# Patient Record
Sex: FEMALE | Race: WHITE | Employment: OTHER | URBAN - METROPOLITAN AREA
[De-identification: names, ages, dates, MRNs, and addresses within clinical notes are randomized per-mention and may not be internally consistent; named-entity substitution may affect disease eponyms.]

---

## 2017-05-24 ENCOUNTER — HOSPITAL ENCOUNTER (OUTPATIENT)
Dept: MAMMOGRAPHY | Age: 72
Discharge: HOME | End: 2017-05-24
Attending: INTERNAL MEDICINE
Payer: MEDICARE

## 2017-05-24 DIAGNOSIS — Z12.31 VISIT FOR SCREENING MAMMOGRAM: ICD-10-CM

## 2017-05-24 PROCEDURE — 77067 SCR MAMMO BI INCL CAD: CPT

## 2017-06-01 ENCOUNTER — HOSPITAL ENCOUNTER (OUTPATIENT)
Dept: MAMMOGRAPHY | Age: 72
Discharge: HOME | End: 2017-06-01
Attending: INTERNAL MEDICINE
Payer: MEDICARE

## 2017-06-01 DIAGNOSIS — R92.8 ABNORMAL MAMMOGRAM OF BOTH BREASTS: ICD-10-CM

## 2017-06-01 PROCEDURE — 77066 DX MAMMO INCL CAD BI: CPT

## 2017-06-29 ENCOUNTER — HOSPITAL ENCOUNTER (OUTPATIENT)
Dept: LAB | Age: 72
Discharge: HOME | End: 2017-06-29

## 2017-06-29 PROCEDURE — 88342 IMHCHEM/IMCYTCHM 1ST ANTB: CPT | Performed by: DERMATOLOGY

## 2017-07-26 RX ORDER — ATENOLOL 50 MG/1
50 TABLET ORAL
Qty: 90 TAB | Refills: 3 | Status: SHIPPED | OUTPATIENT
Start: 2017-07-26 | End: 2018-07-09 | Stop reason: SDUPTHER

## 2017-07-31 ENCOUNTER — HOSPITAL ENCOUNTER (OUTPATIENT)
Dept: LAB | Age: 72
Discharge: HOME | End: 2017-07-31

## 2017-08-23 PROBLEM — G63 NEUROPATHY DUE TO PERIPHERAL VASCULAR DISEASE (HCC): Status: ACTIVE | Noted: 2017-08-23

## 2017-08-23 PROBLEM — I25.10 ARTERIOSCLEROTIC HEART DISEASE (ASHD): Status: ACTIVE | Noted: 2017-08-23

## 2017-08-23 PROBLEM — E03.8 ADULT ONSET HYPOTHYROIDISM: Status: ACTIVE | Noted: 2017-08-23

## 2017-08-23 PROBLEM — M47.812 CERVICAL SPONDYLOSIS: Status: ACTIVE | Noted: 2017-08-23

## 2017-08-23 PROBLEM — M85.80 OSTEOPENIA: Status: ACTIVE | Noted: 2017-08-23

## 2017-08-23 PROBLEM — M26.629 TEMPOROMANDIBULAR JOINT PAIN DYSFUNCTION SYNDROME: Status: ACTIVE | Noted: 2017-08-23

## 2017-08-23 PROBLEM — Z72.0 TOBACCO USER: Status: ACTIVE | Noted: 2017-08-23

## 2017-08-23 PROBLEM — D75.89 MACROCYTOSIS: Status: ACTIVE | Noted: 2017-08-23

## 2017-08-23 PROBLEM — D64.9 ANEMIA: Status: ACTIVE | Noted: 2017-08-23

## 2017-08-23 PROBLEM — E78.5 DYSLIPIDEMIA: Status: ACTIVE | Noted: 2017-08-23

## 2017-08-23 PROBLEM — I73.9 CLAUDICATION IN PERIPHERAL VASCULAR DISEASE (HCC): Status: ACTIVE | Noted: 2017-08-23

## 2017-08-23 PROBLEM — I34.1: Status: ACTIVE | Noted: 2017-08-23

## 2017-08-23 PROBLEM — F41.9 ANXIETY: Status: ACTIVE | Noted: 2017-08-23

## 2017-08-23 PROBLEM — M65.30 TRIGGER FINGER: Status: ACTIVE | Noted: 2017-08-23

## 2017-08-23 PROBLEM — I73.9 NEUROPATHY DUE TO PERIPHERAL VASCULAR DISEASE (HCC): Status: ACTIVE | Noted: 2017-08-23

## 2017-08-23 RX ORDER — PREDNISONE 5 MG/1
TABLET ORAL SEE ADMIN INSTRUCTIONS
COMMUNITY
End: 2017-10-12

## 2017-08-23 RX ORDER — CILOSTAZOL 100 MG/1
TABLET ORAL
COMMUNITY
End: 2017-09-05 | Stop reason: SDUPTHER

## 2017-08-23 RX ORDER — GABAPENTIN 300 MG/1
300 CAPSULE ORAL 3 TIMES DAILY
COMMUNITY
End: 2017-09-18 | Stop reason: SDUPTHER

## 2017-08-23 RX ORDER — DIAPER,BRIEF,ADULT, DISPOSABLE
500 EACH MISCELLANEOUS
COMMUNITY
End: 2018-03-15

## 2017-08-23 RX ORDER — VALACYCLOVIR HYDROCHLORIDE 1 G/1
1000 TABLET, FILM COATED ORAL
COMMUNITY
End: 2018-10-02 | Stop reason: SDUPTHER

## 2017-09-06 RX ORDER — AMLODIPINE BESYLATE 5 MG/1
TABLET ORAL
Qty: 90 TAB | Refills: 3 | Status: SHIPPED | OUTPATIENT
Start: 2017-09-06 | End: 2017-09-26

## 2017-09-06 RX ORDER — CILOSTAZOL 100 MG/1
TABLET ORAL
Qty: 180 TAB | Refills: 3 | Status: SHIPPED | OUTPATIENT
Start: 2017-09-06 | End: 2018-07-30 | Stop reason: SDUPTHER

## 2017-09-14 ENCOUNTER — HOSPITAL ENCOUNTER (OUTPATIENT)
Dept: LAB | Age: 72
Discharge: HOME | End: 2017-09-14

## 2017-09-18 RX ORDER — GABAPENTIN 300 MG/1
300 CAPSULE ORAL 4 TIMES DAILY
Qty: 120 CAP | Refills: 1 | Status: SHIPPED | OUTPATIENT
Start: 2017-09-18 | End: 2017-09-23

## 2017-09-18 RX ORDER — GABAPENTIN 300 MG/1
300 CAPSULE ORAL 4 TIMES DAILY
Qty: 270 CAP | Refills: 3 | Status: SHIPPED | OUTPATIENT
Start: 2017-09-18 | End: 2017-09-23

## 2017-09-18 NOTE — TELEPHONE ENCOUNTER
Requested Prescriptions     Pending Prescriptions Disp Refills    gabapentin (NEURONTIN) 300 mg capsule 270 Cap 3     Sig: Take 1 Cap by mouth three (3) times daily.

## 2017-09-18 NOTE — TELEPHONE ENCOUNTER
Requested Prescriptions     Pending Prescriptions Disp Refills    gabapentin (NEURONTIN) 300 mg capsule 120 Cap 1     Sig: Take 1 Cap by mouth four (4) times daily.

## 2017-09-23 PROBLEM — B00.1 FEVER BLISTER: Status: ACTIVE | Noted: 2017-09-23

## 2017-09-23 PROBLEM — Z00.00 PE (PHYSICAL EXAM), ANNUAL: Status: ACTIVE | Noted: 2017-09-23

## 2017-09-23 PROBLEM — M48.061 LUMBAR SPINAL STENOSIS: Status: ACTIVE | Noted: 2017-09-23

## 2017-09-23 PROBLEM — F32.A DEPRESSION: Status: ACTIVE | Noted: 2017-09-23

## 2017-09-23 PROBLEM — D64.9 ANEMIA: Status: RESOLVED | Noted: 2017-08-23 | Resolved: 2017-09-23

## 2017-09-23 RX ORDER — ATORVASTATIN CALCIUM 40 MG/1
40 TABLET, FILM COATED ORAL DAILY
COMMUNITY
End: 2018-01-30 | Stop reason: SDUPTHER

## 2017-09-23 RX ORDER — LISINOPRIL 40 MG/1
40 TABLET ORAL DAILY
COMMUNITY
End: 2017-10-02 | Stop reason: SDUPTHER

## 2017-09-23 RX ORDER — GABAPENTIN 300 MG/1
600 CAPSULE ORAL 2 TIMES DAILY
COMMUNITY
End: 2018-07-09 | Stop reason: SDUPTHER

## 2017-09-26 ENCOUNTER — OFFICE VISIT (OUTPATIENT)
Dept: INTERNAL MEDICINE CLINIC | Age: 72
End: 2017-09-26

## 2017-09-26 VITALS
HEIGHT: 63 IN | TEMPERATURE: 98.1 F | HEART RATE: 71 BPM | WEIGHT: 126.2 LBS | DIASTOLIC BLOOD PRESSURE: 86 MMHG | SYSTOLIC BLOOD PRESSURE: 151 MMHG | BODY MASS INDEX: 22.36 KG/M2

## 2017-09-26 DIAGNOSIS — E78.5 DYSLIPIDEMIA: ICD-10-CM

## 2017-09-26 DIAGNOSIS — C43.61 MALIGNANT MELANOMA OF RIGHT UPPER EXTREMITY INCLUDING SHOULDER (HCC): ICD-10-CM

## 2017-09-26 DIAGNOSIS — I10 ESSENTIAL HYPERTENSION: ICD-10-CM

## 2017-09-26 DIAGNOSIS — I73.9 PVD (PERIPHERAL VASCULAR DISEASE) (HCC): Primary | ICD-10-CM

## 2017-09-26 PROBLEM — C43.9 MELANOMA (HCC): Status: ACTIVE | Noted: 2017-09-26

## 2017-09-26 LAB
ALBUMIN SERPL-MCNC: 4.2 G/DL (ref 3.9–5.4)
ALKALINE PHOS POC: 83 U/L (ref 38–126)
ALT SERPL-CCNC: 28 U/L (ref 9–52)
AST SERPL-CCNC: 25 U/L (ref 14–36)
BUN BLD-MCNC: 20 MG/DL (ref 7–17)
CALCIUM BLD-MCNC: 9.6 MG/DL (ref 8.4–10.2)
CHLORIDE BLD-SCNC: 105 MMOL/L (ref 98–107)
CHOLEST SERPL-MCNC: 132 MG/DL (ref 0–200)
CO2 POC: 26 MMOL/L (ref 22–32)
CREAT BLD-MCNC: 0.8 MG/DL (ref 0.7–1.2)
EGFR (POC): 73.1
GLUCOSE POC: 88 MG/DL (ref 65–105)
HDLC SERPL-MCNC: 73 MG/DL (ref 35–130)
LDL CHOLESTEROL POC: 41.6 MG/DL (ref 0–130)
POTASSIUM SERPL-SCNC: 4.5 MMOL/L (ref 3.6–5)
PROT SERPL-MCNC: 7.3 G/DL (ref 6.3–8.2)
SODIUM SERPL-SCNC: 144 MMOL/L (ref 137–145)
TCHOL/HDL RATIO (POC): 1.8 (ref 0–4)
TOTAL BILIRUBIN POC: 0.5 MG/DL (ref 0.2–1.3)
TRIGL SERPL-MCNC: 87 MG/DL (ref 0–200)
VLDLC SERPL CALC-MCNC: 17.4 MG/DL

## 2017-09-26 NOTE — PROGRESS NOTES
Subjective:   Viki Gould is a 67 y.o. female      Chief Complaint   Patient presents with    Hypertension     3mth        History of present illness:  Ms. Sally Silverio returns for three month follow up. Since the last visit here there was a mixup in terms of her medications and she got Amlodipine rather than Gabapentin and actually had a bit of withdrawal from being off the Gabapentin for several days time. Interestingly enough, her blood pressure is mildly elevated today and we may have to return to the Amlodipine. She is a bit stressed today, so I'm not going to make that move today and we will check her again in a month's time. If her blood pressure remains elevated we can certainly return to the Amlodipine. She is due for her lipids, etc. today. Those were checked. She denies new cardiorespiratory, GI or  symptoms. Her claudication remains approximately the same. Back pain has been stable and since she has resumed the Gabapentin she has had less issue with the ischemic neuropathy. Overall she is really stable except for her blood pressure being elevated today and we will sort that out by next visit.         Patient Active Problem List    Diagnosis Date Noted    Arteriosclerotic heart disease (ASHD) 08/23/2017     Priority: 1 - One    Claudication in peripheral vascular disease ([facility] [address] 08/23/2017     Priority: 1 - One    PVD (peripheral vascular disease) ([facility] [address]      Priority: 1 - One    CAD (coronary artery disease)      Priority: 1 - One    Hypertension      Priority: 1 - One    Adult onset hypothyroidism 08/23/2017     Priority: 2 - Two    Dyslipidemia 08/23/2017     Priority: 2 - Two    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 ([facility] [address] 08/23/2017     Priority: 2 - Two    Depression 09/23/2017     Priority: 3 - Three    Lumbar spinal stenosis 09/23/2017     Priority: 3 - Three    Familial mitral valve prolapse 08/23/2017     Priority: 3 - Three    Stroke [facility])      Priority: 3 - Three    GERD (gastroesophageal reflux disease)      Priority: 3 - Three    CVD (cerebrovascular disease)      Priority: 3 - Three    Anxiety 08/23/2017     Priority: 4 - Four    Cervical spondylosis 08/23/2017     Priority: 4 - Four    Macrocytosis 08/23/2017     Priority: 4 - Four    Osteopenia 08/23/2017     Priority: 4 - Four    Temporomandibular joint pain dysfunction syndrome 08/23/2017     Priority: 4 - Four    Fever blister 09/23/2017     Priority: 5 - Five    Tobacco user 08/23/2017     Priority: 5 - Five    Trigger finger 08/23/2017     Priority: 5 - Five    Melanoma ([facility] [address] 09/26/2017    PE (physical exam), annual 09/23/2017      Past Surgical History:   Procedure Laterality Date    BYPASS GRAFT OTHR,FEM-POP       RIGHT FEMORAL-POPLITEAL BYPASS  WITH POLYTETRA-FL UOROETHYLENE GRAFT     CARDIAC SURG PROCEDURE UNLIST      stents x2    HX CAROTID ENDARTERECTOMY      right cea    VASCULAR SURGERY PROCEDURE UNLIST      femoral stent - left    VASCULAR SURGERY PROCEDURE UNLIST      X5 right femoral bypass      Allergies   Allergen Reactions    Neomycin Sulfate Unknown (comments)      Family History   Problem Relation Age of Onset   [de-identified] Migraines Mother     Heart Disease Father     Hypertension Father     Lung Disease Father     Cancer Father      H&N    Hypertension Sister     Diabetes Sister     Heart Disease Brother     Hypertension Brother     Hypertension Sister     Cancer Sister      breast    Breast Cancer Sister 54    Hypertension Sister       Social History     Social History    Marital status:      Spouse name: N/A    Number of children: N/A    Years of education: N/A     Occupational History    Not on file.      Social History Main Topics    Smoking status: Former Smoker     Packs/day: 1.00     Years: 35.00     Quit date: 1/21/2008    Smokeless tobacco: Never Used    Alcohol use 2.0 oz/week     4 Glasses of wine per week      Comment: occasionally    Drug use: No    Sexual activity: Not on file     Other Topics Concern    Not on file     Social History Narrative     Outpatient Prescriptions Marked as Taking for the 9/26/17 encounter (Office Visit) with Nuria Hay MD   Medication Sig Dispense Refill    gabapentin (NEURONTIN) 300 mg capsule Take 600 mg by mouth two (2) times a day.  atorvastatin (LIPITOR) 40 mg tablet Take 40 mg by mouth daily.  lisinopril (PRINIVIL, ZESTRIL) 40 mg tablet Take 40 mg by mouth daily.  cilostazol (PLETAL) 100 mg tablet TAKE 1 TABLET TWICE A  Tab 3    valACYclovir (VALTREX) 1 gram tablet Take 1,000 mg by mouth. 2 initially and 2 in 12 hr for fever blisters      DEXTRIN (EASY FIBER PO) Take 30 mL by mouth daily.  atenolol (TENORMIN) 50 mg tablet Take 1 Tab by mouth nightly. 90 Tab 3    levothyroxine (SYNTHROID) 100 mcg tablet Take 100 mcg by mouth Daily (before breakfast).  ascorbic acid (VITAMIN C) 500 mg tablet Take 500 mg by mouth two (2) times a day.  omeprazole (PRILOSEC) 20 mg capsule Take 20 mg by mouth daily.  aspirin (ASPIRIN) 325 mg tablet Take 325 mg by mouth daily.  multivitamin, stress formula (STRESS TAB) tablet Take 1 Tab by mouth daily.  buPROPion SR (WELLBUTRIN SR) 150 mg SR tablet Take 150 mg by mouth daily. Review of Systems              Constitutional:  She denies fever, weight loss, sweats or fatigue. HEENT:  No blurred or double vision, headache or dizziness. No difficulty with swallowing, taste, speech or smell. Respiratory:  No cough, wheezing or shortness of breath. No sputum production. Cardiac:  Denies chest pain, palpitations, unexplained indigestion, syncope, edema, PND or orthopnea. GI:  No changes in bowel movements, no abdominal pain, no bloating, anorexia, nausea, vomiting or heartburn. :  No frequency or dysuria. Denies incontinence. Extremities:  No joint pain, stiffness or swelling.   Skin:  No recent rashes or mole changes. Neurological:  No numbness, tingling, burning paresthesias or loss of motor strength. No syncope, dizziness, frequent headaches or memory loss. Objective:     Vitals:    09/26/17 1049   BP: 151/86   Pulse: 71   Temp: 98.1 °F (36.7 °C)   TempSrc: Oral   Weight: 126 lb 3.2 oz (57.2 kg)   Height: 5' 3\" (1.6 m)   PainSc:   0 - No pain       Body mass index is 22.36 kg/(m^2). Physical Examination:              General Appearance:  Well-developed, well-nourished, no acute  distress. HEENT:      Ears:  The TMs and ear canals were clear. Eyes:  The pupillary responses were normal.  Extraocular muscle function intact. Lids and conjunctiva not injected. Neck:  Supple without thyromegaly or adenopathy. No JVD noted. Lungs:  Clear to auscultation and percussion. Cardiac:  Regular rate and rhythm without murmur. GI: nontender w/o mass. Normal BS's. Extremities:  No clubbing, cyanosis or edema. Skin:  No rash or unusual mole changes noted. Neurological:  Grossly normal.            Assessment/Plan:   Impressions:      ICD-10-CM ICD-9-CM    1. PVD (peripheral vascular disease) ([facility]) I73.9 443.9 AMB POC COMPREHENSIVE METABOLIC PANEL   2. Essential hypertension I10 401.9 AMB POC COMPREHENSIVE METABOLIC PANEL   3. Dyslipidemia E78.5 272.4 AMB POC COMPREHENSIVE METABOLIC PANEL      AMB POC LIPID PROFILE   4. Malignant melanoma of right upper extremity including shoulder ([facility]) C43.61 172.6         Plan:  1. Continue present meds  2. Lifestyle modifications including Na restriction, low carb/fat diet, weight reduction and exercise (at least a walking program). Follow-up Disposition:  Return in about 4 weeks (around 10/24/2017).       Orders Placed This Encounter    AMB POC Isha Horne MD

## 2017-09-26 NOTE — PROGRESS NOTES
Reviewed record in preparation for visit and have obtained necessary documentation. Identified pt with two pt identifiers(name and ). Chief Complaint   Patient presents with    Hypertension     3mth        Coordination of Care Questionnaire:  :     1) Have you been to an emergency room, urgent care clinic since your last visit? No     Hospitalized since your last visit? No               2) Have you seen or consulted any other health care providers outside of [address] [place] [address] since your last visit?  Yes Dr Duron Rm

## 2017-09-27 NOTE — PROGRESS NOTES
Patient informed that Blood sugar, liver and kidney function normal.  Lipids excellent.   LDL 42 and

## 2017-10-03 RX ORDER — LISINOPRIL 40 MG/1
40 TABLET ORAL DAILY
Qty: 90 TAB | Refills: 3 | Status: SHIPPED | OUTPATIENT
Start: 2017-10-03 | End: 2018-09-26 | Stop reason: SDUPTHER

## 2017-10-03 RX ORDER — LEVOTHYROXINE SODIUM 100 UG/1
100 TABLET ORAL
Qty: 90 TAB | Refills: 3 | Status: SHIPPED | OUTPATIENT
Start: 2017-10-03 | End: 2018-09-26 | Stop reason: SDUPTHER

## 2017-10-03 NOTE — TELEPHONE ENCOUNTER
Requested Prescriptions     Pending Prescriptions Disp Refills    lisinopril (PRINIVIL, ZESTRIL) 40 mg tablet 90 Tab 3     Sig: Take 1 Tab by mouth daily.  levothyroxine (SYNTHROID) 100 mcg tablet 90 Tab 3     Sig: Take 1 Tab by mouth Daily (before breakfast).

## 2017-10-12 ENCOUNTER — APPOINTMENT (OUTPATIENT)
Dept: GENERAL RADIOLOGY | Age: 72
End: 2017-10-12
Attending: EMERGENCY MEDICINE
Payer: MEDICARE

## 2017-10-12 ENCOUNTER — HOSPITAL ENCOUNTER (EMERGENCY)
Age: 72
Discharge: HOME | End: 2017-10-12
Attending: EMERGENCY MEDICINE
Payer: MEDICARE

## 2017-10-12 VITALS
TEMPERATURE: 97.8 F | BODY MASS INDEX: 21.51 KG/M2 | OXYGEN SATURATION: 95 % | DIASTOLIC BLOOD PRESSURE: 75 MMHG | WEIGHT: 126 LBS | HEIGHT: 64 IN | SYSTOLIC BLOOD PRESSURE: 149 MMHG | HEART RATE: 75 BPM | RESPIRATION RATE: 26 BRPM

## 2017-10-12 DIAGNOSIS — M50.30 DEGENERATIVE DISC DISEASE, CERVICAL: Primary | ICD-10-CM

## 2017-10-12 PROCEDURE — 99283 EMERGENCY DEPT VISIT LOW MDM: CPT

## 2017-10-12 PROCEDURE — 72050 X-RAY EXAM NECK SPINE 4/5VWS: CPT

## 2017-10-12 PROCEDURE — 74011250637 HC RX REV CODE- 250/637: Performed by: EMERGENCY MEDICINE

## 2017-10-12 RX ORDER — CYCLOBENZAPRINE HCL 10 MG
10 TABLET ORAL
Status: COMPLETED | OUTPATIENT
Start: 2017-10-12 | End: 2017-10-12

## 2017-10-12 RX ORDER — PREDNISONE 20 MG/1
60 TABLET ORAL DAILY
Qty: 15 TAB | Refills: 0 | Status: SHIPPED | OUTPATIENT
Start: 2017-10-12 | End: 2017-10-17

## 2017-10-12 RX ORDER — CYCLOBENZAPRINE HCL 10 MG
10 TABLET ORAL
Qty: 12 TAB | Refills: 0 | Status: SHIPPED | OUTPATIENT
Start: 2017-10-12 | End: 2018-02-28 | Stop reason: SDUPTHER

## 2017-10-12 RX ORDER — HYDROCODONE BITARTRATE AND ACETAMINOPHEN 10; 325 MG/1; MG/1
1 TABLET ORAL
Status: COMPLETED | OUTPATIENT
Start: 2017-10-12 | End: 2017-10-12

## 2017-10-12 RX ORDER — HYDROCODONE BITARTRATE AND ACETAMINOPHEN 5; 325 MG/1; MG/1
1-2 TABLET ORAL
Qty: 20 TAB | Refills: 0 | Status: SHIPPED | OUTPATIENT
Start: 2017-10-12 | End: 2018-03-15

## 2017-10-12 RX ORDER — ONDANSETRON 4 MG/1
8 TABLET, ORALLY DISINTEGRATING ORAL
Status: COMPLETED | OUTPATIENT
Start: 2017-10-12 | End: 2017-10-12

## 2017-10-12 RX ADMIN — ONDANSETRON 8 MG: 4 TABLET, ORALLY DISINTEGRATING ORAL at 05:51

## 2017-10-12 RX ADMIN — HYDROCODONE BITARTRATE AND ACETAMINOPHEN 1 TABLET: 10; 325 TABLET ORAL at 05:51

## 2017-10-12 RX ADMIN — CYCLOBENZAPRINE HYDROCHLORIDE 10 MG: 10 TABLET, FILM COATED ORAL at 05:51

## 2017-10-12 NOTE — DISCHARGE INSTRUCTIONS
Cervical Disc Disease: Care Instructions  Your Care Instructions    Cervical disc disease results from damage, disease, or wear and tear to the discs between the bones (vertebra) in your neck. The discs act as shock absorbers for the spine and keep the spine flexible. When a disc is damaged, it can bulge out and press against the nerve roots or spinal cord. This is sometimes called a herniated or \"slipped disc. \" This pressure can cause pain and numbness or tingling in your arms and hands. It can also cause weakness in your legs. An accident can damage a disc and cause it to break open (rupture). Aging and hard physical work can also cause damage to cervical discs. The first treatments for cervical disc disease include physical therapy, special neck exercises, heat, and pain medicine. If these fail, your doctor may inject steroids and pain medicine into your neck. Surgery is usually done only if other treatments have not worked. Follow-up care is a key part of your treatment and safety. Be sure to make and go to all appointments, and call your doctor if you are having problems. It's also a good idea to know your test results and keep a list of the medicines you take. How can you care for yourself at home? · Take pain medicines exactly as directed. ¨ If the doctor gave you a prescription medicine for pain, take it as prescribed. ¨ If you are not taking a prescription pain medicine, ask your doctor if you can take an over-the-counter medicine. · Don't spend too long in one position. Take short breaks to move around and change positions. · Wear a seat belt and shoulder harness when you are in a car. · Sleep with a pillow under your head and neck that keeps your neck straight. · Follow your doctor's instructions for gentle neck-stretching exercises. · Do not smoke. Smoking can slow healing of your discs. If you need help quitting, talk to your doctor about stop-smoking programs and medicines.  These can increase your chances of quitting for good. · Avoid strenuous work or exercise until your doctor says it is okay. When should you call for help? Call 911 anytime you think you may need emergency care. For example, call if:  · You are unable to move an arm or a leg at all. Call your doctor now or seek immediate medical care if:  · You have new or worse symptoms in your arms, legs, chest, belly, or buttocks. Symptoms may include:  ¨ Numbness or tingling. ¨ Weakness. ¨ Pain. · You lose bladder or bowel control. Watch closely for changes in your health, and be sure to contact your doctor if:  · You are not getting better as expected. Where can you learn more? Go to http://mehnaz-oscar.info/. Enter N118 in the search box to learn more about \"Cervical Disc Disease: Care Instructions. \"  Current as of: March 21, 2017  Content Version: 11.3  © 6698-5825 [employer]. Care instructions adapted under license by [employer] (which disclaims liability or warranty for this information). If you have questions about a medical condition or this instruction, always ask your healthcare professional. [place] [address] any warranty or liability for your use of this information. We hope that we have addressed all of your medical concerns. The examination and treatment you received in the Emergency Department were for an emergent problem and were not intended as complete care. It is important that you follow up with your healthcare provider(s) for ongoing care. If your symptoms worsen or do not improve as expected, and you are unable to reach your usual health care provider(s), you should return to the Emergency Department. Today's healthcare is undergoing tremendous change, and patient satisfaction surveys are one of the many tools to assess the quality of medical care.   You may receive a survey from the [employer] regarding your experience in the Emergency Department. I hope that your experience has been completely positive, particularly the medical care that I provided. As such, please participate in the survey; anything less than excellent does not meet my expectations or intentions. [facility] and [facility] participate in nationally recognized quality of care measures. If your blood pressure is greater than 120/80, as reported below, we urge that you seek medical care to address the potential of high blood pressure, commonly known as hypertension. Hypertension can be hereditary or can be caused by certain medical conditions, pain, stress, or \"white coat syndrome. \"       Please make an appointment with your health care provider(s) for follow up of your Emergency Department visit. VITALS:   Patient Vitals for the past 8 hrs:   Temp Pulse Resp BP SpO2   10/12/17 0524 97.7 °F (36.5 °C) 71 24 (!) 181/105 95 %          Thank you for allowing us to provide you with medical care today. We realize that you have many choices for your emergency care needs. Please choose us in the future for any continued health care needs. Dilan Rios MD    [facility]: [phone]            No results found for this or any previous visit (from the past 24 hour(s)). Xr Spine Cerv 4 Or 5 V    Result Date: 10/12/2017  CLINICAL HISTORY:  neck pain, Neck pain for 2 days decreased range of motion INDICATION: Neck pain COMPARISON: 10/6/2013 FINDINGS: 4 views of the cervical spine are obtained. The spinal alignment is normal. Loss of intervertebral disc height C4-5 C5-C6 C6-7. Anterior disc osteophytes. Right cervical surgical clips related to prior vascular surgery. There are no identifiable paravertebral soft tissue abnormalities. Prevertebral soft tissues unremarkable. Atlanto-dental interval within normal limits. No evidence of subluxation. IMPRESSION: No acute process No fracture

## 2017-10-12 NOTE — ED NOTES
MD  reviewed discharge instructions with the patient. The patient verbalized understanding. Patient ambulated out of ED by herself. No complaints or concerns noted.

## 2017-10-12 NOTE — ED TRIAGE NOTES
Patient arriving c/o neck pain x 2 days after she was cleaning out some closets. Decreased ROM noted, patient unable to turn laterally without significant pain.

## 2017-10-12 NOTE — ED PROVIDER NOTES
Patient is a 67 y.o. female presenting with neck pain. Neck Pain    The problem has been gradually worsening. Associated symptoms include headaches. Pertinent negatives include no chest pain, no numbness and no weakness. 67year old female with history of hypothyroid, anemia, anxiety, cad, recurrent neck and back pain, reports with severe neck pain that started yesterday. She was cleaning out her closets 2 days ago and noticed discomfort in her neck the following day. Pain progressively got worse despite ice/heat. She did take tylenol this evening. No radiation of pain or weakness/numbness. No fever/chills/sob/n/v/d, urinating ok. NO trauma. States similar pain seen here a few years ago and xrays showed diffuse disease. She was last on prednisone for a back flair up last month. Pain is worse with any movement of her neck.      Past Medical History:   Diagnosis Date    Adult onset hypothyroidism 8/23/2017    Anemia 8/23/2017    Anxiety 8/23/2017    Arteriosclerotic heart disease (ASHD) 8/23/2017    Arthritis     back, hip right, neck    CAD (coronary artery disease)          Cervical spondylosis 8/23/2017    Chronic pain     spinal stenosis, bulging disk and bone spurs in back    Claudication in peripheral vascular disease ([address] 8/23/2017    CVD (cerebrovascular disease)     S/P right CEA    Depression     Dyslipidemia 8/23/2017    Familial mitral valve prolapse 8/23/2017    GERD (gastroesophageal reflux disease)     Hyperlipidemia     Hypertension     Hypothyroidism     Lumbar spinal stenosis     Macrocytosis 8/23/2017    Nausea & vomiting     surgery related, severe constipation    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 ([address] 8/23/2017    Osteopenia 8/23/2017    Osteoporosis     Other ill-defined conditions(799.89)     severe constipation from pain medication    PVD (peripheral vascular disease) ([address]     S/P stent Right CFA and Left iliac    Stroke ([address]     Temporomandibular joint pain dysfunction syndrome 8/23/2017    Thyroid disease     TIA (transient ischemic attack)     Tobacco user 8/23/2017    Trigger finger 8/23/2017       Past Surgical History:   Procedure Laterality Date    BYPASS GRAFT OTHR,FEM-POP       RIGHT FEMORAL-POPLITEAL BYPASS  WITH POLYTETRA-FL UOROETHYLENE GRAFT     CARDIAC SURG PROCEDURE UNLIST      stents x2    HX CAROTID ENDARTERECTOMY      right cea    VASCULAR SURGERY PROCEDURE UNLIST      femoral stent - left    VASCULAR SURGERY PROCEDURE UNLIST      X5 right femoral bypass         Family History:   Problem Relation Age of Onset   [facility] Migraines Mother     Heart Disease Father     Hypertension Father     Lung Disease Father     Cancer Father      H&N    Hypertension Sister     Diabetes Sister     Heart Disease Brother     Hypertension Brother     Hypertension Sister     Cancer Sister      breast    Breast Cancer Sister 54    Hypertension Sister        Social History     Social History    Marital status:      Spouse name: N/A    Number of children: N/A    Years of education: N/A     Occupational History    Not on file. Social History Main Topics    Smoking status: Former Smoker     Packs/day: 1.00     Years: 35.00     Quit date: 1/21/2008    Smokeless tobacco: Never Used    Alcohol use 2.0 oz/week     4 Glasses of wine per week      Comment: occasionally    Drug use: No    Sexual activity: Not on file     Other Topics Concern    Not on file     Social History Narrative         ALLERGIES: Neomycin sulfate    Review of Systems   Constitutional: Negative for fever. HENT: Negative for congestion. Eyes: Negative for visual disturbance. Respiratory: Negative for cough and shortness of breath. Cardiovascular: Negative for chest pain. Gastrointestinal: Negative for abdominal pain, nausea and vomiting. Endocrine: Negative for polyuria. Genitourinary: Negative for dysuria. Musculoskeletal: Positive for neck pain. Negative for gait problem. Skin: Negative for rash. Neurological: Positive for headaches. Negative for weakness and numbness. Psychiatric/Behavioral: Negative for dysphoric mood. Vitals:    10/12/17 0524   BP: (!) 181/105   Pulse: 71   Resp: 24   Temp: 97.7 °F (36.5 °C)   SpO2: 95%   Weight: 57.2 kg (126 lb)   Height: 5' 4\" (1.626 m)            Physical Exam   Constitutional: She is oriented to person, place, and time. She appears well-developed and well-nourished. No distress. HENT:   Head: Normocephalic and atraumatic. Mouth/Throat: Oropharynx is clear and moist. No oropharyngeal exudate. Eyes: Conjunctivae and EOM are normal. Pupils are equal, round, and reactive to light. Right eye exhibits no discharge. Left eye exhibits no discharge. No scleral icterus. Neck: Normal range of motion. Neck supple. No JVD present. Tenderness midline cervical spine   Cardiovascular: Normal rate, regular rhythm, normal heart sounds and intact distal pulses. Exam reveals no gallop and no friction rub. No murmur heard. Pulmonary/Chest: Effort normal and breath sounds normal. No stridor. No respiratory distress. She has no wheezes. She has no rales. She exhibits no tenderness. Abdominal: Soft. Bowel sounds are normal. She exhibits no distension and no mass. There is no tenderness. There is no rebound and no guarding. Musculoskeletal: Normal range of motion. She exhibits no edema or tenderness. Neurological: She is alert and oriented to person, place, and time. She has normal reflexes. No cranial nerve deficit. She exhibits normal muscle tone. Coordination normal.   Skin: Skin is warm and dry. No rash noted. No erythema. Psychiatric: She has a normal mood and affect. Her behavior is normal. Judgment and thought content normal.        MDM  ED Course       Procedures      Medicate for pain, xray cspine. Xray non acute change- diffuse DJD.   NO worrisome neurologic findings on exam. Better with meds in ED. Follow up with spine doctor. D/C with norco, flexeril and steroids. Return if worsening.

## 2017-10-13 NOTE — CALL BACK NOTE
[facility] Emergency Department Follow Up Call Record    Discharged to : Home/Family Home/Home Health/Skilled Facility/Rehab/Assisted Living/Other_home____  1) Did you receive your discharge instructions? Yes        2) Do you understand them? Yes         3) Are you able to follow them? Yes          If NO, what can I clarify for you? 4) Do you understand your diagnosis? Yes         5) Do you know which symptoms should prompt you to call the doctor? Yes     6) Were you able to fill and  any medications that were prescribed? Yes     7) You were prescribed ___________for ____________________. Common side effects of this medication are____________________. This is not a complete list so please review the forms given from the pharmacy for a complete list.      8) Are there any questions about your medications? No            Have you scheduled any recommended doctors appointments (specialty, PCP) NO. Christine Nguyen reports her neck feeling better today. She will make appointment with PCP if continues to have neck pain. If NO, what barriers are you encountering (transportation/lost contact info/cost/  didnt think necessary/no PCP  9) If discharged with Home Health, has the agency contacted you to schedule visit? N/A   10) Is there anyone available to help you at home (meals, errands, transportation    monitoring) (adult children, neighbors, private duty companions) No    11) Are you on a special diet? No         If YES, do you understand the requirements for this diet? Education provided? 12) If presented with cough, bronchitis, COPD, asthma, is it ok to ask that the   respiratory disease management educator call you? Not applicable      13)  A) If presented with fall, were you issued an assistive device in the ED    Are you using? Not applicable  B) If given RX for device, have you obtained? Not applicable       If NO, barriers?   C) Therapist recommended:   Are you able to implement the suggestions? Not applicable        If NO, barriers to implementation? D) Are you having any difficulties with mobility inside your home?     (steps, bed, tub)No   If YES, ask if the [facility] PT can contact patient and good time and number?  14)  At the end of your discharge instructions, there is information about accessing [facility], have you had a chance to review those? No         Do you have any questions about signing up for this service? We encourage our patients to be active participants in their healthcare and this site is one of the ways to do that. It will allow you to access parts of your medical record, email your doctors office, schedule appointments, and request medications refills . 15) Are there any other questions that I can answer for you regarding    your Emergency department visit?  NO             Estimated Call Time:___11:34 AM  ________________ Date/Time:_______________

## 2017-10-25 ENCOUNTER — OFFICE VISIT (OUTPATIENT)
Dept: INTERNAL MEDICINE CLINIC | Age: 72
End: 2017-10-25

## 2017-10-25 VITALS
HEART RATE: 88 BPM | TEMPERATURE: 98.4 F | DIASTOLIC BLOOD PRESSURE: 62 MMHG | SYSTOLIC BLOOD PRESSURE: 136 MMHG | HEIGHT: 64 IN | BODY MASS INDEX: 21.68 KG/M2 | WEIGHT: 127 LBS | OXYGEN SATURATION: 96 %

## 2017-10-25 DIAGNOSIS — Z23 ENCOUNTER FOR IMMUNIZATION: ICD-10-CM

## 2017-10-25 NOTE — PROGRESS NOTES
Subjective:   Danya Diaz is a 67 y.o. female      Chief Complaint   Patient presents with    Follow-up     1 mo        History of present illness:  Ms. Erma Patel returns for recheck of her blood pressure, which is excellent today. She did have an episode where she developed severe neck pain after the last time she was here and ended up in [facility] ER and required a steroid pack and pain medications for a while. We suggested that she go for physical therapy, but she's not really having much pain now and it's only an intermittent process. I suggested therapeutic massage instead. She denies any new cardiorespiratory, GI or  symptoms and feels well and does not require any adjustment in her medications as of now. She'll follow up in two months time to return to her regular three month follow up schedule.         Patient Active Problem List    Diagnosis Date Noted    Arteriosclerotic heart disease (ASHD) 08/23/2017     Priority: 1 - One    Claudication in peripheral vascular disease ([facility] 08/23/2017     Priority: 1 - One    [facility]      Priority: 1 - One    CAD (coronary artery disease)      Priority: 1 - One    Hypertension      Priority: 1 - One    Adult onset hypothyroidism 08/23/2017     Priority: 2 - Two    Dyslipidemia 08/23/2017     Priority: 2 - Two    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 ([facility] 08/23/2017     Priority: 2 - Two    Depression 09/23/2017     Priority: 3 - Three    Lumbar spinal stenosis 09/23/2017     Priority: 3 - Three    Familial mitral valve prolapse 08/23/2017     Priority: 3 - Three    Stroke [facility])      Priority: 3 - Three    GERD (gastroesophageal reflux disease)      Priority: 3 - Three    CVD (cerebrovascular disease)      Priority: 3 - Three    Anxiety 08/23/2017     Priority: 4 - Four    Cervical spondylosis 08/23/2017     Priority: 4 - Four    Macrocytosis 08/23/2017     Priority: 4 - Four    Osteopenia 08/23/2017     Priority: 4 - Four    Temporomandibular joint pain dysfunction syndrome 08/23/2017     Priority: 4 - Four    Fever blister 09/23/2017     Priority: 5 - Five    Tobacco user 08/23/2017     Priority: 5 - Five    Trigger finger 08/23/2017     Priority: 5 - Five    Melanoma ([facility] [address] 09/26/2017    PE (physical exam), annual 09/23/2017      Past Surgical History:   Procedure Laterality Date    BYPASS GRAFT OTHR,FEM-POP       RIGHT FEMORAL-POPLITEAL BYPASS  WITH POLYTETRA-FL UOROETHYLENE GRAFT     CARDIAC SURG PROCEDURE UNLIST      stents x2    HX CAROTID ENDARTERECTOMY      right cea    VASCULAR SURGERY PROCEDURE UNLIST      femoral stent - left    VASCULAR SURGERY PROCEDURE UNLIST      X5 right femoral bypass      Allergies   Allergen Reactions    Neomycin Sulfate Unknown (comments)      Family History   Problem Relation Age of Onset   [facility] Migraines Mother     Heart Disease Father     Hypertension Father     Lung Disease Father     Cancer Father      H&N    Hypertension Sister     Diabetes Sister     Heart Disease Brother     Hypertension Brother     Hypertension Sister     Cancer Sister      breast    Breast Cancer Sister 54    Hypertension Sister       Social History     Social History    Marital status:      Spouse name: N/A    Number of children: N/A    Years of education: N/A     Occupational History    Not on file.      Social History Main Topics    Smoking status: Former Smoker     Packs/day: 1.00     Years: 35.00     Quit date: 1/21/2008    Smokeless tobacco: Never Used    Alcohol use 2.0 oz/week     4 Glasses of wine per week      Comment: occasionally    Drug use: No    Sexual activity: Not on file     Other Topics Concern    Not on file     Social History Narrative     Outpatient Prescriptions Marked as Taking for the 10/25/17 encounter (Office Visit) with Nirmala Mejia MD   Medication Sig Dispense Refill    HYDROcodone-acetaminophen (NORCO) 5-325 mg per tablet Take 1-2 Tabs by mouth every four (4) hours as needed for Pain. Max Daily Amount: 12 Tabs. 20 Tab 0    cyclobenzaprine (FLEXERIL) 10 mg tablet Take 1 Tab by mouth three (3) times daily as needed for Muscle Spasm(s). 12 Tab 0    lisinopril (PRINIVIL, ZESTRIL) 40 mg tablet Take 1 Tab by mouth daily. 90 Tab 3    levothyroxine (SYNTHROID) 100 mcg tablet Take 1 Tab by mouth Daily (before breakfast). 90 Tab 3    gabapentin (NEURONTIN) 300 mg capsule Take 600 mg by mouth two (2) times a day.  atorvastatin (LIPITOR) 40 mg tablet Take 40 mg by mouth daily.  cilostazol (PLETAL) 100 mg tablet TAKE 1 TABLET TWICE A  Tab 3    valACYclovir (VALTREX) 1 gram tablet Take 1,000 mg by mouth. 2 initially and 2 in 12 hr for fever blisters      DEXTRIN (EASY FIBER PO) Take 30 mL by mouth daily.  lysine (L-LYSINE) 500 mg tab tablet Take 500 mg by mouth three (3) times daily as needed.  atenolol (TENORMIN) 50 mg tablet Take 1 Tab by mouth nightly. 90 Tab 3    ibandronate (BONIVA) 150 mg tablet Take 150 mg by mouth every thirty (30) days.  Omega-3 Fatty Acids-Fish Oil (OMEGA 3 FISH OIL) 684-1,200 mg CpDR Take 2 Tabs by mouth daily.  ascorbic acid (VITAMIN C) 500 mg tablet Take 500 mg by mouth two (2) times a day.  omeprazole (PRILOSEC) 20 mg capsule Take 20 mg by mouth daily.  aspirin (ASPIRIN) 325 mg tablet Take 325 mg by mouth daily.  multivitamin, stress formula (STRESS TAB) tablet Take 1 Tab by mouth daily.  buPROPion SR (WELLBUTRIN SR) 150 mg SR tablet Take 150 mg by mouth daily. Review of Systems              Constitutional:  She denies fever, weight loss, sweats or fatigue. HEENT:  No blurred or double vision, headache or dizziness. No difficulty with swallowing, taste, speech or smell. Respiratory:  No cough, wheezing or shortness of breath. No sputum production.   Cardiac:  Denies chest pain, palpitations, unexplained indigestion, syncope, edema, PND or orthopnea. GI:  No changes in bowel movements, no abdominal pain, no bloating, anorexia, nausea, vomiting or heartburn. :  No frequency or dysuria. Denies incontinence. Extremities:  No joint pain, stiffness or swelling. Skin:  No recent rashes or mole changes. Neurological:  No numbness, tingling, burning paresthesias or loss of motor strength. No syncope, dizziness, frequent headaches or memory loss. Objective:     Vitals:    10/25/17 1306   BP: 136/62   Pulse: 88   Temp: 98.4 °F (36.9 °C)   TempSrc: Oral   SpO2: 96%   Weight: 127 lb (57.6 kg)   Height: 5' 4\" (1.626 m)       Body mass index is 21.8 kg/(m^2). Physical Examination:              General Appearance:  Well-developed, well-nourished, no acute  distress. HEENT:      Ears:  The TMs and ear canals were clear. Eyes:  The pupillary responses were normal.  Extraocular muscle function intact. Lids and conjunctiva not injected. Neck:  Supple without thyromegaly or adenopathy. No JVD noted. Lungs:  Clear to auscultation and percussion. Cardiac:  Regular rate and rhythm without murmur. GI: nontender w/o mass. Normal BS's. Extremities:  No clubbing, cyanosis or edema. Skin:  No rash or unusual mole changes noted. Neurological:  Grossly normal.            Assessment/Plan:   Impressions:      ICD-10-CM ICD-9-CM    1. Encounter for immunization Z23 V03.89 INFLUENZA VIRUS VACCINE, HIGH DOSE SEASONAL, PRESERVATIVE FREE      ADMIN INFLUENZA VIRUS VAC        Plan:  1. Continue present meds  2. Lifestyle modifications including Na restriction, low carb/fat diet, weight reduction and exercise (at least a walking program). Follow-up Disposition:  Return in about 2 months (around 12/25/2017).       Orders Placed This Encounter    Influenza virus vaccine ([address]) 72 years and older ([address])   [address] fee () for Medicare insured patients       Sathish Retana MD

## 2017-10-25 NOTE — PROGRESS NOTES
Reviewed record in preparation for visit and have obtained necessary documentation. Identified pt with two pt identifiers(name and ). Chief Complaint   Patient presents with    Follow-up     1 mo        Coordination of Care Questionnaire:  :     1) Have you been to an emergency room, urgent care clinic since your last visit? Yes    Hospitalized since your last visit? No     When: 10/12/17  Where: [facility]  Diagnosis:  DJD          2) Have you seen or consulted any other health care providers outside of [address] [place] [address] since your last visit? No        Luis Adjutant who present for routine immunization, flu shot. She denies any symptoms , reactions or allergies that would exclude them from being immunized today. Risks and adverse reactions were discussed and the VIS was given to them. All questions were addressed. She was observed for 15 min post injection. There were no reactions observed.     James López, JOSEN

## 2017-12-15 ENCOUNTER — HOSPITAL ENCOUNTER (OUTPATIENT)
Dept: LAB | Age: 72
Discharge: HOME | End: 2017-12-15

## 2017-12-18 ENCOUNTER — HOSPITAL ENCOUNTER (OUTPATIENT)
Dept: LAB | Age: 72
Discharge: HOME | End: 2017-12-18

## 2018-01-03 ENCOUNTER — TELEPHONE (OUTPATIENT)
Dept: INTERNAL MEDICINE CLINIC | Age: 73
End: 2018-01-03

## 2018-01-03 RX ORDER — BUPROPION HYDROCHLORIDE 150 MG/1
TABLET, EXTENDED RELEASE ORAL
Qty: 90 TAB | Refills: 3 | Status: SHIPPED | OUTPATIENT
Start: 2018-01-03 | End: 2018-12-22 | Stop reason: SDUPTHER

## 2018-01-03 NOTE — TELEPHONE ENCOUNTER
Patient called and said she had a really bad sore throat which resulted in her coughing. Wanted to know what Dr. Jose Bowers would recommend for this.  Please call

## 2018-01-09 ENCOUNTER — OFFICE VISIT (OUTPATIENT)
Dept: INTERNAL MEDICINE CLINIC | Age: 73
End: 2018-01-09

## 2018-01-09 VITALS
HEIGHT: 64 IN | WEIGHT: 133 LBS | HEART RATE: 84 BPM | BODY MASS INDEX: 22.71 KG/M2 | SYSTOLIC BLOOD PRESSURE: 129 MMHG | DIASTOLIC BLOOD PRESSURE: 76 MMHG | TEMPERATURE: 97.9 F

## 2018-01-09 DIAGNOSIS — F33.9 RECURRENT DEPRESSION (HCC): ICD-10-CM

## 2018-01-09 DIAGNOSIS — I10 ESSENTIAL HYPERTENSION: Primary | ICD-10-CM

## 2018-01-09 DIAGNOSIS — I73.9 PVD (PERIPHERAL VASCULAR DISEASE) (HCC): ICD-10-CM

## 2018-01-09 DIAGNOSIS — I73.9 NEUROPATHY DUE TO PERIPHERAL VASCULAR DISEASE (HCC): ICD-10-CM

## 2018-01-09 DIAGNOSIS — I73.9 CLAUDICATION IN PERIPHERAL VASCULAR DISEASE (HCC): ICD-10-CM

## 2018-01-09 DIAGNOSIS — E78.5 DYSLIPIDEMIA: ICD-10-CM

## 2018-01-09 DIAGNOSIS — G63 NEUROPATHY DUE TO PERIPHERAL VASCULAR DISEASE (HCC): ICD-10-CM

## 2018-01-09 LAB
BUN BLD-MCNC: 31 MG/DL (ref 7–17)
CALCIUM BLD-MCNC: 9.7 MG/DL (ref 8.4–10.2)
CHLORIDE BLD-SCNC: 106 MMOL/L (ref 98–107)
CO2 POC: 27 MMOL/L (ref 22–32)
CREAT BLD-MCNC: 1 MG/DL (ref 0.7–1.2)
EGFR (POC): 55.8
GLUCOSE POC: 124 MG/DL (ref 65–105)
POTASSIUM SERPL-SCNC: 5 MMOL/L (ref 3.6–5)
SODIUM SERPL-SCNC: 144 MMOL/L (ref 137–145)

## 2018-01-09 NOTE — PROGRESS NOTES
Subjective:   Diann Paiz is a 67 y.o. female      Chief Complaint   Patient presents with    Follow-up     cough        History of present illness:  Ms. Wall Likes returns in follow up. Actually she seems to be doing very well since her last visit here. Her blood pressure is still quite stable and normal.  It had been elevated for a short period of time, but it seems to be back down to the normal range. She denies new cardiorespiratory, GI or  symptoms. She continues to have problems with her legs from the ischemic neuropathy. She still has some claudication from her peripheral vascular disease at times. Overall she can manage well in terms of walking distances that she needs to, however. She is a little bit less depressed than usual.  She is smiling more. She remains on her usual medications and has experienced no new issues. She has completed a move from where she used to live for 39 years into a small house and seems satisfied with this, although she wasn't sure she was going to be to begin with. She continues follow up with Dr. Sheela Freed periodically. She is due here in March. I would not change anything at this point. Has had recent URI which is resolving and requires no Rx.     Patient Active Problem List    Diagnosis Date Noted    Arteriosclerotic heart disease (ASHD) 08/23/2017     Priority: 1 - One    Claudication in peripheral vascular disease ([facility] [address] 08/23/2017     Priority: 1 - One    PVD (peripheral vascular disease) ([address]      Priority: 1 - One    CAD (coronary artery disease)      Priority: 1 - One    Hypertension      Priority: 1 - One    Adult onset hypothyroidism 08/23/2017     Priority: 2 - Two    Dyslipidemia 08/23/2017     Priority: 2 - Two    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 ([address] 08/23/2017     Priority: 2 - Two    Melanoma ([address] 09/26/2017     Priority: 3 - Three    Depression 09/23/2017     Priority: 3 - Three    Lumbar spinal stenosis 09/23/2017 Priority: 3 - Three    Familial mitral valve prolapse 08/23/2017     Priority: 3 - Three    Stroke [facility])      Priority: 3 - Three    GERD (gastroesophageal reflux disease)      Priority: 3 - Three    CVD (cerebrovascular disease)      Priority: 3 - Three    Anxiety 08/23/2017     Priority: 4 - Four    Cervical spondylosis 08/23/2017     Priority: 4 - Four    Macrocytosis 08/23/2017     Priority: 4 - Four    Osteopenia 08/23/2017     Priority: 4 - Four    Temporomandibular joint pain dysfunction syndrome 08/23/2017     Priority: 4 - Four    Fever blister 09/23/2017     Priority: 5 - Five    Tobacco user 08/23/2017     Priority: 5 - Five    Trigger finger 08/23/2017     Priority: 5 - Five    Recurrent depression ([facility] [address] 01/09/2018    PE (physical exam), annual 09/23/2017      Past Surgical History:   Procedure Laterality Date    BYPASS GRAFT OTHR,FEM-POP       RIGHT FEMORAL-POPLITEAL BYPASS  WITH POLYTETRA-FL UOROETHYLENE GRAFT     CARDIAC SURG PROCEDURE UNLIST      stents x2    HX CAROTID ENDARTERECTOMY      right cea    VASCULAR SURGERY PROCEDURE UNLIST      femoral stent - left    VASCULAR SURGERY PROCEDURE UNLIST      X5 right femoral bypass      Allergies   Allergen Reactions    Neomycin Sulfate Unknown (comments)      Family History   Problem Relation Age of Onset   [facility] Migraines Mother     Heart Disease Father     Hypertension Father     Lung Disease Father     Cancer Father      H&N    Hypertension Sister     Diabetes Sister     Heart Disease Brother     Hypertension Brother     Hypertension Sister     Cancer Sister      breast    Breast Cancer Sister 54    Hypertension Sister       Social History     Social History    Marital status:      Spouse name: N/A    Number of children: N/A    Years of education: N/A     Occupational History    Not on file.      Social History Main Topics    Smoking status: Former Smoker     Packs/day: 1.00     Years: 35.00     Quit date: 1/21/2008    Smokeless tobacco: Never Used    Alcohol use 2.0 oz/week     4 Glasses of wine per week      Comment: occasionally    Drug use: No    Sexual activity: Not on file     Other Topics Concern    Not on file     Social History Narrative     Outpatient Prescriptions Marked as Taking for the 1/9/18 encounter (Office Visit) with Murali Gant MD   Medication Sig Dispense Refill    buPROPion SR (WELLBUTRIN SR) 150 mg SR tablet TAKE 1 TABLET DAILY 90 Tab 3    HYDROcodone-acetaminophen (NORCO) 5-325 mg per tablet Take 1-2 Tabs by mouth every four (4) hours as needed for Pain. Max Daily Amount: 12 Tabs. 20 Tab 0    cyclobenzaprine (FLEXERIL) 10 mg tablet Take 1 Tab by mouth three (3) times daily as needed for Muscle Spasm(s). 12 Tab 0    lisinopril (PRINIVIL, ZESTRIL) 40 mg tablet Take 1 Tab by mouth daily. 90 Tab 3    levothyroxine (SYNTHROID) 100 mcg tablet Take 1 Tab by mouth Daily (before breakfast). 90 Tab 3    gabapentin (NEURONTIN) 300 mg capsule Take 600 mg by mouth two (2) times a day.  atorvastatin (LIPITOR) 40 mg tablet Take 40 mg by mouth daily.  cilostazol (PLETAL) 100 mg tablet TAKE 1 TABLET TWICE A  Tab 3    valACYclovir (VALTREX) 1 gram tablet Take 1,000 mg by mouth. 2 initially and 2 in 12 hr for fever blisters      DEXTRIN (EASY FIBER PO) Take 30 mL by mouth daily.  lysine (L-LYSINE) 500 mg tab tablet Take 500 mg by mouth three (3) times daily as needed.  atenolol (TENORMIN) 50 mg tablet Take 1 Tab by mouth nightly. 90 Tab 3    ibandronate (BONIVA) 150 mg tablet Take 150 mg by mouth every thirty (30) days.  Omega-3 Fatty Acids-Fish Oil (OMEGA 3 FISH OIL) 684-1,200 mg CpDR Take 2 Tabs by mouth daily.  ascorbic acid (VITAMIN C) 500 mg tablet Take 500 mg by mouth two (2) times a day.  omeprazole (PRILOSEC) 20 mg capsule Take 20 mg by mouth daily.  aspirin (ASPIRIN) 325 mg tablet Take 325 mg by mouth daily.       multivitamin, stress formula (STRESS TAB) tablet Take 1 Tab by mouth daily. Review of Systems              Constitutional:  She denies fever, weight loss, sweats or fatigue. HEENT:  No blurred or double vision, headache or dizziness. No difficulty with swallowing, taste, speech or smell. Respiratory:  No cough, wheezing or shortness of breath. No sputum production. Cardiac:  Denies chest pain, palpitations, unexplained indigestion, syncope, edema, PND or orthopnea. GI:  No changes in bowel movements, no abdominal pain, no bloating, anorexia, nausea, vomiting or heartburn. :  No frequency or dysuria. Denies incontinence. Extremities:  No joint pain, stiffness or swelling. Skin:  No recent rashes or mole changes. Neurological:  No numbness, tingling, burning paresthesias or loss of motor strength. No syncope, dizziness, frequent headaches or memory loss. Objective:     Vitals:    01/09/18 1318   BP: 129/76   Pulse: 84   Temp: 97.9 °F (36.6 °C)   TempSrc: Oral   Weight: 133 lb (60.3 kg)   Height: 5' 4\" (1.626 m)   PainSc:   0 - No pain       Body mass index is 22.83 kg/(m^2). Physical Examination:              General Appearance:  Well-developed, well-nourished, no acute  distress. HEENT:     Ears:  The TMs and ear canals were clear. Eyes:  The pupillary responses were normal.  Extraocular muscle function intact. Lids and conjunctiva not injected. Neck:  Supple without thyromegaly or adenopathy. No JVD noted. Lungs:  Clear to auscultation and percussion. Cardiac:  Regular rate and rhythm without murmur. GI: nontender w/o mass. Normal BS's. Extremities:  No clubbing, cyanosis or edema. Skin:  No rash or unusual mole changes noted. Neurological:  Grossly normal.            Assessment/Plan:   Impressions:      ICD-10-CM ICD-9-CM    1. Essential hypertension I10 401.9 AMB POC BASIC METABOLIC PANEL   2. Dyslipidemia E78.5 272.4 AMB POC BASIC METABOLIC PANEL   3.  Recurrent depression ([facility] [address] F33.9 296.30 AMB POC BASIC METABOLIC PANEL   4. Claudication in peripheral vascular disease (HCC) I73.9 443.9 AMB POC BASIC METABOLIC PANEL   5. PVD (peripheral vascular disease) (HCC) I73.9 443.9 AMB POC BASIC METABOLIC PANEL   6. 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 (HCC) I73.9 357.4 AMB POC BASIC METABOLIC PANEL    A16          Plan:  1. Continue present meds  2. Lifestyle modifications including Na restriction, low carb/fat diet, weight reduction and exercise (at least a walking program). Follow-up Disposition:  Return in about 2 months (around 3/9/2018).       Orders Placed This Encounter   Tonia Yap MD

## 2018-01-09 NOTE — PROGRESS NOTES
1. Have you been to the ER, urgent care clinic since your last visit? Hospitalized since your last visit? No    2. Have you seen or consulted any other health care providers outside of the [address] [place] [address] since your last visit? Include any pap smears or colon screening.  No   Chief Complaint   Patient presents with    Follow-up     cough

## 2018-01-31 RX ORDER — ATORVASTATIN CALCIUM 40 MG/1
TABLET, FILM COATED ORAL
Qty: 90 TAB | Refills: 3 | Status: SHIPPED | OUTPATIENT
Start: 2018-01-31 | End: 2019-02-03 | Stop reason: SDUPTHER

## 2018-02-28 DIAGNOSIS — M54.2 NECK PAIN: Primary | ICD-10-CM

## 2018-02-28 RX ORDER — CYCLOBENZAPRINE HCL 10 MG
10 TABLET ORAL
Qty: 30 TAB | Refills: 0 | Status: SHIPPED | OUTPATIENT
Start: 2018-02-28 | End: 2019-11-15 | Stop reason: ALTCHOICE

## 2018-02-28 RX ORDER — PREDNISONE 10 MG/1
10 TABLET ORAL SEE ADMIN INSTRUCTIONS
Qty: 21 TAB | Refills: 0 | Status: SHIPPED | OUTPATIENT
Start: 2018-02-28 | End: 2018-03-15

## 2018-03-08 ENCOUNTER — LAB ONLY (OUTPATIENT)
Dept: INTERNAL MEDICINE CLINIC | Age: 73
End: 2018-03-08

## 2018-03-08 DIAGNOSIS — E78.5 DYSLIPIDEMIA: ICD-10-CM

## 2018-03-08 DIAGNOSIS — E03.8 ADULT ONSET HYPOTHYROIDISM: ICD-10-CM

## 2018-03-08 DIAGNOSIS — Z00.00 PE (PHYSICAL EXAM), ANNUAL: Primary | ICD-10-CM

## 2018-03-08 LAB
ALBUMIN SERPL-MCNC: 4.3 G/DL (ref 3.9–5.4)
ALKALINE PHOS POC: 60 U/L (ref 38–126)
ALT SERPL-CCNC: 23 U/L (ref 9–52)
AST SERPL-CCNC: 22 U/L (ref 14–36)
BACTERIA UA POCT, BACTPOCT: NORMAL
BILIRUB UR QL STRIP: NEGATIVE
BUN BLD-MCNC: 19 MG/DL (ref 7–17)
CALCIUM BLD-MCNC: 10.1 MG/DL (ref 8.4–10.2)
CASTS UA POCT: 0
CHLORIDE BLD-SCNC: 105 MMOL/L (ref 98–107)
CHOLEST SERPL-MCNC: 146 MG/DL (ref 0–200)
CK (CPK) POC: 34 U/L (ref 30–135)
CLUE CELLS, CLUEPOCT: NEGATIVE
CO2 POC: 31 MMOL/L (ref 22–32)
CREAT BLD-MCNC: 0.7 MG/DL (ref 0.7–1.2)
CRYSTALS UA POCT, CRYSPOCT: NEGATIVE
EGFR (POC): 86
EPITHELIAL CELLS POCT: NORMAL
GLUCOSE POC: 99 MG/DL (ref 65–105)
GLUCOSE UR-MCNC: NEGATIVE MG/DL
GRAN# POC: 5.5 K/UL (ref 2–7.8)
GRAN% POC: 75.1 % (ref 37–92)
HCT VFR BLD CALC: 37.2 % (ref 37–51)
HDLC SERPL-MCNC: 92 MG/DL (ref 35–130)
HGB BLD-MCNC: 12.3 G/DL (ref 12–18)
IRON POC: 48 UG/DL (ref 37–170)
IRON SATURATION POC: 10 % (ref 15–55)
KETONES P FAST UR STRIP-MCNC: NEGATIVE MG/DL
LDL CHOLESTEROL POC: 40.4 MG/DL (ref 0–130)
LY# POC: 1.3 K/UL (ref 0.6–4.1)
LY% POC: 18.2 % (ref 10–58.5)
MCH RBC QN: 32.6 PG (ref 26–32)
MCHC RBC-ENTMCNC: 33.1 G/DL (ref 30–36)
MCV RBC: 99 FL (ref 80–97)
MID #, POC: 0.4 K/UL (ref 0–1.8)
MID% POC: 6.7 % (ref 0.1–24)
MUCUS UA POCT, MUCPOCT: NORMAL
PH UR STRIP: 6.5 [PH] (ref 5–7)
PLATELET # BLD: 272 K/UL (ref 140–440)
POTASSIUM SERPL-SCNC: 4.7 MMOL/L (ref 3.6–5)
PROT SERPL-MCNC: 7.5 G/DL (ref 6.3–8.2)
PROT UR QL STRIP: NEGATIVE
RBC # BLD: 3.77 M/UL (ref 4.2–6.3)
RBC UA POCT, RBCPOCT: NORMAL
SODIUM SERPL-SCNC: 142 MMOL/L (ref 137–145)
SP GR UR STRIP: 1.01 (ref 1.01–1.02)
T4 FREE SERPL-MCNC: 0.89 NG/DL (ref 0.71–1.85)
TCHOL/HDL RATIO (POC): 1.6 (ref 0–4)
TIBC POC: 464 UG/DL (ref 265–497)
TOTAL BILIRUBIN POC: 0.6 MG/DL (ref 0.2–1.3)
TRICH UA POCT, TRICHPOC: NEGATIVE
TRIGL SERPL-MCNC: 68 MG/DL (ref 0–200)
TSH BLD-ACNC: 0.63 UIU/ML (ref 0.4–4.2)
UA UROBILINOGEN AMB POC: NORMAL (ref 0.2–1)
URINALYSIS CLARITY POC: CLEAR
URINALYSIS COLOR POC: NORMAL
URINE BLOOD POC: NEGATIVE
URINE CULT COMMENT, POCT: NORMAL
URINE LEUKOCYTES POC: NORMAL
URINE NITRITES POC: NEGATIVE
VITAMIN D POC: 52.6 NG/ML (ref 30–96)
VLDLC SERPL CALC-MCNC: 13.6 MG/DL
WBC # BLD: 7.2 K/UL (ref 4.1–10.9)
WBC UA POCT, WBCPOCT: NORMAL
YEAST UA POCT, YEASTPOC: NEGATIVE

## 2018-03-12 PROBLEM — M85.89 OSTEOPENIA OF MULTIPLE SITES: Status: ACTIVE | Noted: 2017-08-23

## 2018-03-15 ENCOUNTER — OFFICE VISIT (OUTPATIENT)
Dept: INTERNAL MEDICINE CLINIC | Age: 73
End: 2018-03-15

## 2018-03-15 VITALS
RESPIRATION RATE: 14 BRPM | HEART RATE: 80 BPM | TEMPERATURE: 98 F | BODY MASS INDEX: 22.71 KG/M2 | HEIGHT: 64 IN | DIASTOLIC BLOOD PRESSURE: 76 MMHG | SYSTOLIC BLOOD PRESSURE: 138 MMHG | OXYGEN SATURATION: 97 % | WEIGHT: 133 LBS

## 2018-03-15 DIAGNOSIS — E78.5 DYSLIPIDEMIA: ICD-10-CM

## 2018-03-15 DIAGNOSIS — I25.10 CORONARY ARTERY DISEASE INVOLVING NATIVE HEART WITHOUT ANGINA PECTORIS, UNSPECIFIED VESSEL OR LESION TYPE: ICD-10-CM

## 2018-03-15 DIAGNOSIS — G63 NEUROPATHY DUE TO PERIPHERAL VASCULAR DISEASE (HCC): ICD-10-CM

## 2018-03-15 DIAGNOSIS — F41.9 ANXIETY: ICD-10-CM

## 2018-03-15 DIAGNOSIS — I73.9 NEUROPATHY DUE TO PERIPHERAL VASCULAR DISEASE (HCC): ICD-10-CM

## 2018-03-15 DIAGNOSIS — I25.10 ARTERIOSCLEROTIC HEART DISEASE (ASHD): ICD-10-CM

## 2018-03-15 DIAGNOSIS — Z00.00 ROUTINE GENERAL MEDICAL EXAMINATION AT A HEALTH CARE FACILITY: Primary | ICD-10-CM

## 2018-03-15 DIAGNOSIS — I73.9 PVD (PERIPHERAL VASCULAR DISEASE) (HCC): ICD-10-CM

## 2018-03-15 DIAGNOSIS — I10 ESSENTIAL HYPERTENSION: ICD-10-CM

## 2018-03-15 DIAGNOSIS — F32.A DEPRESSION, UNSPECIFIED DEPRESSION TYPE: ICD-10-CM

## 2018-03-15 DIAGNOSIS — E03.8 ADULT ONSET HYPOTHYROIDISM: ICD-10-CM

## 2018-03-15 PROBLEM — J44.9 COPD, MILD (HCC): Status: ACTIVE | Noted: 2018-03-15

## 2018-03-15 RX ORDER — PREDNISONE 5 MG/1
TABLET ORAL SEE ADMIN INSTRUCTIONS
COMMUNITY
End: 2018-06-21

## 2018-03-15 NOTE — PROGRESS NOTES
Ms. Sidra Costa is a 67year old ,  female who comes to the office today for annual comprehensive personal healthcare examination. History of Present Illness: This patient has multiple medical problems. These include:  1. Coronary artery disease, status post angioplasty and stenting of the right coronary artery in December, 2009. At that time she underwent cardiac cath and had a 40% left anterior descending coronary artery lesion as well. She's had no recent problems with angina or CHF. She sees Dr. Brook Rodriguez on a six to twelve month basis. Her last stress test was in May of 2015 and negative. She's not had any recent issues with anginal type symptoms or symptoms of congestive heart failure. 2. PVD, status post left common artery iliac stent and angioplasty in April of 2007 and a right CFA and popliteal bypass graft in April of 2009 with revision of this in September of 2009. She had another revision on the right side in January, 2011 with a patch angioplasty and thrombectomy. She has also undergone a left SFA stent as of September, 2012. Then in August, 2013 she underwent right femoral to tibial peroneal trunk bypass grafting. She then underwent right iliac artery angioplasty and stent and left iliac angioplasty in June of 2014. Her most recent graft surveillance and PVRs have revealed diminished circulation in both legs, but Dr. Anne Quintana is reluctant to do any further surgery as her limbs are not threatened. She continues to have claudication at 1/2 to 1 block. She also has a mild ischemic neuropathy. Her last PVRs in December of 2017 were stable, however. 3. CVD, status post right carotid endarterectomy in March, 2010. She's had stable carotid duplexes since. 4. History of MVP. 5. History of TIA in the remote past.  6. History of chronic tobacco abuse, although she quit smoking in 2007. 7. Hypothyroidism. 8. Hyperlipidemia. 9. Hypertension.   10. History of lumbar spinal stenosis with bilateral radiculopathies, right greater than left, and chronic back pain, all of which are symptomatic at times, but not consistently. She's not had significant back pain recently. 11. History of depression/anxiety. 12. Macrocytosis, longstanding and unchanging with a prior history of anemia. 13. History of recurrent fever blisters. 14. History of trigger fingers. 15. Cervical spondylosis with recent problems with neck pain, resolved with a steroid pack. 16. GERD. 17. History of osteoporosis with previous treatment with Boniva. 18. History of right TMJ, intermittently symptomatic. 19. Probable ischemic neuropathy as noted above. 20. Early COPD. At the time of the visit she was noting her usual claudication. She denied other new CR, GI or  symptoms. She was not having much in the way of musculoskeletal symptoms either. Overall she seemed very stable. Past Medical History:  Otherwise remarkable for a history of multiple trigger fingers and excision of a melanoma from her right upper arm. Allergies:  None known. Current Medications:  1. Aspirin 325 mg daily. 2. Bupropion 150 mg daily. 3. Multivitamin daily. 4. Gabapentin 300 mg, two twice a day. 5. Atorvastatin 40 mg daily. 6. Levothyroxine 100 mcg daily. 7. Omeprazole 20 mg daily. 8. Atenolol 50 mg daily. 9. Vitamin C 500 mg twice a day. 10. Lisinopril 40 mg daily. 11. Cilostazol 100 mg twice a day. 12. Sterapred pack 5 mg as needed. 13. Valtrex 1 gram, two tablets initially, then two 12 hours later as needed for fever blisters  14. Cyclobenzaprine 10 mg three times a day as needed. Social History:   She is a retired teacher. She is . She no longer smokes. She drinks at least one glass of wine a day. She walks to the degree that her legs will allow. Diet is prudent. Family History: Mother  of old age at 80. Father had cancer of the head and neck and also heart disease and  at age 76.   A sister in her late 46s had problems with breast cancer and pulmonary emboli. This sister's twin also had problems with hyperlipidemia, hypertension and irritable bowel. Her brother had significant heart disease and previous bypass surgery and  of esophageal cancer in his 76s. Review of Systems:    CONSTITUTIONAL:  She denies fever, weight loss, sweats or fatigue. HEENT:  No blurred or double vision, headache or dizziness. No difficulty with swallowing, taste, speech or smell. RESPIRATORY:  No cough, wheezing or shortness of breath. No sputum production. CARDIAC:  Denies chest pain, palpitations, unexplained indigestion, syncope, edema, PND or orthopnea. GI:  No changes in bowel movements, no abdominal pain, no bloating, anorexia, nausea, vomiting or heartburn. :  No frequency or dysuria. Denies incontinence or sexual dysfunction. BREASTS:  She does monthly self-breast examination. No masses have been felt. No nipple discharge noted. GYN:  Denies vaginal discharge or unexpected bleeding. EXTREMITIES:  Bilateral claudication, right greater than left leg. SKIN:  No recent rashes or mole changes. NEUROLOGICAL:  No numbness, tingling, burning paresthesias or loss of motor strength. No syncope, dizziness, frequent headaches or memory loss. MSK:  Occasional back and neck pain with low back pain associated with sciatic symptoms at times. Physical Examination:    GENERAL:  Well-developed, well-nourished, no acute  distress. VITAL SIGNS:  BP: 138/76. P: 80 and regular. R: 14.  T: 98.  HT: 5'4\". WT: 133 lbs. BMI: 22.8. VISION:  Deferred to ophthalmologist.       HEARING:  Mild high frequency hearing loss. HEENT:   Ears:  The TMs and ear canals were clear. Eyes:  The pupillary responses were normal.  Extraocular muscle function intact. Lids and conjunctiva not injected. Funduscopic exam revealed sharp disc margins. Pharynx:  Clear with teeth in good repair. No masses were noted. NECK:  Well healed right carotid endarterectomy incision. Supple without thyromegaly or adenopathy. No JVD noted. No     carotid bruits. LUNGS:  Clear to auscultation and percussion. CARDIAC:  Regular rate and rhythm without murmur. PMI not displaced. No gallop, rub or click. ABDOMEN:  Flat, soft, non-tender without palpable organomegaly or mass. No pulsatile mass was felt, and no bruit was heard. Bowel sounds were active. BREASTS:  Both symmetric. No palpable masses felt. No skin retractions noted. No nipple discharge evident. GYN: deferred    RECTAL EXAM:  deferred    EXTREMITIES:  Well healed incisions in the groin area on the right where there is a fair amount of scarring. There are also well healed surgical incisions on the right leg. No clubbing, cyanosis or edema. Diminished DP and PT pulses, particularly on the right and diminished, but palpable, on the left with both feet warm. SKIN:  No rash or unusual mole changes noted. LYMPH NODES:  None felt in the cervical, supraclavicular, axillary or inguinal region. NEUROLOGICAL:  Cranial nerves II-XII grossly intact. Motor strength 5/5. DTRs 2+ and symmetric. Station and gait normal.        Laboratory:  Hemoglobin is 12.3. White blood count is 7,200. Blood sugar is 99. Liver and kidney function are normal.  Electrolytes are normal.  Iron is 48. CK 34. TSH is 0.63. Free thyroxine 0.89. Urinalysis is clear. Vitamin D level is 52.6. Lipid profile reveals a total cholesterol of 146, triglycerides of 68, HDL cholesterol of 92 and an LDL of 40.4. Health Maintenance Issues and Ancillary Studies:  1. TDAP (pertussis and tetanus) vaccine was given in February, 2013. 2. Pneumovax 23 (PPSV23) was given in February, 2011.  3. Seasonal influenza vaccine was given in October, 2017. 4. Zostavax was given in February, 2011. 5. Prevnar 13 (PCV13) was given in April, 2015.  6. Shingrix discussed. She wishes to defer.   7. Lexiscan stress testing was negative in May, 2015. 8. Colonoscopy was negative in September, 2011, (ten year follow up indicated). 9. Bone density revealed T score of -1.9 in April, 2016, (FRAX=11/2.4). 10. EBT heart scan revealed a calcium score of 1,024 in October, 2009. 11. Cardiac cath in December, 2009 revealed the RCA stenosis resulting in PTCA and stent placement. 12. Lumbosacral MRI in June, 2011 revealed L4-5 and L5-S1 foraminal and spinal stenosis. 13. Upper GI in July, 2010 revealed GERD. 14. CT scan of the cervical spine in October, 2013 revealed anterolisthesis of C3 on 4 and C5-6 and C6-7 stenosis. 15. Last pelvic was reportedly negative in May, 2014. 16. Mammogram was negative in May, 2017. 17. Carotid dopplers revealed mild plaque on the left and a clear right carotid endarterectomy on the right in December, 2017. 18. PVRs in December, 2017 revealed moderately decreased circulation on the right with an JOANA of .59 and mild decrease on the left with an JOANA of .73.  19. EKG in the office - nonspecific ST-T wave changes. 20. Pulmonary function studies - mild obstructive lung disease. 21. Health screening assessments were essentially normal.    Impression:  1. CAD, S/P PTCA and stent of the RCA. 2. PVD, S/P multiple procedures, still symptomatic with mild ischemic neuropathy and claudication. 3. CVD, S/P right CEA. 4. MVP.  5. History of TIA. 6. History of chronic tobacco abuse. 7. Hypothyroidism. 8. Hyperlipidemia. 9. Hypertension. 10. Lumbar spinal stenosis with intermittently recurrent back pain and radiculopathy. 11. Cervical spondylosis. 12. Depression. 13. Macrocytosis. 14. TMJ. 15. Osteoporosis. 16. GERD. 17. History of ischemic neuropathy  18. History of recurrent fever blisters. 19. History of trigger fingers. 20. Early COPD. 21. S/P surgeries as noted. Plan:  1. Continue present medications. 2. Continue prudent diet and maintain activity level.   3. Continue follow up with vascular surgery. 4. Obtain mammography and pelvic at appropriate intervals. 5. Recheck here three months time. 6. She is appropriately treated for her ten year coronary heart disease risk. 7. Five year breast cancer risk was calculated to be 2.8% versus the average for age group of 2%. Lifetime risk was calculated to be 10.1% versus 6.7% average for age group. 8. Ten year risk for osteoporotic fracture was calculated to be 11% with risk for hip fracture 2.4%.

## 2018-03-15 NOTE — PROGRESS NOTES
Reviewed record in preparation for visit and have obtained necessary documentation. Identified pt with two pt identifiers(name and ). Chief Complaint   Patient presents with    Complete Physical        Coordination of Care Questionnaire:  :     1) Have you been to an emergency room, urgent care clinic since your last visit? No  Hospitalized since your last visit? No            2) Have you seen or consulted any other health care providers outside of [address] [place] [address] since your last visit?    No

## 2018-06-21 ENCOUNTER — OFFICE VISIT (OUTPATIENT)
Dept: INTERNAL MEDICINE CLINIC | Age: 73
End: 2018-06-21

## 2018-06-21 VITALS
HEIGHT: 64 IN | DIASTOLIC BLOOD PRESSURE: 58 MMHG | SYSTOLIC BLOOD PRESSURE: 120 MMHG | BODY MASS INDEX: 22.26 KG/M2 | OXYGEN SATURATION: 94 % | WEIGHT: 130.4 LBS | HEART RATE: 75 BPM | TEMPERATURE: 97.7 F

## 2018-06-21 DIAGNOSIS — I10 ESSENTIAL HYPERTENSION: Primary | ICD-10-CM

## 2018-06-21 DIAGNOSIS — J44.9 COPD, MILD (HCC): ICD-10-CM

## 2018-06-21 DIAGNOSIS — I73.9 CLAUDICATION IN PERIPHERAL VASCULAR DISEASE (HCC): ICD-10-CM

## 2018-06-21 DIAGNOSIS — I73.9 PVD (PERIPHERAL VASCULAR DISEASE) (HCC): ICD-10-CM

## 2018-06-21 LAB
BUN BLD-MCNC: 28 MG/DL (ref 7–17)
CALCIUM BLD-MCNC: 9.7 MG/DL (ref 8.4–10.2)
CHLORIDE BLD-SCNC: 106 MMOL/L (ref 98–107)
CO2 POC: 27 MMOL/L (ref 22–32)
CREAT BLD-MCNC: 0.8 MG/DL (ref 0.7–1.2)
EGFR (POC): 72.6
GLUCOSE POC: 152 MG/DL (ref 65–105)
POTASSIUM SERPL-SCNC: 4.6 MMOL/L (ref 3.6–5)
SODIUM SERPL-SCNC: 144 MMOL/L (ref 137–145)

## 2018-06-21 NOTE — PROGRESS NOTES
Subjective:   Eliodoro Skiff is a 75 y.o. female      Chief Complaint   Patient presents with    Hypertension     3nth follow up        History of present illness:  Ms. Tita Mora returns in follow up. She's had some increased difficulties with her legs recently. She's been doing a lot more gardening and bending over sitting on a stool doing this. She was concerned it might be related to her legs, but I'm not sure it isn't related to her back and pseudoclaudication. Either way, it is tolerable enough to maintain for right now. She is actually going to be going on vacation and doesn't want to do anything anyway. If the situation gets work she'll need PVRs first to ascertain whether there has been any worsening of her circulation before we can determine the difference between circulation causing her symptoms and her spinal stenosis. She denies new CR, GI or  symptoms otherwise. Overall she actually feels good, looks good and her blood pressure is excellent today. I wouldn't change anything at this point, though she'll get back if her leg symptoms worsen. We'll check a BMP today and follow up in three months time fasting.         Patient Active Problem List    Diagnosis Date Noted    Arteriosclerotic heart disease (ASHD) 08/23/2017     Priority: 1 - One    Claudication in peripheral vascular disease ([address] 08/23/2017     Priority: 1 - One    PVD (peripheral vascular disease) ([address]      Priority: 1 - One    CAD (coronary artery disease)      Priority: 1 - One    Hypertension      Priority: 1 - One    COPD, mild ([address] 03/15/2018     Priority: 2 - Two    Adult onset hypothyroidism 08/23/2017     Priority: 2 - Two    Dyslipidemia 08/23/2017     Priority: 2 - Two    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 ([address] 08/23/2017     Priority: 2 - Two    Melanoma ([address] 09/26/2017     Priority: 3 - Three    Depression 09/23/2017     Priority: 3 - Three    Lumbar spinal stenosis 09/23/2017 Priority: 3 - Three    Familial mitral valve prolapse 08/23/2017     Priority: 3 - Three    Stroke [facility])      Priority: 3 - Three    GERD (gastroesophageal reflux disease)      Priority: 3 - Three    CVD (cerebrovascular disease)      Priority: 3 - Three    Anxiety 08/23/2017     Priority: 4 - Four    Cervical spondylosis 08/23/2017     Priority: 4 - Four    Macrocytosis 08/23/2017     Priority: 4 - Four    Osteopenia of multiple sites 08/23/2017     Priority: 4 - Four    Temporomandibular joint pain dysfunction syndrome 08/23/2017     Priority: 4 - Four    Fever blister 09/23/2017     Priority: 5 - Five    Tobacco user 08/23/2017     Priority: 5 - Five    Trigger finger 08/23/2017     Priority: 5 - Five    Recurrent depression [facility] 01/09/2018    PE (physical exam), annual 09/23/2017      Past Surgical History:   Procedure Laterality Date    BYPASS GRAFT OTHR,FEM-POP       RIGHT FEMORAL-POPLITEAL BYPASS  WITH POLYTETRA-FL UOROETHYLENE GRAFT     CARDIAC SURG PROCEDURE UNLIST      stents x2    HX CAROTID ENDARTERECTOMY      right cea    HX OTHER SURGICAL Right     excision melanoma right arm    VASCULAR SURGERY PROCEDURE UNLIST      femoral stent - left    VASCULAR SURGERY PROCEDURE UNLIST      X5 right femoral bypass      Allergies   Allergen Reactions    Neomycin Sulfate Unknown (comments)      Family History   Problem Relation Age of Onset   Mammie Muster Migraines Mother     Heart Disease Father     Hypertension Father     Lung Disease Father     Cancer Father      H&N    Hypertension Sister     Diabetes Sister     Heart Disease Brother     Hypertension Brother     Hypertension Sister     Cancer Sister      breast    Breast Cancer Sister 54    Hypertension Sister       Social History     Social History    Marital status:      Spouse name: N/A    Number of children: N/A    Years of education: N/A     Occupational History    Not on file.      Social History Main Topics    Smoking status: Former Smoker     Packs/day: 1.00     Years: 35.00     Quit date: 1/21/2008    Smokeless tobacco: Never Used    Alcohol use 2.0 oz/week     4 Glasses of wine per week      Comment: occasionally    Drug use: No    Sexual activity: Not on file     Other Topics Concern    Not on file     Social History Narrative     Outpatient Prescriptions Marked as Taking for the 6/21/18 encounter (Office Visit) with Dewayne Ventura MD   Medication Sig Dispense Refill    cyclobenzaprine (FLEXERIL) 10 mg tablet Take 1 Tab by mouth three (3) times daily as needed for Muscle Spasm(s). 30 Tab 0    atorvastatin (LIPITOR) 40 mg tablet TAKE 1 TABLET DAILY 90 Tab 3    buPROPion SR (WELLBUTRIN SR) 150 mg SR tablet TAKE 1 TABLET DAILY 90 Tab 3    lisinopril (PRINIVIL, ZESTRIL) 40 mg tablet Take 1 Tab by mouth daily. 90 Tab 3    levothyroxine (SYNTHROID) 100 mcg tablet Take 1 Tab by mouth Daily (before breakfast). 90 Tab 3    gabapentin (NEURONTIN) 300 mg capsule Take 600 mg by mouth two (2) times a day.  cilostazol (PLETAL) 100 mg tablet TAKE 1 TABLET TWICE A  Tab 3    valACYclovir (VALTREX) 1 gram tablet Take 1,000 mg by mouth. 2 initially and 2 in 12 hr for fever blisters      atenolol (TENORMIN) 50 mg tablet Take 1 Tab by mouth nightly. 90 Tab 3    ascorbic acid (VITAMIN C) 500 mg tablet Take 500 mg by mouth two (2) times a day.  omeprazole (PRILOSEC) 20 mg capsule Take 20 mg by mouth daily.  aspirin (ASPIRIN) 325 mg tablet Take 325 mg by mouth daily.  multivitamin, stress formula (STRESS TAB) tablet Take 1 Tab by mouth daily. Review of Systems              Constitutional:  She denies fever, weight loss, sweats or fatigue. HEENT:  No blurred or double vision, headache or dizziness. No difficulty with swallowing, taste, speech or smell. Respiratory:  No cough, wheezing or shortness of breath. No sputum production.   Cardiac:  Denies chest pain, palpitations, unexplained indigestion, syncope, edema, PND or orthopnea. GI:  No changes in bowel movements, no abdominal pain, no bloating, anorexia, nausea, vomiting or heartburn. :  No frequency or dysuria. Denies incontinence. Extremities:  No joint pain, stiffness or swelling. Skin:  No recent rashes or mole changes. Neurological:  No numbness, tingling, burning paresthesias or loss of motor strength. No syncope, dizziness, frequent headaches or memory loss. Objective:     Vitals:    06/21/18 1413   BP: 120/58   Pulse: 75   Temp: 97.7 °F (36.5 °C)   TempSrc: Oral   SpO2: 94%   Weight: 130 lb 6.4 oz (59.1 kg)   Height: 5' 4\" (1.626 m)   PainSc:   0 - No pain       Body mass index is 22.38 kg/(m^2). Physical Examination:              General Appearance:  Well-developed, well-nourished, no acute  distress. HEENT:     Ears:  The TMs and ear canals were clear. Eyes:  The pupillary responses were normal.  Extraocular muscle function intact. Lids and conjunctiva not injected. Neck:  Supple without thyromegaly or adenopathy. No JVD noted. Lungs:  Clear to auscultation and percussion. Cardiac:  Regular rate and rhythm without murmur. GI: nontender w/o mass. Normal BS's. Extremities: pedal pulses diminished. Skin:  No rash or unusual mole changes noted. Neurological:  Grossly normal.            Assessment/Plan:   Impressions:      ICD-10-CM ICD-9-CM    1. Essential hypertension I10 401.9 AMB POC BASIC METABOLIC PANEL   2. PVD (peripheral vascular disease) ([facility]) I73.9 443.9 AMB POC BASIC METABOLIC PANEL   3. Claudication in peripheral vascular disease ([facility]) I73.9 443.9 AMB POC BASIC METABOLIC PANEL   4. COPD, mild ([facility]) J44.9 496 AMB POC BASIC METABOLIC PANEL        Plan:  1. Continue present meds  2. Discussed lifestyle modifications including Na restriction, low carb/fat diet, weight reduction and exercise (at least a walking program).         Follow-up Disposition:  Return in about 3 months (around 9/21/2018).       Orders Placed This Encounter   Fabian Pardo MD

## 2018-06-21 NOTE — PROGRESS NOTES
Reviewed record in preparation for visit and have obtained necessary documentation. Identified pt with two pt identifiers(name and ). Chief Complaint   Patient presents with    Hypertension     3nth follow up        Coordination of Care Questionnaire:  :     1) Have you been to an emergency room, urgent care clinic since your last visit? No     Hospitalized since your last visit? No               2) Have you seen or consulted any other health care providers outside of [address] [place] [address] since your last visit?  No

## 2018-07-09 RX ORDER — ATENOLOL 50 MG/1
TABLET ORAL
Qty: 90 TAB | Refills: 3 | Status: SHIPPED | OUTPATIENT
Start: 2018-07-09 | End: 2019-06-11 | Stop reason: SDUPTHER

## 2018-07-09 RX ORDER — GABAPENTIN 300 MG/1
600 CAPSULE ORAL 2 TIMES DAILY
Qty: 360 CAP | Refills: 1 | Status: SHIPPED | OUTPATIENT
Start: 2018-07-09 | End: 2018-12-22 | Stop reason: SDUPTHER

## 2018-07-09 RX ORDER — OMEPRAZOLE 20 MG/1
CAPSULE, DELAYED RELEASE ORAL
Qty: 90 CAP | Refills: 3 | Status: SHIPPED | OUTPATIENT
Start: 2018-07-09 | End: 2019-09-09 | Stop reason: SDUPTHER

## 2018-07-12 DIAGNOSIS — F41.9 ANXIETY: Primary | ICD-10-CM

## 2018-07-12 RX ORDER — LORAZEPAM 1 MG/1
1 TABLET ORAL
Qty: 5 TAB | Refills: 0 | Status: SHIPPED | OUTPATIENT
Start: 2018-07-12 | End: 2019-01-03

## 2018-07-12 NOTE — TELEPHONE ENCOUNTER
Patient called stating that she will be traveling to [facility] and will have a 10hour flight. Is requesting something to help her relax and sleep on the flight. Patient advised that per Dr Gretta Torres we can send Rx for Lorazepam 1 mg #5. Rx sent to pharmacy.

## 2018-07-30 RX ORDER — CILOSTAZOL 100 MG/1
TABLET ORAL
Qty: 30 TAB | Refills: 0 | Status: SHIPPED | OUTPATIENT
Start: 2018-07-30 | End: 2018-08-22 | Stop reason: SDUPTHER

## 2018-07-30 NOTE — TELEPHONE ENCOUNTER
Requested Prescriptions     Pending Prescriptions Disp Refills    cilostazol (PLETAL) 100 mg tablet 30 Tab 0     Sig: TAKE 1 TABLET TWICE A DAY

## 2018-08-23 RX ORDER — CILOSTAZOL 100 MG/1
TABLET ORAL
Qty: 180 TAB | Refills: 3 | Status: SHIPPED | OUTPATIENT
Start: 2018-08-23 | End: 2019-07-21 | Stop reason: SDUPTHER

## 2018-09-06 ENCOUNTER — HOSPITAL ENCOUNTER (OUTPATIENT)
Dept: MAMMOGRAPHY | Age: 73
Discharge: HOME | End: 2018-09-06
Attending: INTERNAL MEDICINE
Payer: MEDICARE

## 2018-09-06 DIAGNOSIS — Z12.31 VISIT FOR SCREENING MAMMOGRAM: ICD-10-CM

## 2018-09-06 PROCEDURE — 77063 BREAST TOMOSYNTHESIS BI: CPT

## 2018-09-27 RX ORDER — LEVOTHYROXINE SODIUM 100 UG/1
TABLET ORAL
Qty: 90 TAB | Refills: 3 | Status: SHIPPED | OUTPATIENT
Start: 2018-09-27 | End: 2019-09-09 | Stop reason: SDUPTHER

## 2018-09-27 RX ORDER — LISINOPRIL 40 MG/1
TABLET ORAL
Qty: 90 TAB | Refills: 3 | Status: SHIPPED | OUTPATIENT
Start: 2018-09-27 | End: 2019-09-09 | Stop reason: SDUPTHER

## 2018-10-02 ENCOUNTER — OFFICE VISIT (OUTPATIENT)
Dept: INTERNAL MEDICINE CLINIC | Age: 73
End: 2018-10-02

## 2018-10-02 VITALS
SYSTOLIC BLOOD PRESSURE: 158 MMHG | HEART RATE: 75 BPM | DIASTOLIC BLOOD PRESSURE: 84 MMHG | WEIGHT: 130 LBS | BODY MASS INDEX: 22.31 KG/M2 | TEMPERATURE: 97.9 F | OXYGEN SATURATION: 96 %

## 2018-10-02 DIAGNOSIS — I73.9 NEUROPATHY DUE TO PERIPHERAL VASCULAR DISEASE (HCC): ICD-10-CM

## 2018-10-02 DIAGNOSIS — M25.522 LEFT ELBOW PAIN: ICD-10-CM

## 2018-10-02 DIAGNOSIS — I10 ESSENTIAL HYPERTENSION: ICD-10-CM

## 2018-10-02 DIAGNOSIS — G63 NEUROPATHY DUE TO PERIPHERAL VASCULAR DISEASE (HCC): ICD-10-CM

## 2018-10-02 DIAGNOSIS — E78.5 DYSLIPIDEMIA: ICD-10-CM

## 2018-10-02 DIAGNOSIS — Z23 ENCOUNTER FOR IMMUNIZATION: Primary | ICD-10-CM

## 2018-10-02 DIAGNOSIS — I73.9 PVD (PERIPHERAL VASCULAR DISEASE) (HCC): ICD-10-CM

## 2018-10-02 DIAGNOSIS — I25.10 ARTERIOSCLEROTIC HEART DISEASE (ASHD): ICD-10-CM

## 2018-10-02 DIAGNOSIS — Z79.899 LONG-TERM CURRENT USE OF HIGH RISK MEDICATION OTHER THAN ANTICOAGULANT: ICD-10-CM

## 2018-10-02 DIAGNOSIS — I73.9 CLAUDICATION IN PERIPHERAL VASCULAR DISEASE (HCC): ICD-10-CM

## 2018-10-02 LAB
ALBUMIN SERPL-MCNC: 4.1 G/DL (ref 3.9–5.4)
ALKALINE PHOS POC: 61 U/L (ref 38–126)
ALT SERPL-CCNC: 37 U/L (ref 9–52)
AST SERPL-CCNC: 30 U/L (ref 14–36)
BUN BLD-MCNC: 23 MG/DL (ref 7–17)
CALCIUM BLD-MCNC: 10 MG/DL (ref 8.4–10.2)
CHLORIDE BLD-SCNC: 106 MMOL/L (ref 98–107)
CHOLEST SERPL-MCNC: 135 MG/DL (ref 0–200)
CK (CPK) POC: 32 U/L (ref 30–135)
CO2 POC: 27 MMOL/L (ref 22–32)
CREAT BLD-MCNC: 0.8 MG/DL (ref 0.7–1.2)
EGFR (POC): 72.6
GLUCOSE POC: 100 MG/DL (ref 65–105)
GRAN# POC: 4.7 K/UL (ref 2–7.8)
GRAN% POC: 70.6 % (ref 37–92)
HCT VFR BLD CALC: 37.5 % (ref 37–51)
HDLC SERPL-MCNC: 89 MG/DL (ref 35–130)
HGB BLD-MCNC: 12.4 G/DL (ref 12–18)
LDL CHOLESTEROL POC: 28.8 MG/DL (ref 0–130)
LY# POC: 1.4 K/UL (ref 0.6–4.1)
LY% POC: 22.8 % (ref 10–58.5)
MCH RBC QN: 32.7 PG (ref 26–32)
MCHC RBC-ENTMCNC: 32.9 G/DL (ref 30–36)
MCV RBC: 99 FL (ref 80–97)
MID #, POC: 0.4 K/UL (ref 0–1.8)
MID% POC: 6.6 % (ref 0.1–24)
PLATELET # BLD: 225 K/UL (ref 140–440)
POTASSIUM SERPL-SCNC: 5.2 MMOL/L (ref 3.6–5)
PROT SERPL-MCNC: 7.3 G/DL (ref 6.3–8.2)
RBC # BLD: 3.78 M/UL (ref 4.2–6.3)
SODIUM SERPL-SCNC: 142 MMOL/L (ref 137–145)
TCHOL/HDL RATIO (POC): 1.5 (ref 0–4)
TOTAL BILIRUBIN POC: 0.8 MG/DL (ref 0.2–1.3)
TRIGL SERPL-MCNC: 86 MG/DL (ref 0–200)
VLDLC SERPL CALC-MCNC: 17.2 MG/DL
WBC # BLD: 6.5 K/UL (ref 4.1–10.9)

## 2018-10-02 RX ORDER — VALACYCLOVIR HYDROCHLORIDE 1 G/1
2000 TABLET, FILM COATED ORAL 2 TIMES DAILY
Qty: 8 TAB | Refills: 5 | Status: SHIPPED | OUTPATIENT
Start: 2018-10-02 | End: 2022-08-16 | Stop reason: SDUPTHER

## 2018-10-02 RX ORDER — AMLODIPINE BESYLATE 5 MG/1
5 TABLET ORAL DAILY
Qty: 30 TAB | Refills: 1 | Status: SHIPPED | OUTPATIENT
Start: 2018-10-02 | End: 2018-11-05 | Stop reason: SDUPTHER

## 2018-10-02 RX ORDER — AMLODIPINE BESYLATE 5 MG/1
5 TABLET ORAL DAILY
COMMUNITY
End: 2018-10-02 | Stop reason: SDUPTHER

## 2018-10-02 RX ORDER — MELATONIN
1000 DAILY
COMMUNITY
End: 2019-01-03

## 2018-10-02 NOTE — PROGRESS NOTES
Reviewed record in preparation for visit and have obtained necessary documentation. Identified pt with two pt identifiers(name and ). Chief Complaint   Patient presents with    Annual Wellness Visit        Coordination of Care Questionnaire:  :     1) Have you been to an emergency room, urgent care clinic since your last visit? No     Hospitalized since your last visit? No               2) Have you seen or consulted any other health care providers outside of [address] [place] [address] since your last visit?  Eye doct

## 2018-10-02 NOTE — PROGRESS NOTES
Subjective:   Ashish Olguin is a 75 y.o. female      Chief Complaint   Patient presents with    Follow-up        History of present illness: Ms. Ludwig Councilman returns in follow up. Overall she's done well since last visit. She's had increased issues with claudication. She's had no recent chest pain, shortness of breath or symptoms suggestive of recurrent angina. She' shad no recurrent TIA or stroke-like symptoms. In general her medications have remained the same, though she's in need of Valtrex for her fever blisters. BP is elevated today, however. We're going to return to giving her Amlodipine. Will check her blood pressure again in a month's time. She's also had persistent problems with left elbow pain since she fell and injured it approximately eight months ago. She finally allowed us to xray it today and it was negative for fracture. I suspect she has an epicondylitis and should see the hand surgeon for this, but she declines that opportunity at the present time. If things worsen she'll let us know. She's going to be going for cataract extractions in the near future and we will complete the form for that as she is a good surgical candidate. We'll see where she stands in a month's time, particularly with regard to her blood pressure.         Patient Active Problem List    Diagnosis Date Noted    Arteriosclerotic heart disease (ASHD) 08/23/2017     Priority: 1 - One    Claudication in peripheral vascular disease ([address] 08/23/2017     Priority: 1 - One    PVD (peripheral vascular disease) ([address]      Priority: 1 - One    CAD (coronary artery disease)      Priority: 1 - One    Hypertension      Priority: 1 - One    COPD, mild ([address] 03/15/2018     Priority: 2 - Two    Adult onset hypothyroidism 08/23/2017     Priority: 2 - Two    Dyslipidemia 08/23/2017     Priority: 2 - Two    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 ([address] 08/23/2017     Priority: 2 - Two    Long-term current use of high risk medication other than anticoagulant 10/02/2018     Priority: 3 - Three    Recurrent depression ([facility] 01/09/2018     Priority: 3 - Three    Melanoma ([facility] 09/26/2017     Priority: 3 - Three    Depression 09/23/2017     Priority: 3 - Three    Lumbar spinal stenosis 09/23/2017     Priority: 3 - Three    Familial mitral valve prolapse 08/23/2017     Priority: 3 - Three    Stroke [facility])      Priority: 3 - Three    GERD (gastroesophageal reflux disease)      Priority: 3 - Three    CVD (cerebrovascular disease)      Priority: 3 - Three    Anxiety 08/23/2017     Priority: 4 - Four    Cervical spondylosis 08/23/2017     Priority: 4 - Four    Macrocytosis 08/23/2017     Priority: 4 - Four    Osteopenia of multiple sites 08/23/2017     Priority: 4 - Four    Temporomandibular joint pain dysfunction syndrome 08/23/2017     Priority: 4 - Four    Fever blister 09/23/2017     Priority: 5 - Five    Tobacco user 08/23/2017     Priority: 5 - Five    Trigger finger 08/23/2017     Priority: 5 - Five    PE (physical exam), annual 09/23/2017      Past Surgical History:   Procedure Laterality Date    BYPASS GRAFT OTHR,FEM-POP       RIGHT FEMORAL-POPLITEAL BYPASS  WITH POLYTETRA-FL UOROETHYLENE GRAFT     CARDIAC SURG PROCEDURE UNLIST      stents x2    HX CAROTID ENDARTERECTOMY      right cea    HX OTHER SURGICAL Right     excision melanoma right arm    VASCULAR SURGERY PROCEDURE UNLIST      femoral stent - left    VASCULAR SURGERY PROCEDURE UNLIST      X5 right femoral bypass      Allergies   Allergen Reactions    Neomycin Sulfate Unknown (comments)      Family History   Problem Relation Age of Onset   Camary.Kuldeep Migraines Mother     Heart Disease Father     Hypertension Father     Lung Disease Father     Cancer Father      H&N    Hypertension Sister     Diabetes Sister     Heart Disease Brother     Hypertension Brother     Hypertension Sister     Cancer Sister      breast    Breast Cancer Sister 54    Hypertension Sister       Social History     Social History    Marital status:      Spouse name: N/A    Number of children: N/A    Years of education: N/A     Occupational History    Not on file. Social History Main Topics    Smoking status: Former Smoker     Packs/day: 1.00     Years: 35.00     Quit date: 1/21/2008    Smokeless tobacco: Never Used    Alcohol use 2.0 oz/week     4 Glasses of wine per week      Comment: occasionally    Drug use: No    Sexual activity: Not on file     Other Topics Concern    Not on file     Social History Narrative     Outpatient Prescriptions Marked as Taking for the 10/2/18 encounter (Office Visit) with Jacolyn Spatz, MD   Medication Sig Dispense Refill    valACYclovir (VALTREX) 1 gram tablet Take 2 Tabs by mouth two (2) times a day. 2 initially and 2 in 12 hr for fever blisters 8 Tab 5    cholecalciferol (VITAMIN D3) 1,000 unit tablet Take 1,000 Units by mouth daily.  amLODIPine (NORVASC) 5 mg tablet Take 1 Tab by mouth daily. 30 Tab 1    levothyroxine (SYNTHROID) 100 mcg tablet TAKE 1 TABLET DAILY BEFORE BREAKFAST 90 Tab 3    lisinopril (PRINIVIL, ZESTRIL) 40 mg tablet TAKE 1 TABLET DAILY 90 Tab 3    cilostazol (PLETAL) 100 mg tablet TAKE 1 TABLET TWICE A  Tab 3    LORazepam (ATIVAN) 1 mg tablet Take 1 Tab by mouth every four (4) hours as needed for Anxiety. Max Daily Amount: 6 mg. 5 Tab 0    atenolol (TENORMIN) 50 mg tablet TAKE 1 TABLET NIGHTLY 90 Tab 3    omeprazole (PRILOSEC) 20 mg capsule TAKE 1 CAPSULE DAILY 90 Cap 3    gabapentin (NEURONTIN) 300 mg capsule Take 2 Caps by mouth two (2) times a day. 360 Cap 1    cyclobenzaprine (FLEXERIL) 10 mg tablet Take 1 Tab by mouth three (3) times daily as needed for Muscle Spasm(s).  30 Tab 0    atorvastatin (LIPITOR) 40 mg tablet TAKE 1 TABLET DAILY 90 Tab 3    buPROPion SR (WELLBUTRIN SR) 150 mg SR tablet TAKE 1 TABLET DAILY 90 Tab 3    ascorbic acid (VITAMIN C) 500 mg tablet Take 500 mg by mouth two (2) times a day.  aspirin (ASPIRIN) 325 mg tablet Take 325 mg by mouth daily.  multivitamin, stress formula (STRESS TAB) tablet Take 1 Tab by mouth daily. Review of Systems              Constitutional:  She denies fever, weight loss, sweats or fatigue. HEENT:  No blurred or double vision, headache or dizziness. No difficulty with swallowing, taste, speech or smell. Respiratory:  No cough, wheezing or shortness of breath. No sputum production. Cardiac:  Denies chest pain, palpitations, unexplained indigestion, syncope, edema, PND or orthopnea. Claudication unchanged  GI:  No changes in bowel movements, no abdominal pain, no bloating, anorexia, nausea, vomiting or heartburn. :  No frequency or dysuria. Denies incontinence. Extremities:  Left elbow pain  Skin:  No recent rashes or mole changes. Neurological:  Some numbness feet    Objective:     Vitals:    10/02/18 1306   BP: 158/84   Pulse: 75   Temp: 97.9 °F (36.6 °C)   TempSrc: Oral   SpO2: 96%   Weight: 130 lb (59 kg)       Body mass index is 22.31 kg/(m^2). Physical Examination:              General Appearance:  Well-developed, well-nourished, no acute  distress. HEENT:     Ears:  The TMs and ear canals were clear. Eyes:  The pupillary responses were normal.  Extraocular muscle function intact. Lids and conjunctiva not injected. Neck:  Supple without thyromegaly or adenopathy. No JVD noted. Lungs:  Clear to auscultation and percussion. Cardiac:  Regular rate and rhythm without murmur. GI: nontender w/o mass. Normal BS's. Extremities:  No clubbing, cyanosis or edema. Skin:  No rash or unusual mole changes noted. Neurological:  Grossly normal.            Assessment/Plan:   Impressions:      ICD-10-CM ICD-9-CM    1. Encounter for immunization Z23 V03.89 INFLUENZA VACCINE INACTIVATED (IIV), SUBUNIT, ADJUVANTED, IM   2. Left elbow pain M25.522 719.42 XR ELBOW LT MIN 3 V   3.  Essential hypertension I10 401.9 AMB POC COMPREHENSIVE METABOLIC PANEL      amLODIPine (NORVASC) 5 mg tablet   4. Dyslipidemia E78.5 272.4 AMB POC COMPREHENSIVE METABOLIC PANEL      AMB POC LIPID PROFILE   5. Arteriosclerotic heart disease (ASHD) I25.10 414.00 AMB POC COMPREHENSIVE METABOLIC PANEL   6. PVD (peripheral vascular disease) ([facility]) I73.9 443.9 AMB POC COMPREHENSIVE METABOLIC PANEL   7. 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 ([facility]) I73.9 357.4 AMB POC COMPREHENSIVE METABOLIC PANEL    E90     8. Claudication in peripheral vascular disease ([facility]) I73.9 443.9 AMB POC COMPREHENSIVE METABOLIC PANEL   9. Long-term current use of high risk medication other than anticoagulant Z79.899 V58.69 AMB POC COMPLETE CBC,AUTOMATED ENTER      AMB POC COMPREHENSIVE METABOLIC PANEL      AMB POC CK (CPK)        Plan:  1. Continue present meds  2. Discussed lifestyle modifications including Na restriction, low carb/fat diet, weight reduction and exercise (at least a walking program). Follow-up Disposition:  Return in about 1 month (around 11/2/2018). Orders Placed This Encounter    XR ELBOW LT MIN 3 V     Standing Status:   Future     Standing Expiration Date:   11/2/2019     Order Specific Question:   Reason for Exam     Answer:   pain    Influenza Vaccine Inactivated (IIV) (FLUAD), Subunit, Adjuvanted, IM ([id number])    AMB POC COMPLETE CBC,AUTOMATED ENTER    AMB POC COMPREHENSIVE METABOLIC PANEL    AMB POC LIPID PROFILE    AMB POC CK (CPK)    valACYclovir (VALTREX) 1 gram tablet     Sig: Take 2 Tabs by mouth two (2) times a day. 2 initially and 2 in 12 hr for fever blisters     Dispense:  8 Tab     Refill:  5    amLODIPine (NORVASC) 5 mg tablet     Sig: Take 1 Tab by mouth daily.      Dispense:  30 Tab     Refill:  1       Shaniqua Garcia MD

## 2018-10-03 NOTE — PROGRESS NOTES
Hgb and WBC normal.  Lipids excellent. LDL 29  Total cholesterol 135. CK (muscle enzyme) normal.  Blood sugar, liver and kidney function normal.  No change in Rx.

## 2018-11-05 ENCOUNTER — OFFICE VISIT (OUTPATIENT)
Dept: INTERNAL MEDICINE CLINIC | Age: 73
End: 2018-11-05

## 2018-11-05 VITALS
TEMPERATURE: 97.8 F | SYSTOLIC BLOOD PRESSURE: 120 MMHG | HEIGHT: 64 IN | RESPIRATION RATE: 16 BRPM | HEART RATE: 81 BPM | BODY MASS INDEX: 22.4 KG/M2 | WEIGHT: 131.2 LBS | OXYGEN SATURATION: 95 % | DIASTOLIC BLOOD PRESSURE: 58 MMHG

## 2018-11-05 DIAGNOSIS — C43.61 MALIGNANT MELANOMA OF RIGHT UPPER EXTREMITY INCLUDING SHOULDER (HCC): ICD-10-CM

## 2018-11-05 DIAGNOSIS — I10 ESSENTIAL HYPERTENSION: ICD-10-CM

## 2018-11-05 DIAGNOSIS — I25.10 ARTERIOSCLEROTIC HEART DISEASE (ASHD): ICD-10-CM

## 2018-11-05 DIAGNOSIS — Z23 IMMUNIZATION DUE: ICD-10-CM

## 2018-11-05 DIAGNOSIS — E78.5 DYSLIPIDEMIA: Primary | ICD-10-CM

## 2018-11-05 RX ORDER — SODIUM FLUORIDE 1.1 G/100G
GEL, DENTIFRICE ORAL
Refills: 5 | COMMUNITY
Start: 2018-10-11

## 2018-11-05 RX ORDER — OFLOXACIN 3 MG/ML
SOLUTION/ DROPS OPHTHALMIC
Refills: 1 | COMMUNITY
Start: 2018-10-22 | End: 2019-01-03 | Stop reason: ALTCHOICE

## 2018-11-05 RX ORDER — AMLODIPINE BESYLATE 5 MG/1
5 TABLET ORAL DAILY
Qty: 90 TAB | Refills: 3 | Status: SHIPPED | OUTPATIENT
Start: 2018-11-05 | End: 2019-12-30 | Stop reason: SDUPTHER

## 2018-11-05 RX ORDER — PREDNISOLONE ACETATE 10 MG/ML
SUSPENSION/ DROPS OPHTHALMIC
Refills: 1 | COMMUNITY
Start: 2018-10-22 | End: 2019-01-03 | Stop reason: ALTCHOICE

## 2018-11-05 NOTE — PROGRESS NOTES
Subjective:   Arsalan Ceballos is a 75 y.o. female      Chief Complaint   Patient presents with    Hypertension     1 mo. f/u        History of present illness:  Ms. Lam Long returns for recheck of her blood pressure, having restarted Amlodipine her last visit here. Her blood pressure is much improved and in a very normal range now. She is up to date on her flu vaccine. We talked about Shingrix and I gave her a prescription to shop around at various pharmacies to see where she could get it and at what best price. I did look at her relative to the melanoma area in her right shoulder and this appears to be well healed without recurrent issues. She denies current cardiorespiratory, GI or  symptoms. We will plan on seeing her again in two months' time.         Patient Active Problem List    Diagnosis Date Noted    Arteriosclerotic heart disease (ASHD) 08/23/2017     Priority: 1 - One    Claudication in peripheral vascular disease ([address] 08/23/2017     Priority: 1 - One    PVD (peripheral vascular disease) ([address]      Priority: 1 - One    CAD (coronary artery disease)      Priority: 1 - One    Hypertension      Priority: 1 - One    COPD, mild ([address] 03/15/2018     Priority: 2 - Two    Adult onset hypothyroidism 08/23/2017     Priority: 2 - Two    Dyslipidemia 08/23/2017     Priority: 2 - Two    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 ([address] 08/23/2017     Priority: 2 - Two    Long-term current use of high risk medication other than anticoagulant 10/02/2018     Priority: 3 - Three    Recurrent depression ([address] 01/09/2018     Priority: 3 - Three    Melanoma ([address] 09/26/2017     Priority: 3 - Three    Depression 09/23/2017     Priority: 3 - Three    Lumbar spinal stenosis 09/23/2017     Priority: 3 - Three    Familial mitral valve prolapse 08/23/2017     Priority: 3 - Three    Stroke [facility])      Priority: 3 - Three    GERD (gastroesophageal reflux disease)      Priority: 3 - Three    CVD (cerebrovascular disease)      Priority: 3 - Three    Anxiety 08/23/2017     Priority: 4 - Four    Cervical spondylosis 08/23/2017     Priority: 4 - Four    Macrocytosis 08/23/2017     Priority: 4 - Four    Osteopenia of multiple sites 08/23/2017     Priority: 4 - Four    Temporomandibular joint pain dysfunction syndrome 08/23/2017     Priority: 4 - Four    Fever blister 09/23/2017     Priority: 5 - Five    Tobacco user 08/23/2017     Priority: 5 - Five    Trigger finger 08/23/2017     Priority: 5 - Five    PE (physical exam), annual 09/23/2017      Past Surgical History:   Procedure Laterality Date    BYPASS GRAFT OTHR,FEM-POP       RIGHT FEMORAL-POPLITEAL BYPASS  WITH POLYTETRA-FL UOROETHYLENE GRAFT     CARDIAC SURG PROCEDURE UNLIST      stents x2    HX CAROTID ENDARTERECTOMY      right cea    HX OTHER SURGICAL Right     excision melanoma right arm    VASCULAR SURGERY PROCEDURE UNLIST      femoral stent - left    VASCULAR SURGERY PROCEDURE UNLIST      X5 right femoral bypass      Allergies   Allergen Reactions    Neomycin Sulfate Unknown (comments)      Family History   Problem Relation Age of Onset   David Heavenly Migraines Mother     Heart Disease Father     Hypertension Father     Lung Disease Father     Cancer Father         H&N    Hypertension Sister     Diabetes Sister     Heart Disease Brother     Hypertension Brother     Hypertension Sister     Cancer Sister         breast    Breast Cancer Sister 54    Hypertension Sister       Social History     Socioeconomic History    Marital status:      Spouse name: Not on file    Number of children: Not on file    Years of education: Not on file    Highest education level: Not on file   Social Needs    Financial resource strain: Not on file    Food insecurity - worry: Not on file    Food insecurity - inability: Not on file    Transportation needs - medical: Not on file   [employer] needs - non-medical: Not on file   Occupational History    Not on file   Tobacco Use    Smoking status: Former Smoker     Packs/day: 1.00     Years: 35.00     Pack years: 35.00     Last attempt to quit: 1/21/2008     Years since quitting: 10.7    Smokeless tobacco: Never Used   Substance and Sexual Activity    Alcohol use: Yes     Alcohol/week: 2.0 oz     Types: 4 Glasses of wine per week     Comment: occasionally    Drug use: No    Sexual activity: Not on file   Other Topics Concern    Not on file   Social History Narrative    Not on file     Outpatient Medications Marked as Taking for the 11/5/18 encounter (Office Visit) with Janice Silverio MD   Medication Sig Dispense Refill    DENTA 5000 PLUS 1.1 % crea APPLY TWICE A DAY AFTER BRUSHING. DO NOT DRINK FOR 3 HOURS  5    ofloxacin (FLOXIN) 0.3 % ophthalmic solution ADMINSTER 1 DROP INTO SURGICAL EYE 4 TIMES A DAY  1    prednisoLONE acetate (PRED FORTE) 1 % ophthalmic suspension ADMINSTER 1 DROP INTO SURGICAL EYE 4 TIMES A DAY  1    amLODIPine (NORVASC) 5 mg tablet Take 1 Tab by mouth daily. 90 Tab 3    varicella-zoster recombinant, PF, (SHINGRIX, PF,) 50 mcg/0.5 mL susr injection 0.5 mL by IntraMUSCular route once for 1 dose. 0.5 mL 1    valACYclovir (VALTREX) 1 gram tablet Take 2 Tabs by mouth two (2) times a day. 2 initially and 2 in 12 hr for fever blisters 8 Tab 5    cholecalciferol (VITAMIN D3) 1,000 unit tablet Take 1,000 Units by mouth daily.  levothyroxine (SYNTHROID) 100 mcg tablet TAKE 1 TABLET DAILY BEFORE BREAKFAST 90 Tab 3    lisinopril (PRINIVIL, ZESTRIL) 40 mg tablet TAKE 1 TABLET DAILY 90 Tab 3    cilostazol (PLETAL) 100 mg tablet TAKE 1 TABLET TWICE A  Tab 3    LORazepam (ATIVAN) 1 mg tablet Take 1 Tab by mouth every four (4) hours as needed for Anxiety.  Max Daily Amount: 6 mg. 5 Tab 0    atenolol (TENORMIN) 50 mg tablet TAKE 1 TABLET NIGHTLY 90 Tab 3    omeprazole (PRILOSEC) 20 mg capsule TAKE 1 CAPSULE DAILY 90 Cap 3    gabapentin (NEURONTIN) 300 mg capsule Take 2 Caps by mouth two (2) times a day. 360 Cap 1    cyclobenzaprine (FLEXERIL) 10 mg tablet Take 1 Tab by mouth three (3) times daily as needed for Muscle Spasm(s). 30 Tab 0    atorvastatin (LIPITOR) 40 mg tablet TAKE 1 TABLET DAILY 90 Tab 3    buPROPion SR (WELLBUTRIN SR) 150 mg SR tablet TAKE 1 TABLET DAILY 90 Tab 3    ascorbic acid (VITAMIN C) 500 mg tablet Take 500 mg by mouth two (2) times a day.  aspirin (ASPIRIN) 325 mg tablet Take 325 mg by mouth daily.  multivitamin, stress formula (STRESS TAB) tablet Take 1 Tab by mouth daily. Review of Systems              Constitutional:  She denies fever, weight loss, sweats or fatigue. HEENT:  No blurred or double vision, headache or dizziness. No difficulty with swallowing, taste, speech or smell. Respiratory:  No cough, wheezing or shortness of breath. No sputum production. Cardiac:  Denies chest pain, palpitations, unexplained indigestion, syncope, edema, PND or orthopnea. GI:  No changes in bowel movements, no abdominal pain, no bloating, anorexia, nausea, vomiting or heartburn. :  No frequency or dysuria. Denies incontinence. Extremities:  No joint pain, stiffness or swelling. Skin:  No recent rashes or mole changes. Neurological:  No numbness, tingling, burning paresthesias or loss of motor strength. No syncope, dizziness, frequent headaches or memory loss. Objective:     Vitals:    11/05/18 1301   BP: 120/58   Pulse: 81   Resp: 16   Temp: 97.8 °F (36.6 °C)   TempSrc: Oral   SpO2: 95%   Weight: 131 lb 3.2 oz (59.5 kg)   Height: 5' 4\" (1.626 m)   PainSc:   0 - No pain       Body mass index is 22.52 kg/m². Physical Examination:              General Appearance:  Well-developed, well-nourished, no acute  distress. HEENT:     Ears:  The TMs and ear canals were clear. Eyes:  The pupillary responses were normal.  Extraocular muscle function intact. Lids and conjunctiva not injected.     Neck:  Supple without thyromegaly or adenopathy. No JVD noted. Lungs:  Clear to auscultation and percussion. Cardiac:  Regular rate and rhythm without murmur. GI: nontender w/o mass. Normal BS's. Extremities:  No clubbing, cyanosis or edema. Skin:  No rash or unusual mole changes noted. Neurological:  Grossly normal.            Assessment/Plan:   Impressions:      ICD-10-CM ICD-9-CM    1. Dyslipidemia E78.5 272.4    2. Essential hypertension I10 401.9 amLODIPine (NORVASC) 5 mg tablet   3. Arteriosclerotic heart disease (ASHD) I25.10 414.00    4. Immunization due Z23 V05.9 varicella-zoster recombinant, PF, (SHINGRIX, PF,) 50 mcg/0.5 mL susr injection   5. Malignant melanoma of right upper extremity including shoulder (HCC) C43.61 172.6         Plan:  1. Continue present meds  2. Discussed lifestyle modifications including Na restriction, low carb/fat diet, weight reduction and exercise (at least a walking program). Follow-up Disposition:  Return in about 2 months (around 2019). Orders Placed This Encounter    amLODIPine (NORVASC) 5 mg tablet     Sig: Take 1 Tab by mouth daily. Dispense:  90 Tab     Refill:  3    varicella-zoster recombinant, PF, (SHINGRIX, PF,) 50 mcg/0.5 mL susr injection     Si.5 mL by IntraMUSCular route once for 1 dose.      Dispense:  0.5 mL     Refill:  1       Beverley Navarro MD

## 2018-11-05 NOTE — PATIENT INSTRUCTIONS
High Blood Pressure: Care Instructions  Your Care Instructions    If your blood pressure is usually above 130/80, you have high blood pressure, or hypertension. That means the top number is 130 or higher or the bottom number is 80 or higher, or both. Despite what a lot of people think, high blood pressure usually doesn't cause headaches or make you feel dizzy or lightheaded. It usually has no symptoms. But it does increase your risk for heart attack, stroke, and kidney or eye damage. The higher your blood pressure, the more your risk increases. Your doctor will give you a goal for your blood pressure. Your goal will be based on your health and your age. Lifestyle changes, such as eating healthy and being active, are always important to help lower blood pressure. You might also take medicine to reach your blood pressure goal.  Follow-up care is a key part of your treatment and safety. Be sure to make and go to all appointments, and call your doctor if you are having problems. It's also a good idea to know your test results and keep a list of the medicines you take. How can you care for yourself at home? Medical treatment  · If you stop taking your medicine, your blood pressure will go back up. You may take one or more types of medicine to lower your blood pressure. Be safe with medicines. Take your medicine exactly as prescribed. Call your doctor if you think you are having a problem with your medicine. · Talk to your doctor before you start taking aspirin every day. Aspirin can help certain people lower their risk of a heart attack or stroke. But taking aspirin isn't right for everyone, because it can cause serious bleeding. · See your doctor regularly. You may need to see the doctor more often at first or until your blood pressure comes down. · If you are taking blood pressure medicine, talk to your doctor before you take decongestants or anti-inflammatory medicine, such as ibuprofen.  Some of these medicines can raise blood pressure. · Learn how to check your blood pressure at home. Lifestyle changes  · Stay at a healthy weight. This is especially important if you put on weight around the waist. Losing even 10 pounds can help you lower your blood pressure. · If your doctor recommends it, get more exercise. Walking is a good choice. Bit by bit, increase the amount you walk every day. Try for at least 30 minutes on most days of the week. You also may want to swim, bike, or do other activities. · Avoid or limit alcohol. Talk to your doctor about whether you can drink any alcohol. · Try to limit how much sodium you eat to less than 2,300 milligrams (mg) a day. Your doctor may ask you to try to eat less than 1,500 mg a day. · Eat plenty of fruits (such as bananas and oranges), vegetables, legumes, whole grains, and low-fat dairy products. · Lower the amount of saturated fat in your diet. Saturated fat is found in animal products such as milk, cheese, and meat. Limiting these foods may help you lose weight and also lower your risk for heart disease. · Do not smoke. Smoking increases your risk for heart attack and stroke. If you need help quitting, talk to your doctor about stop-smoking programs and medicines. These can increase your chances of quitting for good. When should you call for help? Call 911 anytime you think you may need emergency care. This may mean having symptoms that suggest that your blood pressure is causing a serious heart or blood vessel problem. Your blood pressure may be over 180/120.   For example, call 911 if:    · You have symptoms of a heart attack. These may include:  ? Chest pain or pressure, or a strange feeling in the chest.  ? Sweating. ? Shortness of breath. ? Nausea or vomiting. ? Pain, pressure, or a strange feeling in the back, neck, jaw, or upper belly or in one or both shoulders or arms. ? Lightheadedness or sudden weakness.   ? A fast or irregular heartbeat.     · You have symptoms of a stroke. These may include:  ? Sudden numbness, tingling, weakness, or loss of movement in your face, arm, or leg, especially on only one side of your body. ? Sudden vision changes. ? Sudden trouble speaking. ? Sudden confusion or trouble understanding simple statements. ? Sudden problems with walking or balance. ? A sudden, severe headache that is different from past headaches.     · You have severe back or belly pain.    Do not wait until your blood pressure comes down on its own. Get help right away.   Call your doctor now or seek immediate care if:    · Your blood pressure is much higher than normal (such as 180/120 or higher), but you don't have symptoms.     · You think high blood pressure is causing symptoms, such as:  ? Severe headache.  ? Blurry vision.    Watch closely for changes in your health, and be sure to contact your doctor if:    · Your blood pressure measures higher than your doctor recommends at least 2 times. That means the top number is higher or the bottom number is higher, or both.     · You think you may be having side effects from your blood pressure medicine. Where can you learn more? Go to http://mehnaz-oscar.info/. Enter C388 in the search box to learn more about \"High Blood Pressure: Care Instructions. \"  Current as of: December 6, 2017  Content Version: 11.8  © 4526-7455 [employer]. Care instructions adapted under license by [employer] (which disclaims liability or warranty for this information). If you have questions about a medical condition or this instruction, always ask your healthcare professional. [place] [address] any warranty or liability for your use of this information.

## 2018-11-05 NOTE — PROGRESS NOTES
1. Have you been to the ER, urgent care clinic since your last visit? Hospitalized since your last visit? No    2. Have you seen or consulted any other health care providers outside of the [address] [place] [address] since your last visit? Include any pap smears or colon screening. Yes, Dr. Fredrick Montemayor, [address] [place] [address], 10- and 10-, cataract surgery. Also, Dr Juju Bernal, Dermatologist, for routine check. Chief Complaint   Patient presents with    Hypertension     1 mo.  f/u

## 2018-12-23 RX ORDER — GABAPENTIN 300 MG/1
CAPSULE ORAL
Qty: 360 CAP | Refills: 1 | Status: SHIPPED | OUTPATIENT
Start: 2018-12-23 | End: 2019-06-11 | Stop reason: SDUPTHER

## 2018-12-23 RX ORDER — BUPROPION HYDROCHLORIDE 150 MG/1
TABLET, EXTENDED RELEASE ORAL
Qty: 90 TAB | Refills: 3 | Status: SHIPPED | OUTPATIENT
Start: 2018-12-23 | End: 2019-12-30 | Stop reason: SDUPTHER

## 2019-01-03 ENCOUNTER — OFFICE VISIT (OUTPATIENT)
Dept: INTERNAL MEDICINE CLINIC | Age: 74
End: 2019-01-03

## 2019-01-03 VITALS
HEIGHT: 64 IN | HEART RATE: 78 BPM | OXYGEN SATURATION: 97 % | BODY MASS INDEX: 23.22 KG/M2 | WEIGHT: 136 LBS | SYSTOLIC BLOOD PRESSURE: 128 MMHG | DIASTOLIC BLOOD PRESSURE: 66 MMHG

## 2019-01-03 DIAGNOSIS — I25.10 CORONARY ARTERY DISEASE INVOLVING NATIVE HEART WITHOUT ANGINA PECTORIS, UNSPECIFIED VESSEL OR LESION TYPE: ICD-10-CM

## 2019-01-03 DIAGNOSIS — M25.50 ARTHRALGIA, UNSPECIFIED JOINT: ICD-10-CM

## 2019-01-03 DIAGNOSIS — I10 ESSENTIAL HYPERTENSION: Primary | ICD-10-CM

## 2019-01-03 DIAGNOSIS — M79.10 MYALGIA: ICD-10-CM

## 2019-01-03 DIAGNOSIS — E78.5 DYSLIPIDEMIA: ICD-10-CM

## 2019-01-03 LAB
BUN BLD-MCNC: 34 MG/DL (ref 7–17)
CALCIUM BLD-MCNC: 9.6 MG/DL (ref 8.4–10.2)
CHLORIDE BLD-SCNC: 106 MMOL/L (ref 98–107)
CO2 POC: 26 MMOL/L (ref 22–32)
CREAT BLD-MCNC: 0.9 MG/DL (ref 0.7–1.2)
EGFR (POC): 63
ERYTHROCYTE [SEDIMENTATION RATE] IN BLOOD: 50 MM/HR (ref 0–20)
GLUCOSE POC: 106 MG/DL (ref 65–105)
POTASSIUM SERPL-SCNC: 5.2 MMOL/L (ref 3.6–5)
SODIUM SERPL-SCNC: 144 MMOL/L (ref 137–145)

## 2019-01-03 NOTE — PROGRESS NOTES
Reviewed record in preparation for visit and have obtained necessary documentation. Identified pt with two pt identifiers(name and ). Chief Complaint   Patient presents with    Coronary Artery Disease     follow up    COPD     follow up    Hypertension     follow up        Coordination of Care Questionnaire:  :     1) Have you been to an emergency room, urgent care clinic since your last visit? no    Hospitalized since your last visit? no              2) Have you seen or consulted any other health care providers outside of [address] [place] [address] since your last visit? Patient states she has seen Dr Germain Clark and Dr Argenis Tao with the last 3 months, she isn't sure of the dates.

## 2019-01-03 NOTE — PROGRESS NOTES
Subjective:   Nataliya Marroquin is a 75 y.o. female      Chief Complaint   Patient presents with    Coronary Artery Disease     follow up    COPD     follow up    Hypertension     follow up        History of present illness:  Ms. Keyana Brown returns in follow up. After her last visit here she was seen by Dr. Michelle Acosta with worsening of her PVRs in her lower extremities. It appeared that the right leg was worse than the left, but ultimately she came to aortogram with runoff and actually had angioplasty and stenting of the left side common iliac rather than the right side. I am not sure how that transpired, but she is due to see him next week and will check with him about that. She has not really noticed much change in her claudication. Her blood pressure is stable. She continues on her usual medications. She has had issues with bilateral shoulder and upper arm discomforts making it difficult for her to raise her arms. She recalls no antecedent trauma. We will check a sed rate to make sure she does not have polymyalgia. Obviously that will need to be treated if it is so. If not, we could still consider a short course of steroids to see if it would improve her shoulders as the most likely etiology would be bursitis under those circumstances. She denies other new cardiorespiratory, GI or  symptoms. She will continue her usual medications. It is time to check a BMP regarding her renal function, particularly after the dye that she received and because of the medications that she is on.         Patient Active Problem List    Diagnosis Date Noted    Arteriosclerotic heart disease (ASHD) 08/23/2017     Priority: 1 - One    Claudication in peripheral vascular disease ([facility] [address] 08/23/2017     Priority: 1 - One    PVD (peripheral vascular disease) ([facility] [address]      Priority: 1 - One    CAD (coronary artery disease)      Priority: 1 - One    Hypertension      Priority: 1 - One    COPD, mild ([facility] [address] 03/15/2018     Priority: 2 - Two    Adult onset hypothyroidism 08/23/2017     Priority: 2 - Two    Dyslipidemia 08/23/2017     Priority: 2 - Two    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 ([facility] [address] 08/23/2017     Priority: 2 - Two    Long-term current use of high risk medication other than anticoagulant 10/02/2018     Priority: 3 - Three    Recurrent depression ([facility] [address] 01/09/2018     Priority: 3 - Three    Melanoma ([facility] [address] 09/26/2017     Priority: 3 - Three    Depression 09/23/2017     Priority: 3 - Three    Lumbar spinal stenosis 09/23/2017     Priority: 3 - Three    Familial mitral valve prolapse 08/23/2017     Priority: 3 - Three    Stroke [facility])      Priority: 3 - Three    GERD (gastroesophageal reflux disease)      Priority: 3 - Three    CVD (cerebrovascular disease)      Priority: 3 - Three    Anxiety 08/23/2017     Priority: 4 - Four    Cervical spondylosis 08/23/2017     Priority: 4 - Four    Macrocytosis 08/23/2017     Priority: 4 - Four    Osteopenia of multiple sites 08/23/2017     Priority: 4 - Four    Temporomandibular joint pain dysfunction syndrome 08/23/2017     Priority: 4 - Four    Fever blister 09/23/2017     Priority: 5 - Five    Tobacco user 08/23/2017     Priority: 5 - Five    Trigger finger 08/23/2017     Priority: 5 - Five    PE (physical exam), annual 09/23/2017      Past Surgical History:   Procedure Laterality Date    BYPASS GRAFT OTHR,FEM-POP       RIGHT FEMORAL-POPLITEAL BYPASS  WITH POLYTETRA-FL UOROETHYLENE GRAFT     CARDIAC SURG PROCEDURE UNLIST      stents x2    HX CAROTID ENDARTERECTOMY      right cea    HX OTHER SURGICAL Right     excision melanoma right arm    VASCULAR SURGERY PROCEDURE UNLIST      femoral stent - left    VASCULAR SURGERY PROCEDURE UNLIST      X5 right femoral bypass      Allergies   Allergen Reactions    Neomycin Sulfate Unknown (comments)      Family History   Problem Relation Age of Onset    Migraines Mother     Heart Disease Father     Hypertension Father  Lung Disease Father     Cancer Father         H&N    Hypertension Sister     Diabetes Sister     Heart Disease Brother     Hypertension Brother     Hypertension Sister     Cancer Sister         breast    Breast Cancer Sister 54    Hypertension Sister       Social History     Socioeconomic History    Marital status:      Spouse name: Not on file    Number of children: Not on file    Years of education: Not on file    Highest education level: Not on file   Social Needs    Financial resource strain: Not on file    Food insecurity - worry: Not on file    Food insecurity - inability: Not on file   [employer] needs - medical: Not on file   [employer] needs - non-medical: Not on file   Occupational History    Not on file   Tobacco Use    Smoking status: Former Smoker     Packs/day: 1.00     Years: 35.00     Pack years: 35.00     Last attempt to quit: 1/21/2008     Years since quitting: 10.9    Smokeless tobacco: Never Used   Substance and Sexual Activity    Alcohol use: Yes     Alcohol/week: 2.0 oz     Types: 4 Glasses of wine per week     Comment: occasionally    Drug use: No    Sexual activity: Not on file   Other Topics Concern    Not on file   Social History Narrative    Not on file     Outpatient Medications Marked as Taking for the 1/3/19 encounter (Office Visit) with Monse Dorantes MD   Medication Sig Dispense Refill    peg 400-propylene glycol (SYSTANE, PROPYLENE GLYCOL,) 0.4-0.3 % drop Administer 1 Drop to both eyes as needed.  buPROPion SR (WELLBUTRIN SR) 150 mg SR tablet TAKE 1 TABLET DAILY 90 Tab 3    gabapentin (NEURONTIN) 300 mg capsule TAKE 2 CAPSULES TWICE A  Cap 1    DENTA 5000 PLUS 1.1 % crea APPLY TWICE A DAY AFTER BRUSHING. DO NOT DRINK FOR 3 HOURS  5    amLODIPine (NORVASC) 5 mg tablet Take 1 Tab by mouth daily. 90 Tab 3    valACYclovir (VALTREX) 1 gram tablet Take 2 Tabs by mouth two (2) times a day.  2 initially and 2 in 12 hr for fever blisters 8 Tab 5    levothyroxine (SYNTHROID) 100 mcg tablet TAKE 1 TABLET DAILY BEFORE BREAKFAST 90 Tab 3    lisinopril (PRINIVIL, ZESTRIL) 40 mg tablet TAKE 1 TABLET DAILY 90 Tab 3    cilostazol (PLETAL) 100 mg tablet TAKE 1 TABLET TWICE A  Tab 3    atenolol (TENORMIN) 50 mg tablet TAKE 1 TABLET NIGHTLY 90 Tab 3    omeprazole (PRILOSEC) 20 mg capsule TAKE 1 CAPSULE DAILY 90 Cap 3    cyclobenzaprine (FLEXERIL) 10 mg tablet Take 1 Tab by mouth three (3) times daily as needed for Muscle Spasm(s). 30 Tab 0    atorvastatin (LIPITOR) 40 mg tablet TAKE 1 TABLET DAILY 90 Tab 3    ascorbic acid (VITAMIN C) 500 mg tablet Take 500 mg by mouth two (2) times a day.  aspirin (ASPIRIN) 325 mg tablet Take 325 mg by mouth daily.  multivitamin, stress formula (STRESS TAB) tablet Take 1 Tab by mouth daily. Review of Systems              Constitutional:  She denies fever, weight loss, sweats or fatigue. HEENT:  No blurred or double vision, headache or dizziness. No difficulty with swallowing, taste, speech or smell. Respiratory:  No cough, wheezing or shortness of breath. No sputum production. Cardiac:  Denies chest pain, palpitations, unexplained indigestion, syncope, edema, PND or orthopnea. GI:  No changes in bowel movements, no abdominal pain, no bloating, anorexia, nausea, vomiting or heartburn. :  No frequency or dysuria. Denies incontinence. Extremities:  No joint pain, stiffness or swelling. Skin:  No recent rashes or mole changes. Neurological:  No numbness, tingling, burning paresthesias or loss of motor strength. No syncope, dizziness, frequent headaches or memory loss. Objective:     Vitals:    01/03/19 1331   BP: 128/66   Pulse: 78   SpO2: 97%   Weight: 136 lb (61.7 kg)   Height: 5' 4\" (1.626 m)   PainSc:   2   PainLoc: Generalized       Body mass index is 23.34 kg/m².    Physical Examination:              General Appearance:  Well-developed, well-nourished, no acute  distress. HEENT:     Ears:  The TMs and ear canals were clear. Eyes:  The pupillary responses were normal.  Extraocular muscle function intact. Lids and conjunctiva not injected. Neck:  Supple without thyromegaly or adenopathy. No JVD noted. Lungs:  Clear to auscultation and percussion. Cardiac:  Regular rate and rhythm without murmur. GI: nontender w/o mass. Normal BS's. Extremities:  No clubbing, cyanosis or edema. Pulses not palpable below femorals  Skin:  No rash or unusual mole changes noted. Neurological:  Grossly normal.            Assessment/Plan:   Impressions:      ICD-10-CM ICD-9-CM    1. Essential hypertension I10 401.9 AMB POC BASIC METABOLIC PANEL   2. Coronary artery disease involving native heart without angina pectoris, unspecified vessel or lesion type I25.10 414.01 AMB POC BASIC METABOLIC PANEL   3. Dyslipidemia E78.5 272.4 AMB POC BASIC METABOLIC PANEL   4. Arthralgia, unspecified joint M25.50 719.40 AMB POC SEDIMENTATION RATE, ERYTHROCYTE; NON AUTO   5. Myalgia M79.10 729.1 AMB POC SEDIMENTATION RATE, ERYTHROCYTE; NON AUTO        Plan:  1. Continue present meds  2. Discussed lifestyle modifications including Na restriction, low carb/fat diet, weight reduction and exercise (at least a walking program). Follow-up Disposition:  Return in about 2 months (around 3/3/2019) for CPE.       Orders Placed This Encounter    AMB POC BASIC METABOLIC PANEL    AMB POC SEDIMENTATION RATE, ERYTHROCYTE; NON Luiza Rebolledo MD

## 2019-01-04 NOTE — PROGRESS NOTES
Patient informed that Sed rate is high and she could have PMR. Need check CRP also to decide.   Blood sugar, kidney and K+ normal.

## 2019-01-04 NOTE — PROGRESS NOTES
Sed rate is high and she could have PMR. Need check CRP also to decide.   Blood sugar, kidney and K+ normal.

## 2019-01-07 ENCOUNTER — LAB ONLY (OUTPATIENT)
Dept: INTERNAL MEDICINE CLINIC | Age: 74
End: 2019-01-07

## 2019-01-07 DIAGNOSIS — R70.0 ELEVATED SED RATE: Primary | ICD-10-CM

## 2019-01-08 LAB — CRP SERPL-MCNC: 1.3 MG/L (ref 0–4.9)

## 2019-01-08 RX ORDER — PREDNISONE 20 MG/1
20 TABLET ORAL
Qty: 30 TAB | Refills: 0 | Status: SHIPPED | OUTPATIENT
Start: 2019-01-08 | End: 2019-03-19

## 2019-01-08 NOTE — TELEPHONE ENCOUNTER
Patient informed that per Dr James Andino CRP is normal so lesslikely to have PMR.  We can try a therapeutic trial of Prednisone 20 mg daily for 2 weeks and see if she dramatically improves and ESR falls significantly.  Will need appt 2 weeks.  Appointment scheduled

## 2019-01-08 NOTE — PROGRESS NOTES
CRP is normal so lesslikely to have PMR. We can try a therapeutic trial of Prednisone 20 mg daily for 2 weeks and see if she dramatically improves and ESR falls significantly.   Will need appt 2 weeks

## 2019-01-08 NOTE — PROGRESS NOTES
Patient informed that CRP is normal so lesslikely to have PMR.  We can try a therapeutic trial of Prednisone 20 mg daily for 2 weeks and see if she dramatically improves and ESR falls significantly.  Will need appt 2 weeks

## 2019-01-23 ENCOUNTER — OFFICE VISIT (OUTPATIENT)
Dept: INTERNAL MEDICINE CLINIC | Age: 74
End: 2019-01-23

## 2019-01-23 VITALS
HEIGHT: 64 IN | OXYGEN SATURATION: 97 % | DIASTOLIC BLOOD PRESSURE: 62 MMHG | BODY MASS INDEX: 23.22 KG/M2 | SYSTOLIC BLOOD PRESSURE: 136 MMHG | HEART RATE: 80 BPM | WEIGHT: 136 LBS

## 2019-01-23 DIAGNOSIS — I10 ESSENTIAL HYPERTENSION: ICD-10-CM

## 2019-01-23 DIAGNOSIS — M79.10 MYALGIA: Primary | ICD-10-CM

## 2019-01-23 LAB — ERYTHROCYTE [SEDIMENTATION RATE] IN BLOOD: 15 MM/HR (ref 0–20)

## 2019-01-23 NOTE — PROGRESS NOTES
Reviewed record in preparation for visit and have obtained necessary documentation. Identified pt with two pt identifiers(name and ).     Chief Complaint   Patient presents with    Follow-up     bilateral arm and shoulder pain        Coordination of Care Questionnaire:  :     1) Have you been to an emergency room, urgent care clinic since your last visit? no    Hospitalized since your last visit? no              2) Have you seen or consulted any other health care providers outside of [facility] since your last visit? no

## 2019-01-23 NOTE — PROGRESS NOTES
Subjective:   Michelle Kidd is a 75 y.o. female      Chief Complaint   Patient presents with    Follow-up     bilateral arm and shoulder pain        History of present illness:  Ms. Jose Sal returns in follow up. Her last visit here she had a lot of bilateral shoulder pain and arm pain and her sed rate was moderately elevated. We tried her on prednisone 20 mg daily. She has not noted a dramatic improvement in her overall sense of wellbeing, though her shoulder pains have improved. I do not really think she has polymyalgia under the circumstances. We will check her sed rate again today to see where she stands. Once I see that we can either continue prednisone at lower doses or taper off of it completely. She is due in March for her comprehensive examination. She denies any new cardiorespiratory, GI or  symptoms.         Patient Active Problem List    Diagnosis Date Noted    Arteriosclerotic heart disease (ASHD) 08/23/2017     Priority: 1 - One    Claudication in peripheral vascular disease ([address] 08/23/2017     Priority: 1 - One    PVD (peripheral vascular disease) ([address]      Priority: 1 - One    CAD (coronary artery disease)      Priority: 1 - One    Hypertension      Priority: 1 - One    COPD, mild ([address] 03/15/2018     Priority: 2 - Two    Adult onset hypothyroidism 08/23/2017     Priority: 2 - Two    Dyslipidemia 08/23/2017     Priority: 2 - Two    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 ([address] 08/23/2017     Priority: 2 - Two    Myalgia 01/23/2019     Priority: 3 - Three    Long-term current use of high risk medication other than anticoagulant 10/02/2018     Priority: 3 - Three    Recurrent depression ([address] 01/09/2018     Priority: 3 - Three    Melanoma ([address] 09/26/2017     Priority: 3 - Three    Depression 09/23/2017     Priority: 3 - Three    Lumbar spinal stenosis 09/23/2017     Priority: 3 - Three    Familial mitral valve prolapse 08/23/2017     Priority: 3 - Three    Stroke [facility])      Priority: 3 - Three    GERD (gastroesophageal reflux disease)      Priority: 3 - Three    CVD (cerebrovascular disease)      Priority: 3 - Three    Anxiety 08/23/2017     Priority: 4 - Four    Cervical spondylosis 08/23/2017     Priority: 4 - Four    Macrocytosis 08/23/2017     Priority: 4 - Four    Osteopenia of multiple sites 08/23/2017     Priority: 4 - Four    Temporomandibular joint pain dysfunction syndrome 08/23/2017     Priority: 4 - Four    Fever blister 09/23/2017     Priority: 5 - Five    Tobacco user 08/23/2017     Priority: 5 - Five    Trigger finger 08/23/2017     Priority: 5 - Five    PE (physical exam), annual 09/23/2017      Past Surgical History:   Procedure Laterality Date    BYPASS GRAFT OTHR,FEM-POP       RIGHT FEMORAL-POPLITEAL BYPASS  WITH POLYTETRA-FL UOROETHYLENE GRAFT     CARDIAC SURG PROCEDURE UNLIST      stents x2    HX CAROTID ENDARTERECTOMY      right cea    HX OTHER SURGICAL Right     excision melanoma right arm    VASCULAR SURGERY PROCEDURE UNLIST      femoral stent - left    VASCULAR SURGERY PROCEDURE UNLIST      X5 right femoral bypass      Allergies   Allergen Reactions    Neomycin Sulfate Unknown (comments)      Family History   Problem Relation Age of Onset   TinObion Migraines Mother     Heart Disease Father     Hypertension Father     Lung Disease Father     Cancer Father         H&N    Hypertension Sister     Diabetes Sister     Heart Disease Brother     Hypertension Brother     Hypertension Sister     Cancer Sister         breast    Breast Cancer Sister 54    Hypertension Sister       Social History     Socioeconomic History    Marital status:      Spouse name: Not on file    Number of children: Not on file    Years of education: Not on file    Highest education level: Not on file   Social Needs    Financial resource strain: Not on file    Food insecurity - worry: Not on file    Food insecurity - inability: Not on file  Transportation needs - medical: Not on file   Cr[place]le needs - non-medical: Not on file   Occupational History    Not on file   Tobacco Use    Smoking status: Former Smoker     Packs/day: 1.00     Years: 35.00     Pack years: 35.00     Last attempt to quit: 2008     Years since quittin.0    Smokeless tobacco: Never Used   Substance and Sexual Activity    Alcohol use: Yes     Alcohol/week: 2.0 oz     Types: 4 Glasses of wine per week     Comment: occasionally    Drug use: No    Sexual activity: Not on file   Other Topics Concern    Not on file   Social History Narrative    Not on file     Outpatient Medications Marked as Taking for the 19 encounter (Office Visit) with Aryan Morales MD   Medication Sig Dispense Refill    predniSONE (DELTASONE) 20 mg tablet Take 20 mg by mouth daily (with breakfast). 30 Tab 0    peg 400-propylene glycol (SYSTANE, PROPYLENE GLYCOL,) 0.4-0.3 % drop Administer 1 Drop to both eyes as needed.  buPROPion SR (WELLBUTRIN SR) 150 mg SR tablet TAKE 1 TABLET DAILY 90 Tab 3    gabapentin (NEURONTIN) 300 mg capsule TAKE 2 CAPSULES TWICE A  Cap 1    DENTA 5000 PLUS 1.1 % crea APPLY TWICE A DAY AFTER BRUSHING. DO NOT DRINK FOR 3 HOURS  5    amLODIPine (NORVASC) 5 mg tablet Take 1 Tab by mouth daily. 90 Tab 3    valACYclovir (VALTREX) 1 gram tablet Take 2 Tabs by mouth two (2) times a day. 2 initially and 2 in 12 hr for fever blisters 8 Tab 5    levothyroxine (SYNTHROID) 100 mcg tablet TAKE 1 TABLET DAILY BEFORE BREAKFAST 90 Tab 3    lisinopril (PRINIVIL, ZESTRIL) 40 mg tablet TAKE 1 TABLET DAILY 90 Tab 3    cilostazol (PLETAL) 100 mg tablet TAKE 1 TABLET TWICE A  Tab 3    atenolol (TENORMIN) 50 mg tablet TAKE 1 TABLET NIGHTLY 90 Tab 3    omeprazole (PRILOSEC) 20 mg capsule TAKE 1 CAPSULE DAILY 90 Cap 3    cyclobenzaprine (FLEXERIL) 10 mg tablet Take 1 Tab by mouth three (3) times daily as needed for Muscle Spasm(s).  30 Tab 0    atorvastatin (LIPITOR) 40 mg tablet TAKE 1 TABLET DAILY 90 Tab 3    ascorbic acid (VITAMIN C) 500 mg tablet Take 500 mg by mouth two (2) times a day.  aspirin (ASPIRIN) 325 mg tablet Take 325 mg by mouth daily.  multivitamin, stress formula (STRESS TAB) tablet Take 1 Tab by mouth daily. Review of Systems              Constitutional:  She denies fever, weight loss, sweats or fatigue. HEENT:  No blurred or double vision, headache or dizziness. No difficulty with swallowing, taste, speech or smell. Respiratory:  No cough, wheezing or shortness of breath. No sputum production. Cardiac:  Denies chest pain, palpitations, unexplained indigestion, syncope, edema, PND or orthopnea. GI:  No changes in bowel movements, no abdominal pain, no bloating, anorexia, nausea, vomiting or heartburn. :  No frequency or dysuria. Denies incontinence. Extremities:  No joint pain, stiffness or swelling. Skin:  No recent rashes or mole changes. Neurological:  No numbness, tingling, burning paresthesias or loss of motor strength. No syncope, dizziness, frequent headaches or memory loss. Objective:     Vitals:    01/23/19 0934   BP: 136/62   Pulse: 80   SpO2: 97%   Weight: 136 lb (61.7 kg)   Height: 5' 4\" (1.626 m)   PainSc:   2   PainLoc: Arm       Body mass index is 23.34 kg/m². Physical Examination:              General Appearance:  Well-developed, well-nourished, no acute  distress. HEENT:     Ears:  The TMs and ear canals were clear. Eyes:  The pupillary responses were normal.  Extraocular muscle function intact. Lids and conjunctiva not injected. Neck:  Supple without thyromegaly or adenopathy. No JVD noted. Lungs:  Clear to auscultation and percussion. Cardiac:  Regular rate and rhythm without murmur. GI: nontender w/o mass. Normal BS's. Extremities:  No clubbing, cyanosis or edema. Skin:  No rash or unusual mole changes noted.     Neurological:  Grossly normal. Assessment/Plan:   Impressions:      ICD-10-CM ICD-9-CM    1. Myalgia M79.10 729.1 AMB POC SEDIMENTATION RATE, ERYTHROCYTE; NON AUTO   2. Essential hypertension I10 401.9         Plan:  1. Continue present meds  2. Discussed lifestyle modifications including Na restriction, low carb/fat diet, weight reduction and exercise (at least a walking program). Follow-up Disposition:  Return for As scheduled.       Orders Placed This Encounter    AMB POC SEDIMENTATION RATE, ERYTHROCYTE; NON AUTO       Jono Perry MD

## 2019-01-28 NOTE — PROGRESS NOTES
Patient informed that per Dr Delta Skelton rate now normal.  Decrease prednisone and stop as we discussed.   Take 10 mg x 1 week then stop

## 2019-02-04 RX ORDER — ATORVASTATIN CALCIUM 40 MG/1
TABLET, FILM COATED ORAL
Qty: 90 TAB | Refills: 3 | Status: SHIPPED | OUTPATIENT
Start: 2019-02-04 | End: 2020-01-20 | Stop reason: SDUPTHER

## 2019-02-04 NOTE — TELEPHONE ENCOUNTER
PCP: Artem Echeverria MD    Last appt: 1/23/2019  Future Appointments   Date Time Provider Liana Bartholomew   3/12/2019 10:30 AM LAB ONLY [facility]   3/19/2019  1:00 PM Alicia Castrejon MD [facility] SCHED       Requested Prescriptions     Pending Prescriptions Disp Refills    atorvastatin (LIPITOR) 40 mg tablet [Pharmacy Med Name: ATORVASTATIN TABS 40MG] 90 Tab 3     Sig: TAKE 1 TABLET DAILY       Prior labs and Blood pressures:  BP Readings from Last 3 Encounters:   01/23/19 136/62   01/03/19 128/66   11/05/18 120/58     Lab Results   Component Value Date/Time    Sodium 143 10/06/2013 09:18 AM    Potassium 4.0 10/06/2013 09:18 AM    Chloride 110 (H) 10/06/2013 09:18 AM    CO2 26 10/06/2013 09:18 AM    Anion gap 7 10/06/2013 09:18 AM    Glucose 92 10/06/2013 09:18 AM    BUN 12 10/06/2013 09:18 AM    Creatinine 0.67 10/06/2013 09:18 AM    BUN/Creatinine ratio 18 10/06/2013 09:18 AM    GFR est AA >60 10/06/2013 09:18 AM    GFR est non-AA >60 10/06/2013 09:18 AM    Calcium 8.6 10/06/2013 09:18 AM     No results found for: HBA1C, HGBE8, MXE6KMJG, PLY4UDVT  Lab Results   Component Value Date/Time    Cholesterol (POC) 135.0 10/02/2018 02:05 PM    HDL Cholesterol (POC) 89.0 10/02/2018 02:05 PM    LDL Cholesterol (POC) 28.8 10/02/2018 02:05 PM    Triglycerides (POC) 86.0 10/02/2018 02:05 PM     No results found for: Rocha Radha, XQVID3, XQVID, VD3RIA    No results found for: TSH, TSH2, TSH3, TSHP, TSHEXT

## 2019-02-22 ENCOUNTER — TELEPHONE (OUTPATIENT)
Dept: INTERNAL MEDICINE CLINIC | Age: 74
End: 2019-02-22

## 2019-02-22 NOTE — TELEPHONE ENCOUNTER
Patient called stating she is having spasms in her shoulder and neck. She states she thinks it is due to the bad disc in her neck and her upper arm still hurts. Patient states she has an unopened Prednisone 6 day dose pack and wants to know if she can take it. Discussed with Dr. Massiel Thrasher. Per read back verbal order, patient may take the Prednisone dose pack and if she is no better, she should call the office back. Patient informed.

## 2019-03-12 ENCOUNTER — LAB ONLY (OUTPATIENT)
Dept: INTERNAL MEDICINE CLINIC | Age: 74
End: 2019-03-12

## 2019-03-12 DIAGNOSIS — E03.8 ADULT ONSET HYPOTHYROIDISM: ICD-10-CM

## 2019-03-12 DIAGNOSIS — E78.5 DYSLIPIDEMIA: ICD-10-CM

## 2019-03-12 DIAGNOSIS — N39.0 URINARY TRACT INFECTION WITHOUT HEMATURIA, SITE UNSPECIFIED: ICD-10-CM

## 2019-03-12 DIAGNOSIS — M85.89 OSTEOPENIA OF MULTIPLE SITES: ICD-10-CM

## 2019-03-12 DIAGNOSIS — I10 ESSENTIAL HYPERTENSION: Primary | ICD-10-CM

## 2019-03-12 DIAGNOSIS — Z79.899 LONG-TERM CURRENT USE OF HIGH RISK MEDICATION OTHER THAN ANTICOAGULANT: ICD-10-CM

## 2019-03-12 DIAGNOSIS — R79.0 ABNORMAL IRON SATURATION: ICD-10-CM

## 2019-03-12 LAB
A-G RATIO,AGRAT: 1.5 RATIO
ALBUMIN SERPL-MCNC: 4.4 G/DL (ref 3.9–5.4)
ALP SERPL-CCNC: 78 U/L (ref 38–126)
ALT SERPL-CCNC: 20 U/L (ref 9–52)
ANION GAP SERPL CALC-SCNC: 14 MMOL/L
AST SERPL W P-5'-P-CCNC: 28 U/L (ref 14–36)
BACTERIA,BACTU: ABNORMAL
BILIRUB SERPL-MCNC: 0.3 MG/DL (ref 0.2–1.3)
BILIRUB UR QL: NEGATIVE
BUN SERPL-MCNC: 14 MG/DL (ref 7–17)
BUN/CREATININE RATIO,BUCR: 20 RATIO
CALCIUM SERPL-MCNC: 9.9 MG/DL (ref 8.4–10.2)
CHLORIDE SERPL-SCNC: 103 MMOL/L (ref 98–107)
CHOL/HDL RATIO,CHHD: 2 RATIO (ref 0–4)
CHOLEST SERPL-MCNC: 146 MG/DL (ref 0–200)
CK SERPL-CCNC: 35 U/L (ref 30–135)
CLARITY: CLEAR
CO2 SERPL-SCNC: 26 MMOL/L (ref 22–32)
COLOR UR: ABNORMAL
CREAT SERPL-MCNC: 0.7 MG/DL (ref 0.7–1.2)
ERYTHROCYTE [DISTWIDTH] IN BLOOD BY AUTOMATED COUNT: 14.6 %
GLOBULIN,GLOB: 3
GLUCOSE 24H UR-MRATE: NEGATIVE G/(24.H)
GLUCOSE SERPL-MCNC: 95 MG/DL (ref 65–105)
HCT VFR BLD AUTO: 40.7 % (ref 37–51)
HDLC SERPL-MCNC: 85 MG/DL (ref 35–130)
HGB BLD-MCNC: 13 G/DL (ref 12–18)
HGB UR QL STRIP: NEGATIVE
IRON % SATURATION, SAT: 18 % (ref 15–55)
IRON,IRN: 71 UG/DL (ref 37–170)
KETONES UR QL STRIP.AUTO: NEGATIVE
LDL/HDL RATIO,LDHD: 0 RATIO
LDLC SERPL CALC-MCNC: 41 MG/DL (ref 0–130)
LEUKOCYTE ESTERASE: ABNORMAL
LYMPHOCYTES ABSOLUTE: 1.4 K/UL (ref 0.6–4.1)
LYMPHOCYTES NFR BLD: 20.3 % (ref 10–58.5)
MCH RBC QN AUTO: 33.4 PG (ref 26–32)
MCHC RBC AUTO-ENTMCNC: 31.9 G/DL (ref 30–36)
MCV RBC AUTO: 104.5 FL (ref 80–97)
MONOCYTES ABS-DIF,2141: 0.8 K/UL (ref 0–1.8)
MONOCYTES NFR BLD: 11.8 % (ref 0.1–24)
NEUTROPHILS # BLD: 67.9 % (ref 37–92)
NEUTROPHILS ABS,2156: 4.8 K/UL (ref 2–7.8)
NITRITE UR QL STRIP.AUTO: NEGATIVE
PH UR STRIP: 6.5 [PH] (ref 5–7)
PLATELET # BLD AUTO: 264 K/UL (ref 140–440)
PMV BLD AUTO: 8.3 FL
POTASSIUM SERPL-SCNC: 4.4 MMOL/L (ref 3.6–5)
PROT SERPL-MCNC: 7.4 G/DL (ref 6.3–8.2)
PROT UR STRIP-MCNC: NEGATIVE MG/DL
RBC # BLD AUTO: 3.89 M/UL (ref 4.2–6.3)
RBC #/AREA URNS HPF: ABNORMAL #/HPF
SODIUM SERPL-SCNC: 143 MMOL/L (ref 137–145)
SP GR UR REFRACTOMETRY: 1.01 (ref 1–1.03)
T4 FREE SERPL-MCNC: 1.07 NG/DL (ref 0.58–2.3)
TIBC,TIBC: 399 MCG/DL (ref 265–497)
TRIGL SERPL-MCNC: 98 MG/DL (ref 0–200)
TSH SERPL DL<=0.05 MIU/L-ACNC: 0.66 UIU/ML (ref 0.34–5.6)
UROBILINOGEN UR QL STRIP.AUTO: NEGATIVE
VLDLC SERPL CALC-MCNC: 20 MG/DL
WBC # BLD AUTO: 7.1 K/UL (ref 4.1–10.9)
WBC URNS QL MICRO: ABNORMAL #/HPF

## 2019-03-14 LAB
BACTERIA UR CULT: ABNORMAL

## 2019-03-17 PROBLEM — M81.0 AGE-RELATED OSTEOPOROSIS WITHOUT CURRENT PATHOLOGICAL FRACTURE: Status: ACTIVE | Noted: 2017-08-23

## 2019-03-17 PROBLEM — F32.A DEPRESSION: Status: RESOLVED | Noted: 2017-09-23 | Resolved: 2019-03-17

## 2019-03-19 ENCOUNTER — OFFICE VISIT (OUTPATIENT)
Dept: INTERNAL MEDICINE CLINIC | Age: 74
End: 2019-03-19

## 2019-03-19 VITALS
SYSTOLIC BLOOD PRESSURE: 110 MMHG | BODY MASS INDEX: 24.27 KG/M2 | OXYGEN SATURATION: 95 % | WEIGHT: 137 LBS | HEART RATE: 83 BPM | DIASTOLIC BLOOD PRESSURE: 62 MMHG | HEIGHT: 63 IN | RESPIRATION RATE: 16 BRPM | TEMPERATURE: 97.7 F

## 2019-03-19 DIAGNOSIS — N39.0 URINARY TRACT INFECTION WITHOUT HEMATURIA, SITE UNSPECIFIED: ICD-10-CM

## 2019-03-19 DIAGNOSIS — G63 NEUROPATHY DUE TO PERIPHERAL VASCULAR DISEASE (HCC): ICD-10-CM

## 2019-03-19 DIAGNOSIS — I67.9 CVD (CEREBROVASCULAR DISEASE): ICD-10-CM

## 2019-03-19 DIAGNOSIS — I10 ESSENTIAL HYPERTENSION: ICD-10-CM

## 2019-03-19 DIAGNOSIS — E78.5 DYSLIPIDEMIA: ICD-10-CM

## 2019-03-19 DIAGNOSIS — I73.9 NEUROPATHY DUE TO PERIPHERAL VASCULAR DISEASE (HCC): ICD-10-CM

## 2019-03-19 DIAGNOSIS — B00.1 FEVER BLISTER: ICD-10-CM

## 2019-03-19 DIAGNOSIS — M48.062 SPINAL STENOSIS OF LUMBAR REGION WITH NEUROGENIC CLAUDICATION: ICD-10-CM

## 2019-03-19 DIAGNOSIS — F33.9 RECURRENT DEPRESSION (HCC): ICD-10-CM

## 2019-03-19 DIAGNOSIS — E03.8 ADULT ONSET HYPOTHYROIDISM: ICD-10-CM

## 2019-03-19 DIAGNOSIS — F41.9 ANXIETY: ICD-10-CM

## 2019-03-19 DIAGNOSIS — I25.10 CORONARY ARTERY DISEASE INVOLVING NATIVE HEART WITHOUT ANGINA PECTORIS, UNSPECIFIED VESSEL OR LESION TYPE: ICD-10-CM

## 2019-03-19 DIAGNOSIS — I34.1: ICD-10-CM

## 2019-03-19 DIAGNOSIS — J44.9 COPD, MILD (HCC): ICD-10-CM

## 2019-03-19 DIAGNOSIS — M47.812 SPONDYLOSIS OF CERVICAL REGION WITHOUT MYELOPATHY OR RADICULOPATHY: ICD-10-CM

## 2019-03-19 DIAGNOSIS — I73.9 CLAUDICATION IN PERIPHERAL VASCULAR DISEASE (HCC): ICD-10-CM

## 2019-03-19 DIAGNOSIS — K21.00 GASTROESOPHAGEAL REFLUX DISEASE WITH ESOPHAGITIS: ICD-10-CM

## 2019-03-19 DIAGNOSIS — M65.30 TRIGGER FINGER, UNSPECIFIED FINGER, UNSPECIFIED LATERALITY: ICD-10-CM

## 2019-03-19 DIAGNOSIS — M79.10 MYALGIA: ICD-10-CM

## 2019-03-19 DIAGNOSIS — I73.9 PVD (PERIPHERAL VASCULAR DISEASE) (HCC): Primary | ICD-10-CM

## 2019-03-19 DIAGNOSIS — M81.0 AGE-RELATED OSTEOPOROSIS WITHOUT CURRENT PATHOLOGICAL FRACTURE: ICD-10-CM

## 2019-03-19 DIAGNOSIS — D75.89 MACROCYTOSIS: ICD-10-CM

## 2019-03-19 DIAGNOSIS — Z72.0 TOBACCO USER: ICD-10-CM

## 2019-03-19 DIAGNOSIS — Z86.73 HISTORY OF TIA (TRANSIENT ISCHEMIC ATTACK): ICD-10-CM

## 2019-03-19 DIAGNOSIS — Z79.899 LONG-TERM CURRENT USE OF HIGH RISK MEDICATION OTHER THAN ANTICOAGULANT: ICD-10-CM

## 2019-03-19 DIAGNOSIS — M26.629 TEMPOROMANDIBULAR JOINT PAIN DYSFUNCTION SYNDROME: ICD-10-CM

## 2019-03-19 DIAGNOSIS — I25.10 ARTERIOSCLEROTIC HEART DISEASE (ASHD): ICD-10-CM

## 2019-03-19 DIAGNOSIS — C43.61 MALIGNANT MELANOMA OF RIGHT UPPER EXTREMITY INCLUDING SHOULDER (HCC): ICD-10-CM

## 2019-03-19 LAB — SED RATE (ESR): 34 MM/HR (ref 0–20)

## 2019-03-19 RX ORDER — CIPROFLOXACIN 250 MG/1
250 TABLET, FILM COATED ORAL 2 TIMES DAILY
Qty: 14 TAB | Refills: 0 | Status: SHIPPED | OUTPATIENT
Start: 2019-03-19 | End: 2019-05-30 | Stop reason: ALTCHOICE

## 2019-03-19 NOTE — PROGRESS NOTES
Ms. Keyana Brown is a 75year old ,  female who comes to the office today for annual comprehensive personal healthcare examination. 
  
History of Present Illness:  This patient has multiple medical problems.  These include: 1. Coronary artery disease, status post angioplasty and stenting of the right coronary artery in December, 2009. At that time she underwent cardiac cath and had a 40% left anterior descending coronary artery lesion as well. She has had no recent problems with angina or CHF. She sees Dr. Marija Singh on a six to twelve month basis. Her last stress test was in May of 2015 and negative. She has not had any recent issues with anginal type symptoms or symptoms of congestive heart failure. 2. PVD, status post left common artery iliac stent and angioplasty in April of 2007 and a right CFA and popliteal bypass graft in April of 2009 with revision of this in September of 2009. She had another revision on the right side in January, 2011 with a patch angioplasty and thrombectomy. She has also undergone a left SFA stent as of September, 2012. Then in August, 2013 she underwent right femoral to tibial peroneal trunk bypass grafting. She then underwent right iliac artery angioplasty and stent and left iliac angioplasty in June of 2014. Most recently she underwent aortogram with runoff In December, 2018 and underwent stenting of a 60% left common iliac artery stenosis. Dr. Michelle Acosta has been reluctant to do any further surgery as her limbs are not threatened. She does continue to have claudication at one-half to one block. She also has a mild ischemic neuropathy, particularly involving her great toes. 3. CVD, status post right carotid endarterectomy in March, 2010. She has had stable carotid duplexes since. 4. History of MVP. 5. History of TIA in the remote past. 
6. History of chronic tobacco abuse. She quit smoking in 2007. 7. Hypothyroidism. 8. Hyperlipidemia. 9. Hypertension. 10. History of lumbar spinal stenosis with bilateral radiculopathies, right greater than left, and chronic back pain, all of which are symptomatic at times, but not consistently. 11. History of depression/anxiety. 12. Macrocytosis, longstanding and unchanging with a prior history of anemia. 13. History of recurrent fever blisters. 14. History of trigger fingers. 15. Cervical spondylosis with more recent problems with neck pain. She has also had bilateral upper arm pain. She finds it difficult to raise her arms and has noted pain radiating into her occiput as well. She has had a mildly elevated sed rate in the past, raising the question of polymyalgia as the cause of some of her upper arm symptoms, but more likely it relates to a cervical radiculopathy. At the time of the visit we elected to check her sed rate to see if she might benefit from a more prolonged course of prednisone. We also made her an appointment to see Dr. Elizabeth Ramos, however. 16. GERD. 17. History of osteoporosis with previous treatment with Boniva. 18. History of right TMJ, intermittently symptomatic. 19. Ischemic neuropathy as noted above. 20. Early COPD from her smoking. 21. Current UTI. 
  
At the time of the visit she was noting her usual claudication. She also noted problems with neck and upper arm pain, as well as some degree of increased pain in her great toes, consistent with her neuropathy.   
  
Past Medical History:  Otherwise remarkable for a history of multiple trigger fingers and excision of a melanoma from her right upper arm. 
  
Allergies:  None known. 
  
Current Medications: 1. Aspirin 325 mg daily. 2. Bupropion 150 mg daily. 3. Multivitamin daily. 4. Gabapentin 300 mg, two twice a day. 5. Atorvastatin 40 mg daily. 6. Levothyroxine 100 mcg daily. 7. Omeprazole 20 mg daily. 8. Atenolol 50 mg daily. 9. Vitamin C 500 mg twice a day. 10. Lisinopril 40 mg daily. 11. Cilostazol 100 mg twice a day. 12. Valtrex 1 gram, two tablets initially, then two 12 hours later as needed for fever blisters 13. Cyclobenzaprine 10 mg three times a day as needed for muscle spasm. 14. Denta 5000 Paste used twice a day. 15. Amlodipine 5 mg daily. 16. Systane eyedrops as needed for dry eyes. 17. She also has prednisone 20 mg, which she can take three a day for five days when back pain flares. She will hold this until we have reevaluated her regarding her sed rate, etc., as noted above. 
  
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 She is . She no longer smokes. She drinks at least one glass of wine a day. She walks to the degree that her legs will allow. Diet is prudent. 
  
Family History:   Mother  of old age at 80. Father had cancer of the head and neck and also heart disease and  at age 76. A sister in her late 46s had problems with breast cancer and pulmonary emboli. This sister's twin also had problems with hyperlipidemia, hypertension and irritable bowel. Her brother had significant heart disease and previous bypass surgery and  of esophageal cancer in his 76s. 
  
Review of Systems:   
CONSTITUTIONAL:  She denies fever, weight loss, sweats or fatigue. HEENT:  No blurred or double vision, headache or dizziness.  No difficulty with swallowing, taste, speech or smell. RESPIRATORY:  No cough, wheezing or shortness of breath.  No sputum production. CARDIAC:  Denies chest pain, palpitations, unexplained indigestion, syncope, edema, PND or orthopnea.   
GI:  No changes in bowel movements, no abdominal pain, no bloating, anorexia, nausea, vomiting or heartburn. :  No frequency or dysuria.  Denies incontinence or sexual dysfunction. BREASTS:  She does monthly self-breast examination.  No masses have been felt.  No nipple discharge noted. GYN:  Denies vaginal discharge or unexpected bleeding. EXTREMITIES:  Bilateral claudication as noted above. Upper arm discomfort, particularly with raising her arms. SKIN:  No recent rashes or mole changes. NEUROLOGICAL:  No numbness, tingling, burning paresthesias or loss of motor strength.  No syncope, dizziness, frequent headaches or memory loss. MSK:  Back and neck pain as described above. 
  
Physical Examination:   
GENERAL:  Reasonably well-developed, well-nourished, no acute  distress. VITAL SIGNS:  BP: 110/62. P: 83 and regular. R: 16.  T: 97.  HT: 5'3\". WT: 137 lbs. BMI: 24.3. VISION:  Deferred to ophthalmologist.      
HEARING:  Mild high frequency hearing loss. HEENT:   Ears:  The TMs and ear canals were clear.  Eyes:  The pupillary responses were normal.  Extraocular muscle function intact.  Lids and conjunctiva not injected.  Funduscopic exam revealed sharp disc margins.  Pharynx:  Clear with teeth in good repair.  No masses were noted.   
NECK:  Well healed right carotid endarterectomy incision.  No carotid bruits. LUNGS:  Clear to auscultation and percussion. CARDIAC:  Regular rate and rhythm without murmur.  PMI not displaced.  No gallop, rub or click. ABDOMEN:  Flat, soft, non-tender without palpable organomegaly or mass.  No pulsatile mass was felt, and no bruit was heard.  Bowel sounds were active. BREASTS:  Both symmetric.  No palpable masses felt. No skin retractions noted.  No nipple discharge evident. GYN: Deferred   
RECTAL EXAM:  Deferred   EXTREMITIES:  Well healed incisions in the groin area on the right where there is a fair amount of scarring. There are also well healed surgical incisions on the right leg. No clubbing, cyanosis or edema noted. Diminished DP and PT pulses, particularly on the right and diminished, but palpable, on the left with both feet reasonably warm. SKIN:  No rash or unusual mole changes noted.   
LYMPH NODES:  None felt in the cervical, supraclavicular, axillary or inguinal region. NEUROLOGICAL:  Cranial nerves II-XII grossly intact.  Motor strength 5/5. DTRs 2+ and symmetric.  Station and gait normal.     
  
Laboratory:  Hemoglobin is 13.0.  White blood count is 7,100.  Blood sugar is 95.  Liver and kidney function are normal.  Electrolytes are normal.  Lipid profile reveals a total cholesterol of 146, triglycerides of 98, HDL cholesterol of 85 and an LDL of 41. Iron normal at 71. Free thyroxine normal at 1.07 and TSH normal at 0.66, indicating normal thyroid function and adequate replacement. Urine culture grew E. Coli sensitive to Cipro. 
  
Health Maintenance Issues and Ancillary Studies: 1. TDAP (pertussis and tetanus) vaccine was given in February, 2013. 2. Pneumovax 23 (PPSV23) was given in February, 2011. 
3. Seasonal influenza vaccine was given in October, 2018. 4. Zostavax was given in February, 2011. 5. Prevnar 13 (PCV13) was given in April, 2015. 
6. She is on the list to receive Shingrix. 7. Lexiscan stress testing was negative in May, 2015. 8. Colonoscopy was negative in September, 2011, (ten year follow up indicated). 9. Bone density revealed T score of -1.9 in April, 2016, (FRAX=11/2.4). We will repeat this year. 10. EBT heart scan revealed a calcium score of 1,024 in October, 2009. 11. Cardiac cath in December, 2009 revealed the RCA stenosis resulting in PTCA and stenting of that artery. 12. Lumbosacral MRI in June, 2011 revealed L4-5 and L5-S1 foraminal and spinal stenosis. 13. Upper GI in July, 2010 revealed GERD. 14. CT scan of the cervical spine in October, 2013 revealed anterolisthesis of C3 on 4 and C5-6 and C6-7 stenosis. 15. Last pelvic was reportedly negative in May, 2014. 16. Mammogram was negative in September, 2018. 17. Carotid dopplers revealed mild plaque on the left and a clear right carotid endarterectomy on the right in November, 2018. 18. PVRs in November, 2018 revealed moderately decreased circulation on the right with an JOANA of 0.36 and mild decrease on the left with an JOANA of .64. 
19. EKG in the office revealed nonspecific ST-T wave changes. 20. Aortogram with runoff in December, 2018 was as described above. 21. Health screening assessments were essentially normal. 
  
Impression: 1. CAD, S/P PTCA and stent of the RCA. 2. PVD, S/P multiple procedures, still symptomatic with mild ischemic neuropathy and claudication. 3. CVD, S/P right CEA. 4. MVP. 
5. History of TIA. 6. History of chronic tobacco abuse. 7. Hypothyroidism, on replacement. 8. Hyperlipidemia. 9. Hypertension. 10. Lumbar spinal stenosis with intermittently recurrent back pain and radiculopathy. 11. Cervical spondylosis with possible increasing myelopathy, (upper extremity pain and decreased mobility. 12. History of depression. 13. Macrocytosis. 14. TMJ. 15. Osteoporosis. 16. GERD. 17. Ischemic neuropathy 18. History of recurrent fever blisters. 19. History of trigger fingers. 20. Early COPD. 21. Current UTI. 22. Rule out polymyalgia. 23. S/P multiple surgeries as noted. 
  
Plan: 1. Continue present medications. 2. Check sed rate and we will call with results. 3. See Dr. Hair Kaur regarding her neck and other symptoms related to her upper extremities. 4. Continue prudent diet and try to maintain as good of an activity level as possible. 5. Continue usual follow up with vascular surgery. 6. Recheck here in a month's time to try to tie together loose ends. 7. Cipro 250 mg twice a day for seven days' time for the UTI. 8. Referred for bone density. 9. She appears appropriately treated for her ten year coronary heart disease risk. 10. Five year breast cancer risk was calculated to be 2.7% versus the average for age group of 2%. Lifetime risk was calculated to be 9.5% versus 6.3% average for age group. 11. Ten year risk for osteoporotic fracture will be recalculated with her new bone density. 12. She will get Shingrix when it becomes available. All screenings were reviewed and results discussed with the patient, who verbalized understanding and agreement with the plans. A copy of the after visit summary with a personalized health plan was provided to the patient on the day of the visit. This is a Subsequent Medicare Annual Wellness Exam (AWV) (Performed 12 months after IPPE or effective date of Medicare Part B enrollment) I have reviewed the patient's medical history in detail and updated the computerized patient record as noted above. Problem list reviewed with patient and risk factors discussed. PSH, SH, FH, Medications and HM issues also reviewed and discussed. Depression screen, fall risk assessment, functional abilities and ACP also reviewed and discussed as above and below. Depression Risk Factor Screening:  
 
3 most recent PHQ Screens 3/19/2019 Little interest or pleasure in doing things Not at all Feeling down, depressed, irritable, or hopeless Not at all Total Score PHQ 2 0 Trouble falling or staying asleep, or sleeping too much Not at all Feeling tired or having little energy Not at all Poor appetite, weight loss, or overeating Not at all Feeling bad about yourself - or that you are a failure or have let yourself or your family down Not at all Trouble concentrating on things such as school, work, reading, or watching TV Not at all Moving or speaking so slowly that other people could have noticed; or the opposite being so fidgety that others notice Not at all Thoughts of being better off dead, or hurting yourself in some way Not at all PHQ 9 Score 0 Alcohol Risk Factor Screening: You do not drink alcohol or very rarely. You average more than 7 drinks a week. Functional Ability and Level of Safety:  
Hearing Loss Hearing is good. Activities of Daily Living The home contains: no safety equipment. Patient does total self care Fall Risk Fall Risk Assessment, last 12 mths 3/19/2019 Able to walk? Yes Fall in past 12 months? No  
 
 
Abuse Screen Patient is not abused Cognitive Screening Evaluation of Cognitive Function: 
Has your family/caregiver stated any concerns about your memory: no 
Normal 
 
Patient Care Team  
Patient Care Team: 
Shelly Britton MD as PCP - General (Internal Medicine) Augustin Obrien MD (Vascular Surgery) Champ Fermin MD (Ophthalmology) Assessment/Plan Education and counseling provided: 
Are appropriate based on today's review and evaluation Diagnoses and all orders for this visit: 1. PVD (peripheral vascular disease) ([address] 2. Essential hypertension 3. Claudication in peripheral vascular disease ([address] 4. Coronary artery disease involving native heart without angina pectoris, unspecified vessel or lesion type -     AMB POC EKG ROUTINE W/ 12 LEADS, INTER & REP 5. Arteriosclerotic heart disease (ASHD) 6. 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 ([address] 7. Dyslipidemia 8. COPD, mild ([address] 9. Adult onset hypothyroidism 10. Recurrent depression ([address] 11. Myalgia 
-     SED RATE (ESR) 12. Malignant melanoma of right upper extremity including shoulder ([address] 13. Spinal stenosis of lumbar region with neurogenic claudication 14. Long-term current use of high risk medication other than anticoagulant 15. History of TIA (transient ischemic attack) 16. Gastroesophageal reflux disease with esophagitis 17. Familial mitral valve prolapse 18. CVD (cerebrovascular disease) 19. Temporomandibular joint pain dysfunction syndrome 20. Macrocytosis 21. Spondylosis of cervical region without myelopathy or radiculopathy 
-     REFERRAL TO ORTHOPEDIC SURGERY 22. Anxiety 23. Age-related osteoporosis without current pathological fracture -     DEXA BONE DENSITY STUDY AXIAL; Future 24. Trigger finger, unspecified finger, unspecified laterality 25. Fever blister 26. Tobacco user 27. Urinary tract infection without hematuria, site unspecified Other orders 
-     ciprofloxacin HCl (CIPRO) 250 mg tablet; Take 1 Tab by mouth two (2) times a day. Other problems as listed above. Health Maintenance Due Topic Date Due  Shingrix Vaccine Age 50> (1 of 2) 03/23/1994  GLAUCOMA SCREENING Q2Y  03/23/2009 Orders Placed This Encounter  DEXA BONE DENSITY STUDY AXIAL Standing Status:   Future Standing Expiration Date:   4/19/2020 Scheduling Instructions: [facility] to schedule Order Specific Question:   Reason for Exam  
  Answer:   osteoporosis  SED RATE (ESR) ([facility])  REFERRAL TO ORTHOPEDIC SURGERY Referral Priority:   Routine Referral Type:   Consultation Referral Reason:   Specialty Services Required Referred to Provider:   Maico Gomez MD  
  Number of Visits Requested:   1  AMB POC EKG ROUTINE W/ 12 LEADS, INTER & REP Order Specific Question:   Reason for Exam: Answer:   complete physical  
 ciprofloxacin HCl (CIPRO) 250 mg tablet Sig: Take 1 Tab by mouth two (2) times a day. Dispense:  14 Tab Refill:  0 Jono Perry MD

## 2019-03-19 NOTE — PROGRESS NOTES
No chief complaint on file. Depression Risk Factor Screening:  
 
3 most recent PHQ Screens 3/19/2019 Little interest or pleasure in doing things Not at all Feeling down, depressed, irritable, or hopeless Not at all Total Score PHQ 2 0 Trouble falling or staying asleep, or sleeping too much Not at all Feeling tired or having little energy Not at all Poor appetite, weight loss, or overeating Not at all Feeling bad about yourself - or that you are a failure or have let yourself or your family down Not at all Trouble concentrating on things such as school, work, reading, or watching TV Not at all Moving or speaking so slowly that other people could have noticed; or the opposite being so fidgety that others notice Not at all Thoughts of being better off dead, or hurting yourself in some way Not at all PHQ 9 Score 0 Functional Ability and Level of Safety: Activities of Daily Living ADL Assessment 3/19/2019 Feeding yourself No Help Needed Getting from bed to chair No Help Needed Getting dressed No Help Needed Bathing or showering No Help Needed Walk across the room (includes cane/walker) No Help Needed Using the telphone No Help Needed Taking your medications No Help Needed Preparing meals No Help Needed Managing money (expenses/bills) No Help Needed Moderately strenuous housework (laundry) No Help Needed Shopping for personal items (toiletries/medicines) No Help Needed Shopping for groceries No Help Needed Driving No Help Needed Climbing a flight of stairs No Help Needed Getting to places beyond walking distances No Help Needed Fall Risk Fall Risk Assessment, last 12 mths 3/19/2019 Able to walk? Yes Fall in past 12 months? No  
 
 
Abuse Screen Abuse Screening Questionnaire 3/19/2019 Do you ever feel afraid of your partner? Cmune Are you in a relationship with someone who physically or mentally threatens you?  Cmune  
 Is it safe for you to go home? Sobia Duque Reviewed record in preparation for visit and have obtained necessary documentation. Identified pt with two pt identifiers(name and ). No chief complaint on file. Wt Readings from Last 3 Encounters:  
19 137 lb (62.1 kg) 19 136 lb (61.7 kg) 19 136 lb (61.7 kg) Temp Readings from Last 3 Encounters:  
19 97.7 °F (36.5 °C) (Oral) 18 97.8 °F (36.6 °C) (Oral) 10/02/18 97.9 °F (36.6 °C) (Oral) BP Readings from Last 3 Encounters:  
19 110/62  
19 136/62  
19 128/66 Pulse Readings from Last 3 Encounters:  
19 83  
19 80  
19 78 Coordination of Care Questionnaire: 
:  
 
1) Have you been to an emergency room, urgent care clinic since your last visit? No  
 
Hospitalized since your last visit? No  
 
     
 
2) Have you seen or consulted any other health care providers outside of [address] [place] [address] since your last visit? No  
 
 
 
 
Patient Care Team  
Patient Care Team: 
Pao Whitehead MD as PCP - General (Internal Medicine) Ramonita Gautam MD (Vascular Surgery) Lashell Angel MD (Ophthalmology)

## 2019-03-20 RX ORDER — PREDNISONE 20 MG/1
20 TABLET ORAL
Qty: 45 TAB | Refills: 0 | Status: SHIPPED | OUTPATIENT
Start: 2019-03-20 | End: 2019-04-17 | Stop reason: SDUPTHER

## 2019-03-20 NOTE — TELEPHONE ENCOUNTER
Patient informed of results. Per Dr Marck Bowers: Sed rate high again.   Start prednisone 20 mg 2 daily x 2 weeks then decrease to 20 mg daily until seen

## 2019-03-20 NOTE — PROGRESS NOTES
Sed rate high again.   Start prednisone 20 mg 2 daily x 2 weeks then decrease to 20 mg daily until seen

## 2019-03-20 NOTE — PROGRESS NOTES
Patient informed of results. Per Dr Olivier Mata: Sed rate high again. Start prednisone 20 mg 2 daily x 2 weeks then decrease to 20 mg daily until seen Prescription sent to [address]

## 2019-03-28 ENCOUNTER — TELEPHONE (OUTPATIENT)
Dept: INTERNAL MEDICINE CLINIC | Age: 74
End: 2019-03-28

## 2019-04-02 ENCOUNTER — TELEPHONE (OUTPATIENT)
Dept: INTERNAL MEDICINE CLINIC | Age: 74
End: 2019-04-02

## 2019-04-02 NOTE — TELEPHONE ENCOUNTER
Patient called with c/o pulsating headache to left side of head behind her ear. She also states she is having problems with neck pain and is to see Dr. Kenya Moses in a couple of weeks. Patient denies any problems with dizziness or vision. She states the pulsating headache comes and goes. Advised patient we have no available appointments, however she should go to the emergency room if her symptoms are worsened or begins to have problems with her vision or dizziness. Patient states she will do that.

## 2019-04-02 NOTE — TELEPHONE ENCOUNTER
Made follow-up phone call to patient. Patient states she is feeling better. She has not had any further headaches since last Thursday and did not need to go to the ER. She states she will keep appt with Dr. Verner Asp, which is in a couple of weeks.

## 2019-04-16 ENCOUNTER — OFFICE VISIT (OUTPATIENT)
Dept: INTERNAL MEDICINE CLINIC | Age: 74
End: 2019-04-16

## 2019-04-16 VITALS
WEIGHT: 136.4 LBS | TEMPERATURE: 97.9 F | BODY MASS INDEX: 24.17 KG/M2 | OXYGEN SATURATION: 96 % | HEART RATE: 86 BPM | DIASTOLIC BLOOD PRESSURE: 71 MMHG | HEIGHT: 63 IN | SYSTOLIC BLOOD PRESSURE: 109 MMHG

## 2019-04-16 DIAGNOSIS — M35.3 PMR (POLYMYALGIA RHEUMATICA) (HCC): Primary | ICD-10-CM

## 2019-04-16 DIAGNOSIS — M47.812 SPONDYLOSIS OF CERVICAL REGION WITHOUT MYELOPATHY OR RADICULOPATHY: ICD-10-CM

## 2019-04-16 DIAGNOSIS — M48.062 SPINAL STENOSIS OF LUMBAR REGION WITH NEUROGENIC CLAUDICATION: ICD-10-CM

## 2019-04-16 NOTE — PROGRESS NOTES
Chief Complaint   Patient presents with    Follow-up       Depression Risk Factor Screening:     3 most recent PHQ Screens 3/19/2019   Little interest or pleasure in doing things Not at all   Feeling down, depressed, irritable, or hopeless Not at all   Total Score PHQ 2 0   Trouble falling or staying asleep, or sleeping too much Not at all   Feeling tired or having little energy Not at all   Poor appetite, weight loss, or overeating Not at all   Feeling bad about yourself - or that you are a failure or have let yourself or your family down Not at all   Trouble concentrating on things such as school, work, reading, or watching TV Not at all   Moving or speaking so slowly that other people could have noticed; or the opposite being so fidgety that others notice Not at all   Thoughts of being better off dead, or hurting yourself in some way Not at all   PHQ 9 Score 0       Functional Ability and Level of Safety:     Activities of Daily Living  ADL Assessment 3/19/2019   Feeding yourself No Help Needed   Getting from bed to chair No Help Needed   Getting dressed No Help Needed   Bathing or showering No Help Needed   Walk across the room (includes cane/walker) No Help Needed   Using the telphone No Help Needed   Taking your medications No Help Needed   Preparing meals No Help Needed   Managing money (expenses/bills) No Help Needed   Moderately strenuous housework (laundry) No Help Needed   Shopping for personal items (toiletries/medicines) No Help Needed   Shopping for groceries No Help Needed   Driving No Help Needed   Climbing a flight of stairs No Help Needed   Getting to places beyond walking distances No Help Needed       Fall Risk  Fall Risk Assessment, last 12 mths 3/19/2019   Able to walk? Yes   Fall in past 12 months? No       Abuse Screen  Abuse Screening Questionnaire 3/19/2019   Do you ever feel afraid of your partner? N   Are you in a relationship with someone who physically or mentally threatens you?  N   Is it safe for you to go home? Y     Reviewed record in preparation for visit and have obtained necessary documentation. Identified pt with two pt identifiers(name and ). Chief Complaint   Patient presents with    Follow-up      Wt Readings from Last 3 Encounters:   19 136 lb 6.4 oz (61.9 kg)   19 137 lb (62.1 kg)   19 136 lb (61.7 kg)     Temp Readings from Last 3 Encounters:   19 97.9 °F (36.6 °C) (Oral)   19 97.7 °F (36.5 °C) (Oral)   18 97.8 °F (36.6 °C) (Oral)     BP Readings from Last 3 Encounters:   19 109/71   19 110/62   19 136/62     Pulse Readings from Last 3 Encounters:   19 86   19 83   19 80       Coordination of Care Questionnaire:  :     1) Have you been to an emergency room, urgent care clinic since your last visit? No     Hospitalized since your last visit? No               2) Have you seen or consulted any other health care providers outside of [address] [place] [address] since your last visit?   No             Patient Care Team   Patient Care Team:  Tiara Emery MD as PCP - General (Internal Medicine)  Gurwinder Jackson MD (Vascular Surgery)  Gurpreet Martínez MD (Ophthalmology)

## 2019-04-16 NOTE — PROGRESS NOTES
Subjective:   Stephanie Joy is a 75 y.o. female      Chief Complaint   Patient presents with    Follow-up        History of present illness:  Ms. Rashi Hyman returns in follow up. She has had a dramatic response to prednisone at 20 mg with complete resolution of her neck pain, shoulder pain, arm pain, etc.  She did have a mildly elevated sed rate. In this setting it certainly raises the question of polymyalgia as her symptoms went away within a very short period of time. It is also possible that we could be simply treating cervical spondylosis with the steroids and that has improved things as well. Will check her sed rate today to see where she stands and then decide about how quickly we will taper the prednisone. Otherwise she will follow up in six weeks' time. I am certainly pleased with her progress and glad that she has been comfortable at least for a short period of time. What will happen in the long run remains to be seen.         Patient Active Problem List    Diagnosis Date Noted    Arteriosclerotic heart disease (ASHD) 08/23/2017     Priority: 1 - One    Claudication in peripheral vascular disease ([address] 08/23/2017     Priority: 1 - One    PVD (peripheral vascular disease) ([address]      Priority: 1 - One    CAD (coronary artery disease)      Priority: 1 - One    Hypertension      Priority: 1 - One    COPD, mild ([address] 03/15/2018     Priority: 2 - Two    Adult onset hypothyroidism 08/23/2017     Priority: 2 - Two    Dyslipidemia 08/23/2017     Priority: 2 - Two    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 ([address] 08/23/2017     Priority: 2 - Two    Myalgia 01/23/2019     Priority: 3 - Three    Long-term current use of high risk medication other than anticoagulant 10/02/2018     Priority: 3 - Three    Recurrent depression ([address] 01/09/2018     Priority: 3 - Three    Melanoma ([address] 09/26/2017     Priority: 3 - Three    Lumbar spinal stenosis 09/23/2017     Priority: 3 - Three    Familial mitral valve prolapse 08/23/2017     Priority: 3 - Three    History of TIA (transient ischemic attack)      Priority: 3 - Three    GERD (gastroesophageal reflux disease)      Priority: 3 - Three    CVD (cerebrovascular disease)      Priority: 3 - Three    Anxiety 08/23/2017     Priority: 4 - Four    Cervical spondylosis 08/23/2017     Priority: 4 - Four    Macrocytosis 08/23/2017     Priority: 4 - Four    Age-related osteoporosis without current pathological fracture 08/23/2017     Priority: 4 - Four    Temporomandibular joint pain dysfunction syndrome 08/23/2017     Priority: 4 - Four    Fever blister 09/23/2017     Priority: 5 - Five    Tobacco user 08/23/2017     Priority: 5 - Five    Trigger finger 08/23/2017     Priority: 5 - Five    [facility] 04/16/2019    PE (physical exam), annual 09/23/2017      Past Surgical History:   Procedure Laterality Date    BYPASS GRAFT OTHR,FEM-POP       RIGHT FEMORAL-POPLITEAL BYPASS  WITH POLYTETRA-FL UOROETHYLENE GRAFT     CARDIAC SURG PROCEDURE UNLIST      stents x2    HX CAROTID ENDARTERECTOMY      right cea    HX OTHER SURGICAL Right     excision melanoma right arm    VASCULAR SURGERY PROCEDURE UNLIST      femoral stent - left    VASCULAR SURGERY PROCEDURE UNLIST      X5 right femoral bypass      Allergies   Allergen Reactions    Neomycin Sulfate Unknown (comments)      Family History   Problem Relation Age of Onset   [address] Migraines Mother     Heart Disease Father     Hypertension Father     Lung Disease Father     Cancer Father         H&N    Hypertension Sister     Diabetes Sister     Heart Disease Brother     Hypertension Brother     Hypertension Sister     Cancer Sister         breast    Breast Cancer Sister 54    Hypertension Sister       Social History     Socioeconomic History    Marital status:      Spouse name: Not on file    Number of children: Not on file    Years of education: Not on file   [address] [facility] Highest education level: Not on file   Occupational History    Not on file   Social Needs    Financial resource strain: Not on file    Food insecurity:     Worry: Not on file     Inability: Not on file    Transportation needs:     Medical: Not on file     Non-medical: Not on file   Tobacco Use    Smoking status: Former Smoker     Packs/day: 1.00     Years: 35.00     Pack years: 35.00     Last attempt to quit: 2008     Years since quittin.2    Smokeless tobacco: Never Used   Substance and Sexual Activity    Alcohol use: Yes     Alcohol/week: 2.0 oz     Types: 4 Glasses of wine per week     Comment: occasionally    Drug use: No     Types: Prescription, OTC    Sexual activity: Not on file   Lifestyle    Physical activity:     Days per week: Not on file     Minutes per session: Not on file    Stress: Not on file   Relationships    Social connections:     Talks on phone: Not on file     Gets together: Not on file     Attends Rastafarian service: Not on file     Active member of club or organization: Not on file     Attends meetings of clubs or organizations: Not on file     Relationship status: Not on file    Intimate partner violence:     Fear of current or ex partner: Not on file     Emotionally abused: Not on file     Physically abused: Not on file     Forced sexual activity: Not on file   Other Topics Concern    Not on file   Social History Narrative    Not on file     Outpatient Medications Marked as Taking for the 19 encounter (Office Visit) with Judith Jean MD   Medication Sig Dispense Refill    predniSONE (DELTASONE) 20 mg tablet Take 20 mg by mouth daily (with breakfast). Take 20mg twice daily for 2 weeks, then 20mg daily until appointment with Dr Collette Heimlich on 19 45 Tab 0    ciprofloxacin HCl (CIPRO) 250 mg tablet Take 1 Tab by mouth two (2) times a day.  14 Tab 0    atorvastatin (LIPITOR) 40 mg tablet TAKE 1 TABLET DAILY 90 Tab 3    peg 400-propylene glycol (SYSTANE, PROPYLENE GLYCOL,) 0.4-0.3 % drop Administer 1 Drop to both eyes as needed.  buPROPion SR (WELLBUTRIN SR) 150 mg SR tablet TAKE 1 TABLET DAILY 90 Tab 3    gabapentin (NEURONTIN) 300 mg capsule TAKE 2 CAPSULES TWICE A  Cap 1    DENTA 5000 PLUS 1.1 % crea APPLY TWICE A DAY AFTER BRUSHING. DO NOT DRINK FOR 3 HOURS  5    amLODIPine (NORVASC) 5 mg tablet Take 1 Tab by mouth daily. 90 Tab 3    valACYclovir (VALTREX) 1 gram tablet Take 2 Tabs by mouth two (2) times a day. 2 initially and 2 in 12 hr for fever blisters 8 Tab 5    levothyroxine (SYNTHROID) 100 mcg tablet TAKE 1 TABLET DAILY BEFORE BREAKFAST 90 Tab 3    lisinopril (PRINIVIL, ZESTRIL) 40 mg tablet TAKE 1 TABLET DAILY 90 Tab 3    cilostazol (PLETAL) 100 mg tablet TAKE 1 TABLET TWICE A  Tab 3    atenolol (TENORMIN) 50 mg tablet TAKE 1 TABLET NIGHTLY 90 Tab 3    omeprazole (PRILOSEC) 20 mg capsule TAKE 1 CAPSULE DAILY 90 Cap 3    cyclobenzaprine (FLEXERIL) 10 mg tablet Take 1 Tab by mouth three (3) times daily as needed for Muscle Spasm(s). 30 Tab 0    ascorbic acid (VITAMIN C) 500 mg tablet Take 500 mg by mouth two (2) times a day.  aspirin (ASPIRIN) 325 mg tablet Take 325 mg by mouth daily.  multivitamin, stress formula (STRESS TAB) tablet Take 1 Tab by mouth daily. Review of Systems              Constitutional:  She denies fever, weight loss, sweats or fatigue. HEENT:  No blurred or double vision, headache or dizziness. No difficulty with swallowing, taste, speech or smell. Respiratory:  No cough, wheezing or shortness of breath. No sputum production. Cardiac:  Denies chest pain, palpitations, unexplained indigestion, syncope, edema, PND or orthopnea. GI:  No changes in bowel movements, no abdominal pain, no bloating, anorexia, nausea, vomiting or heartburn. :  No frequency or dysuria. Denies incontinence. Extremities:  No joint pain, stiffness or swelling.   Skin:  No recent rashes or mole changes. Neurological:  No numbness, tingling, burning paresthesias or loss of motor strength. No syncope, dizziness, frequent headaches or memory loss. Objective:     Vitals:    04/16/19 1551   BP: 109/71   Pulse: 86   Temp: 97.9 °F (36.6 °C)   TempSrc: Oral   SpO2: 96%   Weight: 136 lb 6.4 oz (61.9 kg)   Height: 5' 3\" (1.6 m)   PainSc:   0 - No pain       Body mass index is 24.16 kg/m². Physical Examination:              General Appearance:  Well-developed, well-nourished, no acute  distress. HEENT:     Ears:  The TMs and ear canals were clear. Eyes:  The pupillary responses were normal.  Extraocular muscle function intact. Lids and conjunctiva not injected. Neck:  Supple without thyromegaly or adenopathy. No JVD noted. Lungs:  Clear to auscultation and percussion. Cardiac:  Regular rate and rhythm without murmur. GI: nontender w/o mass. Normal BS's. Extremities:  No clubbing, cyanosis or edema. Skin:  No rash or unusual mole changes noted. Neurological:  Grossly normal.            Assessment/Plan:   Impressions:      ICD-10-CM ICD-9-CM    1. PMR (polymyalgia rheumatica) ([facility]) M35.3 725 SED RATE (ESR)      CANCELED: SED RATE (ESR)   2. Spinal stenosis of lumbar region with neurogenic claudication M48.062 724.03 SED RATE (ESR)      CANCELED: SED RATE (ESR)   3. Spondylosis of cervical region without myelopathy or radiculopathy M47.812 721.0 SED RATE (ESR)      CANCELED: SED RATE (ESR)        Plan:  1. Continue present meds  2. Discussed lifestyle modifications including Na restriction, low carb/fat diet, weight reduction and exercise (at least a walking program). Follow-up and Dispositions    · Return in about 6 weeks (around 5/28/2019).            Orders Placed This Encounter    SED RATE (ESR)       Del Serna MD

## 2019-04-17 LAB — ERYTHROCYTE [SEDIMENTATION RATE] IN BLOOD BY WESTERGREN METHOD: 15 MM/HR (ref 0–40)

## 2019-04-17 RX ORDER — PREDNISONE 10 MG/1
15 TABLET ORAL
Qty: 45 TAB | Refills: 5 | Status: SHIPPED | OUTPATIENT
Start: 2019-04-17 | End: 2019-10-02 | Stop reason: ALTCHOICE

## 2019-04-17 NOTE — TELEPHONE ENCOUNTER
PHI verified. Patient informed that Dr. Kwaku Rangel has reviewed lab results and stated Sed rate normal.  Decrease prednisone to 15 mg daily. Patient requests a prescription be sent in to [facility].    Requested Prescriptions     Pending Prescriptions Disp Refills    predniSONE (DELTASONE) 10 mg tablet 45 Tab 5     Sig: Take 15 mg by mouth daily (with breakfast).

## 2019-04-17 NOTE — TELEPHONE ENCOUNTER
----- Message from Edgar Blanco MD sent at 4/17/2019  8:34 AM EDT -----  Sed rate normal.  Decrease prednisone to 15 mg daily

## 2019-05-30 ENCOUNTER — OFFICE VISIT (OUTPATIENT)
Dept: INTERNAL MEDICINE CLINIC | Age: 74
End: 2019-05-30

## 2019-05-30 VITALS
WEIGHT: 137.6 LBS | TEMPERATURE: 98 F | SYSTOLIC BLOOD PRESSURE: 112 MMHG | HEIGHT: 63 IN | HEART RATE: 83 BPM | DIASTOLIC BLOOD PRESSURE: 65 MMHG | BODY MASS INDEX: 24.38 KG/M2 | RESPIRATION RATE: 16 BRPM | OXYGEN SATURATION: 95 %

## 2019-05-30 DIAGNOSIS — I10 ESSENTIAL HYPERTENSION: Primary | ICD-10-CM

## 2019-05-30 DIAGNOSIS — M35.3 PMR (POLYMYALGIA RHEUMATICA) (HCC): ICD-10-CM

## 2019-05-30 NOTE — PROGRESS NOTES
Reviewed record in preparation for visit and have obtained necessary documentation. Identified pt with two pt identifiers(name and ). Chief Complaint   Patient presents with    Follow-up        Coordination of Care Questionnaire:  :     1) Have you been to an emergency room, urgent care clinic since your last visit? No      Hospitalized since your last visit? No               2) Have you seen or consulted any other health care providers outside of [address] [place] [address] since your last visit?   No

## 2019-05-30 NOTE — PROGRESS NOTES
Subjective:   Sharri Palmer is a 75 y.o. female      Chief Complaint   Patient presents with    Follow-up        History of present illness:  Ms. Galileo Sutherland returns in follow up. She continues to be virtually free of all her myalgias and arthralgias on the prednisone at 15 mg. Although her sed rate was never greater than 60, clinically she still fits with PMR and she did have elevation of her inflammatory markers. She is now at 15 mg. She has noted some fluid retention and we would like to decrease her medications further if possible. Sed rate and C-reactive protein will be checked today. With regard to her other medical problems, which are extensive, they include hyperlipidemia, hypothyroidism, peripheral vascular disease, coronary artery disease and COPD. She is currently very stable. She is up to date on labs at this point. She continues on all of her usual medications and her medications were reviewed and reconciled and are appropriate. She will continue at 15 mg of prednisone daily until we see her labs and then hopefully decrease to 10 mg daily if her inflammatory markers are normal.  We will check her again in five to six weeks' time.         Patient Active Problem List    Diagnosis Date Noted    Arteriosclerotic heart disease (ASHD) 08/23/2017     Priority: 1 - One    Claudication in peripheral vascular disease ([facility] 08/23/2017     Priority: 1 - One    [facility]per[facility]      Priority: 1 - One    CAD (coronary artery disease)      Priority: 1 - One    Hypertension      Priority: 1 - One    PMR (polymyalgia rheumatica) ([facility] 04/16/2019     Priority: 2 - Two    COPD, mild ([facility] 03/15/2018     Priority: 2 - Two    Adult onset hypothyroidism 08/23/2017     Priority: 2 - Two    Dyslipidemia 08/23/2017     Priority: 2 - Two    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 ([facility] 08/23/2017     Priority: 2 - Two    Myalgia 01/23/2019     Priority: 3 - Three    Long-term current use of high risk medication other than anticoagulant 10/02/2018     Priority: 3 - Three    [facility] 01/09/2018     Priority: 3 - Three    Melanoma ([facility] 09/26/2017     Priority: 3 - Three    Lumbar spinal stenosis 09/23/2017     Priority: 3 - Three    Familial mitral valve prolapse 08/23/2017     Priority: 3 - Three    History of TIA (transient ischemic attack)      Priority: 3 - Three    GERD (gastroesophageal reflux disease)      Priority: 3 - Three    CVD (cerebrovascular disease)      Priority: 3 - Three    Anxiety 08/23/2017     Priority: 4 - Four    Cervical spondylosis 08/23/2017     Priority: 4 - Four    Macrocytosis 08/23/2017     Priority: 4 - Four    Age-related osteoporosis without current pathological fracture 08/23/2017     Priority: 4 - Four    Temporomandibular joint pain dysfunction syndrome 08/23/2017     Priority: 4 - Four    Fever blister 09/23/2017     Priority: 5 - Five    Tobacco user 08/23/2017     Priority: 5 - Five    Trigger finger 08/23/2017     Priority: 5 - Five    PE (physical exam), annual 09/23/2017      Past Surgical History:   Procedure Laterality Date    BYPASS GRAFT OTHR,FEM-POP       RIGHT FEMORAL-POPLITEAL BYPASS  WITH POLYTETRA-FL UOROETHYLENE GRAFT     CARDIAC SURG PROCEDURE UNLIST      stents x2    HX CAROTID ENDARTERECTOMY      right cea    HX OTHER SURGICAL Right     excision melanoma right arm    VASCULAR SURGERY PROCEDURE UNLIST      femoral stent - left    VASCULAR SURGERY PROCEDURE UNLIST      X5 right femoral bypass      Allergies   Allergen Reactions    Neomycin Sulfate Unknown (comments)      Family History   Problem Relation Age of Onset    Migraines Mother     Heart Disease Father     Hypertension Father     Lung Disease Father     Cancer Father         H&N    Hypertension Sister     Diabetes Sister     Heart Disease Brother     Hypertension Brother     Hypertension Sister     Cancer Sister breast    Breast Cancer Sister 54    Hypertension Sister       Social History     Socioeconomic History    Marital status:      Spouse name: Not on file    Number of children: Not on file    Years of education: Not on file    Highest education level: Not on file   Occupational History    Not on file   Social Needs    Financial resource strain: Not on file    Food insecurity:     Worry: Not on file     Inability: Not on file    Transportation needs:     Medical: Not on file     Non-medical: Not on file   Tobacco Use    Smoking status: Former Smoker     Packs/day: 1.00     Years: 35.00     Pack years: 35.00     Last attempt to quit: 2008     Years since quittin.3    Smokeless tobacco: Never Used   Substance and Sexual Activity    Alcohol use: Yes     Alcohol/week: 2.0 oz     Types: 4 Glasses of wine per week     Comment: occasionally    Drug use: No     Types: Prescription, OTC    Sexual activity: Not on file   Lifestyle    Physical activity:     Days per week: Not on file     Minutes per session: Not on file    Stress: Not on file   Relationships    Social connections:     Talks on phone: Not on file     Gets together: Not on file     Attends Nondenominational service: Not on file     Active member of club or organization: Not on file     Attends meetings of clubs or organizations: Not on file     Relationship status: Not on file    Intimate partner violence:     Fear of current or ex partner: Not on file     Emotionally abused: Not on file     Physically abused: Not on file     Forced sexual activity: Not on file   Other Topics Concern    Not on file   Social History Narrative    Not on file     Outpatient Medications Marked as Taking for the 19 encounter (Office Visit) with Ronal Wong MD   Medication Sig Dispense Refill    predniSONE (DELTASONE) 10 mg tablet Take 15 mg by mouth daily (with breakfast).  45 Tab 5    atorvastatin (LIPITOR) 40 mg tablet TAKE 1 TABLET DAILY 90 Tab 3    peg 400-propylene glycol (SYSTANE, PROPYLENE GLYCOL,) 0.4-0.3 % drop Administer 1 Drop to both eyes as needed.  buPROPion SR (WELLBUTRIN SR) 150 mg SR tablet TAKE 1 TABLET DAILY 90 Tab 3    gabapentin (NEURONTIN) 300 mg capsule TAKE 2 CAPSULES TWICE A  Cap 1    DENTA 5000 PLUS 1.1 % crea APPLY TWICE A DAY AFTER BRUSHING. DO NOT DRINK FOR 3 HOURS  5    amLODIPine (NORVASC) 5 mg tablet Take 1 Tab by mouth daily. 90 Tab 3    valACYclovir (VALTREX) 1 gram tablet Take 2 Tabs by mouth two (2) times a day. 2 initially and 2 in 12 hr for fever blisters 8 Tab 5    levothyroxine (SYNTHROID) 100 mcg tablet TAKE 1 TABLET DAILY BEFORE BREAKFAST 90 Tab 3    lisinopril (PRINIVIL, ZESTRIL) 40 mg tablet TAKE 1 TABLET DAILY 90 Tab 3    cilostazol (PLETAL) 100 mg tablet TAKE 1 TABLET TWICE A  Tab 3    atenolol (TENORMIN) 50 mg tablet TAKE 1 TABLET NIGHTLY 90 Tab 3    omeprazole (PRILOSEC) 20 mg capsule TAKE 1 CAPSULE DAILY 90 Cap 3    cyclobenzaprine (FLEXERIL) 10 mg tablet Take 1 Tab by mouth three (3) times daily as needed for Muscle Spasm(s). 30 Tab 0    ascorbic acid (VITAMIN C) 500 mg tablet Take 500 mg by mouth two (2) times a day.  aspirin (ASPIRIN) 325 mg tablet Take 325 mg by mouth daily.  multivitamin, stress formula (STRESS TAB) tablet Take 1 Tab by mouth daily. Review of Systems              Constitutional:  She denies fever, weight loss, sweats or fatigue. HEENT:  No blurred or double vision, headache or dizziness. No difficulty with swallowing, taste, speech or smell. Respiratory:  No cough, wheezing or shortness of breath. No sputum production. Cardiac:  Denies chest pain, palpitations, unexplained indigestion, syncope, edema, PND or orthopnea. GI:  No changes in bowel movements, no abdominal pain, no bloating, anorexia, nausea, vomiting or heartburn. :  No frequency or dysuria. Denies incontinence.   Extremities:  No joint pain, stiffness or swelling. Skin:  No recent rashes or mole changes. Neurological:  No numbness, tingling, burning paresthesias or loss of motor strength. No syncope, dizziness, frequent headaches or memory loss. Objective:     Vitals:    05/30/19 1402   BP: 112/65   Pulse: 83   Resp: 16   Temp: 98 °F (36.7 °C)   TempSrc: Oral   SpO2: 95%   Weight: 137 lb 9.6 oz (62.4 kg)   Height: 5' 3\" (1.6 m)   PainSc:   0 - No pain       Body mass index is 24.37 kg/m². Physical Examination:              General Appearance:  Well-developed, well-nourished, no acute  distress. HEENT:     Ears:  The TMs and ear canals were clear. Eyes:  The pupillary responses were normal.  Extraocular muscle function intact. Lids and conjunctiva not injected. Neck:  Supple without thyromegaly or adenopathy. No JVD noted. Lungs:  Clear to auscultation and percussion. Cardiac:  Regular rate and rhythm without murmur. GI: nontender w/o mass. Normal BS's. Extremities:  No clubbing, cyanosis or edema. Skin:  No rash or unusual mole changes noted. Neurological:  Grossly normal.            Assessment/Plan:   Impressions:      ICD-10-CM ICD-9-CM    1. Essential hypertension I51 262.5 METABOLIC PANEL, BASIC   2. PMR (polymyalgia rheumatica) ([facility]) Y44.5 356 METABOLIC PANEL, BASIC      SED RATE (ESR)      C REACTIVE PROTEIN, QT        Plan:  1. Continue present meds  2. Discussed lifestyle modifications including Na restriction, low carb/fat diet, weight reduction and exercise (at least a walking program). Follow-up and Dispositions    · Return in about 5 weeks (around 7/4/2019).            Orders Placed This Encounter    METABOLIC PANEL, BASIC    SED RATE (ESR) ([facility])    C REACTIVE PROTEIN, QT       Jc Segura MD

## 2019-05-31 LAB
BUN SERPL-MCNC: 27 MG/DL (ref 8–27)
BUN/CREAT SERPL: 25 (ref 12–28)
CALCIUM SERPL-MCNC: 9.1 MG/DL (ref 8.7–10.3)
CHLORIDE SERPL-SCNC: 104 MMOL/L (ref 96–106)
CO2 SERPL-SCNC: 24 MMOL/L (ref 20–29)
CREAT SERPL-MCNC: 1.06 MG/DL (ref 0.57–1)
CRP SERPL-MCNC: 1.1 MG/L (ref 0–4.9)
GLUCOSE SERPL-MCNC: 226 MG/DL (ref 65–99)
POTASSIUM SERPL-SCNC: 4.9 MMOL/L (ref 3.5–5.2)
SED RATE (ESR): 25 MM/HR (ref 0–20)
SODIUM SERPL-SCNC: 142 MMOL/L (ref 134–144)

## 2019-06-06 NOTE — PROGRESS NOTES
Left message at phone number(s) listed in chart for patient to call the office at [phone] to discuss lab results.

## 2019-06-07 ENCOUNTER — TELEPHONE (OUTPATIENT)
Dept: INTERNAL MEDICINE CLINIC | Age: 74
End: 2019-06-07

## 2019-06-07 NOTE — TELEPHONE ENCOUNTER
PHI verified. Patient informed that Dr. Maritza Barr has reviewed lab results and stated CRP and sed rate good.  Decrease Prednisone to 10 mg.  BS high check FBS and A1C.

## 2019-06-07 NOTE — PROGRESS NOTES
PHI verified. Patient informed that Dr. Kwaku Rangel has reviewed lab results and stated CRP and sed rate good. Decrease Prednisone to 10 mg.  BS high check FBS and A1C.  Lab appt scheduled for 06-13-19 at 10:30am.

## 2019-06-11 RX ORDER — ATENOLOL 50 MG/1
TABLET ORAL
Qty: 90 TAB | Refills: 3 | Status: SHIPPED | OUTPATIENT
Start: 2019-06-11 | End: 2020-06-18 | Stop reason: SDUPTHER

## 2019-06-11 RX ORDER — GABAPENTIN 300 MG/1
CAPSULE ORAL
Qty: 360 CAP | Refills: 1 | Status: SHIPPED | OUTPATIENT
Start: 2019-06-11 | End: 2020-01-28 | Stop reason: SDUPTHER

## 2019-06-13 ENCOUNTER — LAB ONLY (OUTPATIENT)
Dept: INTERNAL MEDICINE CLINIC | Age: 74
End: 2019-06-13

## 2019-06-13 DIAGNOSIS — R73.02 IMPAIRED GLUCOSE TOLERANCE: Primary | ICD-10-CM

## 2019-06-13 LAB — GLUCOSE SERPL-MCNC: 92 MG/DL (ref 65–105)

## 2019-06-14 LAB
EST. AVERAGE GLUCOSE BLD GHB EST-MCNC: 114 MG/DL
HBA1C MFR BLD: 5.6 % (ref 4.8–5.6)

## 2019-06-14 NOTE — PROGRESS NOTES
Left message at phone number(s) listed in chart for patient to call the office at [phone] to discuss lab results.

## 2019-06-14 NOTE — PROGRESS NOTES
PHI verified. Patient informed that Dr. Bettie Swanson has reviewed lab results and stated BS and A1C normal.  No evidence DM.

## 2019-06-27 ENCOUNTER — OFFICE VISIT (OUTPATIENT)
Dept: INTERNAL MEDICINE CLINIC | Age: 74
End: 2019-06-27

## 2019-06-27 VITALS
TEMPERATURE: 98.1 F | BODY MASS INDEX: 24.95 KG/M2 | OXYGEN SATURATION: 95 % | HEIGHT: 62 IN | WEIGHT: 135.6 LBS | DIASTOLIC BLOOD PRESSURE: 60 MMHG | SYSTOLIC BLOOD PRESSURE: 104 MMHG | HEART RATE: 88 BPM | RESPIRATION RATE: 16 BRPM

## 2019-06-27 DIAGNOSIS — M35.3 PMR (POLYMYALGIA RHEUMATICA) (HCC): ICD-10-CM

## 2019-06-27 DIAGNOSIS — I10 ESSENTIAL HYPERTENSION: Primary | ICD-10-CM

## 2019-06-27 DIAGNOSIS — F41.9 ANXIETY: ICD-10-CM

## 2019-06-27 RX ORDER — PREDNISONE 10 MG/1
10 TABLET ORAL SEE ADMIN INSTRUCTIONS
Qty: 21 TAB | Refills: 0 | Status: SHIPPED | OUTPATIENT
Start: 2019-06-27 | End: 2019-09-30 | Stop reason: ALTCHOICE

## 2019-06-27 RX ORDER — LORAZEPAM 1 MG/1
1 TABLET ORAL
Qty: 5 TAB | Refills: 0 | Status: SHIPPED | OUTPATIENT
Start: 2019-06-27 | End: 2019-08-22 | Stop reason: ALTCHOICE

## 2019-06-27 NOTE — PROGRESS NOTES
Subjective:   Corby Lamb is a 75 y.o. female      Chief Complaint   Patient presents with    Follow-up        History of present illness:  Ms. Wesley Victor returns in follow up. We reduced her prednisone to 10 mg after her last visit here and she has not noted any major recrudescence of myalgias. She has noted some mild lower extremity, but does have venous varicosities and has been on the prednisone as well. Last visit her sed rate was around 25, but her CRP was normal. We will check her sed rate again and if improved or stable we will try to reduce her prednisone a bit more. She will follow up with Inés Wadsworth in six weeks time for a similar visit to recheck her sed rate and reduce her prednisone further. She is stable with regard to her other problems as they are listed and also is on the same medications currently as were reconciled today. She denies chest pain or SOB. She does not have any major claudication, though still has fairly significant peripheral vascular disease. Her spondylosis and stenosis is stable without significant back or leg pain as well. She is not that active, however. She denies symptoms of congestive heart failure. She has had no angina. She denies current GI or  symptoms. She has had no active synovitis. She denies any recurrent TIA or stroke-like symptoms.         Patient Active Problem List    Diagnosis Date Noted    Arteriosclerotic heart disease (ASHD) 08/23/2017     Priority: 1 - One    Claudication in peripheral vascular disease ([facility] 08/23/2017     Priority: 1 - One    PVD (peripheral vascular disease) ([facility]      Priority: 1 - One    CAD (coronary artery disease)      Priority: 1 - One    Hypertension      Priority: 1 - One    PMR (polymyalgia rheumatica) ([facility] 04/16/2019     Priority: 2 - Two    COPD, mild ([facility] 03/15/2018     Priority: 2 - Two    Adult onset hypothyroidism 08/23/2017     Priority: 2 - Two    Dyslipidemia 08/23/2017     Priority: 2 - Two  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 ([facility] 08/23/2017     Priority: 2 - Two    Myalgia 01/23/2019     Priority: 3 - Three    Long-term current use of high risk medication other than anticoagulant 10/02/2018     Priority: 3 - Three    Recurrent depression ([facility] 01/09/2018     Priority: 3 - Three    Melanoma ([facility] 09/26/2017     Priority: 3 - Three    Lumbar spinal stenosis 09/23/2017     Priority: 3 - Three    Familial mitral valve prolapse 08/23/2017     Priority: 3 - Three    History of TIA (transient ischemic attack)      Priority: 3 - Three    GERD (gastroesophageal reflux disease)      Priority: 3 - Three    CVD (cerebrovascular disease)      Priority: 3 - Three    Anxiety 08/23/2017     Priority: 4 - Four    Cervical spondylosis 08/23/2017     Priority: 4 - Four    Macrocytosis 08/23/2017     Priority: 4 - Four    Age-related osteoporosis without current pathological fracture 08/23/2017     Priority: 4 - Four    Temporomandibular joint pain dysfunction syndrome 08/23/2017     Priority: 4 - Four    Fever blister 09/23/2017     Priority: 5 - Five    Tobacco user 08/23/2017     Priority: 5 - Five    Trigger finger 08/23/2017     Priority: 5 - Five    PE (physical exam), annual 09/23/2017      Past Surgical History:   Procedure Laterality Date    BYPASS GRAFT OTHR,FEM-POP       RIGHT FEMORAL-POPLITEAL BYPASS  WITH POLYTETRA-FL UOROETHYLENE GRAFT     CARDIAC SURG PROCEDURE UNLIST      stents x2    HX CAROTID ENDARTERECTOMY      right cea    HX OTHER SURGICAL Right     excision melanoma right arm    VASCULAR SURGERY PROCEDURE UNLIST      femoral stent - left    VASCULAR SURGERY PROCEDURE UNLIST      X5 right femoral bypass      Allergies   Allergen Reactions    Neomycin Sulfate Unknown (comments)      Family History   Problem Relation Age of Onset    Migraines Mother     Heart Disease Father     Hypertension Father     Lung Disease Father     Cancer Father         H&N    Hypertension Sister     Diabetes Sister     Heart Disease Brother     Hypertension Brother     Hypertension Sister     Cancer Sister         breast    Breast Cancer Sister 54    Hypertension Sister       Social History     Socioeconomic History    Marital status:      Spouse name: Not on file    Number of children: Not on file    Years of education: Not on file    Highest education level: Not on file   Occupational History    Not on file   Social Needs    Financial resource strain: Not on file    Food insecurity:     Worry: Not on file     Inability: Not on file    Transportation needs:     Medical: Not on file     Non-medical: Not on file   Tobacco Use    Smoking status: Former Smoker     Packs/day: 1.00     Years: 35.00     Pack years: 35.00     Last attempt to quit: 2008     Years since quittin.4    Smokeless tobacco: Never Used   Substance and Sexual Activity    Alcohol use:  Yes     Alcohol/week: 2.0 oz     Types: 4 Glasses of wine per week     Comment: occasionally    Drug use: No     Types: Prescription, OTC    Sexual activity: Not on file   Lifestyle    Physical activity:     Days per week: Not on file     Minutes per session: Not on file    Stress: Not on file   Relationships    Social connections:     Talks on phone: Not on file     Gets together: Not on file     Attends Congregational service: Not on file     Active member of club or organization: Not on file     Attends meetings of clubs or organizations: Not on file     Relationship status: Not on file    Intimate partner violence:     Fear of current or ex partner: Not on file     Emotionally abused: Not on file     Physically abused: Not on file     Forced sexual activity: Not on file   Other Topics Concern    Not on file   Social History Narrative    Not on file     Outpatient Medications Marked as Taking for the 19 encounter (Office Visit) with Paris Reilly MD   Medication Sig Dispense Refill    predniSONE (STERAPRED DS) 10 mg dose pack Take 1 Tab by mouth See Admin Instructions. See administration instruction per 10mg dose pack 21 Tab 0    LORazepam (ATIVAN) 1 mg tablet Take 1 Tab by mouth every six (6) hours as needed for Anxiety. Max Daily Amount: 4 mg. 5 Tab 0    atenolol (TENORMIN) 50 mg tablet TAKE 1 TABLET NIGHTLY 90 Tab 3    gabapentin (NEURONTIN) 300 mg capsule TAKE 2 CAPSULES TWICE A  Cap 1    predniSONE (DELTASONE) 10 mg tablet Take 15 mg by mouth daily (with breakfast). (Patient taking differently: Take 10 mg by mouth daily (with breakfast). 06-07-19 - Per Dr. Bettie Swanson, decrease Prednisone to 10 mg daily.) 45 Tab 5    atorvastatin (LIPITOR) 40 mg tablet TAKE 1 TABLET DAILY 90 Tab 3    peg 400-propylene glycol (SYSTANE, PROPYLENE GLYCOL,) 0.4-0.3 % drop Administer 1 Drop to both eyes as needed.  buPROPion SR (WELLBUTRIN SR) 150 mg SR tablet TAKE 1 TABLET DAILY 90 Tab 3    DENTA 5000 PLUS 1.1 % crea APPLY TWICE A DAY AFTER BRUSHING. DO NOT DRINK FOR 3 HOURS  5    amLODIPine (NORVASC) 5 mg tablet Take 1 Tab by mouth daily. 90 Tab 3    valACYclovir (VALTREX) 1 gram tablet Take 2 Tabs by mouth two (2) times a day. 2 initially and 2 in 12 hr for fever blisters 8 Tab 5    levothyroxine (SYNTHROID) 100 mcg tablet TAKE 1 TABLET DAILY BEFORE BREAKFAST 90 Tab 3    lisinopril (PRINIVIL, ZESTRIL) 40 mg tablet TAKE 1 TABLET DAILY 90 Tab 3    cilostazol (PLETAL) 100 mg tablet TAKE 1 TABLET TWICE A  Tab 3    omeprazole (PRILOSEC) 20 mg capsule TAKE 1 CAPSULE DAILY 90 Cap 3    cyclobenzaprine (FLEXERIL) 10 mg tablet Take 1 Tab by mouth three (3) times daily as needed for Muscle Spasm(s). 30 Tab 0    ascorbic acid (VITAMIN C) 500 mg tablet Take 500 mg by mouth two (2) times a day.  aspirin (ASPIRIN) 325 mg tablet Take 325 mg by mouth daily.  multivitamin, stress formula (STRESS TAB) tablet Take 1 Tab by mouth daily.           Review of Systems Constitutional:  She denies fever, weight loss, sweats or fatigue. HEENT:  No blurred or double vision, headache or dizziness. No difficulty with swallowing, taste, speech or smell. Respiratory:  No cough, wheezing or shortness of breath. No sputum production. Cardiac:  Denies chest pain, palpitations, unexplained indigestion, syncope, edema, PND or orthopnea. GI:  No changes in bowel movements, no abdominal pain, no bloating, anorexia, nausea, vomiting or heartburn. :  No frequency or dysuria. Denies incontinence. Extremities:  No joint pain, stiffness or swelling. Skin:  No recent rashes or mole changes. Neurological:  No numbness, tingling, burning paresthesias or loss of motor strength. No syncope, dizziness, frequent headaches or memory loss. Objective:     Vitals:    06/27/19 1529   BP: 104/60   Pulse: 88   Resp: 16   Temp: 98.1 °F (36.7 °C)   TempSrc: Oral   SpO2: 95%   Weight: 135 lb 9.6 oz (61.5 kg)   Height: 5' 2\" (1.575 m)   PainSc:   0 - No pain       Body mass index is 24.8 kg/m². Physical Examination:              General Appearance:  Well-developed, well-nourished, no acute  distress. HEENT:     Ears:  The TMs and ear canals were clear. Eyes:  The pupillary responses were normal.  Extraocular muscle function intact. Lids and conjunctiva not injected. Neck:  Supple without thyromegaly or adenopathy. No JVD noted. Lungs:  Clear to auscultation and percussion. Cardiac:  Regular rate and rhythm without murmur. GI: nontender w/o mass. Normal BS's. Extremities:  No clubbing, cyanosis or edema. Skin:  No rash or unusual mole changes noted. Neurological:  Grossly normal.            Assessment/Plan:   Impressions:      ICD-10-CM ICD-9-CM    1. Essential hypertension I10 401.9    2. PMR (polymyalgia rheumatica) (HCC) M35.3 725 SED RATE (ESR)   3. Anxiety F41.9 300.00 LORazepam (ATIVAN) 1 mg tablet        Plan:  1. Continue present meds  2.  Discussed lifestyle modifications including Na restriction, low carb/fat diet, weight reduction and exercise (at least a walking program). Follow-up and Dispositions    · Return in about 6 weeks (around 8/8/2019) for mahi 1 time. Orders Placed This Encounter    SED RATE (ESR) ([facility])    predniSONE (STERAPRED DS) 10 mg dose pack     Sig: Take 1 Tab by mouth See Admin Instructions. See administration instruction per 10mg dose pack     Dispense:  21 Tab     Refill:  0    LORazepam (ATIVAN) 1 mg tablet     Sig: Take 1 Tab by mouth every six (6) hours as needed for Anxiety. Max Daily Amount: 4 mg.      Dispense:  5 Tab     Refill:  0       Gary Nails MD

## 2019-06-27 NOTE — PROGRESS NOTES
Reviewed record in preparation for visit and have obtained necessary documentation. Identified pt with two pt identifiers(name and ). Chief Complaint   Patient presents with    Follow-up        Coordination of Care Questionnaire:  :     1) Have you been to an emergency room, urgent care clinic since your last visit? No   Hospitalized since your last visit? No             2) Have you seen or consulted any other health care providers outside of [address] [place] [address] since your last visit?  No

## 2019-06-28 LAB — SED RATE (ESR): 15 MM/HR (ref 0–20)

## 2019-07-02 RX ORDER — PREDNISONE 5 MG/1
7.5 TABLET ORAL
Qty: 135 TAB | Refills: 0 | Status: SHIPPED | OUTPATIENT
Start: 2019-07-02 | End: 2019-10-02 | Stop reason: SDUPTHER

## 2019-07-02 NOTE — TELEPHONE ENCOUNTER
PHI verified. Patient informed that Dr. Mauricio Tatum has reviewed lab results and stated Sed rate better. Decrease prednisone to 7.5 mg daily. Requested Prescriptions     Pending Prescriptions Disp Refills    predniSONE (DELTASONE) 5 mg tablet 135 Tab 0     Sig: Take 1.5 Tabs by mouth daily (with breakfast).

## 2019-07-02 NOTE — TELEPHONE ENCOUNTER
----- Message from Nikki Zhang MD sent at 6/28/2019  4:47 PM EDT -----  Sed rate better. Decrease prednisone to 7.5 mg daily.   Will need new scrip

## 2019-07-02 NOTE — PROGRESS NOTES
Left message at phone number(s) listed in chart for patient to call the office at [phone] to discuss lab results.

## 2019-07-22 RX ORDER — CILOSTAZOL 100 MG/1
TABLET ORAL
Qty: 180 TAB | Refills: 3 | Status: SHIPPED | OUTPATIENT
Start: 2019-07-22 | End: 2020-06-18 | Stop reason: SDUPTHER

## 2019-07-22 NOTE — TELEPHONE ENCOUNTER
PCP: Flower Noonan MD    Last appt: 6/27/2019  Future Appointments   Date Time Provider Liana Bartholomew   8/22/2019  2:00 PM Александр Boss NP [facility]       Requested Prescriptions     Pending Prescriptions Disp Refills    cilostazol (PLETAL) 100 mg tablet [Pharmacy Med Name: CILOSTAZOL TABS 100MG] 180 Tab 3     Sig: TAKE 1 TABLET TWICE A DAY       Prior labs and Blood pressures:  BP Readings from Last 3 Encounters:   06/27/19 104/60   05/30/19 112/65   04/16/19 109/71     Lab Results   Component Value Date/Time    Sodium 142 05/30/2019 02:36 PM    Potassium 4.9 05/30/2019 02:36 PM    Chloride 104 05/30/2019 02:36 PM    CO2 24 05/30/2019 02:36 PM    Anion gap 14 03/12/2019 11:22 AM    Glucose 92 06/13/2019 10:44 AM    BUN 27 05/30/2019 02:36 PM    Creatinine 1.06 (H) 05/30/2019 02:36 PM    BUN/Creatinine ratio 25 05/30/2019 02:36 PM    GFR est AA 59 (L) 05/30/2019 02:36 PM    GFR est non-AA 51 (L) 05/30/2019 02:36 PM    Calcium 9.1 05/30/2019 02:36 PM     Lab Results   Component Value Date/Time    Hemoglobin A1c 5.6 06/13/2019 10:43 AM     Lab Results   Component Value Date/Time    Cholesterol, total 146 03/12/2019 11:22 AM    Cholesterol (POC) 135.0 10/02/2018 02:05 PM    HDL Cholesterol 85 03/12/2019 11:22 AM    HDL Cholesterol (POC) 89.0 10/02/2018 02:05 PM    LDL Cholesterol (POC) 28.8 10/02/2018 02:05 PM    LDL, calculated 41 03/12/2019 11:22 AM    VLDL 20 03/12/2019 11:22 AM    Triglyceride 98 03/12/2019 11:22 AM    Triglycerides (POC) 86.0 10/02/2018 02:05 PM    CHOL/HDL Ratio 2 03/12/2019 11:22 AM     No results found for: ROSMERY Valentine    Lab Results   Component Value Date/Time    TSH, 3rd generation 0.66 03/12/2019 11:21 AM

## 2019-08-22 ENCOUNTER — OFFICE VISIT (OUTPATIENT)
Dept: INTERNAL MEDICINE CLINIC | Age: 74
End: 2019-08-22

## 2019-08-22 VITALS
RESPIRATION RATE: 18 BRPM | HEIGHT: 62 IN | BODY MASS INDEX: 24.88 KG/M2 | OXYGEN SATURATION: 94 % | TEMPERATURE: 99 F | DIASTOLIC BLOOD PRESSURE: 64 MMHG | HEART RATE: 82 BPM | SYSTOLIC BLOOD PRESSURE: 126 MMHG | WEIGHT: 135.2 LBS

## 2019-08-22 DIAGNOSIS — S40.012A CONTUSION OF MULTIPLE SITES OF LEFT SHOULDER AND UPPER ARM, INITIAL ENCOUNTER: ICD-10-CM

## 2019-08-22 DIAGNOSIS — M35.3 PMR (POLYMYALGIA RHEUMATICA) (HCC): Primary | ICD-10-CM

## 2019-08-22 DIAGNOSIS — S40.022A CONTUSION OF MULTIPLE SITES OF LEFT SHOULDER AND UPPER ARM, INITIAL ENCOUNTER: ICD-10-CM

## 2019-08-22 DIAGNOSIS — S90.32XA CONTUSION OF LEFT FOOT, INITIAL ENCOUNTER: ICD-10-CM

## 2019-08-22 LAB — SED RATE (ESR): 32 MM/HR (ref 0–20)

## 2019-08-22 NOTE — PROGRESS NOTES
Subjective:  Ms. Tatiana Holder is a pleasant 75year old lady, former patient of Dr. Hay Bach, who comes in today for follow up of PMR. Six weeks Dr. Hay Bach decreased her prednisone to 7.5 mg daily. She did extremely well until about ten days ago, while she was in [place] and fell. Since then she has a little bit of soreness in her left arm that she does not think is from PMR, but mostly from the fall. She did not hit her head and there was no loss of consciousness. She is really recovering from this injury. Today she specifically denies headaches, dizziness or blurred vision. She denies chest pain or palpitations. She denies SOB, cough, wheezing, PND or orthopnea. Denies ankle edema.     Past Medical History:   Diagnosis Date    Adult onset hypothyroidism 8/23/2017    Anemia 8/23/2017    Anxiety 8/23/2017    Arteriosclerotic heart disease (ASHD) 8/23/2017    Arthritis     back, hip right, neck    CAD (coronary artery disease)          Cervical spondylosis 8/23/2017    Chronic pain     spinal stenosis, bulging disk and bone spurs in back    Claudication in peripheral vascular disease ([address] 8/23/2017    CVD (cerebrovascular disease)     S/P right CEA    Depression     Dyslipidemia 8/23/2017    Familial mitral valve prolapse 8/23/2017    GERD (gastroesophageal reflux disease)     Hyperlipidemia     Hypertension     Hypothyroidism     Lumbar spinal stenosis     Macrocytosis 8/23/2017    Nausea & vomiting     surgery related, severe constipation    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 ([address] 8/23/2017    Osteopenia 8/23/2017    Osteoporosis     Other ill-defined conditions(799.89)     severe constipation from pain medication    PVD (peripheral vascular disease) ([address]     S/P stent Right CFA and Left iliac    Stroke ([address]     Temporomandibular joint pain dysfunction syndrome 8/23/2017    Thyroid disease     TIA (transient ischemic attack)     Tobacco user 8/23/2017    Trigger finger 8/23/2017     Past Surgical History:   Procedure Laterality Date    BYPASS GRAFT OTHR,FEM-POP       RIGHT FEMORAL-POPLITEAL BYPASS  WITH POLYTETRA-FL UOROETHYLENE GRAFT     CARDIAC SURG PROCEDURE UNLIST      stents x2    HX CAROTID ENDARTERECTOMY      right cea    HX OTHER SURGICAL Right     excision melanoma right arm    VASCULAR SURGERY PROCEDURE UNLIST      femoral stent - left    VASCULAR SURGERY PROCEDURE UNLIST      X5 right femoral bypass       Current Outpatient Medications on File Prior to Visit   Medication Sig Dispense Refill    cilostazol (PLETAL) 100 mg tablet TAKE 1 TABLET TWICE A  Tab 3    predniSONE (DELTASONE) 5 mg tablet Take 1.5 Tabs by mouth daily (with breakfast). 135 Tab 0    atenolol (TENORMIN) 50 mg tablet TAKE 1 TABLET NIGHTLY 90 Tab 3    gabapentin (NEURONTIN) 300 mg capsule TAKE 2 CAPSULES TWICE A  Cap 1    atorvastatin (LIPITOR) 40 mg tablet TAKE 1 TABLET DAILY 90 Tab 3    peg 400-propylene glycol (SYSTANE, PROPYLENE GLYCOL,) 0.4-0.3 % drop Administer 1 Drop to both eyes as needed.  buPROPion SR (WELLBUTRIN SR) 150 mg SR tablet TAKE 1 TABLET DAILY 90 Tab 3    DENTA 5000 PLUS 1.1 % crea APPLY TWICE A DAY AFTER BRUSHING. DO NOT DRINK FOR 3 HOURS  5    amLODIPine (NORVASC) 5 mg tablet Take 1 Tab by mouth daily. 90 Tab 3    valACYclovir (VALTREX) 1 gram tablet Take 2 Tabs by mouth two (2) times a day. 2 initially and 2 in 12 hr for fever blisters 8 Tab 5    levothyroxine (SYNTHROID) 100 mcg tablet TAKE 1 TABLET DAILY BEFORE BREAKFAST 90 Tab 3    lisinopril (PRINIVIL, ZESTRIL) 40 mg tablet TAKE 1 TABLET DAILY 90 Tab 3    omeprazole (PRILOSEC) 20 mg capsule TAKE 1 CAPSULE DAILY 90 Cap 3    ascorbic acid (VITAMIN C) 500 mg tablet Take 500 mg by mouth two (2) times a day.  aspirin (ASPIRIN) 325 mg tablet Take 325 mg by mouth daily.  multivitamin, stress formula (STRESS TAB) tablet Take 1 Tab by mouth daily.       predniSONE (STERAPRED DS) 10 mg dose pack Take 1 Tab by mouth See Admin Instructions. See administration instruction per 10mg dose pack 21 Tab 0    predniSONE (DELTASONE) 10 mg tablet Take 15 mg by mouth daily (with breakfast). (Patient taking differently: Take 7 mg by mouth daily (with breakfast). 06-07-19 - Per Dr. Bear Clark, decrease Prednisone to 10 mg daily.) 45 Tab 5    cyclobenzaprine (FLEXERIL) 10 mg tablet Take 1 Tab by mouth three (3) times daily as needed for Muscle Spasm(s). 30 Tab 0     No current facility-administered medications on file prior to visit. Allergies   Allergen Reactions    Neomycin Sulfate Unknown (comments)   Physical Examination:  GENERAL:  Pleasant lady in no acute distress. She is alert and oriented. Answers my questions appropriately. She has a normal gait. She ambulates without assistive devices. VITALS:  BP: 126/64. P: 82.  R: 18.  T: 99.  O2 sat: 94. HEENT:  Normocephalic, atraumatic. NECK:  Full ROM without any pain. CHEST:  Lungs clear to auscultation, no rales or wheezes. CV:  Heart regular rhythm without murmur. EXTREMITIES:  No edema or calf tenderness. Distal pulses were present. She does have a little bit of bruising on the lateral aspect of her left foot after she sustained this fall. She is not tender over her lateral malleolus. She has full ROM of the ankle without any pain. She has full ROM of left shoulder without any discomfort. Impression:  1. PMR.  2. Contusion of left shoulder. 3. Contusion of left foot. Plan:  1. In terms of her PMR, it was opted to repeat her sed rate today and I will call her as soon as I have her results. This is in the hopes we can decrease prednisone to 5 mg daily. 2. In terms of contusions, we did defer xrays since she is doing better. 3. She will follow up with Dr. Sung Crowley in three months.

## 2019-08-22 NOTE — PROGRESS NOTES
Chief Complaint   Patient presents with    COPD     6 week follow up    GERD     Visit Vitals  /64 (BP 1 Location: Left arm, BP Patient Position: Sitting)   Pulse 82   Temp 99 °F (37.2 °C) (Oral)   Resp 18   Ht 5' 2\" (1.575 m)   Wt 135 lb 3.2 oz (61.3 kg)   SpO2 94%   BMI 24.73 kg/m²     1. Have you been to the ER, urgent care clinic since your last visit? Hospitalized since your last visit? No    2. Have you seen or consulted any other health care providers outside of the [address] [place] [address] since your last visit? Include any pap smears or colon screening.  No

## 2019-08-22 NOTE — LETTER
Michelle Abdul GTO:[birth year] MR #:[id number] Provider Name:Iveth Castrejon MD  
*NCDE-524* BSMG-491 (5/16) Page 1 of 5 Initial [employer] CONTROLLED SUBSTANCE AGREEMENT I may be prescribed medications that are controlled substances as part  of my treatment plan for management of my medical condition(s). The goal of my treatment plan is to maintain and/or improve my health and wellbeing. Because controlled substances have an increased risk of abuse or harm, continual re-evaluation is needed determine if the goals of my treatment plan are being met for my safety and the safety of others. Manda Chris  am entering into this Controlled Substance Agreement with my provider, Armen Hadley MD at [address] [place] [address] . I understand that successful treatment requires mutual trust and honesty between me and my provider. I understand that there are state and federal laws and regulations which apply to the medications that my provider may prescribe that must be followed. I understand there are risks and benefits ts of taking the medicines that my provider may prescribe. I understand and agree that following this Agreement is necessary in continuing my provider-patient relationship and success of my treatment plan. As a part of my treatment plan, I agree to the following: COMMUNICATION: 
 
1. I will communicate fully with my provider about my medical condition(s), including the effect on my daily life and how well my medications are helping. I will tell my provider all of the medications that I take for any reason, including medications I receive from another health care provider, and will notify my provider about all issues, problems or concerns, including any side effects, which may be related to my medications.  
 
I understand that this information allows my provider to adjust my treatment plan to help manage my medical condition. I understand that this information will become part of my permanent medical record. 2. I will notify my provider if I have a history of alcohol/drug misuse/addiction or if I have had treatment for alcohol/drug addiction in the past, or if I have a new problem with or concern about alcohol/drug use/addiction, because this increases the likelihood of high risk behaviors and may lead to serious medical conditions. 3. Females Only: I will notify my provider if I am or become pregnant, or if I intend to become pregnant, or if I intend to breastfeed. I understand that communication of these issues with my provider is important, due to possible effects my medication could have on an unborn fetus or breastfeeding child. Garry Nevarez NOX:[birth year] MR #:[id number] Provider Name:Jessica Castrejon MD  
*SBRE-187* BSMG-491 (5/16) Page 2 of 5 Initial [employer] MISUSE OF MEDICATIONS / DRUGS: 
 
1. I agree to take all controlled substances as prescribed, and will not misuse or abuse any controlled substances prescribed by my provider. For my safety, I will not increase the amount of medicine I take without first talking with and getting permission from my provider. 2. If I have a medical emergency, another health care provider may prescribe me medication. If I seek emergency treatment, I will notify my provider within seventy-two (72) hours. 3. I understand that my provider may discuss my use and/or possible misuse/abuse of controlled substances and alcohol, as appropriate, with any health care provider involved in my care, pharmacist or legal authority. ILLEGAL DRUGS: 
 
1. I will not use illegal drugs of any kind, including but not limited to marijuana, heroin, cocaine, or any prescription drug which is not prescribed to me.  
 
DRUG DIVERSION / PRESCRIPTION FRAUD: 
 
 1. I will not share, sell, trade, give away, or otherwise misuse my prescriptions or medications. 2. I will not alter any prescriptions provided to me by my provider. SINGLE PROVIDER: 
 
1. I agree that all controlled substances that I take will be prescribed only by my provider (or his/her covering provider) under this Agreement. This agreement does not prevent me from seeking emergency medical treatment or receiving pain management related to a surgery. PROTECTING MEDICATIONS: 
 
1. I am responsible for keeping my prescriptions and medications in a safe and secure place including safeguarding them from loss or theft. I understand that lost, stolen or damaged/destroyed prescriptions or medications will not be replaced. Randall Hayes VVP:[birth year]39 MR #:[id number] Provider Name:Mirlande Castrejon MD  
*XEYT-520* BSMG-491 (5/16) Page 3 of 5 Initial [employer] PRESCRIPTION RENEWALS/REFILLS: 
 
 
1. I authorize my provider and my pharmacy to cooperate fully with any local, state, or federal law enforcement agency in the investigation of any possible misuse, sale, or other diversion of my controlled substance prescriptions or medications. RISKS: 
 
 
1. I understand that if I do not adhere to this Agreement in any way, my provider may change my prescriptions, stop prescribing controlled substances or end our provider-patient relationship. 2. If my provider decides to stop prescribing medication, or decides to end our provider-patient relationship,my provider may require that I taper my medications slowly. If necessary, my provider may also provide a prescription for other medications to treat my withdrawal symptoms. UNDERSTANDING THIS AGREEMENT: 
 
I understand that my provider may adjust or stop my prescriptions for controlled substances based on my medical condition and my treatment plan. I understand that this Agreement does not guarantee that I will be prescribed medications or controlled substances. I understand that controlled substances may be just one part of my treatment plan. My initial on each page and my signature below shows that I have read each page of this Agreement, I have had an opportunity to ask questions, and all of my questions have been answered to my satisfaction by my provider. By signing below, I agree to comply with this Agreement, and I understand that if I do not follow the Agreements listed above, my provider may stop 
 
 
 
_________________________________________  Date/Time 8/22/2019 2:06 PM   
             (Patient Signature)

## 2019-08-22 NOTE — PATIENT INSTRUCTIONS
Contusion: Care Instructions  Your Care Instructions    Contusion is the medical term for a bruise. It is the result of a direct blow or an impact, such as a fall. Contusions are common sports injuries. Most people think of a bruise as a black-and-blue spot. This happens when small blood vessels get torn and leak blood under the skin. But bones, muscles, and organs can also get bruised. This may damage deep tissues but not cause a bruise you can see. The doctor will do a physical exam to find the location of your contusion. You may also have tests to make sure you do not have a more serious injury, such as a broken bone or nerve damage. These may include X-rays or other imaging tests like a CT scan or MRI. Deep-tissue contusions may cause pain and swelling. But if there is no serious damage, they will often get better in a few weeks with home treatment. The doctor has checked you carefully, but problems can develop later. If you notice any problems or new symptoms, get medical treatment right away. Follow-up care is a key part of your treatment and safety. Be sure to make and go to all appointments, and call your doctor if you are having problems. It's also a good idea to know your test results and keep a list of the medicines you take. How can you care for yourself at home? · Put ice or a cold pack on the sore area for 10 to 20 minutes at a time to stop swelling. Put a thin cloth between the ice pack and your skin. · Be safe with medicines. Read and follow all instructions on the label. ? If the doctor gave you a prescription medicine for pain, take it as prescribed. ? If you are not taking a prescription pain medicine, ask your doctor if you can take an over-the-counter medicine. · If you can, prop up the sore area on pillows as much as possible for the next few days. Try to keep the sore area above the level of your heart. When should you call for help?   Call your doctor now or seek immediate medical care if:    · Your pain gets worse.     · You have new or worse swelling.     · You have tingling, weakness, or numbness in the area near the contusion.     · The area near the contusion is cold or pale.    Watch closely for changes in your health, and be sure to contact your doctor if:    · You do not get better as expected. Where can you learn more? Go to http://mehnaz-oscar.info/. Enter S483 in the search box to learn more about \"Contusion: Care Instructions. \"  Current as of: September 23, 2018  Content Version: 12.1  © 9575-0229 [employer]. Care instructions adapted under license by [employer] (which disclaims liability or warranty for this information). If you have questions about a medical condition or this instruction, always ask your healthcare professional. [address] any warranty or liability for your use of this information.

## 2019-08-26 NOTE — PROGRESS NOTES
Lab results discussed with patient. Repeat sed rate in 6 weeks. Continue prednisone 7.5 mg daily. Call if any concerns.

## 2019-08-29 NOTE — TELEPHONE ENCOUNTER
Requested Prescriptions     Pending Prescriptions Disp Refills    atenolol (TENORMIN) 50 mg tablet 90 Tab 3     Sig: Take 1 Tab by mouth nightly. None

## 2019-09-09 ENCOUNTER — HOSPITAL ENCOUNTER (OUTPATIENT)
Dept: MAMMOGRAPHY | Age: 74
Discharge: HOME | End: 2019-09-09
Attending: INTERNAL MEDICINE
Payer: MEDICARE

## 2019-09-09 DIAGNOSIS — Z12.39 BREAST SCREENING, UNSPECIFIED: ICD-10-CM

## 2019-09-09 PROCEDURE — 77063 BREAST TOMOSYNTHESIS BI: CPT

## 2019-09-09 RX ORDER — OMEPRAZOLE 20 MG/1
CAPSULE, DELAYED RELEASE ORAL
Qty: 90 CAP | Refills: 0 | Status: SHIPPED | OUTPATIENT
Start: 2019-09-09 | End: 2019-09-13 | Stop reason: SDUPTHER

## 2019-09-09 RX ORDER — LEVOTHYROXINE SODIUM 100 UG/1
TABLET ORAL
Qty: 90 TAB | Refills: 0 | Status: SHIPPED | OUTPATIENT
Start: 2019-09-09 | End: 2019-10-02 | Stop reason: SDUPTHER

## 2019-09-09 RX ORDER — LISINOPRIL 40 MG/1
TABLET ORAL
Qty: 90 TAB | Refills: 0 | Status: SHIPPED | OUTPATIENT
Start: 2019-09-09 | End: 2019-12-31

## 2019-09-09 NOTE — TELEPHONE ENCOUNTER
RX refill request from the patient/pharmacy. Patient last seen 08- with labs, and next appt. scheduled for 09-  Requested Prescriptions     Pending Prescriptions Disp Refills    lisinopril (PRINIVIL, ZESTRIL) 40 mg tablet 90 Tab 0     Sig: TAKE 1 TABLET DAILY    levothyroxine (SYNTHROID) 100 mcg tablet 90 Tab 0     Sig: TAKE 1 TABLET DAILY BEFORE BREAKFAST    omeprazole (PRILOSEC) 20 mg capsule 90 Cap 0     Sig: TAKE 1 CAPSULE DAILY   .

## 2019-09-13 NOTE — TELEPHONE ENCOUNTER
Lavonne Kelley   Requested Prescriptions     Pending Prescriptions Disp Refills    omeprazole (PRILOSEC) 20 mg capsule 90 Cap 0     Sig: TAKE 1 CAPSULE DAILY

## 2019-09-16 RX ORDER — OMEPRAZOLE 20 MG/1
CAPSULE, DELAYED RELEASE ORAL
Qty: 90 CAP | Refills: 3 | Status: SHIPPED | OUTPATIENT
Start: 2019-09-16 | End: 2019-09-17 | Stop reason: SDUPTHER

## 2019-09-16 NOTE — TELEPHONE ENCOUNTER
PCP: Nitish Gaitan MD    Last appt: 8/22/2019  Future Appointments   Date Time Provider Liana Bartholomew   9/30/2019  1:00 PM Marika Boss NP [facility]   11/21/2019  1:30 PM Tevin Castrejon MD [facility]       Requested Prescriptions     Pending Prescriptions Disp Refills    omeprazole (PRILOSEC) 20 mg capsule 90 Cap 3     Sig: TAKE 1 CAPSULE DAILY       Prior labs and Blood pressures:  BP Readings from Last 3 Encounters:   08/22/19 126/64   06/27/19 104/60   05/30/19 112/65     Lab Results   Component Value Date/Time    Sodium 142 05/30/2019 02:36 PM    Potassium 4.9 05/30/2019 02:36 PM    Chloride 104 05/30/2019 02:36 PM    CO2 24 05/30/2019 02:36 PM    Anion gap 14 03/12/2019 11:22 AM    Glucose 92 06/13/2019 10:44 AM    BUN 27 05/30/2019 02:36 PM    Creatinine 1.06 (H) 05/30/2019 02:36 PM    BUN/Creatinine ratio 25 05/30/2019 02:36 PM    GFR est AA 59 (L) 05/30/2019 02:36 PM    GFR est non-AA 51 (L) 05/30/2019 02:36 PM    Calcium 9.1 05/30/2019 02:36 PM     Lab Results   Component Value Date/Time    Hemoglobin A1c 5.6 06/13/2019 10:43 AM     Lab Results   Component Value Date/Time    Cholesterol, total 146 03/12/2019 11:22 AM    Cholesterol (POC) 135.0 10/02/2018 02:05 PM    HDL Cholesterol 85 03/12/2019 11:22 AM    HDL Cholesterol (POC) 89.0 10/02/2018 02:05 PM    LDL Cholesterol (POC) 28.8 10/02/2018 02:05 PM    LDL, calculated 41 03/12/2019 11:22 AM    VLDL 20 03/12/2019 11:22 AM    Triglyceride 98 03/12/2019 11:22 AM    Triglycerides (POC) 86.0 10/02/2018 02:05 PM    CHOL/HDL Ratio 2 03/12/2019 11:22 AM     No results found for: ROSMERY Clark    Lab Results   Component Value Date/Time    TSH, 3rd generation 0.66 03/12/2019 11:21 AM

## 2019-09-17 RX ORDER — OMEPRAZOLE 20 MG/1
CAPSULE, DELAYED RELEASE ORAL
Qty: 90 CAP | Refills: 3 | Status: SHIPPED | OUTPATIENT
Start: 2019-09-17 | End: 2020-12-16 | Stop reason: SDUPTHER

## 2019-09-17 NOTE — TELEPHONE ENCOUNTER
Patient states Omeprazole went to [facility] instead of [facility] and would like to have it sent to [facility]. Called [facility] and cancelled Omeprazole Rx sent to them.     PCP: Artist Crigler, MD    Last appt: 8/22/2019  Future Appointments   Date Time Provider Liana Bartholomew   9/30/2019  1:00 PM Cha Boss NP [facility]   11/21/2019  1:30 PM Jose Castrejon MD [facility]       Requested Prescriptions     Pending Prescriptions Disp Refills    omeprazole (PRILOSEC) 20 mg capsule 90 Cap 3     Sig: TAKE 1 CAPSULE DAILY       Prior labs and Blood pressures:  BP Readings from Last 3 Encounters:   08/22/19 126/64   06/27/19 104/60   05/30/19 112/65     Lab Results   Component Value Date/Time    Sodium 142 05/30/2019 02:36 PM    Potassium 4.9 05/30/2019 02:36 PM    Chloride 104 05/30/2019 02:36 PM    CO2 24 05/30/2019 02:36 PM    Anion gap 14 03/12/2019 11:22 AM    Glucose 92 06/13/2019 10:44 AM    BUN 27 05/30/2019 02:36 PM    Creatinine 1.06 (H) 05/30/2019 02:36 PM    BUN/Creatinine ratio 25 05/30/2019 02:36 PM    GFR est AA 59 (L) 05/30/2019 02:36 PM    GFR est non-AA 51 (L) 05/30/2019 02:36 PM    Calcium 9.1 05/30/2019 02:36 PM     Lab Results   Component Value Date/Time    Hemoglobin A1c 5.6 06/13/2019 10:43 AM     Lab Results   Component Value Date/Time    Cholesterol, total 146 03/12/2019 11:22 AM    Cholesterol (POC) 135.0 10/02/2018 02:05 PM    HDL Cholesterol 85 03/12/2019 11:22 AM    HDL Cholesterol (POC) 89.0 10/02/2018 02:05 PM    LDL Cholesterol (POC) 28.8 10/02/2018 02:05 PM    LDL, calculated 41 03/12/2019 11:22 AM    VLDL 20 03/12/2019 11:22 AM    Triglyceride 98 03/12/2019 11:22 AM    Triglycerides (POC) 86.0 10/02/2018 02:05 PM    CHOL/HDL Ratio 2 03/12/2019 11:22 AM     No results found for: ROSMERY Andrea    Lab Results   Component Value Date/Time    TSH, 3rd generation 0.66 03/12/2019 11:21 AM

## 2019-09-30 ENCOUNTER — OFFICE VISIT (OUTPATIENT)
Dept: INTERNAL MEDICINE CLINIC | Age: 74
End: 2019-09-30

## 2019-09-30 VITALS
HEIGHT: 62 IN | RESPIRATION RATE: 16 BRPM | WEIGHT: 134.4 LBS | OXYGEN SATURATION: 96 % | HEART RATE: 90 BPM | TEMPERATURE: 98 F | BODY MASS INDEX: 24.73 KG/M2 | DIASTOLIC BLOOD PRESSURE: 64 MMHG | SYSTOLIC BLOOD PRESSURE: 130 MMHG

## 2019-09-30 DIAGNOSIS — M35.3 PMR (POLYMYALGIA RHEUMATICA) (HCC): ICD-10-CM

## 2019-09-30 DIAGNOSIS — L65.9 HAIR LOSS: Primary | ICD-10-CM

## 2019-09-30 DIAGNOSIS — R73.02 IMPAIRED GLUCOSE TOLERANCE: ICD-10-CM

## 2019-09-30 DIAGNOSIS — Z23 ENCOUNTER FOR IMMUNIZATION: ICD-10-CM

## 2019-09-30 DIAGNOSIS — I10 ESSENTIAL HYPERTENSION: ICD-10-CM

## 2019-09-30 LAB
ANION GAP SERPL CALC-SCNC: 8 MMOL/L
BUN SERPL-MCNC: 24 MG/DL (ref 7–17)
CALCIUM SERPL-MCNC: 9.1 MG/DL (ref 8.4–10.2)
CHLORIDE SERPL-SCNC: 105 MMOL/L (ref 98–107)
CO2 SERPL-SCNC: 27 MMOL/L (ref 22–32)
CREAT SERPL-MCNC: 1 MG/DL (ref 0.7–1.2)
GLUCOSE SERPL-MCNC: 103 MG/DL (ref 65–105)
HBA1C MFR BLD HPLC: 4.7 % (ref 4–5.7)
POTASSIUM SERPL-SCNC: 5 MMOL/L (ref 3.6–5)
SED RATE (ESR): 11 MM/HR (ref 0–20)
SODIUM SERPL-SCNC: 140 MMOL/L (ref 137–145)

## 2019-09-30 NOTE — PATIENT INSTRUCTIONS

## 2019-09-30 NOTE — PROGRESS NOTES
Tanja Amaral  Identified pt with two pt identifiers(name and ). Chief Complaint   Patient presents with    Other     6 week f/u - PMR     After obtaining consent, and per verbal order from King Lukasz NP, patient received FLUAD influenza vaccine 0.5 ml IM given by Asiya Payne LPN in Right Deltoid. Verified by Kirill Payton LPN. Requested patient remain in the office 15 minutes post injection. Patient tolerated injection well with no adverse effects. VIS given. 1. Have you been to the ER, urgent care clinic since your last visit? Hospitalized since your last visit? No    2. Have you seen or consulted any other health care providers outside of the [address] [place] [address] since your last visit? Include any pap smears or colon screening. No      Medication reconciliation up to date and corrected with patient at this time. Today's provider has been notified of reason for visit, vitals and flowsheets obtained on patients. Reviewed record in preparation for visit, huddled with provider and have obtained necessary documentation.         Depression Risk Factor Screening:     3 most recent PHQ Screens 3/19/2019   Little interest or pleasure in doing things Not at all   Feeling down, depressed, irritable, or hopeless Not at all   Total Score PHQ 2 0   Trouble falling or staying asleep, or sleeping too much Not at all   Feeling tired or having little energy Not at all   Poor appetite, weight loss, or overeating Not at all   Feeling bad about yourself - or that you are a failure or have let yourself or your family down Not at all   Trouble concentrating on things such as school, work, reading, or watching TV Not at all   Moving or speaking so slowly that other people could have noticed; or the opposite being so fidgety that others notice Not at all   Thoughts of being better off dead, or hurting yourself in some way Not at all   PHQ 9 Score 0       Functional Ability and Level of Safety:     Activities of Daily Living  ADL Assessment 3/19/2019   Feeding yourself No Help Needed   Getting from bed to chair No Help Needed   Getting dressed No Help Needed   Bathing or showering No Help Needed   Walk across the room (includes cane/walker) No Help Needed   Using the telphone No Help Needed   Taking your medications No Help Needed   Preparing meals No Help Needed   Managing money (expenses/bills) No Help Needed   Moderately strenuous housework (laundry) No Help Needed   Shopping for personal items (toiletries/medicines) No Help Needed   Shopping for groceries No Help Needed   Driving No Help Needed   Climbing a flight of stairs No Help Needed   Getting to places beyond walking distances No Help Needed       Fall Risk  Fall Risk Assessment, last 12 mths 3/19/2019   Able to walk? Yes   Fall in past 12 months? No       Abuse Screen  Abuse Screening Questionnaire 3/19/2019   Do you ever feel afraid of your partner? N   Are you in a relationship with someone who physically or mentally threatens you? N   Is it safe for you to go home?  Y           Health Maintenance Due   Topic    Shingrix Vaccine Age 49> (1 of 2)    GLAUCOMA SCREENING Q2Y     Influenza Age 5 to Adult        Wt Readings from Last 3 Encounters:   09/30/19 134 lb 6.4 oz (61 kg)   08/22/19 135 lb 3.2 oz (61.3 kg)   06/27/19 135 lb 9.6 oz (61.5 kg)     Temp Readings from Last 3 Encounters:   09/30/19 98 °F (36.7 °C) (Oral)   08/22/19 99 °F (37.2 °C) (Oral)   06/27/19 98.1 °F (36.7 °C) (Oral)     BP Readings from Last 3 Encounters:   09/30/19 130/64   08/22/19 126/64   06/27/19 104/60     Pulse Readings from Last 3 Encounters:   09/30/19 90   08/22/19 82   06/27/19 88     Vitals:    09/30/19 1304   BP: 130/64   Pulse: 90   Resp: 16   Temp: 98 °F (36.7 °C)   TempSrc: Oral   SpO2: 96%   Weight: 134 lb 6.4 oz (61 kg)   Height: 5' 2\" (1.575 m)   PainSc:   2   PainLoc: Arm         Patient Care Team   Patient Care Team:  Armen Hadley MD as PCP - General (Internal Medicine)  Zhen Smith MD (Vascular Surgery)  Connie Vásquez MD (Ophthalmology)

## 2019-09-30 NOTE — PROGRESS NOTES
Subjective:  Ms. Cam Holden is a pleasant 75year old lady who comes in today for follow up of PMR. She is currently managed on prednisone 7.5 mg daily. Her sed rate done in August was still at 28. She is doing reasonably well. Today she is concerned because she is losing her hair and wonders if that is from the prednisone. Otherwise she denies headaches, dizziness or blurred vision. Denies chest pain or palpitations. Denies SOB, cough, wheezing, PND or orthopnea. Denies ankle edema.       Past Medical History:   Diagnosis Date    Adult onset hypothyroidism 8/23/2017    Anemia 8/23/2017    Anxiety 8/23/2017    Arteriosclerotic heart disease (ASHD) 8/23/2017    Arthritis     back, hip right, neck    CAD (coronary artery disease)          Cervical spondylosis 8/23/2017    Chronic pain     spinal stenosis, bulging disk and bone spurs in back    Claudication in peripheral vascular disease ([address] 8/23/2017    CVD (cerebrovascular disease)     S/P right CEA    Depression     Dyslipidemia 8/23/2017    Familial mitral valve prolapse 8/23/2017    GERD (gastroesophageal reflux disease)     Hyperlipidemia     Hypertension     Hypothyroidism     Lumbar spinal stenosis     Macrocytosis 8/23/2017    Menopause     Nausea & vomiting     surgery related, severe constipation    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 ([address] 8/23/2017    Osteopenia 8/23/2017    Osteoporosis     Other ill-defined conditions(799.89)     severe constipation from pain medication    PVD (peripheral vascular disease) ([address]     S/P stent Right CFA and Left iliac    Stroke ([address]     Temporomandibular joint pain dysfunction syndrome 8/23/2017    Thyroid disease     TIA (transient ischemic attack)     Tobacco user 8/23/2017    Trigger finger 8/23/2017     Past Surgical History:   Procedure Laterality Date    BYPASS GRAFT OTHR,FEM-POP       RIGHT FEMORAL-POPLITEAL BYPASS  WITH POLYTETRA-FL UOROETHYLENE GRAFT     CARDIAC SURG PROCEDURE UNLIST      stents x2    HX CAROTID ENDARTERECTOMY      right cea    HX OTHER SURGICAL Right     excision melanoma right arm    VASCULAR SURGERY PROCEDURE UNLIST      femoral stent - left    VASCULAR SURGERY PROCEDURE UNLIST      X5 right femoral bypass       Current Outpatient Medications on File Prior to Visit   Medication Sig Dispense Refill    omeprazole (PRILOSEC) 20 mg capsule TAKE 1 CAPSULE DAILY 90 Cap 3    lisinopril (PRINIVIL, ZESTRIL) 40 mg tablet TAKE 1 TABLET DAILY 90 Tab 0    levothyroxine (SYNTHROID) 100 mcg tablet TAKE 1 TABLET DAILY BEFORE BREAKFAST 90 Tab 0    cilostazol (PLETAL) 100 mg tablet TAKE 1 TABLET TWICE A  Tab 3    predniSONE (DELTASONE) 5 mg tablet Take 1.5 Tabs by mouth daily (with breakfast). 135 Tab 0    atenolol (TENORMIN) 50 mg tablet TAKE 1 TABLET NIGHTLY 90 Tab 3    gabapentin (NEURONTIN) 300 mg capsule TAKE 2 CAPSULES TWICE A  Cap 1    atorvastatin (LIPITOR) 40 mg tablet TAKE 1 TABLET DAILY 90 Tab 3    peg 400-propylene glycol (SYSTANE, PROPYLENE GLYCOL,) 0.4-0.3 % drop Administer 1 Drop to both eyes as needed.  buPROPion SR (WELLBUTRIN SR) 150 mg SR tablet TAKE 1 TABLET DAILY 90 Tab 3    DENTA 5000 PLUS 1.1 % crea APPLY TWICE A DAY AFTER BRUSHING. DO NOT DRINK FOR 3 HOURS  5    amLODIPine (NORVASC) 5 mg tablet Take 1 Tab by mouth daily. 90 Tab 3    valACYclovir (VALTREX) 1 gram tablet Take 2 Tabs by mouth two (2) times a day. 2 initially and 2 in 12 hr for fever blisters 8 Tab 5    cyclobenzaprine (FLEXERIL) 10 mg tablet Take 1 Tab by mouth three (3) times daily as needed for Muscle Spasm(s). 30 Tab 0    ascorbic acid (VITAMIN C) 500 mg tablet Take 500 mg by mouth two (2) times a day.  aspirin (ASPIRIN) 325 mg tablet Take 325 mg by mouth daily.  multivitamin, stress formula (STRESS TAB) tablet Take 1 Tab by mouth daily.  predniSONE (DELTASONE) 10 mg tablet Take 15 mg by mouth daily (with breakfast). (Patient not taking: Reported on 9/30/2019) 45 Tab 5     No current facility-administered medications on file prior to visit. Allergies   Allergen Reactions    Neomycin Sulfate Unknown (comments)       Physical Examination:  GENERAL:  Pleasant, thin lady in no acute distress. She is alert and oriented. VITALS:  BP: 130/64. P: 90.  R: 16.  T: 98.  O2 sat: 96. HEENT:  Normocephalic, atraumatic. NECK:  Supple without adenopathy. CHEST:  Lungs clear to auscultation, no rales or wheezes. CV:  Heart regular rhythm without murmur or gallop. EXTREMITIES:  No edema or calf tenderness. Distal pulses were present. Impression:  1. PMR.  2. Hypothyroidism. 3. Hypertension. 4. GERD. Plan:  1. It was opted to repeat her sed rate and I also will recheck her thyroid function today, along with a CBC and comprehensive metabolic. I will call her as soon as I have her results. 2. Otherwise she will continue with her current regimen.

## 2019-10-01 LAB
BASOPHILS # BLD AUTO: 0 X10E3/UL (ref 0–0.2)
BASOPHILS NFR BLD AUTO: 0 %
EOSINOPHIL # BLD AUTO: 0.1 X10E3/UL (ref 0–0.4)
EOSINOPHIL NFR BLD AUTO: 1 %
ERYTHROCYTE [DISTWIDTH] IN BLOOD BY AUTOMATED COUNT: 14.3 % (ref 12.3–15.4)
HCT VFR BLD AUTO: 38.7 % (ref 34–46.6)
HGB BLD-MCNC: 12.5 G/DL (ref 11.1–15.9)
IMM GRANULOCYTES # BLD AUTO: 0 X10E3/UL (ref 0–0.1)
IMM GRANULOCYTES NFR BLD AUTO: 0 %
LYMPHOCYTES # BLD AUTO: 0.9 X10E3/UL (ref 0.7–3.1)
LYMPHOCYTES NFR BLD AUTO: 10 %
MCH RBC QN AUTO: 34.4 PG (ref 26.6–33)
MCHC RBC AUTO-ENTMCNC: 32.3 G/DL (ref 31.5–35.7)
MCV RBC AUTO: 107 FL (ref 79–97)
MONOCYTES # BLD AUTO: 1.2 X10E3/UL (ref 0.1–0.9)
MONOCYTES NFR BLD AUTO: 13 %
NEUTROPHILS # BLD AUTO: 6.8 X10E3/UL (ref 1.4–7)
NEUTROPHILS NFR BLD AUTO: 76 %
PLATELET # BLD AUTO: 236 X10E3/UL (ref 150–450)
RBC # BLD AUTO: 3.63 X10E6/UL (ref 3.77–5.28)
T4 FREE SERPL-MCNC: 1.02 NG/DL (ref 0.58–2.3)
TSH SERPL DL<=0.05 MIU/L-ACNC: 0.03 UIU/ML (ref 0.34–5.6)
WBC # BLD AUTO: 9 X10E3/UL (ref 3.4–10.8)

## 2019-10-02 RX ORDER — PREDNISONE 5 MG/1
5 TABLET ORAL
Qty: 90 TAB | Refills: 3 | Status: SHIPPED | OUTPATIENT
Start: 2019-10-02 | End: 2020-09-21 | Stop reason: SDUPTHER

## 2019-10-02 RX ORDER — LEVOTHYROXINE SODIUM 88 UG/1
TABLET ORAL
Qty: 30 TAB | Refills: 2 | Status: SHIPPED | OUTPATIENT
Start: 2019-10-02 | End: 2019-11-05 | Stop reason: SDUPTHER

## 2019-10-02 NOTE — PROGRESS NOTES
Lab results discussed with patient. Sed rate down to 11. Decrease prednisone to 5 mg daily. Repeat sed rate in 6 weeks. TSH 0.03. Decrease levothyroxine to 88 mcg daily. Repeat in 6 weeks. Call if any concerns.

## 2019-11-05 RX ORDER — LEVOTHYROXINE SODIUM 88 UG/1
TABLET ORAL
Qty: 30 TAB | Refills: 0 | Status: SHIPPED | OUTPATIENT
Start: 2019-11-05 | End: 2019-12-30 | Stop reason: SDUPTHER

## 2019-11-05 NOTE — TELEPHONE ENCOUNTER
Last Refill: 10/2/19  Last visit:9/30/2019  NOV: 11/15/2019      Requested Prescriptions     Pending Prescriptions Disp Refills    levothyroxine (SYNTHROID) 88 mcg tablet 30 Tab 0     Sig: TAKE 1 TABLET DAILY BEFORE BREAKFAST

## 2019-11-15 ENCOUNTER — OFFICE VISIT (OUTPATIENT)
Dept: INTERNAL MEDICINE CLINIC | Age: 74
End: 2019-11-15

## 2019-11-15 VITALS
HEART RATE: 89 BPM | HEIGHT: 62 IN | DIASTOLIC BLOOD PRESSURE: 60 MMHG | SYSTOLIC BLOOD PRESSURE: 122 MMHG | TEMPERATURE: 98.3 F | OXYGEN SATURATION: 95 % | BODY MASS INDEX: 24.84 KG/M2 | WEIGHT: 135 LBS | RESPIRATION RATE: 16 BRPM

## 2019-11-15 DIAGNOSIS — I10 ESSENTIAL HYPERTENSION: ICD-10-CM

## 2019-11-15 DIAGNOSIS — M35.3 PMR (POLYMYALGIA RHEUMATICA) (HCC): ICD-10-CM

## 2019-11-15 DIAGNOSIS — M25.512 ACUTE PAIN OF LEFT SHOULDER: Primary | ICD-10-CM

## 2019-11-15 DIAGNOSIS — E03.8 ADULT ONSET HYPOTHYROIDISM: ICD-10-CM

## 2019-11-15 LAB
ANION GAP SERPL CALC-SCNC: 8 MMOL/L
BUN SERPL-MCNC: 14 MG/DL (ref 7–17)
CALCIUM SERPL-MCNC: 9.7 MG/DL (ref 8.4–10.2)
CHLORIDE SERPL-SCNC: 106 MMOL/L (ref 98–107)
CO2 SERPL-SCNC: 28 MMOL/L (ref 22–32)
CREAT SERPL-MCNC: 0.7 MG/DL (ref 0.7–1.2)
GLUCOSE SERPL-MCNC: 144 MG/DL (ref 65–105)
POTASSIUM SERPL-SCNC: 4.4 MMOL/L (ref 3.6–5)
SED RATE (ESR): 19 MM/HR (ref 0–20)
SODIUM SERPL-SCNC: 142 MMOL/L (ref 137–145)

## 2019-11-15 NOTE — PROGRESS NOTES
Subjective:  Ms. Asad Diamond is a pleasant 75year old lady, who comes in today for follow up of her medical problems. 1. PMR, for which she is currently managed on prednisone 5 mg daily. Her sed rate back in December was down to 11. She is doing well from that standpoint. 2. Hypothyroidism, for which I decreased her Levothyroxine to 88 mcg daily six weeks ago. She is in need of getting her thyroid function rechecked. She tells me that since I changed her thyroid medication she has not been losing as much hair. 3. Left shoulder pain. Further discussion with her revealed that she tripped and fell about three weeks ago. She fell onto her left shoulder. She has continued to have pain in this left shoulder that seems to be worse when she is trying to raise her arm. Denies any numbness or tingling down her left arm.   Past Medical History:   Diagnosis Date    Adult onset hypothyroidism 8/23/2017    Anemia 8/23/2017    Anxiety 8/23/2017    Arteriosclerotic heart disease (ASHD) 8/23/2017    Arthritis     back, hip right, neck    CAD (coronary artery disease)          Cervical spondylosis 8/23/2017    Chronic pain     spinal stenosis, bulging disk and bone spurs in back    Claudication in peripheral vascular disease ([address] 8/23/2017    CVD (cerebrovascular disease)     S/P right CEA    Depression     Dyslipidemia 8/23/2017    Familial mitral valve prolapse 8/23/2017    GERD (gastroesophageal reflux disease)     Hyperlipidemia     Hypertension     Hypothyroidism     Lumbar spinal stenosis     Macrocytosis 8/23/2017    Menopause     Nausea & vomiting     surgery related, severe constipation    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 ([address] 8/23/2017    Osteopenia 8/23/2017    Osteoporosis     Other ill-defined conditions(799.89)     severe constipation from pain medication    PVD (peripheral vascular disease) ([address]     S/P stent Right CFA and Left iliac    Stroke ([address]     Temporomandibular joint pain dysfunction syndrome 8/23/2017    Thyroid disease     TIA (transient ischemic attack)     Tobacco user 8/23/2017    Trigger finger 8/23/2017     Past Surgical History:   Procedure Laterality Date    BYPASS GRAFT OTHR,FEM-POP       RIGHT FEMORAL-POPLITEAL BYPASS  WITH POLYTETRA-FL UOROETHYLENE GRAFT     CARDIAC SURG PROCEDURE UNLIST      stents x2    HX CAROTID ENDARTERECTOMY      right cea    HX OTHER SURGICAL Right     excision melanoma right arm    VASCULAR SURGERY PROCEDURE UNLIST      femoral stent - left    VASCULAR SURGERY PROCEDURE UNLIST      X5 right femoral bypass       Current Outpatient Medications on File Prior to Visit   Medication Sig Dispense Refill    multivit-min/iron/folic/lutein (CENTRUM SILVER WOMEN PO) Take  by mouth.  levothyroxine (SYNTHROID) 88 mcg tablet TAKE 1 TABLET DAILY BEFORE BREAKFAST 30 Tab 0    predniSONE (DELTASONE) 5 mg tablet Take 1 Tab by mouth daily (with breakfast). 90 Tab 3    omeprazole (PRILOSEC) 20 mg capsule TAKE 1 CAPSULE DAILY 90 Cap 3    lisinopril (PRINIVIL, ZESTRIL) 40 mg tablet TAKE 1 TABLET DAILY 90 Tab 0    cilostazol (PLETAL) 100 mg tablet TAKE 1 TABLET TWICE A  Tab 3    atenolol (TENORMIN) 50 mg tablet TAKE 1 TABLET NIGHTLY 90 Tab 3    gabapentin (NEURONTIN) 300 mg capsule TAKE 2 CAPSULES TWICE A  Cap 1    atorvastatin (LIPITOR) 40 mg tablet TAKE 1 TABLET DAILY 90 Tab 3    peg 400-propylene glycol (SYSTANE, PROPYLENE GLYCOL,) 0.4-0.3 % drop Administer 1 Drop to both eyes as needed.  buPROPion SR (WELLBUTRIN SR) 150 mg SR tablet TAKE 1 TABLET DAILY 90 Tab 3    DENTA 5000 PLUS 1.1 % crea APPLY TWICE A DAY AFTER BRUSHING. DO NOT DRINK FOR 3 HOURS  5    amLODIPine (NORVASC) 5 mg tablet Take 1 Tab by mouth daily. 90 Tab 3    valACYclovir (VALTREX) 1 gram tablet Take 2 Tabs by mouth two (2) times a day.  2 initially and 2 in 12 hr for fever blisters 8 Tab 5    ascorbic acid (VITAMIN C) 500 mg tablet Take 500 mg by mouth two (2) times a day.  aspirin (ASPIRIN) 325 mg tablet Take 325 mg by mouth daily.  [DISCONTINUED] cyclobenzaprine (FLEXERIL) 10 mg tablet Take 1 Tab by mouth three (3) times daily as needed for Muscle Spasm(s). 30 Tab 0    multivitamin, stress formula (STRESS TAB) tablet Take 1 Tab by mouth daily. No current facility-administered medications on file prior to visit. Allergies   Allergen Reactions    Neomycin Sulfate Unknown (comments)     Physical Examination:  GENERAL:  Pleasant lady in no acute distress. She is alert and oriented. She answers my questions appropriately. VITALS:  Blood pressure:  122/60. Pulse:  89. Respirations:  16.  Temperature:  98.3. O2 sat:  .95  HEENT:  Normocephalic, atraumatic. NECK:  She has full range of motion without pain. CHEST:  Lungs clear to auscultation, no rales or wheezes. CV:  Heart regular rhythm without murmur. EXTREMITIES:  No edema or calf tenderness. Distal pulses were present. Left shoulder - she does have pain on forward elevation, does have pain on eversion and inversion. There is no bruising or deformity noted in the left shoulder. She had excellent pulses. Neurovascular intact. Studies:  Two views of the left shoulder failed to reveal any fracture. She does have some mild glenohumeral joint osteoarthritis. Impression:  1. PMR.  2. Hypothyroidism. 3. Contusion left shoulder. Plan:  1. It was opted to repeat her thyroid function, as well as her sed rate, and I will call her as soon as I have her results. In the meantime she will continue with her current regimen. 2. In terms of her shoulder, if things do not improve over the next few weeks then I think we should refer to ortho for further evaluation, which she is in agreement. She will call me to keep me abreast of her improvement.

## 2019-11-15 NOTE — PROGRESS NOTES
Lluvia Beth is a 75 y.o. female     Chief Complaint   Patient presents with    Hypertension     follow up       Visit Vitals  /60 (BP 1 Location: Left arm, BP Patient Position: Sitting)   Pulse 89   Temp 98.3 °F (36.8 °C) (Oral)   Resp 16   Ht 5' 2\" (1.575 m)   Wt 135 lb (61.2 kg)   SpO2 95%   BMI 24.69 kg/m²       Health Maintenance Due   Topic Date Due    Shingrix Vaccine Age 49> (1 of 2) 03/23/1994    GLAUCOMA SCREENING Q2Y  03/23/2009       1. Have you been to the ER, urgent care clinic since your last visit? Hospitalized since your last visit? No    2. Have you seen or consulted any other health care providers outside of the [address] [place] [address] since your last visit? Include any pap smears or colon screening.  No

## 2019-11-15 NOTE — PATIENT INSTRUCTIONS
Hypothyroidism: Care Instructions  Your Care Instructions    You have hypothyroidism, which means that your body is not making enough thyroid hormone. This hormone helps your body use energy. If your thyroid level is low, you may feel tired, be constipated, have an increase in your blood pressure, or have dry skin or memory problems. You may also get cold easily, even when it is warm. Women with low thyroid levels may have heavy menstrual periods. A blood test to find your thyroid-stimulating hormone (TSH) level is used to check for hypothyroidism. A high TSH level may mean that you have low thyroid. When your body is not making enough thyroid hormone, TSH levels rise in an effort to make the body produce more. The treatment for hypothyroidism is to take thyroid hormone pills. You should start to feel better in 1 to 2 weeks. But it can take several months to see changes in the TSH level. You will need regular visits with your doctor to make sure you have the right dose of medicine. Most people need treatment for the rest of their lives. You will need to see your doctor regularly to have blood tests and to make sure you are doing well. Follow-up care is a key part of your treatment and safety. Be sure to make and go to all appointments, and call your doctor if you are having problems. It's also a good idea to know your test results and keep a list of the medicines you take. How can you care for yourself at home? · Take your thyroid hormone medicine exactly as prescribed. Call your doctor if you think you are having a problem with your medicine. Most people do not have side effects if they take the right amount of medicine regularly. ? Take the medicine 30 minutes before breakfast, and do not take it with calcium, vitamins, or iron. ? Do not take extra doses of your thyroid medicine. It will not help you get better any faster, and it may cause side effects.   ? If you forget to take a dose, do NOT take a double dose of medicine. Take your usual dose the next day. · Tell your doctor about all prescription, herbal, or over-the-counter products you take. · Take care of yourself. Eat a healthy diet, get enough sleep, and get regular exercise. When should you call for help? Call 911 anytime you think you may need emergency care. For example, call if:    · You passed out (lost consciousness).     · You have severe trouble breathing.     · You have a very slow heartbeat (less than 60 beats a minute).     · You have a low body temperature (95°F or below).    Call your doctor now or seek immediate medical care if:    · You feel tired, sluggish, or weak.     · You have trouble remembering things or concentrating.     · You do not begin to feel better 2 weeks after starting your medicine.    Watch closely for changes in your health, and be sure to contact your doctor if you have any problems. Where can you learn more? Go to http://mehnaz-oscar.info/. Enter X119 in the search box to learn more about \"Hypothyroidism: Care Instructions. \"  Current as of: November 6, 2018  Content Version: 12.2  © 8979-4735 [employer]. Care instructions adapted under license by [employer] (which disclaims liability or warranty for this information). If you have questions about a medical condition or this instruction, always ask your healthcare professional. [address] any warranty or liability for your use of this information.

## 2019-11-16 LAB
BASOPHILS # BLD AUTO: 0 X10E3/UL (ref 0–0.2)
BASOPHILS NFR BLD AUTO: 0 %
EOSINOPHIL # BLD AUTO: 0.1 X10E3/UL (ref 0–0.4)
EOSINOPHIL NFR BLD AUTO: 1 %
ERYTHROCYTE [DISTWIDTH] IN BLOOD BY AUTOMATED COUNT: 12.7 % (ref 12.3–15.4)
HCT VFR BLD AUTO: 35.2 % (ref 34–46.6)
HGB BLD-MCNC: 12.2 G/DL (ref 11.1–15.9)
IMM GRANULOCYTES # BLD AUTO: 0 X10E3/UL (ref 0–0.1)
IMM GRANULOCYTES NFR BLD AUTO: 0 %
LYMPHOCYTES # BLD AUTO: 0.7 X10E3/UL (ref 0.7–3.1)
LYMPHOCYTES NFR BLD AUTO: 6 %
MCH RBC QN AUTO: 34.5 PG (ref 26.6–33)
MCHC RBC AUTO-ENTMCNC: 34.7 G/DL (ref 31.5–35.7)
MCV RBC AUTO: 99 FL (ref 79–97)
MONOCYTES # BLD AUTO: 1.2 X10E3/UL (ref 0.1–0.9)
MONOCYTES NFR BLD AUTO: 10 %
NEUTROPHILS # BLD AUTO: 10.1 X10E3/UL (ref 1.4–7)
NEUTROPHILS NFR BLD AUTO: 83 %
PLATELET # BLD AUTO: 216 X10E3/UL (ref 150–450)
RBC # BLD AUTO: 3.54 X10E6/UL (ref 3.77–5.28)
WBC # BLD AUTO: 12.2 X10E3/UL (ref 3.4–10.8)

## 2019-11-19 ENCOUNTER — TELEPHONE (OUTPATIENT)
Dept: INTERNAL MEDICINE CLINIC | Age: 74
End: 2019-11-19

## 2019-11-19 LAB
T4 FREE SERPL-MCNC: 0.96 NG/DL (ref 0.58–2.3)
TSH SERPL DL<=0.05 MIU/L-ACNC: 0.49 UIU/ML (ref 0.34–5.6)

## 2019-12-02 RX ORDER — LEVOTHYROXINE SODIUM 88 UG/1
TABLET ORAL
Qty: 30 TAB | Refills: 2 | Status: SHIPPED | OUTPATIENT
Start: 2019-12-02 | End: 2019-12-30

## 2019-12-04 ENCOUNTER — TELEPHONE (OUTPATIENT)
Dept: INTERNAL MEDICINE CLINIC | Age: 74
End: 2019-12-04

## 2019-12-04 NOTE — TELEPHONE ENCOUNTER
Dr. Claudell Hawks at [facility] would like to speak with NP Marcelle Patino in reference to patient Kunal Sprague about her prednisone, changing her BP med and discuss her labs.     Phone # [phone]

## 2019-12-12 ENCOUNTER — TELEPHONE (OUTPATIENT)
Dept: INTERNAL MEDICINE CLINIC | Age: 74
End: 2019-12-12

## 2019-12-12 DIAGNOSIS — E03.8 ADULT ONSET HYPOTHYROIDISM: ICD-10-CM

## 2019-12-12 DIAGNOSIS — M35.3 PMR (POLYMYALGIA RHEUMATICA) (HCC): Primary | ICD-10-CM

## 2019-12-30 DIAGNOSIS — I10 ESSENTIAL HYPERTENSION: ICD-10-CM

## 2019-12-30 RX ORDER — AMLODIPINE BESYLATE 5 MG/1
5 TABLET ORAL DAILY
Qty: 90 TAB | Refills: 3 | Status: SHIPPED | OUTPATIENT
Start: 2019-12-30 | End: 2020-12-16 | Stop reason: SDUPTHER

## 2019-12-30 RX ORDER — BUPROPION HYDROCHLORIDE 150 MG/1
TABLET, EXTENDED RELEASE ORAL
Qty: 90 TAB | Refills: 3 | Status: SHIPPED | OUTPATIENT
Start: 2019-12-30 | End: 2019-12-30 | Stop reason: SDUPTHER

## 2019-12-30 NOTE — TELEPHONE ENCOUNTER
PCP: Merlyn Lovett MD    Last appt: 11/15/2019  Future Appointments   Date Time Provider Liana Bartholomew   1/7/2020  1:00 PM Anthony Snell MD [address]       Requested Prescriptions     Pending Prescriptions Disp Refills    buPROPion SR (WELLBUTRIN SR) 150 mg SR tablet 90 Tab 3     Sig: TAKE 1 TABLET DAILY    amLODIPine (NORVASC) 5 mg tablet 90 Tab 3     Sig: Take 1 Tab by mouth daily.        Prior labs and Blood pressures:  BP Readings from Last 3 Encounters:   11/15/19 122/60   09/30/19 130/64   08/22/19 126/64     Lab Results   Component Value Date/Time    Sodium 142 11/15/2019 11:23 AM    Potassium 4.4 11/15/2019 11:23 AM    Chloride 106 11/15/2019 11:23 AM    CO2 28.0 11/15/2019 11:23 AM    Anion gap 8 11/15/2019 11:23 AM    Glucose 144 (H) 11/15/2019 11:23 AM    BUN 14.0 11/15/2019 11:23 AM    Creatinine 0.7 11/15/2019 11:23 AM    BUN/Creatinine ratio 25 05/30/2019 02:36 PM    GFR est AA >60 11/15/2019 11:23 AM    GFR est non-AA >60 11/15/2019 11:23 AM    Calcium 9.7 11/15/2019 11:23 AM     Lab Results   Component Value Date/Time    Hemoglobin A1c 4.7 09/30/2019 01:56 PM     Lab Results   Component Value Date/Time    Cholesterol, total 146 03/12/2019 11:22 AM    Cholesterol (POC) 135.0 10/02/2018 02:05 PM    HDL Cholesterol 85 03/12/2019 11:22 AM    HDL Cholesterol (POC) 89.0 10/02/2018 02:05 PM    LDL Cholesterol (POC) 28.8 10/02/2018 02:05 PM    LDL, calculated 41 03/12/2019 11:22 AM    VLDL 20 03/12/2019 11:22 AM    Triglyceride 98 03/12/2019 11:22 AM    Triglycerides (POC) 86.0 10/02/2018 02:05 PM    CHOL/HDL Ratio 2 03/12/2019 11:22 AM     No results found for: ROSMERY Veloz    Lab Results   Component Value Date/Time    TSH, 3rd generation 0.49 11/15/2019 11:23 AM

## 2019-12-31 RX ORDER — LISINOPRIL 40 MG/1
TABLET ORAL
Qty: 90 TAB | Refills: 3 | Status: SHIPPED | OUTPATIENT
Start: 2019-12-31 | End: 2020-12-15 | Stop reason: SDUPTHER

## 2020-01-02 RX ORDER — BUPROPION HYDROCHLORIDE 150 MG/1
TABLET, EXTENDED RELEASE ORAL
Qty: 90 TAB | Refills: 3 | Status: SHIPPED | OUTPATIENT
Start: 2020-01-02 | End: 2020-12-16 | Stop reason: SDUPTHER

## 2020-01-07 ENCOUNTER — OFFICE VISIT (OUTPATIENT)
Dept: INTERNAL MEDICINE CLINIC | Age: 75
End: 2020-01-07

## 2020-01-07 VITALS
HEIGHT: 62 IN | RESPIRATION RATE: 16 BRPM | DIASTOLIC BLOOD PRESSURE: 70 MMHG | OXYGEN SATURATION: 95 % | WEIGHT: 134.8 LBS | HEART RATE: 92 BPM | BODY MASS INDEX: 24.8 KG/M2 | SYSTOLIC BLOOD PRESSURE: 132 MMHG | TEMPERATURE: 98.2 F

## 2020-01-07 DIAGNOSIS — K21.00 GASTROESOPHAGEAL REFLUX DISEASE WITH ESOPHAGITIS: ICD-10-CM

## 2020-01-07 DIAGNOSIS — Z86.73 HISTORY OF TIA (TRANSIENT ISCHEMIC ATTACK): ICD-10-CM

## 2020-01-07 DIAGNOSIS — I73.9 PVD (PERIPHERAL VASCULAR DISEASE) (HCC): Primary | ICD-10-CM

## 2020-01-07 DIAGNOSIS — I25.10 CORONARY ARTERY DISEASE INVOLVING NATIVE HEART WITHOUT ANGINA PECTORIS, UNSPECIFIED VESSEL OR LESION TYPE: ICD-10-CM

## 2020-01-07 DIAGNOSIS — I73.9 NEUROPATHY DUE TO PERIPHERAL VASCULAR DISEASE (HCC): ICD-10-CM

## 2020-01-07 DIAGNOSIS — E78.5 DYSLIPIDEMIA: ICD-10-CM

## 2020-01-07 DIAGNOSIS — M35.3 PMR (POLYMYALGIA RHEUMATICA) (HCC): ICD-10-CM

## 2020-01-07 DIAGNOSIS — G63 NEUROPATHY DUE TO PERIPHERAL VASCULAR DISEASE (HCC): ICD-10-CM

## 2020-01-07 DIAGNOSIS — I10 ESSENTIAL HYPERTENSION: ICD-10-CM

## 2020-01-07 DIAGNOSIS — M12.812 ROTATOR CUFF ARTHROPATHY OF LEFT SHOULDER: ICD-10-CM

## 2020-01-07 DIAGNOSIS — M25.512 ACUTE PAIN OF LEFT SHOULDER: ICD-10-CM

## 2020-01-07 DIAGNOSIS — E03.8 ADULT ONSET HYPOTHYROIDISM: ICD-10-CM

## 2020-01-07 DIAGNOSIS — E87.5 HYPERKALEMIA: ICD-10-CM

## 2020-01-07 LAB
A-G RATIO,AGRAT: 1.4 RATIO
ALBUMIN SERPL-MCNC: 4.2 G/DL (ref 3.9–5.4)
ALP SERPL-CCNC: 72 U/L (ref 38–126)
ALT SERPL-CCNC: 22 U/L (ref 0–35)
ANION GAP SERPL CALC-SCNC: 15 MMOL/L
AST SERPL W P-5'-P-CCNC: 29 U/L (ref 14–36)
BILIRUB SERPL-MCNC: 0.6 MG/DL (ref 0.2–1.3)
BUN SERPL-MCNC: 26 MG/DL (ref 7–17)
BUN/CREATININE RATIO,BUCR: 29 RATIO
CALCIUM SERPL-MCNC: 9.9 MG/DL (ref 8.4–10.2)
CHLORIDE SERPL-SCNC: 99 MMOL/L (ref 98–107)
CHOL/HDL RATIO,CHHD: 1 RATIO (ref 0–4)
CHOLEST SERPL-MCNC: 152 MG/DL (ref 0–200)
CO2 SERPL-SCNC: 26 MMOL/L (ref 22–32)
CREAT SERPL-MCNC: 0.9 MG/DL (ref 0.7–1.2)
GLOBULIN,GLOB: 2.9
GLUCOSE SERPL-MCNC: 114 MG/DL (ref 65–105)
HDLC SERPL-MCNC: 104 MG/DL (ref 35–130)
LDL/HDL RATIO,LDHD: 0 RATIO
LDLC SERPL CALC-MCNC: 23 MG/DL (ref 0–130)
POTASSIUM SERPL-SCNC: 5.5 MMOL/L (ref 3.6–5)
PROT SERPL-MCNC: 7.1 G/DL (ref 6.3–8.2)
SED RATE (ESR): 21 MM/HR (ref 0–20)
SODIUM SERPL-SCNC: 140 MMOL/L (ref 137–145)
T4 FREE SERPL-MCNC: 1.04 NG/DL (ref 0.58–2.3)
TRIGL SERPL-MCNC: 127 MG/DL (ref 0–200)
TSH SERPL DL<=0.05 MIU/L-ACNC: 0.15 UIU/ML (ref 0.34–5.6)
VLDLC SERPL CALC-MCNC: 25 MG/DL

## 2020-01-07 RX ORDER — VITAMIN E (DL,TOCOPHERYL ACET) 180 MG
CAPSULE ORAL
COMMUNITY

## 2020-01-07 NOTE — PATIENT INSTRUCTIONS
Rotator Cuff Injury: Care Instructions  Your Care Instructions    The rotator cuff is a group of tendons and muscles around the shoulder that keeps the upper arm bone in place. It keeps the shoulder joint stable and allows you to raise and rotate your arm. Damage to the rotator cuff can be caused by overuse, a fall, or a direct blow to the shoulder area, which can tear the tendons. Over time, everyday wear can damage the tendons and make injury more likely. Treatment can depend upon the amount of damage to the tendons. In a younger person, surgery may be the first choice. But if you are older than 25, you likely have some wear on your rotator cuff. Surgery may not be the most effective treatment. Your doctor may have you try physical therapy and exercise first.  Follow-up care is a key part of your treatment and safety. Be sure to make and go to all appointments, and call your doctor if you are having problems. It's also a good idea to know your test results and keep a list of the medicines you take. How can you care for yourself at home? · Rest your shoulder as much as you can. If your doctor put your arm in a sling or shoulder immobilizer, wear it as directed. Do not take it off before your doctor tells you to. If it is too tight, loosen it. · Be safe with medicines. Read and follow all instructions on the label. ? If the doctor gave you a prescription medicine for pain, take it as prescribed. ? If you are not taking a prescription pain medicine, ask your doctor if you can take an over-the-counter medicine. · Put ice or a cold pack on your shoulder for 10 to 20 minutes at a time. Try to do this every 1 to 2 hours for the next 3 days (when you are awake). Put a thin cloth between the ice pack and your skin. · After 3 days, put a warm, wet towel on your shoulder. This is to relax the muscles and help blood flow.   · While holding a warm, wet towel on your shoulder, lean forward so your arm hangs freely, and gently swing your arm back and forth like a pendulum. You also can do this standing under a warm shower. · Do not do anything that makes your pain worse. · Follow your doctor's advice about whether you need physical therapy. When should you call for help? Call your doctor now or seek immediate medical care if:    · You have severe pain.     · You cannot move your shoulder or arm.     · You have tingling or numbness in your arm or hand.     · Your arm or hand is cool or pale.    Watch closely for changes in your health, and be sure to contact your doctor if:    · Your pain gets worse.     · You have new or worse swelling in your arm or hand.     · You do not get better as expected. Where can you learn more? Go to http://mehnaz-oscar.info/. Enter 271 47 [id number] in the search box to learn more about \"Rotator Cuff Injury: Care Instructions. \"  Current as of: June 26, 2019  Content Version: 12.2  © 1309-9650 [employer]. Care instructions adapted under license by [employer] (which disclaims liability or warranty for this information). If you have questions about a medical condition or this instruction, always ask your healthcare professional. [address] any warranty or liability for your use of this information.

## 2020-01-07 NOTE — PROGRESS NOTES
Johnnie Pascual is a 75 y.o. female presenting for Hypothyroidism (6 mo fu); Hypertension; Cholesterol Problem; and Shoulder Pain (left x 2 mos)  . 1. Have you been to the ER, urgent care clinic since your last visit? Hospitalized since your last visit? No    2. Have you seen or consulted any other health care providers outside of the [address] [place] [address] since your last visit? Include any pap smears or colon screening. No    Fall Risk Assessment, last 12 mths 1/7/2020   Able to walk? Yes   Fall in past 12 months? Yes   Fall with injury? Yes   Number of falls in past 12 months 2   Fall Risk Score 3         Abuse Screening Questionnaire 1/7/2020   Do you ever feel afraid of your partner? N   Are you in a relationship with someone who physically or mentally threatens you? N   Is it safe for you to go home? Y       3 most recent PHQ Screens 3/19/2019   Little interest or pleasure in doing things Not at all   Feeling down, depressed, irritable, or hopeless Not at all   Total Score PHQ 2 0   Trouble falling or staying asleep, or sleeping too much Not at all   Feeling tired or having little energy Not at all   Poor appetite, weight loss, or overeating Not at all   Feeling bad about yourself - or that you are a failure or have let yourself or your family down Not at all   Trouble concentrating on things such as school, work, reading, or watching TV Not at all   Moving or speaking so slowly that other people could have noticed; or the opposite being so fidgety that others notice Not at all   Thoughts of being better off dead, or hurting yourself in some way Not at all   PHQ 9 Score 0       There are no discontinued medications.

## 2020-01-07 NOTE — PROGRESS NOTES
This note will not be viewable in [address] Peter Kolb is a 75 y.o. female and presents with Hypothyroidism (6 mo fu); Hypertension; Cholesterol Problem; and Shoulder Pain (left x 2 mos)      Subjective:  Patient comes in today for acute left shoulder pain and follow-up of her complex medical problems. Patient has a history of trauma where she tripped and injured her left shoulder 2 months back. She was seen by a nurse practitioner who got a x-ray of the left shoulder done which was unremarkable. She was recommended to follow-up with [address] [place] [address] if her symptoms did not improve. She reports left shoulder pain especially on anterior movement and is positive for drop arm test today on my evaluation. She takes a Tylenol as needed for her symptoms. Pain is mild to moderate in intensity and aggravated by movement. Patient also has polymyalgia rheumatica and was initiated on prednisone 15 mg by her prior PCP. That has been weaned down to 5 mg p.o. daily. His most recent ESR was 11 and the plan was to slowly wean her off. Hypothyroidism on replacement. Her levothyroxine was decreased a month back as she was having excessive hair loss and her TSH was subtherapeutic.     Past Medical History:   Diagnosis Date    Adult onset hypothyroidism 8/23/2017    Anemia 8/23/2017    Anxiety 8/23/2017    Arteriosclerotic heart disease (ASHD) 8/23/2017    Arthritis     back, hip right, neck    CAD (coronary artery disease)          Cervical spondylosis 8/23/2017    Chronic pain     spinal stenosis, bulging disk and bone spurs in back    Claudication in peripheral vascular disease ([address] 8/23/2017    CVD (cerebrovascular disease)     S/P right CEA    Depression     Dyslipidemia 8/23/2017    Familial mitral valve prolapse 8/23/2017    GERD (gastroesophageal reflux disease)     Hyperlipidemia     Hypertension     Hypothyroidism     Lumbar spinal stenosis     Macrocytosis 8/23/2017    Menopause  Nausea & vomiting     surgery related, severe constipation    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 ([facility] 8/23/2017    Osteopenia 8/23/2017    Osteoporosis     Other ill-defined conditions(799.89)     severe constipation from pain medication    PVD (peripheral vascular disease) ([facility]     S/P stent Right CFA and Left iliac    Stroke ([facility]     Temporomandibular joint pain dysfunction syndrome 8/23/2017    Thyroid disease     TIA (transient ischemic attack)     Tobacco user 8/23/2017    Trigger finger 8/23/2017     Past Surgical History:   Procedure Laterality Date    BYPASS GRAFT OTHR,FEM-POP       RIGHT FEMORAL-POPLITEAL BYPASS  WITH POLYTETRA-FL UOROETHYLENE GRAFT     CARDIAC SURG PROCEDURE UNLIST      stents x2    HX CAROTID ENDARTERECTOMY      right cea    HX OTHER SURGICAL Right     excision melanoma right arm    VASCULAR SURGERY PROCEDURE UNLIST      femoral stent - left    VASCULAR SURGERY PROCEDURE UNLIST      X5 right femoral bypass     Allergies   Allergen Reactions    Neomycin Sulfate Unknown (comments)     Current Outpatient Medications   Medication Sig Dispense Refill    turmeric/turmeric ext/pepr ext (TURMERIC-TURMERIC EXT-PEPPER) 500-3 mg cap Take  by mouth.  buPROPion SR (WELLBUTRIN SR) 150 mg SR tablet TAKE 1 TABLET DAILY 90 Tab 3    lisinopril (PRINIVIL, ZESTRIL) 40 mg tablet TAKE 1 TABLET DAILY 90 Tab 3    levothyroxine (SYNTHROID) 88 mcg tablet TAKE 1 TABLET BY MOUTH DAILY BEFORE BREAKFAST 90 Tab 2    amLODIPine (NORVASC) 5 mg tablet Take 1 Tab by mouth daily. 90 Tab 3    multivit-min/iron/folic/lutein (CENTRUM SILVER WOMEN PO) Take  by mouth.  predniSONE (DELTASONE) 5 mg tablet Take 1 Tab by mouth daily (with breakfast).  90 Tab 3    omeprazole (PRILOSEC) 20 mg capsule TAKE 1 CAPSULE DAILY 90 Cap 3    cilostazol (PLETAL) 100 mg tablet TAKE 1 TABLET TWICE A  Tab 3    atenolol (TENORMIN) 50 mg tablet TAKE 1 TABLET NIGHTLY 90 Tab 3    gabapentin (NEURONTIN) 300 mg capsule TAKE 2 CAPSULES TWICE A  Cap 1    atorvastatin (LIPITOR) 40 mg tablet TAKE 1 TABLET DAILY 90 Tab 3    peg 400-propylene glycol (SYSTANE, PROPYLENE GLYCOL,) 0.4-0.3 % drop Administer 1 Drop to both eyes as needed.  DENTA 5000 PLUS 1.1 % crea APPLY TWICE A DAY AFTER BRUSHING. DO NOT DRINK FOR 3 HOURS  5    valACYclovir (VALTREX) 1 gram tablet Take 2 Tabs by mouth two (2) times a day. 2 initially and 2 in 12 hr for fever blisters 8 Tab 5    ascorbic acid (VITAMIN C) 500 mg tablet Take 500 mg by mouth two (2) times a day.  aspirin (ASPIRIN) 325 mg tablet Take 325 mg by mouth daily.  multivitamin, stress formula (STRESS TAB) tablet Take 1 Tab by mouth daily. Social History     Socioeconomic History    Marital status:      Spouse name: Not on file    Number of children: Not on file    Years of education: Not on file    Highest education level: Not on file   Tobacco Use    Smoking status: Former Smoker     Packs/day: 1.00     Years: 35.00     Pack years: 35.00     Last attempt to quit: 2008     Years since quittin.9    Smokeless tobacco: Never Used   Substance and Sexual Activity    Alcohol use: Yes     Alcohol/week: 6.0 standard drinks     Types: 6 Glasses of wine per week     Comment: occasionally    Drug use: No     Types: Prescription, OTC     Family History   Problem Relation Age of Onset   [facility] Migraines Mother     Heart Disease Father     Hypertension Father     Lung Disease Father     Cancer Father         H&N    Hypertension Sister     Diabetes Sister     Heart Disease Brother     Hypertension Brother     Hypertension Sister     Cancer Sister         breast    Breast Cancer Sister 54    Hypertension Sister        Review of Systems  ROS is unremarkable except for ones highlighted in bold.    Constitutional: negative for fevers, chills, anorexia and weight loss  Eyes:   negative for visual disturbance and irritation  ENT: negative for tinnitus,sore throat,nasal congestion,ear pain,hoarseness  Respiratory:  negative for cough, hemoptysis, dyspnea,wheezing  CV:   negative for chest pain, palpitations, lower extremity edema  GI:   negative for nausea, vomiting, diarrhea, abdominal pain,melena  Endo:               negative for polyuria,polydipsia,polyphagia,heat intolerance, hair loss  Genitourinary: negative for frequency, dysuria and hematuria  Integumentary: negative for rash and pruritus  Hematologic:  negative for easy bruising and gum/nose bleeding  Musculoskel: negative for myalgias, arthralgias, back pain, muscle weakness, joint pain  Neurological:  negative for headaches, dizziness, vertigo, memory problems and gait   Behavl/Psych: negative for feelings of anxiety, depression, mood changes  ROS otherwise negative     Objective:  Visit Vitals  /70 (BP 1 Location: Left arm, BP Patient Position: Sitting)   Pulse 92   Temp 98.2 °F (36.8 °C) (Oral)   Resp 16   Ht 5' 2\" (1.575 m)   Wt 134 lb 12.8 oz (61.1 kg)   SpO2 95%   BMI 24.66 kg/m²     Physical Exam:   General appearance - alert, well appearing, and in no distress  Mental status - alert, oriented to person, place, and time  EYE-RODNEY, EOMI, fundi normal, corneas normal, no foreign bodies  ENT-ENT exam normal, no neck nodes or sinus tenderness  Nose - normal and patent, no erythema, discharge or polyps  Mouth - mucous membranes moist, pharynx normal without lesions  Neck - supple, no significant adenopathy   Chest - clear to auscultation, no wheezes, rales or rhonchi, symmetric air entry   Heart - normal rate, regular rhythm, normal S1, S2, no murmurs, rubs, clicks or gallops   Abdomen - soft, nontender, nondistended, no masses or organomegaly  Lymph- no adenopathy palpable  Ext-peripheral pulses normal, no pedal edema, no clubbing or cyanosis  Skin-Warm and dry.  no hyperpigmentation, vitiligo, or suspicious lesions  Neuro -alert, oriented, normal speech, no focal findings or movement disorder noted  Musculoskeletal- FROM, no bony abnormalities,  point tenderness, positive drop arm test    Lab Review:  Results for orders placed or performed in visit on 11/15/19   CBC WITH AUTOMATED DIFF   Result Value Ref Range    WBC 12.2 (H) 3.4 - 10.8 x10E3/uL    RBC 3.54 (L) 3.77 - 5.28 x10E6/uL    HGB 12.2 11.1 - 15.9 g/dL    HCT 35.2 34.0 - 46.6 %    MCV 99 (H) 79 - 97 fL    MCH 34.5 (H) 26.6 - 33.0 pg    MCHC 34.7 31.5 - 35.7 g/dL    RDW 12.7 12.3 - 15.4 %    PLATELET 477 253 - 363 x10E3/uL    NEUTROPHILS 83 Not Estab. %    Lymphocytes 6 Not Estab. %    MONOCYTES 10 Not Estab. %    EOSINOPHILS 1 Not Estab. %    BASOPHILS 0 Not Estab. %    ABS. NEUTROPHILS 10.1 (H) 1.4 - 7.0 x10E3/uL    Abs Lymphocytes 0.7 0.7 - 3.1 x10E3/uL    ABS. MONOCYTES 1.2 (H) 0.1 - 0.9 x10E3/uL    ABS. EOSINOPHILS 0.1 0.0 - 0.4 x10E3/uL    ABS. BASOPHILS 0.0 0.0 - 0.2 x10E3/uL    IMMATURE GRANULOCYTES 0 Not Estab. %    ABS. IMM. GRANS. 0.0 0.0 - 0.1 x10E3/uL   TSH 3RD GENERATION   Result Value Ref Range    TSH, 3rd generation 0.49 0.34 - 5.60 uIU/mL   T4, FREE   Result Value Ref Range    T4, Free 0.96 0.58 - 2.30 ng/dL   SED RATE (ESR)   Result Value Ref Range    Sed rate (ESR) 19 0 - 20 mm/hr   METABOLIC PANEL, BASIC   Result Value Ref Range    Glucose 144 (H) 65 - 105 mg/dL    BUN 14.0 7.0 - 17.0 mg/dL    Creatinine 0.7 0.7 - 1.2 mg/dL    Sodium 142 137 - 145 mmol/L    Potassium 4.4 3.6 - 5.0 mmol/L    Chloride 106 98 - 107 mmol/L    CO2 28.0 22.0 - 32.0 mmol/L    Calcium 9.7 8.4 - 10.2 mg/dl    Anion gap 8 mmol/L    GFR est AA >60 mL/min/1.73m2    GFR est non-AA >60 mL/min/1.73m2        Documenation Review:  Coronary artery disease, status post angioplasty and stenting of the right coronary artery in December, 2009. At that time she underwent cardiac cath and had a 40% left anterior descending coronary artery lesion as well. She has had no recent problems with angina or CHF.   She sees Dr. Lina Mcgraw on a six to twelve month basis. Her last stress test was in May of 2015 and negative. She has not had any recent issues with anginal type symptoms or symptoms of congestive heart failure. PVD, status post left common artery iliac stent and angioplasty in April of 2007 and a right CFA and popliteal bypass graft in April of 2009 with revision of this in September of 2009. She had another revision on the right side in January, 2011 with a patch angioplasty and thrombectomy. She has also undergone a left SFA stent as of September, 2012. Then in August, 2013 she underwent right femoral to tibial peroneal trunk bypass grafting. She then underwent right iliac artery angioplasty and stent and left iliac angioplasty in June of 2014. Most recently she underwent aortogram with runoff In December, 2018 and underwent stenting of a 60% left common iliac artery stenosis. Dr. Remington Sarmiento has been reluctant to do any further surgery as her limbs are not threatened. She does continue to have claudication at one-half to one block. She also has a mild ischemic neuropathy, particularly involving her great toes. CVD, status post right carotid endarterectomy in March, 2010. She has had stable carotid duplexes since. Assessment/Plan:    Diagnoses and all orders for this visit:    1. PVD (peripheral vascular disease) ([facility]    2. Acute pain of left shoulder  -     REFERRAL TO ORTHOPEDICS    3. Rotator cuff arthropathy of left shoulder    4. Coronary artery disease involving native heart without angina pectoris, unspecified vessel or lesion type    5. Essential hypertension  -     CBC W/O DIFF  -     METABOLIC PANEL, COMPREHENSIVE    6. Adult onset hypothyroidism  -     TSH 3RD GENERATION  -     T4, FREE    7. Gastroesophageal reflux disease with esophagitis    8. History of TIA (transient ischemic attack)    9. Dyslipidemia  -     LIPID PANEL    10. 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 ([address]    11.  PMR (polymyalgia rheumatica) ([facility]  -     SED RATE (ESR)      Patient is currently on 5 mg p.o. prednisone daily. Her ESR was 11. Plan was to wean her off of steroids. She reports her symptoms of PMR are well controlled currently. Will refer to [address] [place] [address] for acute on chronic left shoulder pain which most likely looks like rotator cuff arthropathy/tear. Continue to take Tylenol as needed. She cannot take NSAIDs secondary to increase chances of bleeding with high-dose aspirin. Continue current meds   I will call with lab results and make further recommendations or adjustments if necessary. ICD-10-CM ICD-9-CM    1. PVD (peripheral vascular disease) ([facility]) I73.9 443.9    2. Acute pain of left shoulder M25.512 719.41 REFERRAL TO ORTHOPEDICS   3. Rotator cuff arthropathy of left shoulder M12.812 716.81    4. Coronary artery disease involving native heart without angina pectoris, unspecified vessel or lesion type I25.10 414.01    5. Essential hypertension I10 401.9 CBC W/O DIFF      METABOLIC PANEL, COMPREHENSIVE   6. Adult onset hypothyroidism E03.8 244.8 TSH 3RD GENERATION      T4, FREE   7. Gastroesophageal reflux disease with esophagitis K21.0 530.11    8. History of TIA (transient ischemic attack) Z86.73 V12.54    9. Dyslipidemia E78.5 272.4 LIPID PANEL   10. 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 ([facility]) I73.9 357.4     G63     11. PMR (polymyalgia rheumatica) ([facility]) M35.3 725 SED RATE (ESR)         Follow-up and Dispositions    · Return in about 4 weeks (around 2/4/2020) for follow up. I have reviewed with the patient details of the assessment and plan and all questions were answered. Relevent patient education was performed. Verbal and/or written instructions (see AVS) provided. The most recent lab findings were reviewed with the patient. Plan was discussed with patient who verbally expressed understanding. An After Visit Summary was printed and given to the patient.     Lenis Schilder, MD

## 2020-01-08 LAB
ERYTHROCYTE [DISTWIDTH] IN BLOOD BY AUTOMATED COUNT: 13 % (ref 11.7–15.4)
HCT VFR BLD AUTO: 35.3 % (ref 34–46.6)
HGB BLD-MCNC: 12.2 G/DL (ref 11.1–15.9)
MCH RBC QN AUTO: 33.6 PG (ref 26.6–33)
MCHC RBC AUTO-ENTMCNC: 34.6 G/DL (ref 31.5–35.7)
MCV RBC AUTO: 97 FL (ref 79–97)
PLATELET # BLD AUTO: 291 X10E3/UL (ref 150–450)
RBC # BLD AUTO: 3.63 X10E6/UL (ref 3.77–5.28)
WBC # BLD AUTO: 8.4 X10E3/UL (ref 3.4–10.8)

## 2020-01-08 RX ORDER — LEVOTHYROXINE SODIUM 75 UG/1
75 TABLET ORAL
Qty: 90 TAB | Refills: 0 | Status: SHIPPED | OUTPATIENT
Start: 2020-01-08 | End: 2020-03-29

## 2020-01-09 NOTE — PROGRESS NOTES
Please call patient and let her know her ESR is elevated at 21. I would like her to continue prednisone for now. I have sent in prescription for 75 mcg of levothyroxine to her preferred pharmacy. I would like to take it instead of 88 mcg of levothyroxine. Upon review of her TSH. Her potassium is slightly high at 5.5. I would like to repeat a BMP in 1 week. Future order already placed. Send letter to patient.

## 2020-01-13 ENCOUNTER — TELEPHONE (OUTPATIENT)
Dept: INTERNAL MEDICINE CLINIC | Age: 75
End: 2020-01-13

## 2020-01-13 NOTE — TELEPHONE ENCOUNTER
Patient called in to see if she can change the location of her orthopedists, which was schedule at [address] [place] [address] (location is to far out). Also she wants to know if she schedules the appointment or do we. Patient stated she has Potassium test on Wednesday and wants to know should she take her thryoid medicine.  Patient stated Dr. Alejandro Marks lower her pills and wants to know will it affect the test.

## 2020-01-15 ENCOUNTER — LAB ONLY (OUTPATIENT)
Dept: INTERNAL MEDICINE CLINIC | Age: 75
End: 2020-01-15

## 2020-01-15 DIAGNOSIS — E87.5 HYPERKALEMIA: ICD-10-CM

## 2020-01-15 LAB
ANION GAP SERPL CALC-SCNC: 12 MMOL/L
BUN SERPL-MCNC: 24 MG/DL (ref 7–17)
CALCIUM SERPL-MCNC: 9 MG/DL (ref 8.4–10.2)
CHLORIDE SERPL-SCNC: 105 MMOL/L (ref 98–107)
CO2 SERPL-SCNC: 23 MMOL/L (ref 22–32)
CREAT SERPL-MCNC: 0.8 MG/DL (ref 0.7–1.2)
GLUCOSE SERPL-MCNC: 160 MG/DL (ref 65–105)
POTASSIUM SERPL-SCNC: 5 MMOL/L (ref 3.6–5)
SODIUM SERPL-SCNC: 140 MMOL/L (ref 137–145)

## 2020-01-15 NOTE — TELEPHONE ENCOUNTER
Yes, she can definitely take her levothyroxine prior to the BMP/potassium test.   Her levothyroxine was decreased to 75 mcg upon review of her TSH.  I had already sent the stated medication to her preferred pharmacy. Heidi Fernandez will need to take her to the lower dose instead of the 88 mcg of levothyroxine. Please check with referral coordinator to see how we can switch her location from [address] [place] [address] to [address] [place] [address].           Patient notified

## 2020-01-20 NOTE — TELEPHONE ENCOUNTER
PCP: Mann Mireles MD    Last appt: 1/7/2020  Future Appointments   Date Time Provider Liana Batrholomew   2/5/2020  1:30 PM Mann Mireles MD [address]       Requested Prescriptions     Pending Prescriptions Disp Refills    atorvastatin (LIPITOR) 40 mg tablet 90 Tab 3     Sig: Take 1 Tab by mouth daily.        Prior labs and Blood pressures:  BP Readings from Last 3 Encounters:   01/07/20 132/70   11/15/19 122/60   09/30/19 130/64     Lab Results   Component Value Date/Time    Sodium 140 01/15/2020 03:00 PM    Potassium 5.0 01/15/2020 03:00 PM    Chloride 105 01/15/2020 03:00 PM    CO2 23.0 01/15/2020 03:00 PM    Anion gap 12 01/15/2020 03:00 PM    Glucose 160 (H) 01/15/2020 03:00 PM    BUN 24.0 (H) 01/15/2020 03:00 PM    Creatinine 0.8 01/15/2020 03:00 PM    BUN/Creatinine ratio 29 01/07/2020 01:56 PM    GFR est AA >60 01/15/2020 03:00 PM    GFR est non-AA >60 01/15/2020 03:00 PM    Calcium 9.0 01/15/2020 03:00 PM     Lab Results   Component Value Date/Time    Hemoglobin A1c 4.7 09/30/2019 01:56 PM     Lab Results   Component Value Date/Time    Cholesterol, total 152 01/07/2020 01:56 PM    Cholesterol (POC) 135.0 10/02/2018 02:05 PM    HDL Cholesterol 104 01/07/2020 01:56 PM    HDL Cholesterol (POC) 89.0 10/02/2018 02:05 PM    LDL Cholesterol (POC) 28.8 10/02/2018 02:05 PM    LDL, calculated 23 01/07/2020 01:56 PM    VLDL 25 01/07/2020 01:56 PM    Triglyceride 127 01/07/2020 01:56 PM    Triglycerides (POC) 86.0 10/02/2018 02:05 PM    CHOL/HDL Ratio 1 01/07/2020 01:56 PM     No results found for: Yesika Dupree [id number]    Lab Results   Component Value Date/Time    TSH, 3rd generation 0.15 (L) 01/07/2020 01:55 PM

## 2020-01-21 RX ORDER — ATORVASTATIN CALCIUM 40 MG/1
40 TABLET, FILM COATED ORAL DAILY
Qty: 90 TAB | Refills: 3 | Status: SHIPPED | OUTPATIENT
Start: 2020-01-21 | End: 2021-02-26

## 2020-01-22 ENCOUNTER — TELEPHONE (OUTPATIENT)
Dept: INTERNAL MEDICINE CLINIC | Age: 75
End: 2020-01-22

## 2020-01-22 NOTE — TELEPHONE ENCOUNTER
Please call her. Her answering machine has been acting up and she believes she has a message from Dr Nakia Lugo office.

## 2020-01-28 DIAGNOSIS — I73.9 NEUROPATHY DUE TO PERIPHERAL VASCULAR DISEASE (HCC): Primary | ICD-10-CM

## 2020-01-28 DIAGNOSIS — G63 NEUROPATHY DUE TO PERIPHERAL VASCULAR DISEASE (HCC): Primary | ICD-10-CM

## 2020-01-28 RX ORDER — GABAPENTIN 300 MG/1
CAPSULE ORAL
Qty: 360 CAP | Refills: 1 | Status: SHIPPED | OUTPATIENT
Start: 2020-01-28 | End: 2020-01-29 | Stop reason: SDUPTHER

## 2020-01-28 NOTE — TELEPHONE ENCOUNTER
gabapentin (NEURONTIN) 300 mg capsule  Please send to [facility]   She had previously ordered and it was not filled as it was listed under Dr Collette Heimlich. Pt has been informed that this is slowing down her refill requests and she needs to update with [facility] her Provider name as Dr Collette Heimlich will not get the electronic message from [address]. Thank you.

## 2020-01-28 NOTE — TELEPHONE ENCOUNTER
Last Refill: 6/11/19  Last visit:1/7/2020    NOV: 2/5/2020    Requested Prescriptions     Pending Prescriptions Disp Refills    gabapentin (NEURONTIN) 300 mg capsule 360 Cap 1     Sig: TAKE 2 CAPSULES TWICE A DAY

## 2020-01-29 DIAGNOSIS — G63 NEUROPATHY DUE TO PERIPHERAL VASCULAR DISEASE (HCC): ICD-10-CM

## 2020-01-29 DIAGNOSIS — I73.9 NEUROPATHY DUE TO PERIPHERAL VASCULAR DISEASE (HCC): ICD-10-CM

## 2020-01-30 DIAGNOSIS — G63 NEUROPATHY DUE TO PERIPHERAL VASCULAR DISEASE (HCC): ICD-10-CM

## 2020-01-30 DIAGNOSIS — I73.9 NEUROPATHY DUE TO PERIPHERAL VASCULAR DISEASE (HCC): ICD-10-CM

## 2020-01-30 RX ORDER — GABAPENTIN 300 MG/1
CAPSULE ORAL
Qty: 360 CAP | Refills: 1 | Status: SHIPPED | OUTPATIENT
Start: 2020-01-30 | End: 2020-01-31 | Stop reason: SDUPTHER

## 2020-01-30 RX ORDER — GABAPENTIN 300 MG/1
CAPSULE ORAL
Qty: 28 CAP | Refills: 0 | OUTPATIENT
Start: 2020-01-30

## 2020-01-30 NOTE — TELEPHONE ENCOUNTER
Patient will be out of her medication on Monday, waiting on mail order they advised the patient the medication will be mailed on Monday and can take up to 5 to 7 days.

## 2020-01-30 NOTE — TELEPHONE ENCOUNTER
Requested Prescriptions     Pending Prescriptions Disp Refills    gabapentin (NEURONTIN) 300 mg capsule 28 Cap 0     Sig: TAKE 2 CAPSULES TWICE A DAY       Last Refill: ?  Next Appointment:2/5/20      Pharmacy advised pt to state on prescription:  Short supply until patient receives mail order

## 2020-01-31 DIAGNOSIS — G63 NEUROPATHY DUE TO PERIPHERAL VASCULAR DISEASE (HCC): ICD-10-CM

## 2020-01-31 DIAGNOSIS — I73.9 NEUROPATHY DUE TO PERIPHERAL VASCULAR DISEASE (HCC): ICD-10-CM

## 2020-01-31 RX ORDER — GABAPENTIN 300 MG/1
CAPSULE ORAL
Qty: 28 CAP | Refills: 0 | Status: SHIPPED | OUTPATIENT
Start: 2020-01-31 | End: 2020-07-29 | Stop reason: SDUPTHER

## 2020-01-31 NOTE — TELEPHONE ENCOUNTER
Patient will run out of medication on Sunday, and mail order Rx will not arrive until Friday/Saturday next week. Can she get short supply sent to local [facility] until mail order arrives?

## 2020-02-05 ENCOUNTER — OFFICE VISIT (OUTPATIENT)
Dept: INTERNAL MEDICINE CLINIC | Age: 75
End: 2020-02-05

## 2020-02-05 VITALS
OXYGEN SATURATION: 96 % | HEIGHT: 62 IN | RESPIRATION RATE: 16 BRPM | HEART RATE: 89 BPM | DIASTOLIC BLOOD PRESSURE: 62 MMHG | WEIGHT: 134 LBS | BODY MASS INDEX: 24.66 KG/M2 | SYSTOLIC BLOOD PRESSURE: 130 MMHG

## 2020-02-05 DIAGNOSIS — I10 ESSENTIAL HYPERTENSION: ICD-10-CM

## 2020-02-05 DIAGNOSIS — E03.8 ADULT ONSET HYPOTHYROIDISM: ICD-10-CM

## 2020-02-05 DIAGNOSIS — I67.9 CVD (CEREBROVASCULAR DISEASE): ICD-10-CM

## 2020-02-05 DIAGNOSIS — B35.1 TOENAIL FUNGUS: Primary | ICD-10-CM

## 2020-02-05 DIAGNOSIS — E78.5 DYSLIPIDEMIA: ICD-10-CM

## 2020-02-05 DIAGNOSIS — Z86.73 HISTORY OF TIA (TRANSIENT ISCHEMIC ATTACK): ICD-10-CM

## 2020-02-05 DIAGNOSIS — M12.812 ROTATOR CUFF ARTHROPATHY OF LEFT SHOULDER: ICD-10-CM

## 2020-02-05 DIAGNOSIS — M35.3 PMR (POLYMYALGIA RHEUMATICA) (HCC): ICD-10-CM

## 2020-02-05 NOTE — PATIENT INSTRUCTIONS
Toenail Fungus: Care Instructions  Your Care Instructions  A toenail that is infected by a fungus usually turns white or yellow. As the fungus spreads, the nail turns a darker color and gets thicker, and its edges start to turn ragged and crumble. A bad infection can cause toe pain, and the nail may pull away from the toe. Toenails that are exposed to moisture and warmth a lot are more likely to get infected by a fungus. This can happen from wearing sweaty shoes often and from walking barefoot on shower floors. It is hard to treat toenail fungus, and the infection can return after it has cleared up. But medicines can sometimes get rid of toenail fungus for good. If the infection is very bad, or if it causes a lot of pain, you may need to have the nail removed. Follow-up care is a key part of your treatment and safety. Be sure to make and go to all appointments, and call your doctor if you are having problems. It's also a good idea to know your test results and keep a list of the medicines you take. How can you care for yourself at home? · Take your medicines exactly as prescribed. Call your doctor if you have any problems with your medicine. You will get more details on the specific medicines your doctor prescribes. · If your doctor gave you a cream or liquid to put on your toenail, use it exactly as directed. · Wash your feet often, and wash your hands after touching your feet. · Keep your toenails clean and dry. Dry your feet completely after you bathe and before you put on shoes and socks. · Keep your toenails trimmed. · Change socks often. Wear dry socks that absorb moisture. · Do not go barefoot in public places. · Use a spray or powder that fights fungus on your feet and in your shoes. · Do not pick at the skin around your nails. · Do not use nail polish or fake nails on your toenails. When should you call for help?   Call your doctor now or seek immediate medical care if:    · You have signs of infection, such as:  ? Increased pain, swelling, warmth, or redness. ? Red streaks leading from the site. ? Pus draining from the site. ? A fever.     · You have new or increased toe pain.    Watch closely for changes in your health, and be sure to contact your doctor if:    · You do not get better as expected. Where can you learn more? Go to http://mehnza-oscar.info/. Enter D202 in the search box to learn more about \"Toenail Fungus: Care Instructions. \"  Current as of: April 1, 2019  Content Version: 12.2  © 4397-3526 [employer]. Care instructions adapted under license by [employer] (which disclaims liability or warranty for this information). If you have questions about a medical condition or this instruction, always ask your healthcare professional. [address] any warranty or liability for your use of this information.

## 2020-02-05 NOTE — PROGRESS NOTES
This note will not be viewable in [address] Moon Mendoza is a 75 y.o. female and presents with Follow Up Chronic Condition (1 mo fu)      Subjective:  Patient comes in today for follow-up of her medical problems. Her TSH last visit was low and she was instructed to decrease her dose of levothyroxine to 75 mcg from 88. She reports she has been taking the 75 mcg from the past 1 month. Her cold intolerance and hair loss seems to have approved. She has polymyalgia rheumatica and she was wondering if her steroid could completely be weaned off. Currently she is taking 5 mg. Her most recent times are bumped up to 21. Denies any side effects, fractures or mood changes from being on chronic steroids. She does not have any history of hyperglycemia or diabetes. She was referred to [facility] for chronic left shoulder pain and received a steroid shot for rotator cuff arthropathy [place]/glenohumeral osteoarthritis. She is also been referred to physical therapy and reports it has really helped. She also complains of toenail fungus. Her dermatologist prescribed topical Lamisil however her insurance would not cover it.         Past Medical History:   Diagnosis Date    Adult onset hypothyroidism 8/23/2017    Anemia 8/23/2017    Anxiety 8/23/2017    Arteriosclerotic heart disease (ASHD) 8/23/2017    Arthritis     back, hip right, neck    CAD (coronary artery disease)          Cervical spondylosis 8/23/2017    Chronic pain     spinal stenosis, bulging disk and bone spurs in back    Claudication in peripheral vascular disease ([address] 8/23/2017    CVD (cerebrovascular disease)     S/P right CEA    Depression     Dyslipidemia 8/23/2017    Familial mitral valve prolapse 8/23/2017    GERD (gastroesophageal reflux disease)     Hyperlipidemia     Hypertension     Hypothyroidism     Lumbar spinal stenosis     Macrocytosis 8/23/2017    Menopause     Nausea & vomiting     surgery related, severe constipation    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 ([facility] 8/23/2017    Osteopenia 8/23/2017    Osteoporosis     Other ill-defined conditions(799.89)     severe constipation from pain medication    PVD (peripheral vascular disease) ([facility]     S/P stent Right CFA and Left iliac    Stroke ([facility]     Temporomandibular joint pain dysfunction syndrome 8/23/2017    Thyroid disease     TIA (transient ischemic attack)     Tobacco user 8/23/2017    Trigger finger 8/23/2017     Past Surgical History:   Procedure Laterality Date    BYPASS GRAFT OTHR,FEM-POP       RIGHT FEMORAL-POPLITEAL BYPASS  WITH POLYTETRA-FL UOROETHYLENE GRAFT     CARDIAC SURG PROCEDURE UNLIST      stents x2    HX CAROTID ENDARTERECTOMY      right cea    HX OTHER SURGICAL Right     excision melanoma right arm    VASCULAR SURGERY PROCEDURE UNLIST      femoral stent - left    VASCULAR SURGERY PROCEDURE UNLIST      X5 right femoral bypass     Allergies   Allergen Reactions    Neomycin Sulfate Unknown (comments)     Current Outpatient Medications   Medication Sig Dispense Refill    terbinafine 1 % topical gel Apply  to affected area two (2) times a day for 30 days. 1 Tube 1    gabapentin (NEURONTIN) 300 mg capsule TAKE 2 CAPSULES TWICE A DAY 28 Cap 0    atorvastatin (LIPITOR) 40 mg tablet Take 1 Tab by mouth daily. 90 Tab 3    levothyroxine (SYNTHROID) 75 mcg tablet Take 1 Tab by mouth Daily (before breakfast) for 90 days. Indications: a condition with low thyroid hormone levels 90 Tab 0    turmeric/turmeric ext/pepr ext (TURMERIC-TURMERIC EXT-PEPPER) 500-3 mg cap Take  by mouth.  buPROPion SR (WELLBUTRIN SR) 150 mg SR tablet TAKE 1 TABLET DAILY 90 Tab 3    lisinopril (PRINIVIL, ZESTRIL) 40 mg tablet TAKE 1 TABLET DAILY 90 Tab 3    amLODIPine (NORVASC) 5 mg tablet Take 1 Tab by mouth daily. 90 Tab 3    multivit-min/iron/folic/lutein (CENTRUM SILVER WOMEN PO) Take  by mouth.       predniSONE (DELTASONE) 5 mg tablet Take 1 Tab by mouth daily (with breakfast). 90 Tab 3    omeprazole (PRILOSEC) 20 mg capsule TAKE 1 CAPSULE DAILY 90 Cap 3    cilostazol (PLETAL) 100 mg tablet TAKE 1 TABLET TWICE A  Tab 3    atenolol (TENORMIN) 50 mg tablet TAKE 1 TABLET NIGHTLY 90 Tab 3    peg 400-propylene glycol (SYSTANE, PROPYLENE GLYCOL,) 0.4-0.3 % drop Administer 1 Drop to both eyes as needed.  DENTA 5000 PLUS 1.1 % crea APPLY TWICE A DAY AFTER BRUSHING. DO NOT DRINK FOR 3 HOURS  5    valACYclovir (VALTREX) 1 gram tablet Take 2 Tabs by mouth two (2) times a day. 2 initially and 2 in 12 hr for fever blisters 8 Tab 5    ascorbic acid (VITAMIN C) 500 mg tablet Take 500 mg by mouth two (2) times a day.  aspirin (ASPIRIN) 325 mg tablet Take 325 mg by mouth daily.  multivitamin, stress formula (STRESS TAB) tablet Take 1 Tab by mouth daily. Social History     Socioeconomic History    Marital status:      Spouse name: Not on file    Number of children: Not on file    Years of education: Not on file    Highest education level: Not on file   Tobacco Use    Smoking status: Former Smoker     Packs/day: 1.00     Years: 35.00     Pack years: 35.00     Last attempt to quit: 2008     Years since quittin.0    Smokeless tobacco: Never Used   Substance and Sexual Activity    Alcohol use: Yes     Alcohol/week: 6.0 standard drinks     Types: 6 Glasses of wine per week     Comment: occasionally    Drug use: No     Types: Prescription, OTC     Family History   Problem Relation Age of Onset   Hersmarija Flanagani Migraines Mother     Heart Disease Father     Hypertension Father     Lung Disease Father     Cancer Father         H&N    Hypertension Sister     Diabetes Sister     Heart Disease Brother     Hypertension Brother     Hypertension Sister     Cancer Sister         breast    Breast Cancer Sister 54    Hypertension Sister        Review of Systems  ROS is unremarkable except for ones highlighted in bold. Constitutional: negative for fevers, chills, anorexia and weight loss  Eyes:   negative for visual disturbance and irritation  ENT:   negative for tinnitus,sore throat,nasal congestion,ear pain,hoarseness  Respiratory:  negative for cough, hemoptysis, dyspnea,wheezing  CV:   negative for chest pain, palpitations, lower extremity edema  GI:   negative for nausea, vomiting, diarrhea, abdominal pain,melena  Endo:               negative for polyuria,polydipsia,polyphagia,heat intolerance, hair loss  Genitourinary: negative for frequency, dysuria and hematuria  Integumentary: negative for rash and pruritus  Hematologic:  negative for easy bruising and gum/nose bleeding  Musculoskel: negative for myalgias, arthralgias, back pain, muscle weakness, joint pain  Neurological:  negative for headaches, dizziness, vertigo, memory problems and gait   Behavl/Psych: negative for feelings of anxiety, depression, mood changes  ROS otherwise negative     Objective:  Visit Vitals  /62 (BP 1 Location: Left arm, BP Patient Position: Sitting)   Pulse 89   Resp 16   Ht 5' 2\" (1.575 m)   Wt 134 lb (60.8 kg)   SpO2 96%   BMI 24.51 kg/m²     Physical Exam:   General appearance - alert, well appearing, and in no distress  Mental status - alert, oriented to person, place, and time  EYE-RODNEY, EOMI, fundi normal, corneas normal, no foreign bodies  ENT-ENT exam normal, no neck nodes or sinus tenderness  Nose - normal and patent, no erythema, discharge or polyps  Mouth - mucous membranes moist, pharynx normal without lesions  Neck - supple, no significant adenopathy   Chest - clear to auscultation, no wheezes, rales or rhonchi, symmetric air entry   Heart - normal rate, regular rhythm, normal S1, S2, no murmurs, rubs, clicks or gallops   Abdomen - soft, nontender, nondistended, no masses or organomegaly  Lymph- no adenopathy palpable  Ext-peripheral pulses normal, no pedal edema, no clubbing or cyanosis  Skin-Warm and dry.  no hyperpigmentation, vitiligo, or suspicious lesions  Neuro -alert, oriented, normal speech, no focal findings or movement disorder noted  Musculoskeletal- FROM, no bony abnormalities,  point tenderness, positive drop arm test    Lab Review:  Results for orders placed or performed in visit on 90/97/56   METABOLIC PANEL, BASIC   Result Value Ref Range    Glucose 160 (H) 65 - 105 mg/dL    BUN 24.0 (H) 7.0 - 17.0 mg/dL    Creatinine 0.8 0.7 - 1.2 mg/dL    Sodium 140 137 - 145 mmol/L    Potassium 5.0 3.6 - 5.0 mmol/L    Chloride 105 98 - 107 mmol/L    CO2 23.0 22.0 - 32.0 mmol/L    Calcium 9.0 8.4 - 10.2 mg/dl    Anion gap 12 mmol/L    GFR est AA >60 mL/min/1.73m2    GFR est non-AA >60 mL/min/1.73m2        Documenation Review:      Assessment/Plan:    Diagnoses and all orders for this visit:    1. Toenail fungus  -     terbinafine 1 % topical gel; Apply  to affected area two (2) times a day for 30 days. 2. Dyslipidemia    3. Adult onset hypothyroidism    4. CVD (cerebrovascular disease)    5. Essential hypertension    6. History of TIA (transient ischemic attack)    7. PMR (polymyalgia rheumatica) ([facility])    8. Rotator cuff arthropathy of left shoulder           Continue prednisone 5 mg. Will repeat ESR in your next follow-up. Noted the dose of levothyroxine was decreased to 75 mcg. Will recheck TSH in 3 months. Terbinafine gel up prescribed for toenail fungus. Continue physical therapy exercises for your left chronic shoulder pain. We will follow-up in 3 months for CPE. ICD-10-CM ICD-9-CM    1. Toenail fungus B35.1 110.1 terbinafine 1 % topical gel   2. Dyslipidemia E78.5 272.4    3. Adult onset hypothyroidism E03.8 244.8    4. CVD (cerebrovascular disease) I67.9 437.9    5. Essential hypertension I10 401.9    6. History of TIA (transient ischemic attack) Z86.73 V12.54    7. PMR (polymyalgia rheumatica) (HCC) M35.3 725    8.  Rotator cuff arthropathy of left shoulder M12.812 716.81              I have reviewed with the patient details of the assessment and plan and all questions were answered. Relevent patient education was performed. Verbal and/or written instructions (see AVS) provided. The most recent lab findings were reviewed with the patient. Plan was discussed with patient who verbally expressed understanding. An After Visit Summary was printed and given to the patient.     Manuel Casarez MD

## 2020-02-05 NOTE — PROGRESS NOTES
Alec Carbajal is a 75 y.o. female presenting for Follow Up Chronic Condition (1 mo fu)  . 1. Have you been to the ER, urgent care clinic since your last visit? Hospitalized since your last visit? No    2. Have you seen or consulted any other health care providers outside of the [address] [place] [address] since your last visit? Include any pap smears or colon screening. No    Fall Risk Assessment, last 12 mths 1/7/2020   Able to walk? Yes   Fall in past 12 months? Yes   Fall with injury? Yes   Number of falls in past 12 months 2   Fall Risk Score 3         Abuse Screening Questionnaire 1/7/2020   Do you ever feel afraid of your partner? N   Are you in a relationship with someone who physically or mentally threatens you? N   Is it safe for you to go home? Y       3 most recent PHQ Screens 3/19/2019   Little interest or pleasure in doing things Not at all   Feeling down, depressed, irritable, or hopeless Not at all   Total Score PHQ 2 0   Trouble falling or staying asleep, or sleeping too much Not at all   Feeling tired or having little energy Not at all   Poor appetite, weight loss, or overeating Not at all   Feeling bad about yourself - or that you are a failure or have let yourself or your family down Not at all   Trouble concentrating on things such as school, work, reading, or watching TV Not at all   Moving or speaking so slowly that other people could have noticed; or the opposite being so fidgety that others notice Not at all   Thoughts of being better off dead, or hurting yourself in some way Not at all   PHQ 9 Score 0       There are no discontinued medications.

## 2020-03-28 DIAGNOSIS — E03.8 ADULT ONSET HYPOTHYROIDISM: ICD-10-CM

## 2020-03-29 RX ORDER — LEVOTHYROXINE SODIUM 75 UG/1
TABLET ORAL
Qty: 90 TAB | Refills: 3 | Status: SHIPPED | OUTPATIENT
Start: 2020-03-29 | End: 2021-02-26

## 2020-06-11 ENCOUNTER — TELEPHONE (OUTPATIENT)
Dept: INTERNAL MEDICINE CLINIC | Age: 75
End: 2020-06-11

## 2020-06-18 ENCOUNTER — OFFICE VISIT (OUTPATIENT)
Dept: INTERNAL MEDICINE CLINIC | Age: 75
End: 2020-06-18

## 2020-06-18 VITALS
HEIGHT: 62 IN | DIASTOLIC BLOOD PRESSURE: 62 MMHG | WEIGHT: 125 LBS | RESPIRATION RATE: 16 BRPM | BODY MASS INDEX: 23 KG/M2 | HEART RATE: 82 BPM | OXYGEN SATURATION: 98 % | SYSTOLIC BLOOD PRESSURE: 130 MMHG | TEMPERATURE: 98.8 F

## 2020-06-18 DIAGNOSIS — R73.02 IMPAIRED GLUCOSE TOLERANCE: ICD-10-CM

## 2020-06-18 DIAGNOSIS — Z00.00 PE (PHYSICAL EXAM), ANNUAL: Primary | ICD-10-CM

## 2020-06-18 DIAGNOSIS — E55.9 VITAMIN D DEFICIENCY: ICD-10-CM

## 2020-06-18 DIAGNOSIS — M81.0 AGE-RELATED OSTEOPOROSIS WITHOUT CURRENT PATHOLOGICAL FRACTURE: ICD-10-CM

## 2020-06-18 DIAGNOSIS — I25.10 ARTERIOSCLEROTIC HEART DISEASE (ASHD): ICD-10-CM

## 2020-06-18 DIAGNOSIS — E03.8 ADULT ONSET HYPOTHYROIDISM: ICD-10-CM

## 2020-06-18 DIAGNOSIS — I25.10 CORONARY ARTERY DISEASE INVOLVING NATIVE HEART WITHOUT ANGINA PECTORIS, UNSPECIFIED VESSEL OR LESION TYPE: ICD-10-CM

## 2020-06-18 DIAGNOSIS — M35.3 PMR (POLYMYALGIA RHEUMATICA) (HCC): ICD-10-CM

## 2020-06-18 DIAGNOSIS — I73.9 PVD (PERIPHERAL VASCULAR DISEASE) (HCC): ICD-10-CM

## 2020-06-18 DIAGNOSIS — I10 ESSENTIAL HYPERTENSION: ICD-10-CM

## 2020-06-18 DIAGNOSIS — E78.5 DYSLIPIDEMIA: ICD-10-CM

## 2020-06-18 LAB
A-G RATIO,AGRAT: 1.6 RATIO
ALBUMIN SERPL-MCNC: 4.2 G/DL (ref 3.9–5.4)
ALP SERPL-CCNC: 73 U/L (ref 38–126)
ALT SERPL-CCNC: 27 U/L (ref 0–35)
ANION GAP SERPL CALC-SCNC: 9 MMOL/L
AST SERPL W P-5'-P-CCNC: 34 U/L (ref 14–36)
BILIRUB SERPL-MCNC: 0.6 MG/DL (ref 0.2–1.3)
BUN SERPL-MCNC: 19 MG/DL (ref 7–17)
BUN/CREATININE RATIO,BUCR: 19 RATIO
CALCIUM SERPL-MCNC: 10 MG/DL (ref 8.4–10.2)
CHLORIDE SERPL-SCNC: 106 MMOL/L (ref 98–107)
CHOL/HDL RATIO,CHHD: 1 RATIO (ref 0–4)
CHOLEST SERPL-MCNC: 165 MG/DL (ref 0–200)
CO2 SERPL-SCNC: 27 MMOL/L (ref 22–32)
CREAT SERPL-MCNC: 1 MG/DL (ref 0.7–1.2)
ERYTHROCYTE [DISTWIDTH] IN BLOOD BY AUTOMATED COUNT: 13.6 %
GLOBULIN,GLOB: 2.6
GLUCOSE SERPL-MCNC: 83 MG/DL (ref 65–105)
HBA1C MFR BLD HPLC: 5.2 % (ref 4–5.7)
HCT VFR BLD AUTO: 37.9 % (ref 37–51)
HDLC SERPL-MCNC: 111 MG/DL (ref 35–130)
HGB BLD-MCNC: 12.3 G/DL (ref 12–18)
LDL/HDL RATIO,LDHD: 0 RATIO
LDLC SERPL CALC-MCNC: 39 MG/DL (ref 0–130)
MCH RBC QN AUTO: 36.2 PG (ref 26–32)
MCHC RBC AUTO-ENTMCNC: 32.5 G/DL (ref 30–36)
MCV RBC AUTO: 111.6 FL (ref 80–97)
PLATELET # BLD AUTO: 248 K/UL (ref 140–440)
POTASSIUM SERPL-SCNC: 4.4 MMOL/L (ref 3.6–5)
PROT SERPL-MCNC: 6.8 G/DL (ref 6.3–8.2)
RBC # BLD AUTO: 3.4 M/UL (ref 4.2–6.3)
SED RATE (ESR): 22 MM/HR (ref 0–20)
SODIUM SERPL-SCNC: 142 MMOL/L (ref 137–145)
TRIGL SERPL-MCNC: 75 MG/DL (ref 0–200)
VLDLC SERPL CALC-MCNC: 15 MG/DL
WBC # BLD AUTO: 7.9 K/UL (ref 4.1–10.9)

## 2020-06-18 RX ORDER — ATENOLOL 50 MG/1
TABLET ORAL
Qty: 90 TAB | Refills: 3 | Status: SHIPPED | OUTPATIENT
Start: 2020-06-18 | End: 2021-06-10 | Stop reason: SDUPTHER

## 2020-06-18 RX ORDER — CILOSTAZOL 100 MG/1
TABLET ORAL
Qty: 180 TAB | Refills: 3 | Status: SHIPPED | OUTPATIENT
Start: 2020-06-18 | End: 2021-07-29

## 2020-06-18 RX ORDER — DICLOFENAC SODIUM 10 MG/G
GEL TOPICAL
Qty: 1 EACH | Refills: 3 | Status: SHIPPED | OUTPATIENT
Start: 2020-06-18

## 2020-06-18 NOTE — PATIENT INSTRUCTIONS
The best way to stay healthy is to live a healthy lifestyle. A healthy lifestyle includes regular exercise, eating a well-balanced diet, keeping a healthy weight and not smoking. Regular physical exams and screening tests are another important way to take care of yourself. Preventive exams provided by health care providers can find health problems early when treatment works best and can keep you from getting certain diseases or illnesses. Preventive services include exams, lab tests, screenings, shots, monitoring and information to help you take care of your own health. All people over 65 should have a pneumonia shot. Pneumonia shots are usually only needed once in a lifetime unless your doctor decides differently. In addition to your physical exam, some screening tests are recommended: 
 
All people over 65 should have a yearly flu shot. People over 65 are at medium to high risk for Hepatitis B. Three shots are needed for complete protection. Bone mass measurement (dexa scan) is recommended every two years. Diabetes Mellitus screening is recommended every year. Glaucoma is an eye disease caused by high pressure in the eye. An eye exam is recommended every year. Cardiovascular screening tests that check your cholesterol and other blood fat (lipid) levels are recommended every five years. Colorectal Cancer screening tests help to find pre-cancerous polyps (growths in the colon) so they can be removed before they turn into cancer. Tests ordered for screening depend on your personal and family history risk factors. Prostate Cancer Screening (annually up to age 76) Screening for breast cancer is recommended yearly with a Mammogram. 
 
Screening for cervical and vaginal cancer is recommended with a pelvic and Pap test every two years.  However if you have had an abnormal pap in the past  three years or at high risk for cervical or vaginal cancer Medicare will cover a pap test and a pelvic exam every year. Here is a list of your current Health Maintenance items with a due date: 
Health Maintenance Due Topic Date Due  Shingles Vaccine (1 of 2) 03/23/1994  Glaucoma Screening   03/23/2009 [facility] Annual Well Visit  03/19/2020

## 2020-06-18 NOTE — PROGRESS NOTES
This note will not be viewable in [address] Cata Page is a 67 y.o. female and presents with Annual Wellness Visit      Subjective:  Patient comes in today for follow-up of AMW and follow-up of her polymyalgia rheumatica. Patient reports she has been doing fairly well. She is depressed given the COVID-19 and is not been out. She is looking forward to going to [facility] head to be with assist in her [place]. She has been on prednisone 5 mg p.o. for almost a year now for polymyalgia rheumatica. She continues to have intermittent pain in bilateral shoulders radiating to her arms which she attributes to PMR. Her ESR was 21 on last lab check. She reports she has some diet and good days. She does not take any NSAIDs secondary to being on high dose of aspirin because of the bleeding risk. She has used hot compresses with some mild relief. She is up-to-date on most of her immunizations and health screenings. DEXA scan was done last year which showed some osteopenia. Remains on calcium, vitamin D and does weightbearing exercises. She has some callus buildup on her right foot which concerns her. She does have a history of coronary artery disease s/p stents, peripheral vascular disease and CVD s/p endarterectomy. She remains on aspirin, statin and cilostazol. Recap-Her TSH last visit was low and she was instructed to decrease her dose of levothyroxine to 75 mcg from 88. She reports she has been taking the 75 mcg from the past 1 month. Her cold intolerance and hair loss seems to have improved. She has polymyalgia rheumatica and she was wondering if her steroid could completely be weaned off. Currently she is taking 5 mg. Her most recent ESR bumped up to 21. Denies any side effects, fractures or mood changes from being on chronic steroids. She does not have any history of hyperglycemia or diabetes.     She was referred to [address] [place] [address] for chronic left shoulder pain and received a steroid shot for rotator cuff arthropathy /glenohumeral osteoarthritis. She is also been referred to physical therapy and reports it has really helped. She also complains of toenail fungus. Her dermatologist prescribed topical Lamisil however her insurance would not cover it.         Past Medical History:   Diagnosis Date    Adult onset hypothyroidism 8/23/2017    Anemia 8/23/2017    Anxiety 8/23/2017    Arteriosclerotic heart disease (ASHD) 8/23/2017    Arthritis     back, hip right, neck    CAD (coronary artery disease)          Cervical spondylosis 8/23/2017    Chronic pain     spinal stenosis, bulging disk and bone spurs in back    Claudication in peripheral vascular disease ([address] 8/23/2017    CVD (cerebrovascular disease)     S/P right CEA    Depression     Dyslipidemia 8/23/2017    Familial mitral valve prolapse 8/23/2017    GERD (gastroesophageal reflux disease)     Hyperlipidemia     Hypertension     Hypothyroidism     Lumbar spinal stenosis     Macrocytosis 8/23/2017    Menopause     Nausea & vomiting     surgery related, severe constipation    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 ([address] 8/23/2017    Osteopenia 8/23/2017    Osteoporosis     Other ill-defined conditions(799.89)     severe constipation from pain medication    PVD (peripheral vascular disease) ([facility])     S/P stent Right CFA and Left iliac    Stroke ([address]     Temporomandibular joint pain dysfunction syndrome 8/23/2017    Thyroid disease     TIA (transient ischemic attack)     Tobacco user 8/23/2017    Trigger finger 8/23/2017     Past Surgical History:   Procedure Laterality Date    BYPASS GRAFT OTHR,FEM-POP       RIGHT FEMORAL-POPLITEAL BYPASS  WITH POLYTETRA-FL UOROETHYLENE GRAFT     CARDIAC SURG PROCEDURE UNLIST      stents x2    HX CAROTID ENDARTERECTOMY      right cea    HX OTHER SURGICAL Right     excision melanoma right arm    VASCULAR SURGERY PROCEDURE UNLIST      femoral stent - left  VASCULAR SURGERY PROCEDURE UNLIST      X5 right femoral bypass     Allergies   Allergen Reactions    Neomycin Sulfate Unknown (comments)     Current Outpatient Medications   Medication Sig Dispense Refill    diclofenac (VOLTAREN) 1 % gel Apply  to affected area four (4) times daily as needed for Pain. 1 Each 3    terbinafine 1 % topical gel Apply  to affected area two (2) times a day for 30 days. 1 Tube 0    levothyroxine (SYNTHROID) 75 mcg tablet TAKE 1 TABLET DAILY BEFORE BREAKFAST FOR A CONDITION WITH LOW THYROID HORMONE LEVELS 90 Tab 3    gabapentin (NEURONTIN) 300 mg capsule TAKE 2 CAPSULES TWICE A DAY 28 Cap 0    atorvastatin (LIPITOR) 40 mg tablet Take 1 Tab by mouth daily. 90 Tab 3    turmeric/turmeric ext/pepr ext (TURMERIC-TURMERIC EXT-PEPPER) 500-3 mg cap Take  by mouth.  buPROPion SR (WELLBUTRIN SR) 150 mg SR tablet TAKE 1 TABLET DAILY 90 Tab 3    lisinopril (PRINIVIL, ZESTRIL) 40 mg tablet TAKE 1 TABLET DAILY 90 Tab 3    amLODIPine (NORVASC) 5 mg tablet Take 1 Tab by mouth daily. 90 Tab 3    multivit-min/iron/folic/lutein (CENTRUM SILVER WOMEN PO) Take  by mouth.  predniSONE (DELTASONE) 5 mg tablet Take 1 Tab by mouth daily (with breakfast). 90 Tab 3    omeprazole (PRILOSEC) 20 mg capsule TAKE 1 CAPSULE DAILY 90 Cap 3    cilostazol (PLETAL) 100 mg tablet TAKE 1 TABLET TWICE A  Tab 3    atenolol (TENORMIN) 50 mg tablet TAKE 1 TABLET NIGHTLY 90 Tab 3    peg 400-propylene glycol (SYSTANE, PROPYLENE GLYCOL,) 0.4-0.3 % drop Administer 1 Drop to both eyes as needed.  DENTA 5000 PLUS 1.1 % crea APPLY TWICE A DAY AFTER BRUSHING. DO NOT DRINK FOR 3 HOURS  5    valACYclovir (VALTREX) 1 gram tablet Take 2 Tabs by mouth two (2) times a day. 2 initially and 2 in 12 hr for fever blisters 8 Tab 5    ascorbic acid (VITAMIN C) 500 mg tablet Take 500 mg by mouth two (2) times a day.  aspirin (ASPIRIN) 325 mg tablet Take 325 mg by mouth daily.       multivitamin, stress formula (STRESS TAB) tablet Take 1 Tab by mouth daily. Social History     Socioeconomic History    Marital status:      Spouse name: Not on file    Number of children: Not on file    Years of education: Not on file    Highest education level: Not on file   Tobacco Use    Smoking status: Former Smoker     Packs/day: 1.00     Years: 35.00     Pack years: 35.00     Last attempt to quit: 2008     Years since quittin.4    Smokeless tobacco: Never Used   Substance and Sexual Activity    Alcohol use: Yes     Alcohol/week: 6.0 standard drinks     Types: 6 Glasses of wine per week     Comment: occasionally    Drug use: No     Types: Prescription, OTC     Family History   Problem Relation Age of Onset   [place] Self Migraines Mother     Heart Disease Father     Hypertension Father     Lung Disease Father     Cancer Father         H&N    Hypertension Sister     Diabetes Sister     Heart Disease Brother     Hypertension Brother     Hypertension Sister     Cancer Sister         breast    Breast Cancer Sister 54    Hypertension Sister        Review of Systems  ROS is unremarkable except for ones highlighted in bold.    Constitutional: negative for fevers, chills, anorexia and weight loss  Eyes:   negative for visual disturbance and irritation  ENT:   negative for tinnitus,sore throat,nasal congestion,ear pain,hoarseness  Respiratory:  negative for cough, hemoptysis, dyspnea,wheezing  CV:   negative for chest pain, palpitations, lower extremity edema  GI:   negative for nausea, vomiting, diarrhea, abdominal pain,melena  Endo:               negative for polyuria,polydipsia,polyphagia,heat intolerance, hair loss  Genitourinary: negative for frequency, dysuria and hematuria  Integumentary: negative for rash and pruritus  Hematologic:  negative for easy bruising and gum/nose bleeding  Musculoskel: negative for myalgias, arthralgias, back pain, muscle weakness, joint pain  Neurological:  negative for headaches, dizziness, vertigo, memory problems and gait   Behavl/Psych: negative for feelings of anxiety, depression, mood changes  ROS otherwise negative     Objective:  Visit Vitals  /62 (BP 1 Location: Left arm, BP Patient Position: Sitting)   Pulse 82   Temp 98.8 °F (37.1 °C)   Resp 16   Ht 5' 2\" (1.575 m)   Wt 125 lb (56.7 kg)   SpO2 98%   BMI 22.86 kg/m²     Physical Exam:   General appearance - alert, well appearing, and in no distress  Mental status - alert, oriented to person, place, and time  EYE-RODNEY, EOMI, fundi normal, corneas normal, no foreign bodies  ENT-ENT exam normal, no neck nodes or sinus tenderness  Nose - normal and patent, no erythema, discharge or polyps  Mouth - mucous membranes moist, pharynx normal without lesions  Neck - supple, no significant adenopathy   Chest - clear to auscultation, no wheezes, rales or rhonchi, symmetric air entry   Heart - normal rate, regular rhythm, normal S1, S2, no murmurs, rubs, clicks or gallops   Abdomen - soft, nontender, nondistended, no masses or organomegaly  Lymph- no adenopathy palpable  Ext-peripheral pulses normal, no pedal edema, no clubbing or cyanosis  Skin-Warm and dry. no hyperpigmentation, vitiligo, or suspicious lesions  Neuro -alert, oriented, normal speech, no focal findings or movement disorder noted  Musculoskeletal- FROM, no bony abnormalities,  point tenderness, positive drop arm test    Lab Review:  Results for orders placed or performed in visit on 06/18/20   CBC W/O DIFF   Result Value Ref Range    WBC 7.9 4.1 - 10.9 K/uL    RBC 3.40 (L) 4.20 - 6.30 M/uL    HGB 12.3 12.0 - 18.0 g/dL    HCT 37.9 37.0 - 51.0 %    MCH 36.2 (H) 26.0 - 32.0 pg    MCHC 32.5 30.0 - 36.0 g/dL    .6 (H) 80.0 - 97.0 fL    RDW 13.6 %    PLATELET 138.4 984.9 - 440.0 K/uL        Documenation Review:  More than 30 minutes of time was spent in reviewing records of patient  Cervical spondylosis with more recent problems with neck pain.  She has also had bilateral upper arm pain. She finds it difficult to raise her arms and has noted pain radiating into her occiput as well. She has had a mildly elevated sed rate in the past, raising the question of polymyalgia as the cause of some of her upper arm symptoms, but more likely it relates to a cervical radiculopathy. At the time of the visit we elected to check her sed rate to see if she might benefit from a more prolonged course of prednisone. History of lumbar spinal stenosis with bilateral radiculopathies, right greater than left, and chronic back pain, all of which are symptomatic at times, but not consistently. Hx of right coronary artery in December, 2009. At that time she underwent cardiac cath and had a 40% left anterior descending coronary artery lesion as well. She has had no recent problems with angina or CHF. She sees Dr. Sheila Palmer on a six to twelve month basis. Her last stress test was in May of 2015 and negative. She has not had any recent issues with anginal type symptoms or symptoms of congestive heart failure. PVD, status post left common artery iliac stent and angioplasty in April of 2007 and a right CFA and popliteal bypass graft in April of 2009 with revision of this in September of 2009. She had another revision on the right side in January, 2011 with a patch angioplasty and thrombectomy. She has also undergone a left SFA stent as of September, 2012. Then in August, 2013 she underwent right femoral to tibial peroneal trunk bypass grafting. She then underwent right iliac artery angioplasty and stent and left iliac angioplasty in June of 2014. Most recently she underwent aortogram with runoff In December, 2018 and underwent stenting of a 60% left common iliac artery stenosis. Dr. Basilio Yang has been reluctant to do any further surgery as her limbs are not threatened. She does continue to have claudication at one-half to one block.   She also has a mild ischemic neuropathy, particularly involving her great toes. CVD, status post right carotid endarterectomy in March, 2010. She has had stable carotid duplexes since. History of osteoporosis with previous treatment with [facility] Maintenance Issues and Ancillary Studies:  1. TDAP (pertussis and tetanus) vaccine was given in February, 2013. 2. Pneumovax 23 (PPSV23) was given in February, 2011.  3. Seasonal influenza vaccine was given in October, 2019. 4. Zostavax was given in February, 2011. 5. Prevnar 13 (PCV13) was given in April, 2015. Updated every pain again we will going to continue until we do ESR and what we can do is that if you  6. She is on the list to receive Shingrix. 7. Lexiscan stress testing was negative in May, 2015. 8. Colonoscopy was negative in September, 2011, (ten year follow up indicated). 9. Reviewed DEXA scan from 2019. Patient is osteopenic using WHO criteria. 10-year probability of major osteoporotic fracture 15%, 10-year probability of hip fracture 6.5%. 10. EBT heart scan revealed a calcium score of 1,024 in October, 2009. 11. Cardiac cath in December, 2009 revealed the RCA stenosis resulting in PTCA and stenting of that artery. 12. Lumbosacral MRI in June, 2011 revealed L4-5 and L5-S1 foraminal and spinal stenosis. 13. Upper GI in July, 2010 revealed GERD. 14. CT scan of the cervical spine in October, 2013 revealed anterolisthesis of C3 on 4 and C5-6 and C6-7 stenosis. 15. Last pelvic was reportedly negative in May, 2014. 16. Mammogram was negative in September, 2018. 17. Carotid dopplers revealed mild plaque on the left and a clear right carotid endarterectomy on the right in November, 2018. 18. PVRs in November, 2018 revealed moderately decreased circulation on the right with an JOANA of 0.36 and mild decrease on the left with an JOANA of .64.  19. EKG in the office revealed nonspecific ST-T wave changes. 20. Aortogram with runoff in December, 2018 was as described above.   21. Health screening assessments were essentially normal.       Assessment/Plan:    Diagnoses and all orders for this visit:    1. PE (physical exam), annual    2. PVD (peripheral vascular disease) ([facility]  -     REFERRAL TO PODIATRY    3. Coronary artery disease involving native heart without angina pectoris, unspecified vessel or lesion type  -     CBC W/O DIFF  -     METABOLIC PANEL, COMPREHENSIVE    4. Essential hypertension    5. Adult onset hypothyroidism  -     TSH 3RD GENERATION    6. Arteriosclerotic heart disease (ASHD)    7. Dyslipidemia  -     LIPID PANEL    8. Age-related osteoporosis without current pathological fracture    9. PMR (polymyalgia rheumatica) ([facility])  -     SED RATE (ESR)  -     diclofenac (VOLTAREN) 1 % gel; Apply  to affected area four (4) times daily as needed for Pain. 10. Vitamin D deficiency  -     VITAMIN D, 25 HYDROXY    11. Impaired glucose tolerance  -     HEMOGLOBIN A1C W/O EAG    Other orders  -     terbinafine 1 % topical gel; Apply  to affected area two (2) times a day for 30 days. -     cilostazoL (PLETAL) 100 mg tablet; TAKE 1 TABLET TWICE A DAY  -     atenoloL (TENORMIN) 50 mg tablet; TAKE 1 TABLET NIGHTLY         Recommend a weaning trial of prednisone to see if that helps. We will recheck her ESR. I have instructed her to take 5 mg prednisone on Tuesday, Thursday and Saturday for the next 4 weeks. If she tolerates the alternate dose schedule she can wean it down to 2.5 mg every T, TH, SAT. She will call us and let her know she is tolerating the weaning trial.    Terbinafine gel up prescribed for toenail fungus. Referral to podiatry   Continue physical therapy exercises for your left chronic shoulder pain. Continue current meds   I will call with lab results and make further recommendations or adjustments if necessary. Discussed lifestyle modifications including Na restriction, low carb/fat diet, weight reduction and exercise (at least a walking program).         ICD-10-CM ICD-9-CM 1. PE (physical exam), annual Z00.00 V70.0    2. PVD (peripheral vascular disease) ([facility]) I73.9 443.9 REFERRAL TO PODIATRY   3. Coronary artery disease involving native heart without angina pectoris, unspecified vessel or lesion type I25.10 414.01 CBC W/O DIFF      METABOLIC PANEL, COMPREHENSIVE   4. Essential hypertension I10 401.9    5. Adult onset hypothyroidism E03.8 244.8 TSH 3RD GENERATION   6. Arteriosclerotic heart disease (ASHD) I25.10 414.00    7. Dyslipidemia E78.5 272.4 LIPID PANEL   8. Age-related osteoporosis without current pathological fracture M81.0 733.01    9. PMR (polymyalgia rheumatica) ([facility]) M35.3 725 SED RATE (ESR)      diclofenac (VOLTAREN) 1 % gel   10. Vitamin D deficiency E55.9 268.9 VITAMIN D, 25 HYDROXY   11. Impaired glucose tolerance R73.02 790.22 HEMOGLOBIN A1C W/O EAG         Follow-up and Dispositions    · Return in about 3 months (around 9/18/2020) for follow up. I have reviewed with the patient details of the assessment and plan and all questions were answered. Relevent patient education was performed. Verbal and/or written instructions (see AVS) provided. The most recent lab findings were reviewed with the patient. Plan was discussed with patient who verbally expressed understanding. An After Visit Summary was printed and given to the patient.     Isac Laughlin MD    Trial of weaning you off of steroids is 1 ESR

## 2020-06-18 NOTE — PROGRESS NOTES
Chief Complaint   Patient presents with    Annual Wellness Visit       Depression Risk Factor Screening:     3 most recent PHQ Screens 6/18/2020   Little interest or pleasure in doing things Not at all   Feeling down, depressed, irritable, or hopeless Not at all   Total Score PHQ 2 0   Trouble falling or staying asleep, or sleeping too much Several days   Feeling tired or having little energy More than half the days   Poor appetite, weight loss, or overeating Not at all   Feeling bad about yourself - or that you are a failure or have let yourself or your family down Not at all   Trouble concentrating on things such as school, work, reading, or watching TV Several days   Moving or speaking so slowly that other people could have noticed; or the opposite being so fidgety that others notice Not at all   Thoughts of being better off dead, or hurting yourself in some way Not at all   PHQ 9 Score 4   How difficult have these problems made it for you to do your work, take care of your home and get along with others Not difficult at all       Functional Ability and Level of Safety:     Activities of Daily Living  ADL Assessment 6/18/2020   Feeding yourself No Help Needed   Getting from bed to chair No Help Needed   Getting dressed No Help Needed   Bathing or showering No Help Needed   Walk across the room (includes cane/walker) No Help Needed   Using the telphone No Help Needed   Taking your medications No Help Needed   Preparing meals No Help Needed   Managing money (expenses/bills) No Help Needed   Moderately strenuous housework (laundry) No Help Needed   Shopping for personal items (toiletries/medicines) No Help Needed   Shopping for groceries No Help Needed   Driving No Help Needed   Climbing a flight of stairs No Help Needed   Getting to places beyond walking distances No Help Needed       Fall Risk  Fall Risk Assessment, last 12 mths 6/18/2020   Able to walk? Yes   Fall in past 12 months? Yes   Fall with injury?  No Number of falls in past 12 months 1   Fall Risk Score 1       Abuse Screen  Abuse Screening Questionnaire 6/18/2020   Do you ever feel afraid of your partner? N   Are you in a relationship with someone who physically or mentally threatens you? N   Is it safe for you to go home?  Y         Patient Care Team   Patient Care Team:  Angelique Blair MD as PCP - General (Hospitalist)  Angelique Blair MD as PCP - [facility] Empaneled Provider  Ndia London MD (Vascular Surgery)  Suki Pearson MD (Ophthalmology)

## 2020-06-19 LAB
25(OH)D3 SERPL-MCNC: 46 NG/ML (ref 30–96)
TSH SERPL DL<=0.05 MIU/L-ACNC: 2.4 UIU/ML (ref 0.34–5.6)

## 2020-06-22 ENCOUNTER — TELEPHONE (OUTPATIENT)
Dept: INTERNAL MEDICINE CLINIC | Age: 75
End: 2020-06-22

## 2020-06-22 NOTE — TELEPHONE ENCOUNTER
Patient has questions about her test results. Her sed rate test up is up? Patient states she is taking prednisone for it. Patient states Dr. Belvie Najjar told to take half a pill of prednisone every other day. Patient wants to know what to do with Sunday because she takes 1/2 pill Tuesday, Thursday and Saturday, and a whole pill Monday, Wednesday and Friday. She would like to know the directions again for the next four weeks.      CB: [phone]

## 2020-06-23 NOTE — TELEPHONE ENCOUNTER
Maverick Fenton MD  You 15 hours ago (4:47 PM)      I have instructed her to take 5 mg prednisone on Tuesday, Thursday and Saturday for the next 4 weeks.  If she tolerates the alternate dose schedule she can wean it down to 2.5 mg every T, TH, SAT. Please note she does not need to take any prednisone on Monday, Wednesday, Friday or Sunday. Her ESR is pretty much the same.     Routing comment        Patient notified

## 2020-07-29 DIAGNOSIS — I73.9 NEUROPATHY DUE TO PERIPHERAL VASCULAR DISEASE (HCC): ICD-10-CM

## 2020-07-29 DIAGNOSIS — G63 NEUROPATHY DUE TO PERIPHERAL VASCULAR DISEASE (HCC): ICD-10-CM

## 2020-07-29 NOTE — TELEPHONE ENCOUNTER
Last Refill: 1/31/2020  Last Visit: 6/18/2020   Next Visit: 9/17/2020    Requested Prescriptions     Pending Prescriptions Disp Refills    gabapentin (NEURONTIN) 300 mg capsule 28 Cap 0     Sig: TAKE 2 CAPSULES TWICE A DAY

## 2020-07-30 RX ORDER — GABAPENTIN 300 MG/1
CAPSULE ORAL
Qty: 28 CAP | Refills: 0 | Status: SHIPPED | OUTPATIENT
Start: 2020-07-30 | End: 2020-08-06 | Stop reason: SDUPTHER

## 2020-08-06 DIAGNOSIS — I73.9 NEUROPATHY DUE TO PERIPHERAL VASCULAR DISEASE (HCC): ICD-10-CM

## 2020-08-06 DIAGNOSIS — G63 NEUROPATHY DUE TO PERIPHERAL VASCULAR DISEASE (HCC): ICD-10-CM

## 2020-08-06 NOTE — TELEPHONE ENCOUNTER
PCP: Zach Mae MD    Last appt: Visit date not found  Future Appointments   Date Time Provider Liana Bartholomew   9/17/2020  1:00 PM Zach Mae MD [facility]       Requested Prescriptions     Pending Prescriptions Disp Refills    gabapentin (NEURONTIN) 300 mg capsule 360 Cap 0     Sig: TAKE 2 CAPSULES TWICE A DAY       Prior labs and Blood pressures:  BP Readings from Last 3 Encounters:   06/18/20 130/62   02/05/20 130/62   01/07/20 132/70     Lab Results   Component Value Date/Time    Sodium 142 06/18/2020 11:57 AM    Potassium 4.4 06/18/2020 11:57 AM    Chloride 106 06/18/2020 11:57 AM    CO2 27.0 06/18/2020 11:57 AM    Anion gap 9 06/18/2020 11:57 AM    Glucose 83 06/18/2020 11:57 AM    BUN 19.0 (H) 06/18/2020 11:57 AM    Creatinine 1.0 06/18/2020 11:57 AM    BUN/Creatinine ratio 19 06/18/2020 11:57 AM    GFR est AA >60 06/18/2020 11:57 AM    GFR est non-AA 54 06/18/2020 11:57 AM    Calcium 10.0 06/18/2020 11:57 AM     Lab Results   Component Value Date/Time    Hemoglobin A1c 5.2 06/18/2020 11:56 AM     Lab Results   Component Value Date/Time    Cholesterol, total 165 06/18/2020 11:57 AM    Cholesterol (POC) 135.0 10/02/2018 02:05 PM    HDL Cholesterol 111 06/18/2020 11:57 AM    HDL Cholesterol (POC) 89.0 10/02/2018 02:05 PM    LDL Cholesterol (POC) 28.8 10/02/2018 02:05 PM    LDL, calculated 39 06/18/2020 11:57 AM    VLDL 15 06/18/2020 11:57 AM    Triglyceride 75 06/18/2020 11:57 AM    Triglycerides (POC) 86.0 10/02/2018 02:05 PM    CHOL/HDL Ratio 1 06/18/2020 11:57 AM     Lab Results   Component Value Date/Time    VITAMIN D, 25-HYDROXY 46 06/18/2020 11:57 AM       Lab Results   Component Value Date/Time    TSH, 3rd generation 2.40 06/18/2020 11:57 AM

## 2020-08-06 NOTE — TELEPHONE ENCOUNTER
Last Refill: 7/30/2020  Last Visit: 6/18/2020   Next Visit: 9/17/2020    Patient only had 28 tabs last refill    Requested Prescriptions     Pending Prescriptions Disp Refills    gabapentin (NEURONTIN) 300 mg capsule 360 Cap 0     Sig: TAKE 2 CAPSULES TWICE A DAY

## 2020-08-07 RX ORDER — GABAPENTIN 300 MG/1
CAPSULE ORAL
Qty: 360 CAP | Refills: 0 | Status: SHIPPED | OUTPATIENT
Start: 2020-08-07 | End: 2020-09-17 | Stop reason: ALTCHOICE

## 2020-09-10 ENCOUNTER — TELEPHONE (OUTPATIENT)
Dept: INTERNAL MEDICINE CLINIC | Age: 75
End: 2020-09-10

## 2020-09-10 NOTE — TELEPHONE ENCOUNTER
Patient called to report to Dr. Alejandro Finley that she is doing fine on the current dose of prednisone. She would like to know if Dr. Alejandro Finley wants to reduce her dosage again or if she is going to wait for her appointment next week.    CB: [phone]

## 2020-09-11 NOTE — TELEPHONE ENCOUNTER
Bola Echevarria MD  You 16 hours ago (4:30 PM)       Will wait to discuss during appointment    Message text          Patient notified

## 2020-09-16 PROBLEM — C43.9 MELANOMA (HCC): Status: RESOLVED | Noted: 2017-09-26 | Resolved: 2020-09-16

## 2020-09-16 PROBLEM — F33.9 RECURRENT DEPRESSION (HCC): Status: RESOLVED | Noted: 2018-01-09 | Resolved: 2020-09-16

## 2020-09-16 PROBLEM — J44.9 COPD, MILD (HCC): Status: RESOLVED | Noted: 2018-03-15 | Resolved: 2020-09-16

## 2020-09-17 ENCOUNTER — OFFICE VISIT (OUTPATIENT)
Dept: INTERNAL MEDICINE CLINIC | Age: 75
End: 2020-09-17
Payer: MEDICARE

## 2020-09-17 VITALS
BODY MASS INDEX: 23 KG/M2 | DIASTOLIC BLOOD PRESSURE: 58 MMHG | OXYGEN SATURATION: 98 % | WEIGHT: 125 LBS | SYSTOLIC BLOOD PRESSURE: 122 MMHG | TEMPERATURE: 98 F | HEART RATE: 76 BPM | HEIGHT: 62 IN | RESPIRATION RATE: 14 BRPM

## 2020-09-17 DIAGNOSIS — I10 ESSENTIAL HYPERTENSION: ICD-10-CM

## 2020-09-17 DIAGNOSIS — M35.3 PMR (POLYMYALGIA RHEUMATICA) (HCC): ICD-10-CM

## 2020-09-17 DIAGNOSIS — E03.8 ADULT ONSET HYPOTHYROIDISM: Primary | ICD-10-CM

## 2020-09-17 DIAGNOSIS — I25.10 ARTERIOSCLEROTIC HEART DISEASE (ASHD): ICD-10-CM

## 2020-09-17 LAB
A-G RATIO,AGRAT: 1.5 RATIO
ALBUMIN SERPL-MCNC: 4.1 G/DL (ref 3.9–5.4)
ALP SERPL-CCNC: 70 U/L (ref 38–126)
ALT SERPL-CCNC: 20 U/L (ref 0–35)
ANION GAP SERPL CALC-SCNC: 11 MMOL/L
AST SERPL W P-5'-P-CCNC: 32 U/L (ref 14–36)
BILIRUB SERPL-MCNC: 0.4 MG/DL (ref 0.2–1.3)
BUN SERPL-MCNC: 21 MG/DL (ref 7–17)
BUN/CREATININE RATIO,BUCR: 26 RATIO
CALCIUM SERPL-MCNC: 9.6 MG/DL (ref 8.4–10.2)
CHLORIDE SERPL-SCNC: 102 MMOL/L (ref 98–107)
CO2 SERPL-SCNC: 28 MMOL/L (ref 22–32)
CREAT SERPL-MCNC: 0.8 MG/DL (ref 0.7–1.2)
ERYTHROCYTE [DISTWIDTH] IN BLOOD BY AUTOMATED COUNT: 13 %
GLOBULIN,GLOB: 2.8
GLUCOSE SERPL-MCNC: 141 MG/DL (ref 65–105)
HCT VFR BLD AUTO: 39.1 % (ref 37–51)
HGB BLD-MCNC: 12.4 G/DL (ref 12–18)
LYMPHOCYTES ABSOLUTE: 1.4 K/UL (ref 0.6–4.1)
LYMPHOCYTES NFR BLD: 19.1 % (ref 10–58.5)
MCH RBC QN AUTO: 33.3 PG (ref 26–32)
MCHC RBC AUTO-ENTMCNC: 31.7 G/DL (ref 30–36)
MCV RBC AUTO: 105 FL (ref 80–97)
MONOCYTES ABS-DIF,2141: 0.6 K/UL (ref 0–1.8)
MONOCYTES NFR BLD: 8.3 % (ref 0.1–24)
NEUTROPHILS # BLD: 72.6 % (ref 37–92)
NEUTROPHILS ABS,2156: 5.3 K/UL (ref 2–7.8)
PLATELET # BLD AUTO: 274 K/UL (ref 140–440)
POTASSIUM SERPL-SCNC: 5.1 MMOL/L (ref 3.6–5)
PROT SERPL-MCNC: 6.9 G/DL (ref 6.3–8.2)
RBC # BLD AUTO: 3.72 M/UL (ref 4.2–6.3)
SED RATE (ESR): 13 MM/HR (ref 0–20)
SODIUM SERPL-SCNC: 141 MMOL/L (ref 137–145)
WBC # BLD AUTO: 7.3 K/UL (ref 4.1–10.9)

## 2020-09-17 PROCEDURE — G8536 NO DOC ELDER MAL SCRN: HCPCS | Performed by: INTERNAL MEDICINE

## 2020-09-17 PROCEDURE — 85025 COMPLETE CBC W/AUTO DIFF WBC: CPT | Performed by: INTERNAL MEDICINE

## 2020-09-17 PROCEDURE — G8427 DOCREV CUR MEDS BY ELIG CLIN: HCPCS | Performed by: INTERNAL MEDICINE

## 2020-09-17 PROCEDURE — 85651 RBC SED RATE NONAUTOMATED: CPT | Performed by: INTERNAL MEDICINE

## 2020-09-17 PROCEDURE — 1100F PTFALLS ASSESS-DOCD GE2>/YR: CPT | Performed by: INTERNAL MEDICINE

## 2020-09-17 PROCEDURE — G8432 DEP SCR NOT DOC, RNG: HCPCS | Performed by: INTERNAL MEDICINE

## 2020-09-17 PROCEDURE — G8754 DIAS BP LESS 90: HCPCS | Performed by: INTERNAL MEDICINE

## 2020-09-17 PROCEDURE — G8752 SYS BP LESS 140: HCPCS | Performed by: INTERNAL MEDICINE

## 2020-09-17 PROCEDURE — 80053 COMPREHEN METABOLIC PANEL: CPT | Performed by: INTERNAL MEDICINE

## 2020-09-17 PROCEDURE — 1090F PRES/ABSN URINE INCON ASSESS: CPT | Performed by: INTERNAL MEDICINE

## 2020-09-17 PROCEDURE — G8420 CALC BMI NORM PARAMETERS: HCPCS | Performed by: INTERNAL MEDICINE

## 2020-09-17 PROCEDURE — 3288F FALL RISK ASSESSMENT DOCD: CPT | Performed by: INTERNAL MEDICINE

## 2020-09-17 PROCEDURE — 99214 OFFICE O/P EST MOD 30 MIN: CPT | Performed by: INTERNAL MEDICINE

## 2020-09-17 RX ORDER — DULOXETIN HYDROCHLORIDE 30 MG/1
30 CAPSULE, DELAYED RELEASE ORAL DAILY
Qty: 30 CAP | Refills: 0 | Status: SHIPPED | OUTPATIENT
Start: 2020-09-17 | End: 2020-09-21 | Stop reason: ALTCHOICE

## 2020-09-17 NOTE — PROGRESS NOTES
Bernard Lo is a 67 y.o. female presenting for Hypertension (3 mo fu)  . 1. Have you been to the ER, urgent care clinic since your last visit? Hospitalized since your last visit? No    2. Have you seen or consulted any other health care providers outside of the [address] [place] [address] since your last visit? Include any pap smears or colon screening. No    Fall Risk Assessment, last 12 mths 6/18/2020   Able to walk? Yes   Fall in past 12 months? Yes   Fall with injury? No   Number of falls in past 12 months 1   Fall Risk Score 1         Abuse Screening Questionnaire 6/18/2020   Do you ever feel afraid of your partner? N   Are you in a relationship with someone who physically or mentally threatens you? N   Is it safe for you to go home? Y       3 most recent PHQ Screens 6/18/2020   Little interest or pleasure in doing things Not at all   Feeling down, depressed, irritable, or hopeless Not at all   Total Score PHQ 2 0   Trouble falling or staying asleep, or sleeping too much Several days   Feeling tired or having little energy More than half the days   Poor appetite, weight loss, or overeating Not at all   Feeling bad about yourself - or that you are a failure or have let yourself or your family down Not at all   Trouble concentrating on things such as school, work, reading, or watching TV Several days   Moving or speaking so slowly that other people could have noticed; or the opposite being so fidgety that others notice Not at all   Thoughts of being better off dead, or hurting yourself in some way Not at all   PHQ 9 Score 4   How difficult have these problems made it for you to do your work, take care of your home and get along with others Not difficult at all       There are no discontinued medications.

## 2020-09-17 NOTE — PROGRESS NOTES
This note will not be viewable in [address] Socorro Allan is a 67 y.o. female and presents with Hypertension (3 mo fu)      Subjective:  Patient comes in today for follow-up of her polymyalgia rheumatica. Patient reports she had started with prednisone 5 mg Tuesday Thursday Saturday and then weaned herself down to 2.5 mg TTS. Since the past few weeks she has been noticing right hip pain with burning followed by back pain and then stiffness and pain in her left wrist.  She reports her back pain is better now. She has been taking gabapentin 600 mg p.o. twice daily but that does not help her symptoms. She is wondering if decreasing the steroids has anything to do with it. She also reports thinning of her hair and is very concerned that she might get bald. Her TSH was checked in June which was normal.  She followed up with a dermatologist who gave her scalp oil and ketoconazole shampoo. She reports she has been using it but that has not helped with the symptoms either. She does have a history of coronary artery disease s/p stents, peripheral vascular disease and CVD s/p endarterectomy. She remains on aspirin, statin and cilostazol. Recap-Her TSH last visit was low and she was instructed to decrease her dose of levothyroxine to 75 mcg from 88. She reports she has been taking the 75 mcg from the past 1 month. Her cold intolerance and hair loss seems to have improved. She has polymyalgia rheumatica and she was wondering if her steroid could completely be weaned off. Currently she is taking 5 mg. Her most recent ESR bumped up to 21. Denies any side effects, fractures or mood changes from being on chronic steroids. She does not have any history of hyperglycemia or diabetes. She was referred to [address] [place] [address] for chronic left shoulder pain and received a steroid shot for rotator cuff arthropathy /glenohumeral osteoarthritis.   She is also been referred to physical therapy and reports it has really helped. She also complains of toenail fungus. Her dermatologist prescribed topical Lamisil however her insurance would not cover it.     Past Medical History:   Diagnosis Date    Adult onset hypothyroidism 8/23/2017    Anemia 8/23/2017    Anxiety 8/23/2017    Arteriosclerotic heart disease (ASHD) 8/23/2017    Arthritis     back, hip right, neck    CAD (coronary artery disease)          Cervical spondylosis 8/23/2017    Chronic pain     spinal stenosis, bulging disk and bone spurs in back    Claudication in peripheral vascular disease ([address] 8/23/2017    CVD (cerebrovascular disease)     S/P right CEA    Depression     Dyslipidemia 8/23/2017    Familial mitral valve prolapse 8/23/2017    GERD (gastroesophageal reflux disease)     Hyperlipidemia     Hypertension     Hypothyroidism     Lumbar spinal stenosis     Macrocytosis 8/23/2017    Menopause     Nausea & vomiting     surgery related, severe constipation    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 ([address] 8/23/2017    Osteopenia 8/23/2017    Osteoporosis     Other ill-defined conditions(799.89)     severe constipation from pain medication    PVD (peripheral vascular disease) ([address]     S/P stent Right CFA and Left iliac    Stroke ([address]     Temporomandibular joint pain dysfunction syndrome 8/23/2017    Thyroid disease     TIA (transient ischemic attack)     Tobacco user 8/23/2017    Trigger finger 8/23/2017     Past Surgical History:   Procedure Laterality Date    BYPASS GRAFT OTHR,FEM-POP       RIGHT FEMORAL-POPLITEAL BYPASS  WITH POLYTETRA-FL UOROETHYLENE GRAFT     CARDIAC SURG PROCEDURE UNLIST      stents x2    HX CAROTID ENDARTERECTOMY      right cea    HX OTHER SURGICAL Right     excision melanoma right arm    VASCULAR SURGERY PROCEDURE UNLIST      femoral stent - left    VASCULAR SURGERY PROCEDURE UNLIST      X5 right femoral bypass     Allergies   Allergen Reactions    Neomycin Sulfate Unknown (comments) Current Outpatient Medications   Medication Sig Dispense Refill    DULoxetine (CYMBALTA) 30 mg capsule Take 1 Cap by mouth daily for 30 days. 30 Cap 0    diclofenac (VOLTAREN) 1 % gel Apply  to affected area four (4) times daily as needed for Pain. 1 Each 3    cilostazoL (PLETAL) 100 mg tablet TAKE 1 TABLET TWICE A  Tab 3    atenoloL (TENORMIN) 50 mg tablet TAKE 1 TABLET NIGHTLY 90 Tab 3    levothyroxine (SYNTHROID) 75 mcg tablet TAKE 1 TABLET DAILY BEFORE BREAKFAST FOR A CONDITION WITH LOW THYROID HORMONE LEVELS 90 Tab 3    atorvastatin (LIPITOR) 40 mg tablet Take 1 Tab by mouth daily. 90 Tab 3    turmeric/turmeric ext/pepr ext (TURMERIC-TURMERIC EXT-PEPPER) 500-3 mg cap Take  by mouth.  buPROPion SR (WELLBUTRIN SR) 150 mg SR tablet TAKE 1 TABLET DAILY 90 Tab 3    lisinopril (PRINIVIL, ZESTRIL) 40 mg tablet TAKE 1 TABLET DAILY 90 Tab 3    amLODIPine (NORVASC) 5 mg tablet Take 1 Tab by mouth daily. 90 Tab 3    multivit-min/iron/folic/lutein (CENTRUM SILVER WOMEN PO) Take  by mouth.  predniSONE (DELTASONE) 5 mg tablet Take 1 Tab by mouth daily (with breakfast). (Patient taking differently: Take 5 mg by mouth. 1/2 tablet Tuesday Thursday and Saturday) 90 Tab 3    omeprazole (PRILOSEC) 20 mg capsule TAKE 1 CAPSULE DAILY 90 Cap 3    peg 400-propylene glycol (SYSTANE, PROPYLENE GLYCOL,) 0.4-0.3 % drop Administer 1 Drop to both eyes as needed.  DENTA 5000 PLUS 1.1 % crea Apply at night  5    valACYclovir (VALTREX) 1 gram tablet Take 2 Tabs by mouth two (2) times a day. 2 initially and 2 in 12 hr for fever blisters 8 Tab 5    ascorbic acid (VITAMIN C) 500 mg tablet Take 500 mg by mouth two (2) times a day.  aspirin (ASPIRIN) 325 mg tablet Take 325 mg by mouth daily.  multivitamin, stress formula (STRESS TAB) tablet Take 1 Tab by mouth daily.        Social History     Socioeconomic History    Marital status:      Spouse name: Not on file    Number of children: Not on file    Years of education: Not on file    Highest education level: Not on file   Tobacco Use    Smoking status: Former Smoker     Packs/day: 1.00     Years: 35.00     Pack years: 35.00     Last attempt to quit: 2008     Years since quittin.6    Smokeless tobacco: Never Used   Substance and Sexual Activity    Alcohol use: Yes     Alcohol/week: 6.0 standard drinks     Types: 6 Glasses of wine per week     Comment: occasionally    Drug use: No     Types: Prescription, OTC     Family History   Problem Relation Age of Onset   Behzad Guy Migraines Mother     Heart Disease Father     Hypertension Father     Lung Disease Father     Cancer Father         H&N    Hypertension Sister     Diabetes Sister     Heart Disease Brother     Hypertension Brother     Hypertension Sister     Cancer Sister         breast    Breast Cancer Sister 54    Hypertension Sister        Review of Systems  ROS is unremarkable except for ones highlighted in bold.    Constitutional: negative for fevers, chills, anorexia and weight loss  Eyes:   negative for visual disturbance and irritation  ENT:   negative for tinnitus,sore throat,nasal congestion,ear pain,hoarseness  Respiratory:  negative for cough, hemoptysis, dyspnea,wheezing  CV:   negative for chest pain, palpitations, lower extremity edema  GI:   negative for nausea, vomiting, diarrhea, abdominal pain,melena  Endo:               negative for polyuria,polydipsia,polyphagia,heat intolerance, hair loss  Genitourinary: negative for frequency, dysuria and hematuria  Integumentary: negative for rash and pruritus  Hematologic:  negative for easy bruising and gum/nose bleeding  Musculoskel: negative for myalgias, arthralgias, back pain, muscle weakness, joint pain  Neurological:  negative for headaches, dizziness, vertigo, memory problems and gait   Behavl/Psych: negative for feelings of anxiety, depression, mood changes  ROS otherwise negative     Objective:  Visit Vitals  BP (!) 122/58 (BP 1 Location: Left arm, BP Patient Position: Sitting)   Pulse 76   Temp 98 °F (36.7 °C)   Resp 14   Ht 5' 2\" (1.575 m)   Wt 125 lb (56.7 kg)   SpO2 98%   BMI 22.86 kg/m²     Physical Exam:   General appearance - alert, well appearing, and in no distress  Mental status - alert, oriented to person, place, and time  EYE-RODNEY, EOMI, fundi normal, corneas normal, no foreign bodies  ENT-ENT exam normal, no neck nodes or sinus tenderness  Nose - normal and patent, no erythema, discharge or polyps  Mouth - mucous membranes moist, pharynx normal without lesions  Neck - supple, no significant adenopathy   Chest - clear to auscultation, no wheezes, rales or rhonchi, symmetric air entry   Heart - normal rate, regular rhythm, normal S1, S2, no murmurs, rubs, clicks or gallops   Abdomen - soft, nontender, nondistended, no masses or organomegaly  Lymph- no adenopathy palpable  Ext-peripheral pulses normal, no pedal edema, no clubbing or cyanosis  Skin-Warm and dry.  no hyperpigmentation, vitiligo, or suspicious lesions  Neuro -alert, oriented, normal speech, no focal findings or movement disorder noted  Musculoskeletal- FROM, no bony abnormalities,  point tenderness, positive drop arm test    Lab Review:  Results for orders placed or performed in visit on 06/18/20   CBC W/O DIFF   Result Value Ref Range    WBC 7.9 4.1 - 10.9 K/uL    RBC 3.40 (L) 4.20 - 6.30 M/uL    HGB 12.3 12.0 - 18.0 g/dL    HCT 37.9 37.0 - 51.0 %    MCH 36.2 (H) 26.0 - 32.0 pg    MCHC 32.5 30.0 - 36.0 g/dL    .6 (H) 80.0 - 97.0 fL    RDW 13.6 %    PLATELET 723.3 935.4 - 440.0 K/uL   LIPID PANEL   Result Value Ref Range    Cholesterol, total 165 0 - 200 mg/dL    Triglyceride 75 0 - 200 mg/dL    HDL Cholesterol 111 35 - 130 mg/dL    VLDL 15 mg/dL    LDL, calculated 39 0 - 130 mg/dL    CHOL/HDL Ratio 1 0 - 4 Ratio    LDL/HDL Ratio 0 Ratio   METABOLIC PANEL, COMPREHENSIVE   Result Value Ref Range    Glucose 83 65 - 105 mg/dL    BUN 19.0 (H) 7.0 - 17.0 mg/dL    Creatinine 1.0 0.7 - 1.2 mg/dL    Sodium 142 137 - 145 mmol/L    Potassium 4.4 3.6 - 5.0 mmol/L    Chloride 106 98 - 107 mmol/L    CO2 27.0 22.0 - 32.0 mmol/L    Calcium 10.0 8.4 - 10.2 mg/dl    Protein, total 6.8 6.3 - 8.2 g/dL    Albumin 4.2 3.9 - 5.4 g/dL    ALT (SGPT) 27 0 - 35 U/L    AST (SGOT) 34.0 14.0 - 36.0 U/L    Alk. phosphatase 73 38 - 126 U/L    Bilirubin, total 0.6 0.2 - 1.3 mg/dL    BUN/Creatinine ratio 19 Ratio    GFR est AA >60 mL/min/1.73m2    GFR est non-AA 54 <60 mL/min/1.73m2    Globulin 2.60     A-G Ratio 1.6 Ratio    Anion gap 9 mmol/L   HEMOGLOBIN A1C W/O EAG   Result Value Ref Range    Hemoglobin A1c 5.2 4.0 - 5.7 %   TSH 3RD GENERATION   Result Value Ref Range    TSH, 3rd generation 2.40 0.34 - 5.60 uIU/mL   VITAMIN D, 25 HYDROXY   Result Value Ref Range    VITAMIN D, 25-HYDROXY 46 30 - 96 ng/mL   SED RATE (ESR)   Result Value Ref Range    Sed rate (ESR) 22 (H) 0 - 20 mm/hr        Documenation Review:  Cervical spondylosis with more recent problems with neck pain. She has also had bilateral upper arm pain. She finds it difficult to raise her arms and has noted pain radiating into her occiput as well. She has had a mildly elevated sed rate in the past, raising the question of polymyalgia as the cause of some of her upper arm symptoms, but more likely it relates to a cervical radiculopathy. At the time of the visit we elected to check her sed rate to see if she might benefit from a more prolonged course of prednisone. History of lumbar spinal stenosis with bilateral radiculopathies, right greater than left, and chronic back pain, all of which are symptomatic at times, but not consistently. Hx of right coronary artery in December, 2009. At that time she underwent cardiac cath and had a 40% left anterior descending coronary artery lesion as well. She has had no recent problems with angina or CHF. She sees Dr. Meliton Dandy on a six to twelve month basis.   Her last stress test was in May of 2015 and negative. She has not had any recent issues with anginal type symptoms or symptoms of congestive heart failure. PVD, status post left common artery iliac stent and angioplasty in April of 2007 and a right CFA and popliteal bypass graft in April of 2009 with revision of this in September of 2009. She had another revision on the right side in January, 2011 with a patch angioplasty and thrombectomy. She has also undergone a left SFA stent as of September, 2012. Then in August, 2013 she underwent right femoral to tibial peroneal trunk bypass grafting. She then underwent right iliac artery angioplasty and stent and left iliac angioplasty in June of 2014. Most recently she underwent aortogram with runoff In December, 2018 and underwent stenting of a 60% left common iliac artery stenosis. Dr. Thompson Card has been reluctant to do any further surgery as her limbs are not threatened. She does continue to have claudication at one-half to one block. She also has a mild ischemic neuropathy, particularly involving her great toes. CVD, status post right carotid endarterectomy in March, 2010. She has had stable carotid duplexes since. History of osteoporosis with previous treatment with Boniva     Assessment/Plan:    Diagnoses and all orders for this visit:    1. Adult onset hypothyroidism    2. Essential hypertension  -     CBC WITH AUTOMATED DIFF  -     METABOLIC PANEL, COMPREHENSIVE    3. PMR (polymyalgia rheumatica) (HCC)  -     SED RATE (ESR)  -     DULoxetine (CYMBALTA) 30 mg capsule; Take 1 Cap by mouth daily for 30 days. 4. Arteriosclerotic heart disease (ASHD)         Continue prednisone 2.5 mg every T, TH, SAT. Discontinue gabapentin. Start Cymbalta 30 mg p.o. daily. She will let me know in 2 weeks if the above regimen helped. She still having symptoms we can look into increasing the prednisone back to 5 mg.       Continue current meds   I will call with lab results and make further recommendations or adjustments if necessary. Discussed lifestyle modifications including Na restriction, low carb/fat diet, weight reduction and exercise (at least a walking program). ICD-10-CM ICD-9-CM    1. Adult onset hypothyroidism  E03.8 244.8    2. Essential hypertension  I10 401.9 CBC WITH AUTOMATED DIFF      METABOLIC PANEL, COMPREHENSIVE   3. PMR (polymyalgia rheumatica) (HCC)  M35.3 725 SED RATE (ESR)      DULoxetine (CYMBALTA) 30 mg capsule   4. Arteriosclerotic heart disease (ASHD)  I25.10 414.00          Follow-up and Dispositions    · Return in about 3 months (around 12/17/2020) for follow up. I have reviewed with the patient details of the assessment and plan and all questions were answered. Relevent patient education was performed. Verbal and/or written instructions (see AVS) provided. The most recent lab findings were reviewed with the patient. Plan was discussed with patient who verbally expressed understanding. An After Visit Summary was printed and given to the patient.     Rajwinder Marquez MD    Trial of weaning you off of steroids is 1 ESR

## 2020-09-17 NOTE — PATIENT INSTRUCTIONS
High Blood Pressure: Care Instructions Overview It's normal for blood pressure to go up and down throughout the day. But if it stays up, you have high blood pressure. Another name for high blood pressure is hypertension. Despite what a lot of people think, high blood pressure usually doesn't cause headaches or make you feel dizzy or lightheaded. It usually has no symptoms. But it does increase your risk of stroke, heart attack, and other problems. You and your doctor will talk about your risks of these problems based on your blood pressure. Your doctor will give you a goal for your blood pressure. Your goal will be based on your health and your age. Lifestyle changes, such as eating healthy and being active, are always important to help lower blood pressure. You might also take medicine to reach your blood pressure goal. 
Follow-up care is a key part of your treatment and safety. Be sure to make and go to all appointments, and call your doctor if you are having problems. It's also a good idea to know your test results and keep a list of the medicines you take. How can you care for yourself at home? Medical treatment · If you stop taking your medicine, your blood pressure will go back up. You may take one or more types of medicine to lower your blood pressure. Be safe with medicines. Take your medicine exactly as prescribed. Call your doctor if you think you are having a problem with your medicine. · Talk to your doctor before you start taking aspirin every day. Aspirin can help certain people lower their risk of a heart attack or stroke. But taking aspirin isn't right for everyone, because it can cause serious bleeding. · See your doctor regularly. You may need to see the doctor more often at first or until your blood pressure comes down. · If you are taking blood pressure medicine, talk to your doctor before you take decongestants or anti-inflammatory medicine, such as ibuprofen. Some of these medicines can raise blood pressure. · Learn how to check your blood pressure at home. Lifestyle changes · Stay at a healthy weight. This is especially important if you put on weight around the waist. Losing even 10 pounds can help you lower your blood pressure. · If your doctor recommends it, get more exercise. Walking is a good choice. Bit by bit, increase the amount you walk every day. Try for at least 30 minutes on most days of the week. You also may want to swim, bike, or do other activities. · Avoid or limit alcohol. Talk to your doctor about whether you can drink any alcohol. · Try to limit how much sodium you eat to less than 2,300 milligrams (mg) a day. Your doctor may ask you to try to eat less than 1,500 mg a day. · Eat plenty of fruits (such as bananas and oranges), vegetables, legumes, whole grains, and low-fat dairy products. · Lower the amount of saturated fat in your diet. Saturated fat is found in animal products such as milk, cheese, and meat. Limiting these foods may help you lose weight and also lower your risk for heart disease. · Do not smoke. Smoking increases your risk for heart attack and stroke. If you need help quitting, talk to your doctor about stop-smoking programs and medicines. These can increase your chances of quitting for good. When should you call for help? Call  911 anytime you think you may need emergency care. This may mean having symptoms that suggest that your blood pressure is causing a serious heart or blood vessel problem. Your blood pressure may be over 180/120. For example, call 911 if: 
  · You have symptoms of a heart attack. These may include: 
? Chest pain or pressure, or a strange feeling in the chest. 
? Sweating. ? Shortness of breath. ? Nausea or vomiting. ? Pain, pressure, or a strange feeling in the back, neck, jaw, or upper belly or in one or both shoulders or arms. ? Lightheadedness or sudden weakness. ? A fast or irregular heartbeat.  
  · You have symptoms of a stroke. These may include: 
? Sudden numbness, tingling, weakness, or loss of movement in your face, arm, or leg, especially on only one side of your body. ? Sudden vision changes. ? Sudden trouble speaking. ? Sudden confusion or trouble understanding simple statements. ? Sudden problems with walking or balance. ? A sudden, severe headache that is different from past headaches.  
  · You have severe back or belly pain. Do not wait until your blood pressure comes down on its own. Get help right away. Call your doctor now or seek immediate care if: 
  · Your blood pressure is much higher than normal (such as 180/120 or higher), but you don't have symptoms.  
  · You think high blood pressure is causing symptoms, such as: 
? Severe headache. 
? Blurry vision. Watch closely for changes in your health, and be sure to contact your doctor if: 
  · Your blood pressure measures higher than your doctor recommends at least 2 times. That means the top number is higher or the bottom number is higher, or both.  
  · You think you may be having side effects from your blood pressure medicine. Where can you learn more? Go to http://mehnaz-oscar.info/ Enter G591 in the search box to learn more about \"High Blood Pressure: Care Instructions. \" Current as of: December 16, 2019               Content Version: 12.6 © 8490-3906 [employer]. Care instructions adapted under license by [employer] (which disclaims liability or warranty for this information). If you have questions about a medical condition or this instruction, always ask your healthcare professional. [place] [address] any warranty or liability for your use of this information.

## 2020-09-21 ENCOUNTER — TELEPHONE (OUTPATIENT)
Dept: INTERNAL MEDICINE CLINIC | Age: 75
End: 2020-09-21

## 2020-09-21 DIAGNOSIS — M35.3 PMR (POLYMYALGIA RHEUMATICA) (HCC): Primary | ICD-10-CM

## 2020-09-21 RX ORDER — PREDNISONE 5 MG/1
TABLET ORAL
Qty: 90 TAB | Refills: 0 | Status: SHIPPED | OUTPATIENT
Start: 2020-09-21 | End: 2021-01-07 | Stop reason: SDUPTHER

## 2020-09-21 NOTE — TELEPHONE ENCOUNTER
Patient states she was instructed to stop gabapentin. Her pharmacist told her that she had to taper off this medication. . She took 1 cymbalta on Saturday night and has stopped taking it. She also wants to know if she should take bupropion and cymbalta since they are both depression meds.      [phone]

## 2020-09-22 ENCOUNTER — HOSPITAL ENCOUNTER (OUTPATIENT)
Dept: MAMMOGRAPHY | Age: 75
Discharge: HOME | End: 2020-09-22
Attending: INTERNAL MEDICINE
Payer: MEDICARE

## 2020-09-22 DIAGNOSIS — Z12.31 SCREENING MAMMOGRAM FOR HIGH-RISK PATIENT: ICD-10-CM

## 2020-09-22 PROCEDURE — 77063 BREAST TOMOSYNTHESIS BI: CPT

## 2020-09-22 NOTE — TELEPHONE ENCOUNTER
Spoke to patient and per Dr. Dorie Matias I told her to increase her prednisone to 5 mg on Tusdays Thursdays and Saturdays, and also to stop the Cymbalta until she is weaned off of the Gabapentin.

## 2020-10-12 ENCOUNTER — TELEPHONE (OUTPATIENT)
Dept: INTERNAL MEDICINE CLINIC | Age: 75
End: 2020-10-12

## 2020-10-12 NOTE — TELEPHONE ENCOUNTER
taking two gabapentin daily should she she lower or continue until her December appointment?  Please advise

## 2020-10-13 NOTE — TELEPHONE ENCOUNTER
Jessica Galvan MD  You 19 hours ago (1:49 PM)       How is her pain level? If it is well controlled, she can lower to 1 tablet a day and see how she feels.         Left message for patient to return call

## 2020-12-07 ENCOUNTER — TELEPHONE (OUTPATIENT)
Dept: INTERNAL MEDICINE CLINIC | Age: 75
End: 2020-12-07

## 2020-12-07 NOTE — TELEPHONE ENCOUNTER
Chana Harden MD  You 30 minutes ago (3:47 PM)       Can you please share with me how many mg of gabapentin and prednisone is the patient currently taking?     Message text

## 2020-12-07 NOTE — TELEPHONE ENCOUNTER
Patient called stating she is working towards coming off of gabapentin and prednisone so she can start symbalta. She has been able to ween down to 1 300mg tablet of gabapentin daily for a month now. She is still taking her prednisone has written. She would like to know what to do next. Does she get rid of the gabapentin or start halving the prednisone? Please advise.    CB: [phone]

## 2020-12-08 NOTE — TELEPHONE ENCOUNTER
Isac Fitch MD  You 17 hours ago (9:01 PM)       Continue current dose of prednisone and gabapentin.  Hold Cymbalta.    Please schedule follow-up visit with patient. Noelle Swanson discuss further regimen in office    Message text          Patient notified

## 2020-12-15 ENCOUNTER — OFFICE VISIT (OUTPATIENT)
Dept: INTERNAL MEDICINE CLINIC | Age: 75
End: 2020-12-15
Payer: MEDICARE

## 2020-12-15 VITALS
SYSTOLIC BLOOD PRESSURE: 108 MMHG | WEIGHT: 124 LBS | BODY MASS INDEX: 22.82 KG/M2 | RESPIRATION RATE: 14 BRPM | HEART RATE: 81 BPM | OXYGEN SATURATION: 97 % | DIASTOLIC BLOOD PRESSURE: 52 MMHG | HEIGHT: 62 IN | TEMPERATURE: 98.6 F

## 2020-12-15 DIAGNOSIS — M35.3 PMR (POLYMYALGIA RHEUMATICA) (HCC): Primary | ICD-10-CM

## 2020-12-15 DIAGNOSIS — E78.5 DYSLIPIDEMIA: ICD-10-CM

## 2020-12-15 DIAGNOSIS — I73.9 NEUROPATHY DUE TO PERIPHERAL VASCULAR DISEASE (HCC): ICD-10-CM

## 2020-12-15 DIAGNOSIS — I10 ESSENTIAL HYPERTENSION: ICD-10-CM

## 2020-12-15 DIAGNOSIS — E03.8 ADULT ONSET HYPOTHYROIDISM: ICD-10-CM

## 2020-12-15 DIAGNOSIS — G63 NEUROPATHY DUE TO PERIPHERAL VASCULAR DISEASE (HCC): ICD-10-CM

## 2020-12-15 DIAGNOSIS — I25.10 ARTERIOSCLEROTIC HEART DISEASE (ASHD): ICD-10-CM

## 2020-12-15 LAB
A-G RATIO,AGRAT: 1.4 RATIO
ALBUMIN SERPL-MCNC: 4.1 G/DL (ref 3.9–5.4)
ALP SERPL-CCNC: 68 U/L (ref 38–126)
ALT SERPL-CCNC: 19 U/L (ref 0–35)
ANION GAP SERPL CALC-SCNC: 8 MMOL/L
AST SERPL W P-5'-P-CCNC: 25 U/L (ref 14–36)
BILIRUB SERPL-MCNC: 0.3 MG/DL (ref 0.2–1.3)
BUN SERPL-MCNC: 17 MG/DL (ref 7–17)
BUN/CREATININE RATIO,BUCR: 21 RATIO
CALCIUM SERPL-MCNC: 9.8 MG/DL (ref 8.4–10.2)
CHLORIDE SERPL-SCNC: 108 MMOL/L (ref 98–107)
CO2 SERPL-SCNC: 29 MMOL/L (ref 22–32)
CREAT SERPL-MCNC: 0.8 MG/DL (ref 0.7–1.2)
ERYTHROCYTE [DISTWIDTH] IN BLOOD BY AUTOMATED COUNT: 13.4 %
GLOBULIN,GLOB: 3
GLUCOSE SERPL-MCNC: 165 MG/DL (ref 65–105)
HCT VFR BLD AUTO: 39.8 % (ref 37–51)
HGB BLD-MCNC: 12.7 G/DL (ref 12–18)
MCH RBC QN AUTO: 34 PG (ref 26–32)
MCHC RBC AUTO-ENTMCNC: 31.9 G/DL (ref 30–36)
MCV RBC AUTO: 106.3 FL (ref 80–97)
PLATELET # BLD AUTO: 267 K/UL (ref 140–440)
POTASSIUM SERPL-SCNC: 4.5 MMOL/L (ref 3.6–5)
PROT SERPL-MCNC: 7.1 G/DL (ref 6.3–8.2)
RBC # BLD AUTO: 3.74 M/UL (ref 4.2–6.3)
SED RATE (ESR): 24 MM/HR (ref 0–20)
SODIUM SERPL-SCNC: 145 MMOL/L (ref 137–145)
TSH SERPL DL<=0.05 MIU/L-ACNC: 2.94 UIU/ML (ref 0.34–5.6)
WBC # BLD AUTO: 8.5 K/UL (ref 4.1–10.9)

## 2020-12-15 PROCEDURE — 84443 ASSAY THYROID STIM HORMONE: CPT | Performed by: INTERNAL MEDICINE

## 2020-12-15 PROCEDURE — 3288F FALL RISK ASSESSMENT DOCD: CPT | Performed by: INTERNAL MEDICINE

## 2020-12-15 PROCEDURE — 99214 OFFICE O/P EST MOD 30 MIN: CPT | Performed by: INTERNAL MEDICINE

## 2020-12-15 PROCEDURE — 80053 COMPREHEN METABOLIC PANEL: CPT | Performed by: INTERNAL MEDICINE

## 2020-12-15 PROCEDURE — 85651 RBC SED RATE NONAUTOMATED: CPT | Performed by: INTERNAL MEDICINE

## 2020-12-15 PROCEDURE — G8427 DOCREV CUR MEDS BY ELIG CLIN: HCPCS | Performed by: INTERNAL MEDICINE

## 2020-12-15 PROCEDURE — G8420 CALC BMI NORM PARAMETERS: HCPCS | Performed by: INTERNAL MEDICINE

## 2020-12-15 PROCEDURE — 85027 COMPLETE CBC AUTOMATED: CPT | Performed by: INTERNAL MEDICINE

## 2020-12-15 PROCEDURE — 1100F PTFALLS ASSESS-DOCD GE2>/YR: CPT | Performed by: INTERNAL MEDICINE

## 2020-12-15 PROCEDURE — G8754 DIAS BP LESS 90: HCPCS | Performed by: INTERNAL MEDICINE

## 2020-12-15 PROCEDURE — G8752 SYS BP LESS 140: HCPCS | Performed by: INTERNAL MEDICINE

## 2020-12-15 PROCEDURE — G8536 NO DOC ELDER MAL SCRN: HCPCS | Performed by: INTERNAL MEDICINE

## 2020-12-15 PROCEDURE — G8432 DEP SCR NOT DOC, RNG: HCPCS | Performed by: INTERNAL MEDICINE

## 2020-12-15 PROCEDURE — 1090F PRES/ABSN URINE INCON ASSESS: CPT | Performed by: INTERNAL MEDICINE

## 2020-12-15 RX ORDER — LISINOPRIL 40 MG/1
40 TABLET ORAL DAILY
Qty: 90 TAB | Refills: 3 | Status: SHIPPED | OUTPATIENT
Start: 2020-12-15 | End: 2021-12-01 | Stop reason: SDUPTHER

## 2020-12-15 NOTE — PROGRESS NOTES
ESR has increased to 24. Continue prednisone 5 mg Tuesday, Thursday and Saturday. Start Cymbalta. Referral for rheumatology has been placed.

## 2020-12-15 NOTE — PROGRESS NOTES
Isak Sethi is a 67 y.o. female presenting for Medication Evaluation (3 mo fu)  . 1. Have you been to the ER, urgent care clinic since your last visit? Hospitalized since your last visit? No    2. Have you seen or consulted any other health care providers outside of the [address] [place] [address] since your last visit? Include any pap smears or colon screening. No    Fall Risk Assessment, last 12 mths 6/18/2020   Able to walk? Yes   Fall in past 12 months? Yes   Fall with injury? No   Number of falls in past 12 months 1   Fall Risk Score 1         Abuse Screening Questionnaire 6/18/2020   Do you ever feel afraid of your partner? N   Are you in a relationship with someone who physically or mentally threatens you? N   Is it safe for you to go home? Y       3 most recent PHQ Screens 6/18/2020   Little interest or pleasure in doing things Not at all   Feeling down, depressed, irritable, or hopeless Not at all   Total Score PHQ 2 0   Trouble falling or staying asleep, or sleeping too much Several days   Feeling tired or having little energy More than half the days   Poor appetite, weight loss, or overeating Not at all   Feeling bad about yourself - or that you are a failure or have let yourself or your family down Not at all   Trouble concentrating on things such as school, work, reading, or watching TV Several days   Moving or speaking so slowly that other people could have noticed; or the opposite being so fidgety that others notice Not at all   Thoughts of being better off dead, or hurting yourself in some way Not at all   PHQ 9 Score 4   How difficult have these problems made it for you to do your work, take care of your home and get along with others Not difficult at all       There are no discontinued medications.

## 2020-12-15 NOTE — PROGRESS NOTES
Carole Kilgore is a 67 y.o. female and presents with Medication Evaluation (3 mo fu)      Subjective:  Patient comes in today for follow-up of her polymyalgia rheumatica. Patient comes in today for follow-up of her polymyalgia rheumatica and discussion of meds. Currently she is taking prednisone 5 mg in the morning Tuesday, Thursday and Saturday and 300mg of Gabapentin nightly. She does have pain and stiffness in her bilateral shoulder however it is better. She is trying to wean herself off of gabapentin since the 600 mg twice daily dose was not helping her much anyways. ESR was 13 last office visit. She continues to have hair fall. Followed up with dermatologist who gave her some violent shampoo however that does not help with the symptoms. Most likely her symptoms are due to being on chronic steroids. Plan is to start on Cymbalta however she wanted to wean herself off of gabapentin and then initiated. History of mild depression anxiety on Wellbutrin. She does have a history of coronary artery disease s/p stents, peripheral vascular disease and CVD s/p endarterectomy. She remains on aspirin, statin and cilostazol. Recap-Her TSH last visit was low and she was instructed to decrease her dose of levothyroxine to 75 mcg from 88. She reports she has been taking the 75 mcg from the past 1 month. Her cold intolerance and hair loss seems to have improved. She has polymyalgia rheumatica and she was wondering if her steroid could completely be weaned off. Currently she is taking 5 mg. Her most recent ESR bumped up to 21. Denies any side effects, fractures or mood changes from being on chronic steroids. She does not have any history of hyperglycemia or diabetes. She was referred to [address] [place] [address] for chronic left shoulder pain and received a steroid shot for rotator cuff arthropathy /glenohumeral osteoarthritis.   She is also been referred to physical therapy and reports it has really helped.     Past Medical History:   Diagnosis Date    Adult onset hypothyroidism 8/23/2017    Anemia 8/23/2017    Anxiety 8/23/2017    Arteriosclerotic heart disease (ASHD) 8/23/2017    Arthritis     back, hip right, neck    CAD (coronary artery disease)          Cervical spondylosis 8/23/2017    Chronic pain     spinal stenosis, bulging disk and bone spurs in back    Claudication in peripheral vascular disease ([address] 8/23/2017    CVD (cerebrovascular disease)     S/P right CEA    Depression     Dyslipidemia 8/23/2017    Familial mitral valve prolapse 8/23/2017    GERD (gastroesophageal reflux disease)     Hyperlipidemia     Hypertension     Hypothyroidism     Lumbar spinal stenosis     Macrocytosis 8/23/2017    Menopause     Nausea & vomiting     surgery related, severe constipation    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 ([address] 8/23/2017    Osteopenia 8/23/2017    Osteoporosis     Other ill-defined conditions(799.89)     severe constipation from pain medication    PVD (peripheral vascular disease) ([address]     S/P stent Right CFA and Left iliac    Stroke ([address]     Temporomandibular joint pain dysfunction syndrome 8/23/2017    Thyroid disease     TIA (transient ischemic attack)     Tobacco user 8/23/2017    Trigger finger 8/23/2017     Past Surgical History:   Procedure Laterality Date    BYPASS GRAFT OTHR,FEM-POP       RIGHT FEMORAL-POPLITEAL BYPASS  WITH POLYTETRA-FL UOROETHYLENE GRAFT     CARDIAC SURG PROCEDURE UNLIST      stents x2    HX CAROTID ENDARTERECTOMY      right cea    HX OTHER SURGICAL Right     excision melanoma right arm    VASCULAR SURGERY PROCEDURE UNLIST      femoral stent - left    VASCULAR SURGERY PROCEDURE UNLIST      X5 right femoral bypass     Allergies   Allergen Reactions    Neomycin Sulfate Unknown (comments)     Current Outpatient Medications   Medication Sig Dispense Refill    lisinopriL (PRINIVIL, ZESTRIL) 40 mg tablet Take 1 Tab by mouth daily. 90 Tab 3    predniSONE (DELTASONE) 5 mg tablet 1 Tablet  and Saturday 90 Tab 0    diclofenac (VOLTAREN) 1 % gel Apply  to affected area four (4) times daily as needed for Pain. 1 Each 3    cilostazoL (PLETAL) 100 mg tablet TAKE 1 TABLET TWICE A  Tab 3    atenoloL (TENORMIN) 50 mg tablet TAKE 1 TABLET NIGHTLY 90 Tab 3    levothyroxine (SYNTHROID) 75 mcg tablet TAKE 1 TABLET DAILY BEFORE BREAKFAST FOR A CONDITION WITH LOW THYROID HORMONE LEVELS 90 Tab 3    atorvastatin (LIPITOR) 40 mg tablet Take 1 Tab by mouth daily. 90 Tab 3    turmeric/turmeric ext/pepr ext (TURMERIC-TURMERIC EXT-PEPPER) 500-3 mg cap Take  by mouth.  buPROPion SR (WELLBUTRIN SR) 150 mg SR tablet TAKE 1 TABLET DAILY 90 Tab 3    amLODIPine (NORVASC) 5 mg tablet Take 1 Tab by mouth daily. 90 Tab 3    multivit-min/iron/folic/lutein (CENTRUM SILVER WOMEN PO) Take  by mouth.  omeprazole (PRILOSEC) 20 mg capsule TAKE 1 CAPSULE DAILY 90 Cap 3    peg 400-propylene glycol (SYSTANE, PROPYLENE GLYCOL,) 0.4-0.3 % drop Administer 1 Drop to both eyes as needed.  DENTA 5000 PLUS 1.1 % crea Apply at night  5    valACYclovir (VALTREX) 1 gram tablet Take 2 Tabs by mouth two (2) times a day. 2 initially and 2 in 12 hr for fever blisters 8 Tab 5    ascorbic acid (VITAMIN C) 500 mg tablet Take 500 mg by mouth two (2) times a day.  aspirin (ASPIRIN) 325 mg tablet Take 325 mg by mouth daily.  multivitamin, stress formula (STRESS TAB) tablet Take 1 Tab by mouth daily.        Social History     Socioeconomic History    Marital status:      Spouse name: Not on file    Number of children: Not on file    Years of education: Not on file    Highest education level: Not on file   Tobacco Use    Smoking status: Former Smoker     Packs/day: 1.00     Years: 35.00     Pack years: 35.00     Last attempt to quit: 2008     Years since quittin.9    Smokeless tobacco: Never Used   Substance and Sexual Activity    Alcohol use: Yes     Alcohol/week: 6.0 standard drinks     Types: 6 Glasses of wine per week     Comment: occasionally    Drug use: No     Types: Prescription, OTC     Family History   Problem Relation Age of Onset   [address] [facility] [address] Migraines Mother     Heart Disease Father     Hypertension Father     Lung Disease Father     Cancer Father         H&N    Hypertension Sister     Diabetes Sister     Heart Disease Brother     Hypertension Brother     Hypertension Sister     Cancer Sister         breast    Breast Cancer Sister 54    Hypertension Sister        Review of Systems  ROS is unremarkable except for ones highlighted in bold.    Constitutional: negative for fevers, chills, anorexia and weight loss  Eyes:   negative for visual disturbance and irritation  ENT:   negative for tinnitus,sore throat,nasal congestion,ear pain,hoarseness  Respiratory:  negative for cough, hemoptysis, dyspnea,wheezing  CV:   negative for chest pain, palpitations, lower extremity edema  GI:   negative for nausea, vomiting, diarrhea, abdominal pain,melena  Endo:               negative for polyuria,polydipsia,polyphagia,heat intolerance, hair loss  Genitourinary: negative for frequency, dysuria and hematuria  Integumentary: negative for rash and pruritus  Hematologic:  negative for easy bruising and gum/nose bleeding  Musculoskel: negative for myalgias, arthralgias, back pain, muscle weakness, joint pain  Neurological:  negative for headaches, dizziness, vertigo, memory problems and gait   Behavl/Psych: negative for feelings of anxiety, depression, mood changes  ROS otherwise negative     Objective:  Visit Vitals  BP (!) 108/52 (BP 1 Location: Left arm, BP Patient Position: Sitting)   Pulse 81   Temp 98.6 °F (37 °C)   Resp 14   Ht 5' 2\" (1.575 m)   Wt 124 lb (56.2 kg)   SpO2 97%   BMI 22.68 kg/m²     Physical Exam:   General appearance - alert, well appearing, and in no distress  Mental status - alert, oriented to person, place, and time  EYE-RODNEY, EOMI, fundi normal, corneas normal, no foreign bodies  ENT-ENT exam normal, no neck nodes or sinus tenderness  Nose - normal and patent, no erythema, discharge or polyps  Mouth - mucous membranes moist, pharynx normal without lesions  Neck - supple, no significant adenopathy   Chest - clear to auscultation, no wheezes, rales or rhonchi, symmetric air entry   Heart - normal rate, regular rhythm, normal S1, S2, no murmurs, rubs, clicks or gallops   Abdomen - soft, nontender, nondistended, no masses or organomegaly  Lymph- no adenopathy palpable  Ext-peripheral pulses normal, no pedal edema, no clubbing or cyanosis  Skin-Warm and dry. no hyperpigmentation, vitiligo, or suspicious lesions  Neuro -alert, oriented, normal speech, no focal findings or movement disorder noted  Musculoskeletal- FROM, no bony abnormalities,  point tenderness, positive drop arm test    Lab Review:  Results for orders placed or performed in visit on 12/15/20   CBC W/O DIFF   Result Value Ref Range    WBC 8.5 4.1 - 10.9 K/uL    RBC 3.74 (L) 4.20 - 6.30 M/uL    HGB 12.7 12.0 - 18.0 g/dL    HCT 39.8 37.0 - 51.0 %    MCH 34.0 (H) 26.0 - 32.0 pg    MCHC 31.9 30.0 - 36.0 g/dL    .3 (H) 80.0 - 97.0 fL    RDW 13.4 %    PLATELET 686.9 258.8 - 306.4 K/uL   METABOLIC PANEL, COMPREHENSIVE   Result Value Ref Range    Glucose 165 (H) 65 - 105 mg/dL    BUN 17.0 7.0 - 17.0 mg/dL    Creatinine 0.8 0.7 - 1.2 mg/dL    Sodium 145 137 - 145 mmol/L    Potassium 4.5 3.6 - 5.0 mmol/L    Chloride 108 (H) 98 - 107 mmol/L    CO2 29.0 22.0 - 32.0 mmol/L    Calcium 9.8 8.4 - 10.2 mg/dl    Protein, total 7.1 6.3 - 8.2 g/dL    Albumin 4.1 3.9 - 5.4 g/dL    ALT (SGPT) 19 0 - 35 U/L    AST (SGOT) 25.0 14.0 - 36.0 U/L    Alk.  phosphatase 68 38 - 126 U/L    Bilirubin, total 0.3 0.2 - 1.3 mg/dL    BUN/Creatinine ratio 21 Ratio    GFR est AA >60 mL/min/1.73m2    GFR est non-AA >60 mL/min/1.73m2    Globulin 3.00     A-G Ratio 1.4 Ratio    Anion gap 8 mmol/L   TSH 3RD GENERATION   Result Value Ref Range    TSH, 3rd generation 2.94 0.34 - 5.60 uIU/mL        Documenation Review:  Cervical spondylosis with more recent problems with neck pain. She has also had bilateral upper arm pain. She finds it difficult to raise her arms and has noted pain radiating into her occiput as well. She has had a mildly elevated sed rate in the past, raising the question of polymyalgia as the cause of some of her upper arm symptoms, but more likely it relates to a cervical radiculopathy. At the time of the visit we elected to check her sed rate to see if she might benefit from a more prolonged course of prednisone. History of lumbar spinal stenosis with bilateral radiculopathies, right greater than left, and chronic back pain, all of which are symptomatic at times, but not consistently. Hx of right coronary artery in December, 2009. At that time she underwent cardiac cath and had a 40% left anterior descending coronary artery lesion as well. She has had no recent problems with angina or CHF. She sees Dr. Kitty Ballesteros on a six to twelve month basis. Her last stress test was in May of 2015 and negative. She has not had any recent issues with anginal type symptoms or symptoms of congestive heart failure. PVD, status post left common artery iliac stent and angioplasty in April of 2007 and a right CFA and popliteal bypass graft in April of 2009 with revision of this in September of 2009. She had another revision on the right side in January, 2011 with a patch angioplasty and thrombectomy. She has also undergone a left SFA stent as of September, 2012. Then in August, 2013 she underwent right femoral to tibial peroneal trunk bypass grafting. She then underwent right iliac artery angioplasty and stent and left iliac angioplasty in June of 2014. Most recently she underwent aortogram with runoff In December, 2018 and underwent stenting of a 60% left common iliac artery stenosis. Dr. Rory Sullivan has been reluctant to do any further surgery as her limbs are not threatened. She does continue to have claudication at one-half to one block. She also has a mild ischemic neuropathy, particularly involving her great toes. CVD, status post right carotid endarterectomy in March, 2010. She has had stable carotid duplexes since. History of osteoporosis with previous treatment with Boniva     Assessment/Plan:    Diagnoses and all orders for this visit:    1. PMR (polymyalgia rheumatica) (HCC)  -     SED RATE (ESR)  -     CBC W/O DIFF  -     METABOLIC PANEL, COMPREHENSIVE  -     REFERRAL TO RHEUMATOLOGY    2. 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 ([facility]    3. Essential hypertension  -     SED RATE (ESR)  -     METABOLIC PANEL, COMPREHENSIVE    4. Arteriosclerotic heart disease (ASHD)    5. Dyslipidemia    6. Adult onset hypothyroidism  -     TSH 3RD GENERATION    Other orders  -     lisinopriL (PRINIVIL, ZESTRIL) 40 mg tablet; Take 1 Tab by mouth daily. Continue prednisone 5 mg every T, TH, SAT.,  Gabapentin 300 mg p.o. at night. Start Cymbalta 30 mg p.o. daily. Refer to rheumatology    Continue current meds   I will call with lab results and make further recommendations or adjustments if necessary. Discussed lifestyle modifications including Na restriction, low carb/fat diet, weight reduction and exercise (at least a walking program). ICD-10-CM ICD-9-CM    1. PMR (polymyalgia rheumatica) (HCC)  M35.3 725 SED RATE (ESR)      CBC W/O DIFF      METABOLIC PANEL, COMPREHENSIVE      REFERRAL TO RHEUMATOLOGY   2. 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 (HCC)  I73.9 443.9     G63 357.4    3. Essential hypertension  I10 401.9 SED RATE (ESR)      METABOLIC PANEL, COMPREHENSIVE   4. Arteriosclerotic heart disease (ASHD)  I25.10 414.00    5. Dyslipidemia  E78.5 272.4    6.  Adult onset hypothyroidism  E03.8 244.8 TSH 3RD GENERATION         Follow-up and Dispositions    · Return in about 3 months (around 3/15/2021) for follow up. I have reviewed with the patient details of the assessment and plan and all questions were answered. Relevent patient education was performed. Verbal and/or written instructions (see AVS) provided. The most recent lab findings were reviewed with the patient. Plan was discussed with patient who verbally expressed understanding. An After Visit Summary was printed and given to the patient.     China Whittaker MD    Trial of weaning you off of steroids is 1 ESR

## 2020-12-15 NOTE — PATIENT INSTRUCTIONS
Polymyalgia Rheumatica: Care Instructions  Your Care Instructions  Polymyalgia rheumatica causes pain and swelling in joints and muscles, mainly in the hips, neck, and shoulders. Pain and swelling may be worse in the morning. This condition can occur quickly and often lasts for a year or two. Your doctor will treat you with medicine to reduce swelling. Your symptoms should get much better in 1 to 3 days and go away in 2 to 4 weeks. Still, you may need to take medicine to prevent it from coming back. You may be on the medicine for 1 to 2 years or longer. Some people who have this also get giant cell arteritis (temporal arteritis), which causes swelling of some blood vessels in the head. Tell your doctor if you have any headaches, jaw pain, or tightness or tenderness along the temple or scalp. This condition can cause blindness if it is not treated. Tell your doctor if you have problems with your vision, including blurring or seeing double. Follow-up care is a key part of your treatment and safety. Be sure to make and go to all appointments, and call your doctor if you are having problems. It's also a good idea to know your test results and keep a list of the medicines you take. How can you care for yourself at home? · Take your medicines exactly as prescribed. Call your doctor if you think you are having a problem with your medicine. You may get medicines to reduce pain and to keep your bones from getting thin. · Take anti-inflammatory medicines to reduce pain, if your doctor recommends them. These include ibuprofen (Advil, Motrin) and naproxen (Aleve). Read and follow all instructions on the label. · Eat a healthy diet. Make sure to drink milk and eat dairy products, such as low-fat cheese and yogurt. Ask your doctor how much calcium you need. If you cannot eat dairy products or you do not get enough calcium from food, you may take pills. · Do gentle weight lifting to protect your bones.  This is very important for women who have gone through menopause. · Do not smoke or allow others to smoke around you. If you need help quitting, talk to your doctor about stop-smoking programs and medicines. These can increase your chances of quitting for good. When should you call for help? Call your doctor now or seek immediate medical care if:    · You have a headache, jaw pain, or problems seeing. Watch closely for changes in your health, and be sure to contact your doctor if:    · Your joint and muscle pain or stiffness gets worse.     · You have side effects from your corticosteroid medicine, such as:  ? Signs of diabetes (feeling thirsty all the time, needing to urinate often). ? Signs of infection (fever, chills, cough, burning during urination, severe sore throat, or skin infection). ? A large weight gain. ? Mood changes. ? Trouble sleeping. ? Bruising easily.     · You have any other problems with your medicine.     · You do not get better as expected. Where can you learn more? Go to http://www.gray.com/  Enter M396 in the search box to learn more about \"Polymyalgia Rheumatica: Care Instructions. \"  Current as of: December 9, 2019               Content Version: 12.6  © 7156-4177 [employer]. Care instructions adapted under license by [employer] (which disclaims liability or warranty for this information). If you have questions about a medical condition or this instruction, always ask your healthcare professional. [place] [address] any warranty or liability for your use of this information.

## 2020-12-16 ENCOUNTER — TELEPHONE (OUTPATIENT)
Dept: INTERNAL MEDICINE CLINIC | Age: 75
End: 2020-12-16

## 2020-12-16 DIAGNOSIS — I10 ESSENTIAL HYPERTENSION: ICD-10-CM

## 2020-12-16 RX ORDER — BUPROPION HYDROCHLORIDE 150 MG/1
TABLET, EXTENDED RELEASE ORAL
Qty: 90 TAB | Refills: 3 | Status: SHIPPED | OUTPATIENT
Start: 2020-12-16 | End: 2021-12-01 | Stop reason: SDUPTHER

## 2020-12-16 RX ORDER — OMEPRAZOLE 20 MG/1
CAPSULE, DELAYED RELEASE ORAL
Qty: 90 CAP | Refills: 3 | Status: SHIPPED | OUTPATIENT
Start: 2020-12-16 | End: 2022-05-31 | Stop reason: SDUPTHER

## 2020-12-16 RX ORDER — AMLODIPINE BESYLATE 5 MG/1
5 TABLET ORAL DAILY
Qty: 90 TAB | Refills: 3 | Status: SHIPPED | OUTPATIENT
Start: 2020-12-16 | End: 2021-03-17

## 2020-12-16 NOTE — TELEPHONE ENCOUNTER
Spoke with patient and her derm has not started her however I advised her if she is interested the have her dermatologist call Dr. Eda Membreno and they can speak.  Also advised we would need to look at her K+ closely if started as it was on the higher side

## 2020-12-16 NOTE — TELEPHONE ENCOUNTER
Henrietta Leventhal, MD  You 1 hour ago (2:36 PM)     Upon review, noted that her potassium is been on the higher side.  I would recommend caution/close follow-up of her potassium levels if her dermatologist started her on spironolactone.     Message text        Henrietta Leventhal, MD  You 1 hour ago (2:36 PM)     Dermatologist is welcome to call me anytime    Message text

## 2020-12-16 NOTE — TELEPHONE ENCOUNTER
----- Message from Lisa Rodriguez MD sent at 12/15/2020  5:18 PM EST -----  ESR has increased to 24. Continue prednisone 5 mg Tuesday, Thursday and Saturday. Start Cymbalta. Referral for rheumatology has been placed.

## 2020-12-16 NOTE — TELEPHONE ENCOUNTER
Patient notified and states she spoke with her dermatologist and  she recommended she may want to talk with you and switch one of her BP meds to Spirolactone to help her hair falling out.  Please advise

## 2020-12-16 NOTE — TELEPHONE ENCOUNTER
Last Refill: 1/2/2020  Last visit:12/15/2020    NOV: 3/17/2021    Requested Prescriptions     Pending Prescriptions Disp Refills    buPROPion SR (WELLBUTRIN SR) 150 mg SR tablet 90 Tab 3     Sig: TAKE 1 TABLET DAILY    amLODIPine (NORVASC) 5 mg tablet 90 Tab 3     Sig: Take 1 Tab by mouth daily.     omeprazole (PRILOSEC) 20 mg capsule 90 Cap 3     Sig: TAKE 1 CAPSULE DAILY

## 2021-01-07 ENCOUNTER — TELEPHONE (OUTPATIENT)
Dept: INTERNAL MEDICINE CLINIC | Age: 76
End: 2021-01-07

## 2021-01-07 DIAGNOSIS — M35.3 PMR (POLYMYALGIA RHEUMATICA) (HCC): ICD-10-CM

## 2021-01-07 RX ORDER — PREDNISONE 5 MG/1
TABLET ORAL
Qty: 90 TAB | Refills: 0 | Status: SHIPPED | OUTPATIENT
Start: 2021-01-07 | End: 2021-03-17 | Stop reason: SDUPTHER

## 2021-01-07 RX ORDER — DULOXETIN HYDROCHLORIDE 30 MG/1
30 CAPSULE, DELAYED RELEASE ORAL DAILY
Qty: 90 CAP | Refills: 1 | Status: SHIPPED | OUTPATIENT
Start: 2021-01-07 | End: 2021-03-17 | Stop reason: SDUPTHER

## 2021-01-07 NOTE — TELEPHONE ENCOUNTER
Marichuy Tracey MD  You 11 minutes ago (4:31 PM)     Prescription to [facility]    Message text       Marichuy Tracey MD  You 12 minutes ago (4:30 PM)     I am glad she is doing well on Cymbalta and prednisone.  I would like her to continue current regimen.  I will send in refill.     Message text       patient notified of recommendation

## 2021-01-07 NOTE — TELEPHONE ENCOUNTER
Currently taking one Cymbalta daily and one prednisone 5mg Tuesday Thursday and Saturday . Doing well she wants to know if you want to make any changes? Please advise     She did state that she had some side affects the first week of muscle aches and nausea but that has subsided. Also she will need a refill if you want to continue at this dose.

## 2021-01-07 NOTE — TELEPHONE ENCOUNTER
Patient is calling to give a report on how she is doing on the new medicine. She would like to speak to Dagmar Trimble or Dr. Dee Richard.   CB: [phone]

## 2021-01-18 ENCOUNTER — OFFICE VISIT (OUTPATIENT)
Dept: RHEUMATOLOGY | Age: 76
End: 2021-01-18
Payer: MEDICARE

## 2021-01-18 ENCOUNTER — PATIENT MESSAGE (OUTPATIENT)
Dept: FAMILY MEDICINE CLINIC | Age: 76
End: 2021-01-18

## 2021-01-18 VITALS
WEIGHT: 117.8 LBS | RESPIRATION RATE: 16 BRPM | OXYGEN SATURATION: 97 % | DIASTOLIC BLOOD PRESSURE: 57 MMHG | BODY MASS INDEX: 21.55 KG/M2 | HEART RATE: 92 BPM | TEMPERATURE: 96.8 F | SYSTOLIC BLOOD PRESSURE: 94 MMHG

## 2021-01-18 DIAGNOSIS — G62.9 PERIPHERAL POLYNEUROPATHY: ICD-10-CM

## 2021-01-18 DIAGNOSIS — M35.3 POLYMYALGIA RHEUMATICA (HCC): ICD-10-CM

## 2021-01-18 DIAGNOSIS — D75.89 MACROCYTOSIS: Primary | ICD-10-CM

## 2021-01-18 DIAGNOSIS — D52.9 ANEMIA DUE TO FOLIC ACID DEFICIENCY, UNSPECIFIED DEFICIENCY TYPE: ICD-10-CM

## 2021-01-18 PROCEDURE — G8752 SYS BP LESS 140: HCPCS | Performed by: INTERNAL MEDICINE

## 2021-01-18 PROCEDURE — 1090F PRES/ABSN URINE INCON ASSESS: CPT | Performed by: INTERNAL MEDICINE

## 2021-01-18 PROCEDURE — G8754 DIAS BP LESS 90: HCPCS | Performed by: INTERNAL MEDICINE

## 2021-01-18 PROCEDURE — 1100F PTFALLS ASSESS-DOCD GE2>/YR: CPT | Performed by: INTERNAL MEDICINE

## 2021-01-18 PROCEDURE — G8427 DOCREV CUR MEDS BY ELIG CLIN: HCPCS | Performed by: INTERNAL MEDICINE

## 2021-01-18 PROCEDURE — 3288F FALL RISK ASSESSMENT DOCD: CPT | Performed by: INTERNAL MEDICINE

## 2021-01-18 PROCEDURE — G8432 DEP SCR NOT DOC, RNG: HCPCS | Performed by: INTERNAL MEDICINE

## 2021-01-18 PROCEDURE — G0463 HOSPITAL OUTPT CLINIC VISIT: HCPCS | Performed by: INTERNAL MEDICINE

## 2021-01-18 PROCEDURE — G8536 NO DOC ELDER MAL SCRN: HCPCS | Performed by: INTERNAL MEDICINE

## 2021-01-18 PROCEDURE — 99205 OFFICE O/P NEW HI 60 MIN: CPT | Performed by: INTERNAL MEDICINE

## 2021-01-18 PROCEDURE — G8420 CALC BMI NORM PARAMETERS: HCPCS | Performed by: INTERNAL MEDICINE

## 2021-01-18 NOTE — LETTER
1/25/2021    Patient: Ali Hamman   YOB: 1945   Date of Visit: 1/18/2021     Aidee Lema MD  [address]  [address]  [place] [employer]    Dear Aidee Lema MD,      Thank you for referring Ms. Mars Anderson to [facility] for evaluation. My notes for this consultation are attached. If you have questions, please do not hesitate to call me. I look forward to following your patient along with you.       Sincerely,    Harleen Orellana MD

## 2021-01-18 NOTE — PROGRESS NOTES
Chief Complaint   Patient presents with    Joint Pain     1. Have you been to the ER, urgent care clinic since your last visit? Hospitalized since your last visit? No    2. Have you seen or consulted any other health care providers outside of the [address] [place] [address] since your last visit? Include any pap smears or colon screening.  No

## 2021-01-18 NOTE — PROGRESS NOTES
REASON FOR VISIT:   Catrina Medina is a 67 y.o. female with history of hypertension, osteopenia, and hypothyroidism who is being referred to [facility] at the request of Dr. Sydney Shaw, regarding a diagnosis of polymyalgia rheumatica. HISTORY OF PRESENT ILLNESS    March 2019 developed bilateral shoulder pain, difficulty lifting her hands above her shoulders. Hasn't had significant thigh soreness. Wasn't having any headaches, visual changes, night sweats, or weight loss. Started on prednisone with immediate dramatic improvement. With weaning prednisone, would develop recrudescence of the shoulder and upper arm pain. Now on prednisone 5mg Tuesday, Thursday, and Saturday since November. Shoulders hurt more currently. Had previously been going to PT, can't go to pool classes during COVID so isn't exercising like she was. Has noticed hair thinning diffusely, comes out in clumps, pony tail not as thick as before. Switched from gabapentin to duloxetine, during the transition prednisone had been increased. Last year fell and irritated both shoulders, attributes to clumsiness--once tripped over a stool while carrying laundry, another time was reaching for shoes while sitting and carpet slipped from under her and she developed symptomatic rotator cuff tendinopathy for which she completed PT. Takes Tylenol for pain is only once every 2 weeks, when she is more active such as gardening or washing the floor. Has been advised to avoid NSAIDs. Has had paralumbar low back pain every morning, stays in the low back. Also h/o neck spasms. Has had multiple LE bypasses and stents for symptomatic claudication. Now takes daily aspirin and cilostazol. REVIEW OF SYSTEMS  A comprehensive review of systems was negative except for that written in the HPI.   A 10-point review of systems is per the new patient questionnaire, which has been reviewed extensively and scanned into the electronic medical record for future reference. Review of systems is as above and is otherwise negative. ALLERGIES  Neomycin sulfate    MEDICATIONS  Current Outpatient Medications   Medication Sig    predniSONE (DELTASONE) 5 mg tablet 1 Tablet Tuesday Thursday and Saturday    DULoxetine (CYMBALTA) 30 mg capsule Take 1 Cap by mouth daily for 90 days.  buPROPion SR (WELLBUTRIN SR) 150 mg SR tablet TAKE 1 TABLET DAILY    amLODIPine (NORVASC) 5 mg tablet Take 1 Tab by mouth daily.  omeprazole (PRILOSEC) 20 mg capsule TAKE 1 CAPSULE DAILY    lisinopriL (PRINIVIL, ZESTRIL) 40 mg tablet Take 1 Tab by mouth daily.  diclofenac (VOLTAREN) 1 % gel Apply  to affected area four (4) times daily as needed for Pain.  cilostazoL (PLETAL) 100 mg tablet TAKE 1 TABLET TWICE A DAY    atenoloL (TENORMIN) 50 mg tablet TAKE 1 TABLET NIGHTLY    levothyroxine (SYNTHROID) 75 mcg tablet TAKE 1 TABLET DAILY BEFORE BREAKFAST FOR A CONDITION WITH LOW THYROID HORMONE LEVELS    atorvastatin (LIPITOR) 40 mg tablet Take 1 Tab by mouth daily.  turmeric/turmeric ext/pepr ext (TURMERIC-TURMERIC EXT-PEPPER) 500-3 mg cap Take  by mouth.  multivit-min/iron/folic/lutein (CENTRUM SILVER WOMEN PO) Take  by mouth.  peg 400-propylene glycol (SYSTANE, PROPYLENE GLYCOL,) 0.4-0.3 % drop Administer 1 Drop to both eyes as needed.  DENTA 5000 PLUS 1.1 % crea Apply at night    valACYclovir (VALTREX) 1 gram tablet Take 2 Tabs by mouth two (2) times a day. 2 initially and 2 in 12 hr for fever blisters    ascorbic acid (VITAMIN C) 500 mg tablet Take 500 mg by mouth two (2) times a day.  aspirin (ASPIRIN) 325 mg tablet Take 325 mg by mouth daily.  multivitamin, stress formula (STRESS TAB) tablet Take 1 Tab by mouth daily. No current facility-administered medications for this visit.         PAST MEDICAL HISTORY  Past Medical History:   Diagnosis Date    Adult onset hypothyroidism 8/23/2017    Anemia 8/23/2017    Anxiety 8/23/2017    Arteriosclerotic heart disease (ASHD) 8/23/2017    Arthritis     back, hip right, neck    CAD (coronary artery disease)          Cervical spondylosis 8/23/2017    Chronic pain     spinal stenosis, bulging disk and bone spurs in back    Claudication in peripheral vascular disease ([address] 8/23/2017    CVD (cerebrovascular disease)     S/P right CEA    Depression     Dyslipidemia 8/23/2017    Familial mitral valve prolapse 8/23/2017    GERD (gastroesophageal reflux disease)     Hyperlipidemia     Hypertension     Hypothyroidism     Lumbar spinal stenosis     Macrocytosis 8/23/2017    Menopause     Nausea & vomiting     surgery related, severe constipation    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 ([address] 8/23/2017    Osteopenia 8/23/2017    Osteoporosis     Other ill-defined conditions(799.89)     severe constipation from pain medication    PVD (peripheral vascular disease) ([address]     S/P stent Right CFA and Left iliac    Stroke ([address]     Temporomandibular joint pain dysfunction syndrome 8/23/2017    Thyroid disease     TIA (transient ischemic attack)     Tobacco user 8/23/2017    Trigger finger 8/23/2017       FAMILY HISTORY  family history includes Breast Cancer (age of onset: 54) in her sister; Cancer in her father and sister; Diabetes in her sister; Heart Disease in her brother and father; Hypertension in her brother, father, sister, sister, and sister; Lung Disease in her father; Migraines in her mother. SOCIAL HISTORY  She  reports that she quit smoking about 13 years ago. She has a 35.00 pack-year smoking history. She has never used smokeless tobacco. She reports current alcohol use of about 6.0 standard drinks of alcohol per week. She reports that she does not use drugs. Social History     Social History Narrative    Not on file   Former secondary education .       DATA  Visit Vitals  BP (!) 94/57 (BP 1 Location: Left arm, BP Patient Position: Sitting)   Pulse 92   Temp 96.8 °F (36 °C) (Temporal)   Resp 16   Wt 117 lb 12.8 oz (53.4 kg)   SpO2 97%   BMI 21.55 kg/m²    Body mass index is 21.55 kg/m². No flowsheet data found. General:  The patient is well developed, well nourished, alert, and in no apparent distress. Eyes: Sclera are anicteric. No conjunctival injection. HEENT:  Oropharynx is clear. No oral ulcers. Adequate salivary pooling. No cervical or supraclavicular lymphadenopathy. Lungs:  Clear to auscultation bilaterally, without wheeze or stridor. Normal respiratory effort. Cor:  Regular rate and rhythm. No murmur rub or gallop. Abdomen: Soft, non-tender, without hepatomegaly or masses. Extremities: No calf tenderness or edema. Warm and well perfused. Skin:  No significant abnormalities. No petechial, purpuric, or psoriaform rashes   Neuro: Nonfocal, normal gait, no foot or wrist drop  Musculoskeletal:    A comprehensive musculoskeletal exam was performed for all joints of each upper and lower extremity and assessed for swelling, tenderness and range of motion. Results are documented as below:  No evidence of synovitis in the small joints of the hands, wrists, shoulders, elbows, hips, knees or ankles. Bilateral empty can and +Neer of shoulders. No distal deltoid tenderness, no thigh tenderness. Bilateral CMC squaring and subluxation. Labs   12/15/20: , CBC o/w WNL; CMP WNL; TSH 2.9 (WNL)    Imaging:  3/19/19 DXA: Lspine excluded 2/2 degenerative changes.  Findings:  Femoral Neck Left:  Bone mineral density (gm/cm2): 0.895  % of peak bone mass: 86  % for age matched controls: 118  T-score: -1.0  Z-score: 1.0  Total Hip Left:  Bone mineral density (gm/cm2): 0.927  % of peak bone mass: 92  % for age matched controls: 119  T-score: -0.6  Z-score: 1.2  33% Radius Right:  Bone mineral density (gm/cm2): 0.707  % of peak bone mass: 80  % for age matched controls: 103  T-score: -2.0  Z-score: 0.2  IMPRESSION  This patient is osteopenic using the [address] [employer] criteria  10 year probability of major osteoporotic fracture: 15.1%  10 year probability of hip fracture: 6.5%      ASSESSMENT AND PLAN  Ms. Ange Henriquez is a 67 y.o. female who presents for evaluation of polymyalgia with [id number] prednisone, limited most recently by shoulder pain and today e/o bilateral rotator cuff tendinopathies. She seems to be able to distinguish a difference in the character of her current vs her prior PMR pain, and as long as acute phase reactants remain low and no red flag symptoms such as headaches/visual changes/B symptoms emerge, then favor continued taper of her remaining prednisone. Concerning downtrend in blood pressure though no clear e/o infection or symptoms suggestive of acute coronary syndrome/incipient heart failure, urged her to hold her blood pressure medications and speak to her PCP immediately regarding blood pressure management plan. 1. Macrocytosis  - VITAMIN B12; Future  - SED RATE (ESR); Standing  - C REACTIVE PROTEIN, QT; Standing    2. Polymyalgia rheumatica (HCC)  - VITAMIN B12; Future  - SED RATE (ESR); Standing  - C REACTIVE PROTEIN, QT; Standing    3. Peripheral polyneuropathy  - VITAMIN B12; Future      Patient Instructions   1. Your blood pressure is low today, stop the amlodipine until you've spoken with your PCP, and give them a call tomorrow morning. 2. You do describe PMR from before, but I tend to agree that I think you have mostly rotator cuff-related pain at this point and osteoarthritis. 3. Resume physical therapy for the shoulders and low back    4. Labs ASAP and then 1 more time before next visit. 5. Once your blood pressure issues are better, go ahead and change prednisone to 2.5mg daily using your current tablets. I'm sending you 1mg tablets, so that after 2 weeks of 2.5mg daily, you can decrease to 2mg daily. Continue 2mg daily for 1 month, then decrease to 1mg daily until our next appointment.     6. Return in 6-8 weeks.        Orders Placed This Encounter    VITAMIN B12    SED RATE (ESR)    C REACTIVE PROTEIN, QT       Medications: I am having Doni Fast. Clemente Dyemanpreet maintain her aspirin, (multivitamin, stress formula), ascorbic acid (vitamin C), valACYclovir, Denta 5000 Plus, peg 400-propylene glycol, multivit-min/iron/folic/lutein (CENTRUM SILVER WOMEN PO), turmeric-turmeric ext-pepper, atorvastatin, levothyroxine, diclofenac, cilostazoL, atenoloL, lisinopriL, buPROPion SR, amLODIPine, omeprazole, predniSONE, and DULoxetine.     Follow up: 6-8 weeks    Bradley Martinez MD    Adult Rheumatology   [facility]  [facility] of [facility], [address] [place] [address]   Phone [phone]  Fax [phone]

## 2021-01-25 ENCOUNTER — HOSPITAL ENCOUNTER (OUTPATIENT)
Dept: LAB | Age: 76
Discharge: HOME | End: 2021-01-25
Payer: MEDICARE

## 2021-01-25 ENCOUNTER — DOCUMENTATION ONLY (OUTPATIENT)
Dept: INTERNAL MEDICINE CLINIC | Age: 76
End: 2021-01-25

## 2021-01-25 PROCEDURE — 82607 VITAMIN B-12: CPT

## 2021-01-25 NOTE — TELEPHONE ENCOUNTER
Can you please check your blood pressure and get back to me? Which antihypertensive I you on currently? Noted that at the rheumatology office her blood pressure was low. Are you having any lightheadedness/dizziness/dehydration?

## 2021-01-26 ENCOUNTER — TELEPHONE (OUTPATIENT)
Dept: INTERNAL MEDICINE CLINIC | Age: 76
End: 2021-01-26

## 2021-01-26 LAB — VIT B12 SERPL-MCNC: 785 PG/ML (ref 232–1245)

## 2021-01-26 NOTE — PROGRESS NOTES
Angelique Blair MD  You 17 minutes ago (4:35 PM)     I am glad your blood pressure is better today.  Discontinue amlodipine.  Continue lisinopril and atenolol at current doses.     Message text        Patient notified

## 2021-01-26 NOTE — PROGRESS NOTES
Spoke with patient and she states she is currently taking Lisinopril and Atenolol. She was advised to stop Amlodipine until she spoke with you. Her readings yesterday were 147/74 and 112/58. Today 126/65 and 133/92.  Please advise

## 2021-01-26 NOTE — PROGRESS NOTES
Dakota Hopkins MD  You Yesterday (8:12 AM)     Can you please check your blood pressure and get back to me?  Which antihypertensive I you on currently?  Noted that at the rheumatology office her blood pressure was low.  Are you having any lightheadedness/dizziness/dehydration?     Routing comment

## 2021-02-17 ENCOUNTER — HOSPITAL ENCOUNTER (OUTPATIENT)
Dept: LAB | Age: 76
Discharge: HOME | End: 2021-02-17
Payer: MEDICARE

## 2021-02-17 PROCEDURE — 85651 RBC SED RATE NONAUTOMATED: CPT

## 2021-02-17 PROCEDURE — 36415 COLL VENOUS BLD VENIPUNCTURE: CPT

## 2021-02-17 PROCEDURE — 86140 C-REACTIVE PROTEIN: CPT

## 2021-02-18 LAB
CRP SERPL-MCNC: <1 MG/L (ref 0–10)
ERYTHROCYTE [SEDIMENTATION RATE] IN BLOOD BY WESTERGREN METHOD: 20 MM/HR (ref 0–40)

## 2021-02-24 ENCOUNTER — TELEPHONE (OUTPATIENT)
Dept: RHEUMATOLOGY | Age: 76
End: 2021-02-24

## 2021-02-24 NOTE — TELEPHONE ENCOUNTER
----- Message from Federico Bardales sent at 2/24/2021  4:07 PM EST -----  Regarding: Dr. Kiran Khan Message/Vendor Calls    Caller's first and last name: Patient      Reason for call:   New lab orders       Callback required yes/no and why:  Yes. To advise  Best contact number(s):  [phone]         Details to clarify the request: Patient would like  Orders sent to [address] [place] [address], [address] [place] [address] Avenue  : [phone]   Alka Wilkins was ill and got off schedule.  New appointment is scheduled for  3/22/21        Shira Bardales

## 2021-02-24 NOTE — TELEPHONE ENCOUNTER
Patient had blood drawn 2/17/21. Does she need blood drawn again if so when?  States she has gotten off schedule

## 2021-02-24 NOTE — TELEPHONE ENCOUNTER
----- Message from [employer] sent at 2/24/2021  4:10 PM EST -----  Regarding: Dr. Landy Boudreaux (if not patient): n/a      Relationship of caller (if not patient): n/a      Best contact number(s):  [phone]        Name of medication and dosage if known: Prednisone 1 mg      Is patient out of this medication (yes/no): Yes      Pharmacy name: [facility]     Pharmacy listed in chart? (yes/no):  Yes   Pharmacy phone number:      Details to clarify the request:        [employer]

## 2021-02-25 DIAGNOSIS — E03.8 ADULT ONSET HYPOTHYROIDISM: ICD-10-CM

## 2021-02-25 DIAGNOSIS — M35.3 POLYMYALGIA RHEUMATICA (HCC): Primary | ICD-10-CM

## 2021-02-25 RX ORDER — PREDNISONE 1 MG/1
TABLET ORAL
Qty: 60 TAB | Refills: 2 | Status: SHIPPED | OUTPATIENT
Start: 2021-02-25 | End: 2021-03-17 | Stop reason: SDUPTHER

## 2021-02-25 NOTE — TELEPHONE ENCOUNTER
Pt notified that blood work that she has done will tide her over until her follow up visit in march per Dr. Collette العلي. Pt advised of the prednisone taper plan below and verbalized understanding.

## 2021-02-26 RX ORDER — ATORVASTATIN CALCIUM 40 MG/1
TABLET, FILM COATED ORAL
Qty: 90 TAB | Refills: 3 | Status: SHIPPED | OUTPATIENT
Start: 2021-02-26 | End: 2022-05-25 | Stop reason: SDUPTHER

## 2021-02-26 RX ORDER — LEVOTHYROXINE SODIUM 75 UG/1
TABLET ORAL
Qty: 90 TAB | Refills: 3 | Status: SHIPPED | OUTPATIENT
Start: 2021-02-26 | End: 2022-03-03 | Stop reason: SDUPTHER

## 2021-03-17 ENCOUNTER — OFFICE VISIT (OUTPATIENT)
Dept: INTERNAL MEDICINE CLINIC | Age: 76
End: 2021-03-17
Payer: MEDICARE

## 2021-03-17 VITALS
HEART RATE: 92 BPM | WEIGHT: 121 LBS | BODY MASS INDEX: 22.26 KG/M2 | RESPIRATION RATE: 14 BRPM | TEMPERATURE: 97.9 F | DIASTOLIC BLOOD PRESSURE: 70 MMHG | OXYGEN SATURATION: 100 % | SYSTOLIC BLOOD PRESSURE: 130 MMHG | HEIGHT: 62 IN

## 2021-03-17 DIAGNOSIS — M35.3 PMR (POLYMYALGIA RHEUMATICA) (HCC): ICD-10-CM

## 2021-03-17 DIAGNOSIS — G63 NEUROPATHY DUE TO PERIPHERAL VASCULAR DISEASE (HCC): ICD-10-CM

## 2021-03-17 DIAGNOSIS — E78.5 DYSLIPIDEMIA: ICD-10-CM

## 2021-03-17 DIAGNOSIS — I73.9 NEUROPATHY DUE TO PERIPHERAL VASCULAR DISEASE (HCC): ICD-10-CM

## 2021-03-17 DIAGNOSIS — I10 ESSENTIAL HYPERTENSION: Primary | ICD-10-CM

## 2021-03-17 DIAGNOSIS — M48.062 SPINAL STENOSIS OF LUMBAR REGION WITH NEUROGENIC CLAUDICATION: ICD-10-CM

## 2021-03-17 DIAGNOSIS — E03.8 ADULT ONSET HYPOTHYROIDISM: ICD-10-CM

## 2021-03-17 PROCEDURE — 99214 OFFICE O/P EST MOD 30 MIN: CPT | Performed by: INTERNAL MEDICINE

## 2021-03-17 PROCEDURE — G8754 DIAS BP LESS 90: HCPCS | Performed by: INTERNAL MEDICINE

## 2021-03-17 PROCEDURE — G8752 SYS BP LESS 140: HCPCS | Performed by: INTERNAL MEDICINE

## 2021-03-17 PROCEDURE — G8420 CALC BMI NORM PARAMETERS: HCPCS | Performed by: INTERNAL MEDICINE

## 2021-03-17 PROCEDURE — 1090F PRES/ABSN URINE INCON ASSESS: CPT | Performed by: INTERNAL MEDICINE

## 2021-03-17 PROCEDURE — 3288F FALL RISK ASSESSMENT DOCD: CPT | Performed by: INTERNAL MEDICINE

## 2021-03-17 PROCEDURE — 1100F PTFALLS ASSESS-DOCD GE2>/YR: CPT | Performed by: INTERNAL MEDICINE

## 2021-03-17 PROCEDURE — G8536 NO DOC ELDER MAL SCRN: HCPCS | Performed by: INTERNAL MEDICINE

## 2021-03-17 PROCEDURE — G8427 DOCREV CUR MEDS BY ELIG CLIN: HCPCS | Performed by: INTERNAL MEDICINE

## 2021-03-17 PROCEDURE — G8432 DEP SCR NOT DOC, RNG: HCPCS | Performed by: INTERNAL MEDICINE

## 2021-03-17 RX ORDER — PREDNISONE 1 MG/1
1 TABLET ORAL EVERY OTHER DAY
COMMUNITY
End: 2021-09-14 | Stop reason: ALTCHOICE

## 2021-03-17 RX ORDER — DULOXETIN HYDROCHLORIDE 60 MG/1
60 CAPSULE, DELAYED RELEASE ORAL DAILY
Qty: 30 CAP | Refills: 0 | Status: SHIPPED | OUTPATIENT
Start: 2021-03-17 | End: 2021-04-13 | Stop reason: SDUPTHER

## 2021-03-17 NOTE — PROGRESS NOTES
Kendra Mcnair is a 67 y.o. female and presents with Hypothyroidism (3 mo fu), Cholesterol Problem, and Medication Evaluation      Subjective:  Patient comes in today for follow-up of chronic neck problems. Patient was having low blood pressure and was advised to stop amlodipine and continue lisinopril and atenolol. Her blood pressures are more stable now. Repeat blood pressure checked in office today was 130/70. History of lumbar spinal stenosis with bilateral radiculopathies, right greater than left, and chronic back pain, reports persistent of symptoms. She was on gabapentin in the past however that provided only mild relief and she was switched to Cymbalta. She is not sure if that is helping or not. ?  IMPRESSION:   1. Unchanged degenerative levoscoliosis. 2. L4-L5 moderate central spinal canal stenosis. 3. Multilevel foraminal stenosis, moderate at L4-L5 and L5-S1. Hx polymyalgia rheumatica she has been seeing Dr. Fredericka Leventhal. She has been advised to taper prednisone. She was referred to [address] [place] [address] for chronic left shoulder pain and received a steroid shot for rotator cuff arthropathy /glenohumeral osteoarthritis. She is also been referred to physical therapy has plans to start back next month. Hypothyroidism-on replacement    History of mild depression anxiety on Wellbutrin. She does have a history of coronary artery disease s/p stents, peripheral vascular disease and CVD s/p endarterectomy. She remains on aspirin, statin and cilostazol.         Past Medical History:   Diagnosis Date    Adult onset hypothyroidism 8/23/2017    Anemia 8/23/2017    Anxiety 8/23/2017    Arteriosclerotic heart disease (ASHD) 8/23/2017    Arthritis     back, hip right, neck    CAD (coronary artery disease)          Cervical spondylosis 8/23/2017    Chronic pain     spinal stenosis, bulging disk and bone spurs in back    Claudication in peripheral vascular disease ([facility] [address] 8/23/2017    CVD (cerebrovascular disease)     S/P right CEA    Depression     Dyslipidemia 8/23/2017    Familial mitral valve prolapse 8/23/2017    GERD (gastroesophageal reflux disease)     Hyperlipidemia     Hypertension     Hypothyroidism     Lumbar spinal stenosis     Macrocytosis 8/23/2017    Menopause     Nausea & vomiting     surgery related, severe constipation    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 ([address] 8/23/2017    Osteopenia 8/23/2017    Osteoporosis     Other ill-defined conditions(799.89)     severe constipation from pain medication    PVD (peripheral vascular disease) ([address]     S/P stent Right CFA and Left iliac    Stroke ([address]     Temporomandibular joint pain dysfunction syndrome 8/23/2017    Thyroid disease     TIA (transient ischemic attack)     Tobacco user 8/23/2017    Trigger finger 8/23/2017     Past Surgical History:   Procedure Laterality Date    HX CAROTID ENDARTERECTOMY      right cea    HX OTHER SURGICAL Right     excision melanoma right arm    [place] BYPASS GRAFT OTHR,FEM-POP       RIGHT FEMORAL-POPLITEAL BYPASS  WITH POLYTETRA-FL UOROETHYLENE GRAFT     [place] CARDIAC SURG PROCEDURE UNLIST      stents x2    VASCULAR SURGERY PROCEDURE UNLIST      femoral stent - left    VASCULAR SURGERY PROCEDURE UNLIST      X5 right femoral bypass     Allergies   Allergen Reactions    Neomycin Sulfate Unknown (comments)     Current Outpatient Medications   Medication Sig Dispense Refill    predniSONE (DELTASONE) 1 mg tablet Take 1 mg by mouth daily.  DULoxetine (CYMBALTA) 60 mg capsule Take 1 Cap by mouth daily for 30 days.  30 Cap 0    atorvastatin (LIPITOR) 40 mg tablet TAKE 1 TABLET DAILY 90 Tab 3    levothyroxine (SYNTHROID) 75 mcg tablet TAKE 1 TABLET DAILY BEFORE BREAKFAST FOR A CONDITION WITH LOW THYROID HORMONE LEVELS 90 Tab 3    buPROPion SR (WELLBUTRIN SR) 150 mg SR tablet TAKE 1 TABLET DAILY 90 Tab 3    omeprazole (PRILOSEC) 20 mg capsule TAKE 1 CAPSULE DAILY 90 Cap 3    lisinopriL (PRINIVIL, ZESTRIL) 40 mg tablet Take 1 Tab by mouth daily. 90 Tab 3    diclofenac (VOLTAREN) 1 % gel Apply  to affected area four (4) times daily as needed for Pain. 1 Each 3    cilostazoL (PLETAL) 100 mg tablet TAKE 1 TABLET TWICE A  Tab 3    atenoloL (TENORMIN) 50 mg tablet TAKE 1 TABLET NIGHTLY 90 Tab 3    turmeric/turmeric ext/pepr ext (TURMERIC-TURMERIC EXT-PEPPER) 500-3 mg cap Take  by mouth.  multivit-min/iron/folic/lutein (CENTRUM SILVER WOMEN PO) Take  by mouth.  DENTA 5000 PLUS 1.1 % crea Apply at night  5    valACYclovir (VALTREX) 1 gram tablet Take 2 Tabs by mouth two (2) times a day. 2 initially and 2 in 12 hr for fever blisters 8 Tab 5    ascorbic acid (VITAMIN C) 500 mg tablet Take 500 mg by mouth two (2) times a day.  aspirin (ASPIRIN) 325 mg tablet Take 325 mg by mouth daily.  peg 400-propylene glycol (SYSTANE, PROPYLENE GLYCOL,) 0.4-0.3 % drop Administer 1 Drop to both eyes as needed.  multivitamin, stress formula (STRESS TAB) tablet Take 1 Tab by mouth daily. Social History     Socioeconomic History    Marital status:      Spouse name: Not on file    Number of children: Not on file    Years of education: Not on file    Highest education level: Not on file   Tobacco Use    Smoking status: Former Smoker     Packs/day: 1.00     Years: 35.00     Pack years: 35.00     Quit date: 2008     Years since quittin.1    Smokeless tobacco: Never Used   Substance and Sexual Activity    Alcohol use:  Yes     Alcohol/week: 6.0 standard drinks     Types: 6 Glasses of wine per week     Comment: occasionally    Drug use: No     Types: Prescription, OTC     Family History   Problem Relation Age of Onset   Sky Babcock Migraines Mother     Heart Disease Father     Hypertension Father     Lung Disease Father     Cancer Father         H&N    Hypertension Sister     Diabetes Sister     Heart Disease Brother     Hypertension Brother     Hypertension Sister     Cancer Sister         breast    Breast Cancer Sister 54    Hypertension Sister        Review of Systems  ROS is unremarkable except for ones highlighted in bold.    Constitutional: negative for fevers, chills, anorexia and weight loss  Eyes:   negative for visual disturbance and irritation  ENT:   negative for tinnitus,sore throat,nasal congestion,ear pain,hoarseness  Respiratory:  negative for cough, hemoptysis, dyspnea,wheezing  CV:   negative for chest pain, palpitations, lower extremity edema  GI:   negative for nausea, vomiting, diarrhea, abdominal pain,melena  Endo:               negative for polyuria,polydipsia,polyphagia,heat intolerance, hair loss  Genitourinary: negative for frequency, dysuria and hematuria  Integumentary: negative for rash and pruritus  Hematologic:  negative for easy bruising and gum/nose bleeding  Musculoskel: negative for myalgias, arthralgias, back pain, muscle weakness, joint pain  Neurological:  negative for headaches, dizziness, vertigo, memory problems and gait   Behavl/Psych: negative for feelings of anxiety, depression, mood changes  ROS otherwise negative     Objective:  Visit Vitals  /70 (BP 1 Location: Left upper arm, BP Patient Position: Sitting, BP Cuff Size: Adult)   Pulse 92   Temp 97.9 °F (36.6 °C)   Resp 14   Ht 5' 2\" (1.575 m)   Wt 121 lb (54.9 kg)   SpO2 100%   BMI 22.13 kg/m²     Physical Exam:   General appearance - alert, well appearing, and in no distress  Mental status - alert, oriented to person, place, and time  EYE-RODNEY, EOMI, fundi normal, corneas normal, no foreign bodies  ENT-ENT exam normal, no neck nodes or sinus tenderness  Nose - normal and patent, no erythema, discharge or polyps  Mouth - mucous membranes moist, pharynx normal without lesions  Neck - supple, no significant adenopathy   Chest - clear to auscultation, no wheezes, rales or rhonchi, symmetric air entry   Heart - normal rate, regular rhythm, normal S1, S2, no murmurs, rubs, clicks or gallops   Abdomen - soft, nontender, nondistended, no masses or organomegaly  Lymph- no adenopathy palpable  Ext-peripheral pulses normal, no pedal edema, no clubbing or cyanosis  Skin-Warm and dry. no hyperpigmentation, vitiligo, or suspicious lesions  Neuro -alert, oriented, normal speech, no focal findings or movement disorder noted  Musculoskeletal- FROM, no bony abnormalities,  point tenderness, positive drop arm test    Lab Review:  Results for orders placed or performed in visit on 01/18/21   VITAMIN B12   Result Value Ref Range    Vitamin B12 785 232 - 1,245 pg/mL   SED RATE (ESR)   Result Value Ref Range    Sed rate (ESR) 20 0 - 40 mm/hr   C REACTIVE PROTEIN, QT   Result Value Ref Range    C-Reactive Protein, Qt <1 0 - 10 mg/L        Documenation Review:  Cervical spondylosis with more recent problems with neck pain. She has also had bilateral upper arm pain. She finds it difficult to raise her arms and has noted pain radiating into her occiput as well. She has had a mildly elevated sed rate in the past, raising the question of polymyalgia as the cause of some of her upper arm symptoms, but more likely it relates to a cervical radiculopathy. At the time of the visit we elected to check her sed rate to see if she might benefit from a more prolonged course of prednisone. History of lumbar spinal stenosis with bilateral radiculopathies, right greater than left, and chronic back pain, all of which are symptomatic at times, but not consistently. Hx of right coronary artery in December, 2009. At that time she underwent cardiac cath and had a 40% left anterior descending coronary artery lesion as well. She has had no recent problems with angina or CHF. She sees Dr. Kj Samuel on a six to twelve month basis. Her last stress test was in May of 2015 and negative.   She has not had any recent issues with anginal type symptoms or symptoms of congestive heart failure. PVD, status post left common artery iliac stent and angioplasty in April of 2007 and a right CFA and popliteal bypass graft in April of 2009 with revision of this in September of 2009. She had another revision on the right side in January, 2011 with a patch angioplasty and thrombectomy. She has also undergone a left SFA stent as of September, 2012. Then in August, 2013 she underwent right femoral to tibial peroneal trunk bypass grafting. She then underwent right iliac artery angioplasty and stent and left iliac angioplasty in June of 2014. Most recently she underwent aortogram with runoff In December, 2018 and underwent stenting of a 60% left common iliac artery stenosis. Dr. Royal Vasquez has been reluctant to do any further surgery as her limbs are not threatened. She does continue to have claudication at one-half to one block. She also has a mild ischemic neuropathy, particularly involving her great toes. CVD, status post right carotid endarterectomy in March, 2010. She has had stable carotid duplexes since. History of osteoporosis with previous treatment with Boniva     Assessment/Plan:    Diagnoses and all orders for this visit:    1. Essential hypertension  -     METABOLIC PANEL, COMPREHENSIVE    2. Dyslipidemia    3. Adult onset hypothyroidism  -     TSH 3RD GENERATION  -     T4, FREE    4. PMR (polymyalgia rheumatica) (HCC)  -     DULoxetine (CYMBALTA) 60 mg capsule; Take 1 Cap by mouth daily for 30 days. 5. 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 ([facility] [address]  -     REFERRAL TO PHYSICIAL MEDICINE REHAB    6. Spinal stenosis of lumbar region with neurogenic claudication  -     REFERRAL TO PHYSICIAL MEDICINE REHAB       Continue to follow-up with rheumatology. She is on a steroid taper. Blood pressure well controlled on lisinopril, atenolol. Norvasc was stopped. Referral for Dr. Britney Rivera med made. Will increase her Cymbalta.   Continue current meds  I will call with lab results and make further recommendations or adjustments if necessary.  Discussed lifestyle modifications including Na restriction, low carb/fat diet, weight reduction and exercise (at least a walking program).        ICD-10-CM ICD-9-CM    1. Essential hypertension  I10 401.9 METABOLIC PANEL, COMPREHENSIVE   2. Dyslipidemia  E78.5 272.4    3. Adult onset hypothyroidism  E03.8 244.8 TSH 3RD GENERATION      T4, FREE   4. PMR (polymyalgia rheumatica) ([facility])  M35.3 725 DULoxetine (CYMBALTA) 60 mg capsule   5. 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 ([facility])  I73.9 443.9 REFERRAL TO PHYSICIAL MEDICINE REHAB    G63 357.4    6. Spinal stenosis of lumbar region with neurogenic claudication  M48.062 724.03 REFERRAL TO PHYSICIAL MEDICINE REHAB         Follow-up and Dispositions    · Return in about 4 weeks (around 4/14/2021) for follow up.         I have reviewed with the patient details of the assessment and plan and all questions were answered. Relevent patient education was performed. Verbal and/or written instructions (see AVS) provided. The most recent lab findings were reviewed with the patient.  Plan was discussed with patient who verbally expressed understanding.    An After Visit Summary was printed and given to the patient.    Kala Calderon MD    Trial of weaning you off of steroids is 1 ESR

## 2021-03-17 NOTE — PATIENT INSTRUCTIONS
Back Pain: Care Instructions  Your Care Instructions     Back pain has many possible causes. It is often related to problems with muscles and ligaments of the back. It may also be related to problems with the nerves, discs, or bones of the back. Moving, lifting, standing, sitting, or sleeping in an awkward way can strain the back. Sometimes you don't notice the injury until later. Arthritis is another common cause of back pain. Although it may hurt a lot, back pain usually improves on its own within several weeks. Most people recover in 12 weeks or less. Using good home treatment and being careful not to stress your back can help you feel better sooner. Follow-up care is a key part of your treatment and safety. Be sure to make and go to all appointments, and call your doctor if you are having problems. It's also a good idea to know your test results and keep a list of the medicines you take. How can you care for yourself at home? · Sit or lie in positions that are most comfortable and reduce your pain. Try one of these positions when you lie down:  ? Lie on your back with your knees bent and supported by large pillows. ? Lie on the floor with your legs on the seat of a sofa or chair. ? Lie on your side with your knees and hips bent and a pillow between your legs. ? Lie on your stomach if it does not make pain worse. · Do not sit up in bed, and avoid soft couches and twisted positions. Bed rest can help relieve pain at first, but it delays healing. Avoid bed rest after the first day of back pain. · Change positions every 30 minutes. If you must sit for long periods of time, take breaks from sitting. Get up and walk around, or lie in a comfortable position. · Try using a heating pad on a low or medium setting for 15 to 20 minutes every 2 or 3 hours. Try a warm shower in place of one session with the heating pad. · You can also try an ice pack for 10 to 15 minutes every 2 to 3 hours.  Put a thin cloth between the ice pack and your skin. · Take pain medicines exactly as directed. ? If the doctor gave you a prescription medicine for pain, take it as prescribed. ? If you are not taking a prescription pain medicine, ask your doctor if you can take an over-the-counter medicine. · Take short walks several times a day. You can start with 5 to 10 minutes, 3 or 4 times a day, and work up to longer walks. Walk on level surfaces and avoid hills and stairs until your back is better. · Return to work and other activities as soon as you can. Continued rest without activity is usually not good for your back. · To prevent future back pain, do exercises to stretch and strengthen your back and stomach. Learn how to use good posture, safe lifting techniques, and proper body mechanics. When should you call for help? Call your doctor now or seek immediate medical care if:    · You have new or worsening numbness in your legs.     · You have new or worsening weakness in your legs. (This could make it hard to stand up.)     · You lose control of your bladder or bowels. Watch closely for changes in your health, and be sure to contact your doctor if:    · You have a fever, lose weight, or don't feel well.     · You do not get better as expected. Where can you learn more? Go to http://www.[employer].com/  Enter I594 in the search box to learn more about \"Back Pain: Care Instructions. \"  Current as of: March 2, 2020               Content Version: 12.6  © 0622-1092 [employer]. Care instructions adapted under license by [employer] (which disclaims liability or warranty for this information). If you have questions about a medical condition or this instruction, always ask your healthcare professional. [place] [address] any warranty or liability for your use of this information.

## 2021-03-17 NOTE — PROGRESS NOTES
Cynthia Worley is a 67 y.o. female presenting for Hypothyroidism (3 mo fu), Cholesterol Problem, and Medication Evaluation  . 1. Have you been to the ER, urgent care clinic since your last visit? Hospitalized since your last visit? No    2. Have you seen or consulted any other health care providers outside of the [address] [place] [address] [place] since your last visit? Include any pap smears or colon screening. No    Fall Risk Assessment, last 12 mths 3/17/2021   Able to walk? Yes   Fall in past 12 months? 0   Do you feel unsteady? 0   Are you worried about falling 0   Is TUG test greater than 12 seconds? -   Is the gait abnormal? -   Number of falls in past 12 months -   Fall with injury? -         Abuse Screening Questionnaire 3/17/2021   Do you ever feel afraid of your partner? N   Are you in a relationship with someone who physically or mentally threatens you? N   Is it safe for you to go home?  Y       3 most recent PHQ Screens 6/18/2020   Little interest or pleasure in doing things Not at all   Feeling down, depressed, irritable, or hopeless Not at all   Total Score PHQ 2 0   Trouble falling or staying asleep, or sleeping too much Several days   Feeling tired or having little energy More than half the days   Poor appetite, weight loss, or overeating Not at all   Feeling bad about yourself - or that you are a failure or have let yourself or your family down Not at all   Trouble concentrating on things such as school, work, reading, or watching TV Several days   Moving or speaking so slowly that other people could have noticed; or the opposite being so fidgety that others notice Not at all   Thoughts of being better off dead, or hurting yourself in some way Not at all   PHQ 9 Score 4   How difficult have these problems made it for you to do your work, take care of your home and get along with others Not difficult at all       Medications Discontinued During This Encounter   Medication Reason    predniSONE (DELTASONE) 5 mg tablet DUPLICATE ORDER    predniSONE (DELTASONE) 1 mg tablet DUPLICATE ORDER

## 2021-03-22 ENCOUNTER — OFFICE VISIT (OUTPATIENT)
Dept: RHEUMATOLOGY | Age: 76
End: 2021-03-22
Payer: MEDICARE

## 2021-03-22 VITALS
RESPIRATION RATE: 18 BRPM | DIASTOLIC BLOOD PRESSURE: 54 MMHG | TEMPERATURE: 97.5 F | BODY MASS INDEX: 21.57 KG/M2 | SYSTOLIC BLOOD PRESSURE: 98 MMHG | OXYGEN SATURATION: 95 % | WEIGHT: 117.2 LBS | HEIGHT: 62 IN | HEART RATE: 90 BPM

## 2021-03-22 DIAGNOSIS — M19.012 OSTEOARTHRITIS OF BILATERAL GLENOHUMERAL JOINTS: ICD-10-CM

## 2021-03-22 DIAGNOSIS — M19.011 OSTEOARTHRITIS OF BILATERAL GLENOHUMERAL JOINTS: ICD-10-CM

## 2021-03-22 DIAGNOSIS — M16.12 PRIMARY OSTEOARTHRITIS OF LEFT HIP: ICD-10-CM

## 2021-03-22 DIAGNOSIS — M62.838 MUSCLE SPASMS OF NECK: ICD-10-CM

## 2021-03-22 DIAGNOSIS — M85.89 OSTEOPENIA OF MULTIPLE SITES: ICD-10-CM

## 2021-03-22 DIAGNOSIS — M35.3 POLYMYALGIA RHEUMATICA (HCC): Primary | ICD-10-CM

## 2021-03-22 PROCEDURE — 99214 OFFICE O/P EST MOD 30 MIN: CPT | Performed by: INTERNAL MEDICINE

## 2021-03-22 PROCEDURE — G0463 HOSPITAL OUTPT CLINIC VISIT: HCPCS | Performed by: INTERNAL MEDICINE

## 2021-03-22 PROCEDURE — G8432 DEP SCR NOT DOC, RNG: HCPCS | Performed by: INTERNAL MEDICINE

## 2021-03-22 PROCEDURE — G8752 SYS BP LESS 140: HCPCS | Performed by: INTERNAL MEDICINE

## 2021-03-22 PROCEDURE — 3288F FALL RISK ASSESSMENT DOCD: CPT | Performed by: INTERNAL MEDICINE

## 2021-03-22 PROCEDURE — G8427 DOCREV CUR MEDS BY ELIG CLIN: HCPCS | Performed by: INTERNAL MEDICINE

## 2021-03-22 PROCEDURE — G8536 NO DOC ELDER MAL SCRN: HCPCS | Performed by: INTERNAL MEDICINE

## 2021-03-22 PROCEDURE — 1100F PTFALLS ASSESS-DOCD GE2>/YR: CPT | Performed by: INTERNAL MEDICINE

## 2021-03-22 PROCEDURE — G8754 DIAS BP LESS 90: HCPCS | Performed by: INTERNAL MEDICINE

## 2021-03-22 PROCEDURE — 1090F PRES/ABSN URINE INCON ASSESS: CPT | Performed by: INTERNAL MEDICINE

## 2021-03-22 PROCEDURE — G8420 CALC BMI NORM PARAMETERS: HCPCS | Performed by: INTERNAL MEDICINE

## 2021-03-22 RX ORDER — MELATONIN
1000 DAILY
Qty: 90 TAB | Refills: 2 | Status: SHIPPED | OUTPATIENT
Start: 2021-03-22 | End: 2022-01-01

## 2021-03-22 RX ORDER — CYCLOBENZAPRINE HCL 5 MG
5 TABLET ORAL
Qty: 10 TAB | Refills: 0 | Status: SHIPPED | OUTPATIENT
Start: 2021-03-22 | End: 2022-07-07 | Stop reason: SDUPTHER

## 2021-03-22 NOTE — PROGRESS NOTES
Chief Complaint   Patient presents with    Joint Pain     hips and shoulders     1. Have you been to the ER, urgent care clinic since your last visit? Hospitalized since your last visit? No    2. Have you seen or consulted any other health care providers outside of the [address] [place] [address] since your last visit? Include any pap smears or colon screening.  Yes Where: Dermatology

## 2021-03-22 NOTE — PROGRESS NOTES
REASON FOR VISIT:   Christine Martin is a 67 y.o. female with history of hypertension, osteopenia, and hypothyroidism who is returning for followup of polymyalgia rheumatica. HISTORY OF PRESENT ILLNESS    Now on prednisone 1mg daily for the last 2 weeks. Saw Dr. Dee Richard Thursday who increased Cymbalta. Pain still primarily in shoulders and for the last 2 weeks her lateral right hip. Right lateral gluteal pain has now resolved    Due for 2nd COVID injection ([employer]) at the end of the month. No headaches or visual changes. No consistent weight changes. Has remained off of amlodipine. Blood pressure with PCP was good last week, but today again 90's/50's. Denies consistent lightheadedness or fatigue, only if she stands quickly. Disease History:  Initial visit March 2019 developed bilateral shoulder pain, difficulty lifting her hands above her shoulders. Hasn't had significant thigh soreness. Wasn't having any headaches, visual changes, night sweats, or weight loss. Started on prednisone with immediate dramatic improvement. With weaning prednisone, would develop recrudescence of the shoulder and upper arm pain. Now on prednisone 5mg Tuesday, Thursday, and Saturday since November. Shoulders hurt more currently. Had previously been going to PT, can't go to pool classes during COVID so isn't exercising like she was. Has noticed hair thinning diffusely, comes out in clumps, pony tail not as thick as before. Switched from gabapentin to duloxetine, during the transition prednisone had been increased. Last year fell and irritated both shoulders, attributes to clumsiness--once tripped over a stool while carrying laundry, another time was reaching for shoes while sitting and carpet slipped from under her and she developed symptomatic rotator cuff tendinopathy for which she completed PT. Takes Tylenol for pain is only once every 2 weeks, when she is more active such as gardening or washing the floor.  Has been advised to avoid NSAIDs. Has had paralumbar low back pain every morning, stays in the low back. Also h/o neck spasms. Has had multiple LE bypasses and stents for symptomatic claudication. Now takes daily aspirin and cilostazol. REVIEW OF SYSTEMS  A comprehensive review of systems was negative except for that written in the HPI. A 10-point review of systems is per the new patient questionnaire, which has been reviewed extensively and scanned into the electronic medical record for future reference. Review of systems is as above and is otherwise negative. ALLERGIES  Neomycin sulfate    MEDICATIONS  Current Outpatient Medications   Medication Sig    predniSONE (DELTASONE) 1 mg tablet Take 1 mg by mouth daily.  DULoxetine (CYMBALTA) 60 mg capsule Take 1 Cap by mouth daily for 30 days.  atorvastatin (LIPITOR) 40 mg tablet TAKE 1 TABLET DAILY    levothyroxine (SYNTHROID) 75 mcg tablet TAKE 1 TABLET DAILY BEFORE BREAKFAST FOR A CONDITION WITH LOW THYROID HORMONE LEVELS    buPROPion SR (WELLBUTRIN SR) 150 mg SR tablet TAKE 1 TABLET DAILY    omeprazole (PRILOSEC) 20 mg capsule TAKE 1 CAPSULE DAILY    lisinopriL (PRINIVIL, ZESTRIL) 40 mg tablet Take 1 Tab by mouth daily.  diclofenac (VOLTAREN) 1 % gel Apply  to affected area four (4) times daily as needed for Pain.  cilostazoL (PLETAL) 100 mg tablet TAKE 1 TABLET TWICE A DAY    atenoloL (TENORMIN) 50 mg tablet TAKE 1 TABLET NIGHTLY    turmeric/turmeric ext/pepr ext (TURMERIC-TURMERIC EXT-PEPPER) 500-3 mg cap Take  by mouth.  multivit-min/iron/folic/lutein (CENTRUM SILVER WOMEN PO) Take  by mouth.  peg 400-propylene glycol (SYSTANE, PROPYLENE GLYCOL,) 0.4-0.3 % drop Administer 1 Drop to both eyes as needed.  DENTA 5000 PLUS 1.1 % crea Apply at night    valACYclovir (VALTREX) 1 gram tablet Take 2 Tabs by mouth two (2) times a day.  2 initially and 2 in 12 hr for fever blisters    ascorbic acid (VITAMIN C) 500 mg tablet Take 500 mg by mouth two (2) times a day.  aspirin (ASPIRIN) 325 mg tablet Take 325 mg by mouth daily.  multivitamin, stress formula (STRESS TAB) tablet Take 1 Tab by mouth daily. No current facility-administered medications for this visit. PAST MEDICAL HISTORY  Past Medical History:   Diagnosis Date    Adult onset hypothyroidism 8/23/2017    Anemia 8/23/2017    Anxiety 8/23/2017    Arteriosclerotic heart disease (ASHD) 8/23/2017    Arthritis     back, hip right, neck    CAD (coronary artery disease)          Cervical spondylosis 8/23/2017    Chronic pain     spinal stenosis, bulging disk and bone spurs in back    Claudication in peripheral vascular disease ([address] 8/23/2017    CVD (cerebrovascular disease)     S/P right CEA    Depression     Dyslipidemia 8/23/2017    Familial mitral valve prolapse 8/23/2017    GERD (gastroesophageal reflux disease)     Hyperlipidemia     Hypertension     Hypothyroidism     Lumbar spinal stenosis     Macrocytosis 8/23/2017    Menopause     Nausea & vomiting     surgery related, severe constipation    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 ([address] 8/23/2017    Osteopenia 8/23/2017    Osteoporosis     Other ill-defined conditions(799.89)     severe constipation from pain medication    PVD (peripheral vascular disease) ([address]     S/P stent Right CFA and Left iliac    Stroke ([address]     Temporomandibular joint pain dysfunction syndrome 8/23/2017    Thyroid disease     TIA (transient ischemic attack)     Tobacco user 8/23/2017    Trigger finger 8/23/2017       FAMILY HISTORY  family history includes Breast Cancer (age of onset: 54) in her sister; Cancer in her father and sister; Diabetes in her sister; Heart Disease in her brother and father; Hypertension in her brother, father, sister, sister, and sister; Lung Disease in her father; Migraines in her mother. SOCIAL HISTORY  She  reports that she quit smoking about 13 years ago.  She has a 35.00 pack-year smoking history. She has never used smokeless tobacco. She reports current alcohol use of about 6.0 standard drinks of alcohol per week. She reports that she does not use drugs. Social History     Social History Narrative    Not on file   Former secondary education . DATA  Visit Vitals  BP (!) 98/54 (BP 1 Location: Right arm, BP Patient Position: Sitting) Comment: dr Sarah Bella notified of BP readings   Pulse 90   Temp 97.5 °F (36.4 °C) (Oral)   Resp 18   Ht 5' 2\" (1.575 m)   Wt 117 lb 3.2 oz (53.2 kg)   SpO2 95%   BMI 21.44 kg/m²    Body mass index is 21.44 kg/m². No flowsheet data found. General:  The patient is well developed, well nourished, alert, and in no apparent distress. Eyes: Sclera are anicteric. No conjunctival injection. HEENT:  Oropharynx is clear. No oral ulcers. Adequate salivary pooling. No cervical or supraclavicular lymphadenopathy. Lungs:  Clear to auscultation bilaterally, without wheeze or stridor. Normal respiratory effort. Cor:  Regular rate and rhythm. No murmur rub or gallop. Abdomen: Soft, non-tender, without hepatomegaly or masses. Extremities: No calf tenderness or edema. Warm and well perfused. Skin:  No significant abnormalities. No petechial, purpuric, or psoriaform rashes   Neuro: Nonfocal, normal gait, no foot or wrist drop  Musculoskeletal:    A comprehensive musculoskeletal exam was performed for all joints of each upper and lower extremity and assessed for swelling, tenderness and range of motion. Results are documented as below:  No evidence of synovitis in the small joints of the hands, wrists, shoulders, elbows, hips, knees or ankles. Still global shoulder crepitus with tenderness on passive ROM, +empty can and +Neer bilaterally. No distal deltoid tenderness, no thigh tenderness. Bilateral CMC squaring and subluxation.       Labs   2/17/21: ESR 20, CRP <1mg/L    1/25/21: B12 785    12/15/20: , CBC o/w WNL; CMP WNL; TSH 2.9 (WNL)    Imaging:  3/19/19 DXA: Lspine excluded 2/2 degenerative changes. Findings:  Femoral Neck Left:  Bone mineral density (gm/cm2): 0.895  % of peak bone mass: 86  % for age matched controls: 118  T-score: -1.0  Z-score: 1.0  Total Hip Left:  Bone mineral density (gm/cm2): 0.927  % of peak bone mass: 92  % for age matched controls: 119  T-score: -0.6  Z-score: 1.2  33% Radius Right:  Bone mineral density (gm/cm2): 0.707  % of peak bone mass: 80  % for age matched controls: 103  T-score: -2.0  Z-score: 0.2  IMPRESSION  This patient is osteopenic using the World Health Organization criteria  10 year probability of major osteoporotic fracture: 15.1%  10 year probability of hip fracture: 6.5%      ASSESSMENT AND PLAN  Ms. Winifred Lamb is a 67 y.o. female who presents for evaluation of polymyalgia with [id number] prednisone, limited by extensive osteoarthritis primarily in the shoulders. Acute phase reactants reassuringly remain low, but with her lower recent blood pressures there is no urgency in weaning her last remaining milligram of prednisone, so making no changes today to her PMR regimen and will reevaluate in 3 months. 1. Polymyalgia rheumatica (HCC)  - C REACTIVE PROTEIN, QT; Future  - CBC WITH AUTOMATED DIFF; Future  - METABOLIC PANEL, COMPREHENSIVE; Future  - SED RATE (ESR); Future  - C REACTIVE PROTEIN, QT  - CBC WITH AUTOMATED DIFF  - METABOLIC PANEL, COMPREHENSIVE  - SED RATE (ESR)    2. Osteopenia of multiple sites  - DEXA BONE DENSITY STUDY AXIAL; Future  - cholecalciferol (VITAMIN D3) (1000 Units /25 mcg) tablet; Take 1 Tab by mouth daily. Dispense: 90 Tab; Refill: 2    3. Primary osteoarthritis of left hip  - XR HIPS BI W PELV 3 OR 4 VWS; Future    4. Osteoarthritis of bilateral glenohumeral joints  - REFERRAL TO PHYSICAL THERAPY  - XR SHOULDER RT AP/LAT MIN 2 V; Future  - XR SHOULDER LT AP/LAT MIN 2 V; Future    5. Muscle spasms of neck  - cyclobenzaprine (FLEXERIL) 5 mg tablet;  Take 1 Tab by mouth every eight (8) hours as needed for Muscle Spasm(s). Use only needed for the onset of severe spasm. Note this increases your risk of falls. Dispense: 10 Tab; Refill: 0  - XR SPINE CERV W OBL/FLEX/EXT MIN 6 V COMP; Future        Patient Instructions   1. For now, just continue the prednisone 1mg daily. I don't want to stop this entirely quite yet as your blood pressures have been borderline low. 2. DXA bone density scan before your next visit. Brittany Messer depending on the findings, your PCP or I can help you with next steps. 3. Start taking 1000 units of vitamin D daily to be safe, though your vitamin D levels have been good. 4. For joint pain, tylenol up to 3000mg/day in divided doses is the safest option (up to 6 extra strength tylenol per day); also heating packs or ice (they both work, matter of personal preference), topical treatments to small joints like diclofenac gel (harder on the kidneys and stomach but OK for small joints). Also topical lidocaine, salonpas, tigerbalm, blue emu oil, etc are topical numbing agents which many people find helpful. 5. Physical therapy after your last COVID shot. 6. Return in 3 months, at which point we may finish weaning you off of prednisone. Orders Placed This Encounter    DEXA BONE DENSITY STUDY AXIAL    XR HIPS BI W PELV 3 OR 4 VWS    XR SHOULDER RT AP/LAT MIN 2 V    XR SHOULDER LT AP/LAT MIN 2 V    XR SPINE CERV W OBL/FLEX/EXT MIN 6 V COMP    C REACTIVE PROTEIN, QT    CBC WITH AUTOMATED DIFF    METABOLIC PANEL, COMPREHENSIVE    SED RATE (ESR)    REFERRAL TO PHYSICAL THERAPY    cyclobenzaprine (FLEXERIL) 5 mg tablet    cholecalciferol (VITAMIN D3) (1000 Units /25 mcg) tablet       Medications: I am having Joellen Hay. John start on cyclobenzaprine and cholecalciferol.  I am also having her maintain her aspirin, (multivitamin, stress formula), ascorbic acid (vitamin C), valACYclovir, Denta 5000 Plus, peg 400-propylene glycol, multivit-min/iron/folic/lutein (CENTRUM SILVER WOMEN PO), turmeric-turmeric ext-pepper, diclofenac, cilostazoL, atenoloL, lisinopriL, buPROPion SR, omeprazole, atorvastatin, levothyroxine, predniSONE, and DULoxetine.     Follow up: 3 months    Face to face time: 28 minutes  Note preparation and records review day of service: 10 minutes  Total provider time day of service: 38 minutes      Deb Hyman MD    Adult Rheumatology   [address]   Phone [phone]  Fax [phone]

## 2021-03-22 NOTE — PATIENT INSTRUCTIONS
1. For now, just continue the prednisone 1mg daily. I don't want to stop this entirely quite yet as your blood pressures have been borderline low. 2. DXA bone density scan before your next visit. Claudine Spring depending on the findings, your PCP or I can help you with next steps. 3. Start taking 1000 units of vitamin D daily to be safe, though your vitamin D levels have been good. 4. For joint pain, tylenol up to 3000mg/day in divided doses is the safest option (up to 6 extra strength tylenol per day); also heating packs or ice (they both work, matter of personal preference), topical treatments to small joints like diclofenac gel (harder on the kidneys and stomach but OK for small joints). Also topical lidocaine, salonpas, tigerbalm, blue emu oil, etc are topical numbing agents which many people find helpful. 5. Physical therapy after your last COVID shot. 6. Return in 3 months, at which point we may finish weaning you off of prednisone.

## 2021-03-25 ENCOUNTER — TELEPHONE (OUTPATIENT)
Dept: INTERNAL MEDICINE CLINIC | Age: 76
End: 2021-03-25

## 2021-03-25 DIAGNOSIS — M81.0 AGE-RELATED OSTEOPOROSIS WITHOUT CURRENT PATHOLOGICAL FRACTURE: Primary | ICD-10-CM

## 2021-03-25 NOTE — TELEPHONE ENCOUNTER
MD Jatinder Michaels 5 minutes ago (3:03 PM)     She can have a DEXA scan anytime on or after first April 2021. Which she like me to schedule one for her in the month of April? Message text      Patient scheduled. Patient will need an order from Dr. Ismael Mcbride.

## 2021-04-01 ENCOUNTER — HOSPITAL ENCOUNTER (OUTPATIENT)
Dept: GENERAL RADIOLOGY | Age: 76
Discharge: HOME | End: 2021-04-01
Payer: MEDICARE

## 2021-04-01 DIAGNOSIS — M62.838 MUSCLE SPASMS OF NECK: ICD-10-CM

## 2021-04-01 DIAGNOSIS — M19.011 OSTEOARTHRITIS OF BILATERAL GLENOHUMERAL JOINTS: ICD-10-CM

## 2021-04-01 DIAGNOSIS — M16.12 PRIMARY OSTEOARTHRITIS OF LEFT HIP: ICD-10-CM

## 2021-04-01 DIAGNOSIS — M19.012 OSTEOARTHRITIS OF BILATERAL GLENOHUMERAL JOINTS: ICD-10-CM

## 2021-04-01 PROCEDURE — 73030 X-RAY EXAM OF SHOULDER: CPT

## 2021-04-01 PROCEDURE — 72052 X-RAY EXAM NECK SPINE 6/>VWS: CPT

## 2021-04-01 PROCEDURE — 73521 X-RAY EXAM HIPS BI 2 VIEWS: CPT

## 2021-04-12 PROBLEM — M19.011 PRIMARY OSTEOARTHRITIS OF BOTH SHOULDERS: Status: ACTIVE | Noted: 2021-04-12

## 2021-04-12 PROBLEM — M19.012 PRIMARY OSTEOARTHRITIS OF BOTH SHOULDERS: Status: ACTIVE | Noted: 2021-04-12

## 2021-04-12 NOTE — PROGRESS NOTES
Sonia Horn is a 67 y.o. female and presents with Hypertension (1 mo fu)      Subjective:  Patient comes in today for follow-up of chronic neck problems. Reviewed records from rheumatology Dr. Mickey Heard. Appreciate his input. Discussed with patient to continue prednisone 1 mg with further intent to stop prednisone by her rheumatologist.  She still has aches and pains. Reviewed x-rays of hips, spine and bilateral shoulders which showed diffuse osteopenia, arthritis and lumbar spondylosis. Hx polymyalgia rheumatica she has been seeing Dr. Lakshmi Almanzar. She has been advised to taper prednisone. She was referred to [address] [place] [address] for chronic left shoulder pain and received a steroid shot for rotator cuff arthropathy /glenohumeral osteoarthritis. She is also been referred to physical therapy has plans to start back next month. She is still waiting to get a second COVID-19 shot. DEXA scan was ordered and reviewed with her today which shows osteopenia. Risk for hip fracture increased. On vitamin D3. Blood pressure readings at home in clinic today were at goal.  Recommended to continue lisinopril and atenolol. Noted that amlodipine was discontinued secondary to hypotension. History of lumbar spinal stenosis with bilateral radiculopathies, right greater than left, and chronic back pain, reports persistent of symptoms. She was on gabapentin in the past however that provided only mild relief and she was switched to Cymbalta. Dose of Cymbalta was increased to 60 mg daily. Patient reports she has not noticed much change. MRI lumbar spine-  1. Unchanged degenerative levoscoliosis. 2. L4-L5 moderate central spinal canal stenosis. 3. Multilevel foraminal stenosis, moderate at L4-L5 and L5-S1. Hypothyroidism-on replacement    History of mild depression ,anxiety on Wellbutrin.     She does have a history of coronary artery disease s/p stents, peripheral vascular disease and CVD s/p endarterectomy. She remains on aspirin, statin and cilostazol.       Past Medical History:   Diagnosis Date    Adult onset hypothyroidism 8/23/2017    Anemia 8/23/2017    Anxiety 8/23/2017    Arteriosclerotic heart disease (ASHD) 8/23/2017    Arthritis     back, hip right, neck    CAD (coronary artery disease)          Cervical spondylosis 8/23/2017    Chronic pain     spinal stenosis, bulging disk and bone spurs in back    Claudication in peripheral vascular disease ([address] 8/23/2017    CVD (cerebrovascular disease)     S/P right CEA    Depression     Dyslipidemia 8/23/2017    Familial mitral valve prolapse 8/23/2017    GERD (gastroesophageal reflux disease)     Hyperlipidemia     Hypertension     Hypothyroidism     Lumbar spinal stenosis     Macrocytosis 8/23/2017    Menopause     Nausea & vomiting     surgery related, severe constipation    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 ([address] 8/23/2017    Osteopenia 8/23/2017    Osteoporosis     Other ill-defined conditions(799.89)     severe constipation from pain medication    PVD (peripheral vascular disease) ([address]     S/P stent Right CFA and Left iliac    Stroke ([address]     Temporomandibular joint pain dysfunction syndrome 8/23/2017    Thyroid disease     TIA (transient ischemic attack)     Tobacco user 8/23/2017    Trigger finger 8/23/2017     Past Surgical History:   Procedure Laterality Date    HX CAROTID ENDARTERECTOMY      right cea    HX OTHER SURGICAL Right     excision melanoma right arm    [place] BYPASS GRAFT OTHR,FEM-POP       RIGHT FEMORAL-POPLITEAL BYPASS  WITH POLYTETRA-FL UOROETHYLENE GRAFT     [place] CARDIAC SURG PROCEDURE UNLIST      stents x2    VASCULAR SURGERY PROCEDURE UNLIST      femoral stent - left    VASCULAR SURGERY PROCEDURE UNLIST      X5 right femoral bypass     Allergies   Allergen Reactions    Neomycin Sulfate Unknown (comments)     Current Outpatient Medications   Medication Sig Dispense Refill    DULoxetine (CYMBALTA) 60 mg capsule Take 1 Cap by mouth daily for 30 days. 90 Cap 2    cyclobenzaprine (FLEXERIL) 5 mg tablet Take 1 Tab by mouth every eight (8) hours as needed for Muscle Spasm(s). Use only needed for the onset of severe spasm. Note this increases your risk of falls. 10 Tab 0    cholecalciferol (VITAMIN D3) (1000 Units /25 mcg) tablet Take 1 Tab by mouth daily. 90 Tab 2    predniSONE (DELTASONE) 1 mg tablet Take 1 mg by mouth daily.  atorvastatin (LIPITOR) 40 mg tablet TAKE 1 TABLET DAILY 90 Tab 3    levothyroxine (SYNTHROID) 75 mcg tablet TAKE 1 TABLET DAILY BEFORE BREAKFAST FOR A CONDITION WITH LOW THYROID HORMONE LEVELS 90 Tab 3    buPROPion SR (WELLBUTRIN SR) 150 mg SR tablet TAKE 1 TABLET DAILY 90 Tab 3    omeprazole (PRILOSEC) 20 mg capsule TAKE 1 CAPSULE DAILY 90 Cap 3    lisinopriL (PRINIVIL, ZESTRIL) 40 mg tablet Take 1 Tab by mouth daily. 90 Tab 3    diclofenac (VOLTAREN) 1 % gel Apply  to affected area four (4) times daily as needed for Pain. 1 Each 3    cilostazoL (PLETAL) 100 mg tablet TAKE 1 TABLET TWICE A  Tab 3    atenoloL (TENORMIN) 50 mg tablet TAKE 1 TABLET NIGHTLY 90 Tab 3    turmeric/turmeric ext/pepr ext (TURMERIC-TURMERIC EXT-PEPPER) 500-3 mg cap Take  by mouth.  multivit-min/iron/folic/lutein (CENTRUM SILVER WOMEN PO) Take  by mouth.  peg 400-propylene glycol (SYSTANE, PROPYLENE GLYCOL,) 0.4-0.3 % drop Administer 1 Drop to both eyes as needed.  DENTA 5000 PLUS 1.1 % crea Apply at night  5    valACYclovir (VALTREX) 1 gram tablet Take 2 Tabs by mouth two (2) times a day. 2 initially and 2 in 12 hr for fever blisters 8 Tab 5    ascorbic acid (VITAMIN C) 500 mg tablet Take 500 mg by mouth two (2) times a day.  aspirin (ASPIRIN) 325 mg tablet Take 325 mg by mouth daily.  multivitamin, stress formula (STRESS TAB) tablet Take 1 Tab by mouth daily.        Social History     Socioeconomic History    Marital status:      Spouse name: Not on file    Number of children: Not on file    Years of education: Not on file    Highest education level: Not on file   Tobacco Use    Smoking status: Former Smoker     Packs/day: 1.00     Years: 35.00     Pack years: 35.00     Quit date: 2008     Years since quittin.2    Smokeless tobacco: Never Used   Substance and Sexual Activity    Alcohol use: Yes     Alcohol/week: 6.0 standard drinks     Types: 6 Glasses of wine per week     Comment: occasionally    Drug use: No     Types: Prescription, OTC     Family History   Problem Relation Age of Onset   Apurva Marsh Migraines Mother     Heart Disease Father     Hypertension Father     Lung Disease Father     Cancer Father         H&N    Hypertension Sister     Diabetes Sister     Heart Disease Brother     Hypertension Brother     Hypertension Sister     Cancer Sister         breast    Breast Cancer Sister 54    Hypertension Sister        Review of Systems  ROS is unremarkable except for ones highlighted in bold.    Constitutional: negative for fevers, chills, anorexia and weight loss  Eyes:   negative for visual disturbance and irritation  ENT:   negative for tinnitus,sore throat,nasal congestion,ear pain,hoarseness  Respiratory:  negative for cough, hemoptysis, dyspnea,wheezing  CV:   negative for chest pain, palpitations, lower extremity edema  GI:   negative for nausea, vomiting, diarrhea, abdominal pain,melena  Endo:               negative for polyuria,polydipsia,polyphagia,heat intolerance, hair loss  Genitourinary: negative for frequency, dysuria and hematuria  Integumentary: negative for rash and pruritus  Hematologic:  negative for easy bruising and gum/nose bleeding  Musculoskel: negative for myalgias, arthralgias, back pain, muscle weakness, joint pain  Neurological:  negative for headaches, dizziness, vertigo, memory problems and gait   Behavl/Psych: negative for feelings of anxiety, depression, mood changes  ROS otherwise negative Objective:  Visit Vitals  /64 (BP 1 Location: Left upper arm, BP Patient Position: Sitting, BP Cuff Size: Adult)   Pulse 79   Temp 97.9 °F (36.6 °C)   Resp 14   Ht 5' 2\" (1.575 m)   Wt 118 lb (53.5 kg)   SpO2 98%   BMI 21.58 kg/m²     Physical Exam:   General appearance - alert, well appearing, and in no distress  Mental status - alert, oriented to person, place, and time  EYE-RODNEY, EOMI, fundi normal, corneas normal, no foreign bodies  ENT-ENT exam normal, no neck nodes or sinus tenderness  Nose - normal and patent, no erythema, discharge or polyps  Mouth - mucous membranes moist, pharynx normal without lesions  Neck - supple, no significant adenopathy   Chest - clear to auscultation, no wheezes, rales or rhonchi, symmetric air entry   Heart - normal rate, regular rhythm, normal S1, S2, no murmurs, rubs, clicks or gallops   Abdomen - soft, nontender, nondistended, no masses or organomegaly  Lymph- no adenopathy palpable  Ext-peripheral pulses normal, no pedal edema, no clubbing or cyanosis  Skin-Warm and dry. no hyperpigmentation, vitiligo, or suspicious lesions  Neuro -alert, oriented, normal speech, no focal findings or movement disorder noted  Musculoskeletal- FROM, no bony abnormalities,  point tenderness, positive drop arm test    Lab Review:  Results for orders placed or performed in visit on 01/18/21   VITAMIN B12   Result Value Ref Range    Vitamin B12 785 232 - 1,245 pg/mL   SED RATE (ESR)   Result Value Ref Range    Sed rate (ESR) 20 0 - 40 mm/hr   C REACTIVE PROTEIN, QT   Result Value Ref Range    C-Reactive Protein, Qt <1 0 - 10 mg/L        Documenation Review:  Cervical spondylosis with more recent problems with neck pain. She has also had bilateral upper arm pain. She finds it difficult to raise her arms and has noted pain radiating into her occiput as well.  She has had a mildly elevated sed rate in the past, raising the question of polymyalgia as the cause of some of her upper arm symptoms, but more likely it relates to a cervical radiculopathy. At the time of the visit we elected to check her sed rate to see if she might benefit from a more prolonged course of prednisone. History of lumbar spinal stenosis with bilateral radiculopathies, right greater than left, and chronic back pain, all of which are symptomatic at times, but not consistently. Hx of right coronary artery in December, 2009. At that time she underwent cardiac cath and had a 40% left anterior descending coronary artery lesion as well. She has had no recent problems with angina or CHF. She sees Dr. Jerome Patient on a six to twelve month basis. Her last stress test was in May of 2015 and negative. She has not had any recent issues with anginal type symptoms or symptoms of congestive heart failure. PVD, status post left common artery iliac stent and angioplasty in April of 2007 and a right CFA and popliteal bypass graft in April of 2009 with revision of this in September of 2009. She had another revision on the right side in January, 2011 with a patch angioplasty and thrombectomy. She has also undergone a left SFA stent as of September, 2012. Then in August, 2013 she underwent right femoral to tibial peroneal trunk bypass grafting. She then underwent right iliac artery angioplasty and stent and left iliac angioplasty in June of 2014. Most recently she underwent aortogram with runoff In December, 2018 and underwent stenting of a 60% left common iliac artery stenosis. Dr. Jean Pollock has been reluctant to do any further surgery as her limbs are not threatened. She does continue to have claudication at one-half to one block. She also has a mild ischemic neuropathy, particularly involving her great toes. CVD, status post right carotid endarterectomy in March, 2010. She has had stable carotid duplexes since.     History of osteoporosis with previous treatment with Boniva     Assessment/Plan:    Diagnoses and all orders for this visit:    1. Osteopenia of multiple sites    2. Essential hypertension    3. PMR (polymyalgia rheumatica) ([facility])  -     DULoxetine (CYMBALTA) 60 mg capsule; Take 1 Cap by mouth daily for 30 days. 4. Primary osteoarthritis of both shoulders         DEXA scan results reviewed with patient. Risk for hip fracture is increased, reflects osteopenia. She would like to discuss with her rheumatologist.  Continue to follow-up with rheumatology. She is on a steroid taper. Blood pressure well controlled on lisinopril, atenolol. Norvasc was stopped. Referral for Dr. Eliana Holm med made. Will increase her Cymbalta. Continue current meds  I will call with lab results and make further recommendations or adjustments if necessary. Discussed lifestyle modifications including Na restriction, low carb/fat diet, weight reduction and exercise (at least a walking program). ICD-10-CM ICD-9-CM    1. Osteopenia of multiple sites  M85.89 733.90    2. Essential hypertension  I10 401.9    3. PMR (polymyalgia rheumatica) ([facility])  M35.3 725 DULoxetine (CYMBALTA) 60 mg capsule   4. Primary osteoarthritis of both shoulders  M19.011 715.11     M19.012           Follow-up and Dispositions    · Return in about 3 months (around 7/13/2021) for CPE-30mins. I have reviewed with the patient details of the assessment and plan and all questions were answered. Relevent patient education was performed. Verbal and/or written instructions (see AVS) provided. The most recent lab findings were reviewed with the patient. Plan was discussed with patient who verbally expressed understanding. An After Visit Summary was printed and given to the patient.     Early MD Earl    Trial of weaning you off of steroids is 1 ESR

## 2021-04-13 ENCOUNTER — OFFICE VISIT (OUTPATIENT)
Dept: INTERNAL MEDICINE CLINIC | Age: 76
End: 2021-04-13

## 2021-04-13 VITALS
HEIGHT: 62 IN | HEART RATE: 79 BPM | WEIGHT: 118 LBS | TEMPERATURE: 97.9 F | SYSTOLIC BLOOD PRESSURE: 112 MMHG | DIASTOLIC BLOOD PRESSURE: 64 MMHG | BODY MASS INDEX: 21.71 KG/M2 | OXYGEN SATURATION: 98 % | RESPIRATION RATE: 14 BRPM

## 2021-04-13 DIAGNOSIS — I10 ESSENTIAL HYPERTENSION: ICD-10-CM

## 2021-04-13 DIAGNOSIS — M19.012 PRIMARY OSTEOARTHRITIS OF BOTH SHOULDERS: ICD-10-CM

## 2021-04-13 DIAGNOSIS — M85.89 OSTEOPENIA OF MULTIPLE SITES: Primary | ICD-10-CM

## 2021-04-13 DIAGNOSIS — M19.011 PRIMARY OSTEOARTHRITIS OF BOTH SHOULDERS: ICD-10-CM

## 2021-04-13 DIAGNOSIS — M35.3 PMR (POLYMYALGIA RHEUMATICA) (HCC): ICD-10-CM

## 2021-04-13 PROCEDURE — G8536 NO DOC ELDER MAL SCRN: HCPCS | Performed by: INTERNAL MEDICINE

## 2021-04-13 PROCEDURE — G8754 DIAS BP LESS 90: HCPCS | Performed by: INTERNAL MEDICINE

## 2021-04-13 PROCEDURE — G8752 SYS BP LESS 140: HCPCS | Performed by: INTERNAL MEDICINE

## 2021-04-13 PROCEDURE — 1090F PRES/ABSN URINE INCON ASSESS: CPT | Performed by: INTERNAL MEDICINE

## 2021-04-13 PROCEDURE — 1100F PTFALLS ASSESS-DOCD GE2>/YR: CPT | Performed by: INTERNAL MEDICINE

## 2021-04-13 PROCEDURE — 3288F FALL RISK ASSESSMENT DOCD: CPT | Performed by: INTERNAL MEDICINE

## 2021-04-13 PROCEDURE — G8427 DOCREV CUR MEDS BY ELIG CLIN: HCPCS | Performed by: INTERNAL MEDICINE

## 2021-04-13 PROCEDURE — G8420 CALC BMI NORM PARAMETERS: HCPCS | Performed by: INTERNAL MEDICINE

## 2021-04-13 PROCEDURE — G8432 DEP SCR NOT DOC, RNG: HCPCS | Performed by: INTERNAL MEDICINE

## 2021-04-13 PROCEDURE — 99214 OFFICE O/P EST MOD 30 MIN: CPT | Performed by: INTERNAL MEDICINE

## 2021-04-13 RX ORDER — GLUCOSAMINE SULFATE 1500 MG
POWDER IN PACKET (EA) ORAL
COMMUNITY
Start: 2021-03-22 | End: 2021-04-13 | Stop reason: SDUPTHER

## 2021-04-13 RX ORDER — DULOXETIN HYDROCHLORIDE 60 MG/1
60 CAPSULE, DELAYED RELEASE ORAL DAILY
Qty: 90 CAP | Refills: 2 | Status: SHIPPED | OUTPATIENT
Start: 2021-04-13 | End: 2021-07-12 | Stop reason: SDUPTHER

## 2021-04-13 NOTE — PROGRESS NOTES
Merlyn Rios is a 67 y.o. female presenting for Hypertension (1 mo fu)  . 1. Have you been to the ER, urgent care clinic since your last visit? Hospitalized since your last visit? No    2. Have you seen or consulted any other health care providers outside of the [address] [place] [address] since your last visit? Include any pap smears or colon screening. No    Fall Risk Assessment, last 12 mths 3/22/2021   Able to walk? Yes   Fall in past 12 months? 0   Do you feel unsteady? 1   Are you worried about falling 0   Is TUG test greater than 12 seconds? -   Is the gait abnormal? 0   Number of falls in past 12 months -   Fall with injury? -         Abuse Screening Questionnaire 3/22/2021   Do you ever feel afraid of your partner? N   Are you in a relationship with someone who physically or mentally threatens you? N   Is it safe for you to go home?  Y       3 most recent PHQ Screens 6/18/2020   Little interest or pleasure in doing things Not at all   Feeling down, depressed, irritable, or hopeless Not at all   Total Score PHQ 2 0   Trouble falling or staying asleep, or sleeping too much Several days   Feeling tired or having little energy More than half the days   Poor appetite, weight loss, or overeating Not at all   Feeling bad about yourself - or that you are a failure or have let yourself or your family down Not at all   Trouble concentrating on things such as school, work, reading, or watching TV Several days   Moving or speaking so slowly that other people could have noticed; or the opposite being so fidgety that others notice Not at all   Thoughts of being better off dead, or hurting yourself in some way Not at all   PHQ 9 Score 4   How difficult have these problems made it for you to do your work, take care of your home and get along with others Not difficult at all       Medications Discontinued During This Encounter   Medication Reason    cholecalciferol (VITAMIN D3) 25 mcg (5,542 unit) cap DUPLICATE ORDER

## 2021-04-13 NOTE — PATIENT INSTRUCTIONS
Learning About Low Bone Density  What is low bone density? Low bone density (sometimes called osteopenia) is a decrease in thickness, or density, in bones. That means the bones become thinner and weaker. It is much more common in women than in men. It's important to know that low bone density is not a disease. It can happen normally with aging. Having low bone density means that there is a greater risk that you may get osteoporosis. It also means that you are more likely to break a bone than someone who does not have low bone density. But not everyone with low bone density gets osteoporosis or breaks a bone. Low bone density doesn't cause any symptoms. It's usually found with a type of X-ray called a bone density test. Low bone density means that your bone density result (T-score) is between -1.0 and -2.5. What increases your risk for low bone density? Things that increase your risk include:  · Getting older. · Having a family history of osteoporosis. · Being thin. · Being white or . · Getting too little physical activity. · Smoking. · Drinking too much alcohol often. · Using certain medicines such as steroids. How can you prevent osteoporosis? There are things you can do to help prevent osteoporosis. Certain lifestyle changes will help slow the loss of bone density. · Eat food that has plenty of calcium and vitamin D. Yogurt, cheese, milk, and dark green vegetables are high in calcium. Eggs, fatty fish, cereal, and fortified milk are high in vitamin D.  · Ask your doctor if you need to take a calcium plus vitamin D supplement. You may be able to get enough calcium and vitamin D through your diet. · Get regular exercise. ? Do 30 minutes of weight-bearing exercise on most days of the week. Walking, jogging, stair climbing, and dancing are good choices. ? Do resistance exercises with weights or elastic bands 2 or 3 days a week.   · Limit alcohol to 2 drinks a day for men and 1 drink a day for women. Too much alcohol can cause health problems. · Do not smoke. Smoking can make bones thin faster. If you need help quitting, talk to your doctor about stop-smoking programs and medicines. These can increase your chances of quitting for good. Prescription medicines are available for treating low bone density. But these are more often used to treat osteoporosis. Follow-up care is a key part of your treatment and safety. Be sure to make and go to all appointments, and call your doctor if you are having problems. It's also a good idea to know your test results and keep a list of the medicines you take. Where can you learn more? Go to http://www.gray.com/  Enter M898 in the search box to learn more about \"Learning About Low Bone Density. \"  Current as of: December 7, 2020               Content Version: 12.8  © 7087-3616 [employer]. Care instructions adapted under license by [employer] (which disclaims liability or warranty for this information). If you have questions about a medical condition or this instruction, always ask your healthcare professional. [address] any warranty or liability for your use of this information.

## 2021-04-28 ENCOUNTER — HOSPITAL ENCOUNTER (OUTPATIENT)
Dept: LAB | Age: 76
Discharge: HOME | End: 2021-04-28
Payer: MEDICARE

## 2021-04-28 PROCEDURE — 80053 COMPREHEN METABOLIC PANEL: CPT

## 2021-04-28 PROCEDURE — 85025 COMPLETE CBC W/AUTO DIFF WBC: CPT

## 2021-04-28 PROCEDURE — 85651 RBC SED RATE NONAUTOMATED: CPT

## 2021-04-28 PROCEDURE — 36415 COLL VENOUS BLD VENIPUNCTURE: CPT

## 2021-04-28 PROCEDURE — 86140 C-REACTIVE PROTEIN: CPT

## 2021-04-29 LAB
ALBUMIN SERPL-MCNC: 4 G/DL (ref 3.7–4.7)
ALBUMIN/GLOB SERPL: 1.6 {RATIO} (ref 1.2–2.2)
ALP SERPL-CCNC: 84 IU/L (ref 39–117)
ALT SERPL-CCNC: 65 IU/L (ref 0–32)
AST SERPL-CCNC: 130 IU/L (ref 0–40)
BASOPHILS # BLD AUTO: 0 X10E3/UL (ref 0–0.2)
BASOPHILS NFR BLD AUTO: 0 %
BILIRUB SERPL-MCNC: 0.4 MG/DL (ref 0–1.2)
BUN SERPL-MCNC: 19 MG/DL (ref 8–27)
BUN/CREAT SERPL: 28 (ref 12–28)
CALCIUM SERPL-MCNC: 9 MG/DL (ref 8.7–10.3)
CHLORIDE SERPL-SCNC: 104 MMOL/L (ref 96–106)
CO2 SERPL-SCNC: 25 MMOL/L (ref 20–29)
CREAT SERPL-MCNC: 0.68 MG/DL (ref 0.57–1)
CRP SERPL-MCNC: <1 MG/L (ref 0–10)
EOSINOPHIL # BLD AUTO: 0.1 X10E3/UL (ref 0–0.4)
EOSINOPHIL NFR BLD AUTO: 2 %
ERYTHROCYTE [DISTWIDTH] IN BLOOD BY AUTOMATED COUNT: 12.1 % (ref 11.7–15.4)
ERYTHROCYTE [SEDIMENTATION RATE] IN BLOOD BY WESTERGREN METHOD: 21 MM/HR (ref 0–40)
GLOBULIN SER CALC-MCNC: 2.5 G/DL (ref 1.5–4.5)
GLUCOSE SERPL-MCNC: 158 MG/DL (ref 65–99)
HCT VFR BLD AUTO: 37.2 % (ref 34–46.6)
HGB BLD-MCNC: 12.6 G/DL (ref 11.1–15.9)
IMM GRANULOCYTES # BLD AUTO: 0 X10E3/UL (ref 0–0.1)
IMM GRANULOCYTES NFR BLD AUTO: 0 %
LYMPHOCYTES # BLD AUTO: 1.9 X10E3/UL (ref 0.7–3.1)
LYMPHOCYTES NFR BLD AUTO: 24 %
MCH RBC QN AUTO: 34.1 PG (ref 26.6–33)
MCHC RBC AUTO-ENTMCNC: 33.9 G/DL (ref 31.5–35.7)
MCV RBC AUTO: 101 FL (ref 79–97)
MONOCYTES # BLD AUTO: 1.3 X10E3/UL (ref 0.1–0.9)
MONOCYTES NFR BLD AUTO: 16 %
NEUTROPHILS # BLD AUTO: 4.4 X10E3/UL (ref 1.4–7)
NEUTROPHILS NFR BLD AUTO: 58 %
PLATELET # BLD AUTO: 276 X10E3/UL (ref 150–450)
POTASSIUM SERPL-SCNC: 4.4 MMOL/L (ref 3.5–5.2)
PROT SERPL-MCNC: 6.5 G/DL (ref 6–8.5)
RBC # BLD AUTO: 3.69 X10E6/UL (ref 3.77–5.28)
SODIUM SERPL-SCNC: 141 MMOL/L (ref 134–144)
WBC # BLD AUTO: 7.8 X10E3/UL (ref 3.4–10.8)

## 2021-06-08 ENCOUNTER — APPOINTMENT (OUTPATIENT)
Dept: FAMILY MEDICINE CLINIC | Age: 76
End: 2021-06-08

## 2021-06-08 DIAGNOSIS — G62.9 PERIPHERAL POLYNEUROPATHY: ICD-10-CM

## 2021-06-08 DIAGNOSIS — M35.3 POLYMYALGIA RHEUMATICA (HCC): ICD-10-CM

## 2021-06-08 DIAGNOSIS — D52.9 ANEMIA DUE TO FOLIC ACID DEFICIENCY, UNSPECIFIED DEFICIENCY TYPE: ICD-10-CM

## 2021-06-08 DIAGNOSIS — D75.89 MACROCYTOSIS: ICD-10-CM

## 2021-06-09 ENCOUNTER — TELEPHONE (OUTPATIENT)
Dept: RHEUMATOLOGY | Age: 76
End: 2021-06-09

## 2021-06-09 DIAGNOSIS — I10 ESSENTIAL HYPERTENSION: ICD-10-CM

## 2021-06-09 DIAGNOSIS — M85.89 OSTEOPENIA OF MULTIPLE SITES: Primary | ICD-10-CM

## 2021-06-09 DIAGNOSIS — R74.01 TRANSAMINITIS: ICD-10-CM

## 2021-06-09 DIAGNOSIS — M35.3 POLYMYALGIA RHEUMATICA (HCC): ICD-10-CM

## 2021-06-09 DIAGNOSIS — E55.9 VITAMIN D DEFICIENCY: ICD-10-CM

## 2021-06-09 LAB
CRP SERPL-MCNC: 1 MG/L (ref 0–10)
ERYTHROCYTE [SEDIMENTATION RATE] IN BLOOD BY WESTERGREN METHOD: 7 MM/HR (ref 0–40)
VIT B12 SERPL-MCNC: 670 PG/ML (ref 232–1245)

## 2021-06-09 NOTE — TELEPHONE ENCOUNTER
Called, left  for patient. DXA scan shows high-risk osteopenia. We can discuss risk/benefit of treatment at upcoming visit. April labs showed an increase in liver enzymes; could have increased if she had just had a fall, or could be side effect from medication like Lipitor. I'll check a few of these possibilities and include repeat liver enzymes with these labs\ to make sure they're going back down. Please try to catch patient by phone to close the loop on Thursday.

## 2021-06-09 NOTE — TELEPHONE ENCOUNTER
----- Message from Brooke Landaverde RN sent at 6/7/2021  9:03 AM EDT -----  Regarding: FW: Dr. Maria Luz Montesinos    ----- Message -----  From: Abbi Baker  Sent: 6/4/2021   4:55 PM EDT  To: [facility] Nurse Pool  Subject: Dr. Nina Estimable first and last name: Self    Reason for call: Tests    Callback required yes/no and why: Yes, to verify tests & obtain bone density test results    Best contact number(s): [phone]    Details to clarify the request: Pt stated that Dr. Kait Ruby told her to have tests retaken prior to her appt date that is on 6/23/21. Pt is wanting to know which tests need to be redone, and if she can have orders for labs called into [facility] on [address].  Pt is also wanting to know if Dr. Kait Ruby has received the results of her bone density test.

## 2021-06-10 RX ORDER — ATENOLOL 50 MG/1
TABLET ORAL
Qty: 90 TABLET | Refills: 3 | Status: SHIPPED | OUTPATIENT
Start: 2021-06-10 | End: 2022-08-10 | Stop reason: SDUPTHER

## 2021-06-23 ENCOUNTER — APPOINTMENT (OUTPATIENT)
Dept: FAMILY MEDICINE CLINIC | Age: 76
End: 2021-06-23

## 2021-06-23 ENCOUNTER — OFFICE VISIT (OUTPATIENT)
Dept: RHEUMATOLOGY | Age: 76
End: 2021-06-23
Payer: MEDICARE

## 2021-06-23 VITALS
HEART RATE: 98 BPM | TEMPERATURE: 97.6 F | RESPIRATION RATE: 18 BRPM | BODY MASS INDEX: 21.22 KG/M2 | SYSTOLIC BLOOD PRESSURE: 109 MMHG | DIASTOLIC BLOOD PRESSURE: 68 MMHG | WEIGHT: 116 LBS

## 2021-06-23 DIAGNOSIS — M35.3 POLYMYALGIA RHEUMATICA (HCC): ICD-10-CM

## 2021-06-23 DIAGNOSIS — M35.3 POLYMYALGIA RHEUMATICA (HCC): Primary | ICD-10-CM

## 2021-06-23 DIAGNOSIS — M81.8 OTHER OSTEOPOROSIS, UNSPECIFIED PATHOLOGICAL FRACTURE PRESENCE: ICD-10-CM

## 2021-06-23 DIAGNOSIS — D75.89 MACROCYTOSIS: ICD-10-CM

## 2021-06-23 PROCEDURE — 1090F PRES/ABSN URINE INCON ASSESS: CPT | Performed by: INTERNAL MEDICINE

## 2021-06-23 PROCEDURE — G8432 DEP SCR NOT DOC, RNG: HCPCS | Performed by: INTERNAL MEDICINE

## 2021-06-23 PROCEDURE — G8754 DIAS BP LESS 90: HCPCS | Performed by: INTERNAL MEDICINE

## 2021-06-23 PROCEDURE — 99215 OFFICE O/P EST HI 40 MIN: CPT | Performed by: INTERNAL MEDICINE

## 2021-06-23 PROCEDURE — G8536 NO DOC ELDER MAL SCRN: HCPCS | Performed by: INTERNAL MEDICINE

## 2021-06-23 PROCEDURE — G8420 CALC BMI NORM PARAMETERS: HCPCS | Performed by: INTERNAL MEDICINE

## 2021-06-23 PROCEDURE — G0463 HOSPITAL OUTPT CLINIC VISIT: HCPCS | Performed by: INTERNAL MEDICINE

## 2021-06-23 PROCEDURE — G8752 SYS BP LESS 140: HCPCS | Performed by: INTERNAL MEDICINE

## 2021-06-23 PROCEDURE — G8427 DOCREV CUR MEDS BY ELIG CLIN: HCPCS | Performed by: INTERNAL MEDICINE

## 2021-06-23 PROCEDURE — 1101F PT FALLS ASSESS-DOCD LE1/YR: CPT | Performed by: INTERNAL MEDICINE

## 2021-06-23 NOTE — Clinical Note
Alex Oropeza, another Prolia application for our [facility] for you, i'll enter it in [address] as soon as we figure out how to order for the [facility]

## 2021-06-23 NOTE — PATIENT INSTRUCTIONS
1. Reduce prednisone to 1 pill every other day for 2 weeks, then stop if you're not feeling more painful or lightheaded. 2. Labs ASAP to better understand if there are liver problems, ask at checkout if they can fit you in over there. 3. We'll pursue Prolia injections every 6 months for low bone density. 4. Return in 3 months. We could coordinate your Prolia with your next visit.

## 2021-06-23 NOTE — PROGRESS NOTES
REASON FOR VISIT:   Gumaro Santoro is a 67 y.o. female with history of hypertension, osteopenia, and hypothyroidism who is returning for followup of polymyalgia rheumatica. HISTORY OF PRESENT ILLNESS    Still taking 1mg daily prednisone. Has been having more soreness in bilateral upper arms. For 4-5 days was nearly bedridden, better    No headaches. No weight loss    Blood pressure bit better today, says consistently lightheaded with quick standing though does OK if she takes her time. Disease History:  Initial visit March 2019 developed bilateral shoulder pain, difficulty lifting her hands above her shoulders. Hasn't had significant thigh soreness. Wasn't having any headaches, visual changes, night sweats, or weight loss. Started on prednisone with immediate dramatic improvement. With weaning prednisone, would develop recrudescence of the shoulder and upper arm pain. Now on prednisone 5mg Tuesday, Thursday, and Saturday since November. Shoulders hurt more currently. Had previously been going to PT, can't go to pool classes during COVID so isn't exercising like she was. Has noticed hair thinning diffusely, comes out in clumps, pony tail not as thick as before. Switched from gabapentin to duloxetine, during the transition prednisone had been increased. Last year fell and irritated both shoulders, attributes to clumsiness--once tripped over a stool while carrying laundry, another time was reaching for shoes while sitting and carpet slipped from under her and she developed symptomatic rotator cuff tendinopathy for which she completed PT. Takes Tylenol for pain is only once every 2 weeks, when she is more active such as gardening or washing the floor. Has been advised to avoid NSAIDs. Has had paralumbar low back pain every morning, stays in the low back. Also h/o neck spasms. Has had multiple LE bypasses and stents for symptomatic claudication.  Now takes daily aspirin and cilostazol. REVIEW OF SYSTEMS  A comprehensive review of systems was negative except for that written in the HPI. A 10-point review of systems is per the new patient questionnaire, which has been reviewed extensively and scanned into the electronic medical record for future reference. Review of systems is as above and is otherwise negative. ALLERGIES  Neomycin sulfate    MEDICATIONS  Current Outpatient Medications   Medication Sig    atenoloL (TENORMIN) 50 mg tablet TAKE 1 TABLET NIGHTLY    cyclobenzaprine (FLEXERIL) 5 mg tablet Take 1 Tab by mouth every eight (8) hours as needed for Muscle Spasm(s). Use only needed for the onset of severe spasm. Note this increases your risk of falls.  cholecalciferol (VITAMIN D3) (1000 Units /25 mcg) tablet Take 1 Tab by mouth daily.  predniSONE (DELTASONE) 1 mg tablet Take 1 mg by mouth daily.  atorvastatin (LIPITOR) 40 mg tablet TAKE 1 TABLET DAILY    levothyroxine (SYNTHROID) 75 mcg tablet TAKE 1 TABLET DAILY BEFORE BREAKFAST FOR A CONDITION WITH LOW THYROID HORMONE LEVELS    buPROPion SR (WELLBUTRIN SR) 150 mg SR tablet TAKE 1 TABLET DAILY    omeprazole (PRILOSEC) 20 mg capsule TAKE 1 CAPSULE DAILY    lisinopriL (PRINIVIL, ZESTRIL) 40 mg tablet Take 1 Tab by mouth daily.  diclofenac (VOLTAREN) 1 % gel Apply  to affected area four (4) times daily as needed for Pain.  cilostazoL (PLETAL) 100 mg tablet TAKE 1 TABLET TWICE A DAY    turmeric/turmeric ext/pepr ext (TURMERIC-TURMERIC EXT-PEPPER) 500-3 mg cap Take  by mouth.  multivit-min/iron/folic/lutein (CENTRUM SILVER WOMEN PO) Take  by mouth.  peg 400-propylene glycol (SYSTANE, PROPYLENE GLYCOL,) 0.4-0.3 % drop Administer 1 Drop to both eyes as needed.  DENTA 5000 PLUS 1.1 % crea Apply at night    valACYclovir (VALTREX) 1 gram tablet Take 2 Tabs by mouth two (2) times a day.  2 initially and 2 in 12 hr for fever blisters    ascorbic acid (VITAMIN C) 500 mg tablet Take 500 mg by mouth two (2) times a day.  aspirin (ASPIRIN) 325 mg tablet Take 325 mg by mouth daily.  multivitamin, stress formula (STRESS TAB) tablet Take 1 Tab by mouth daily. No current facility-administered medications for this visit. PAST MEDICAL HISTORY  Past Medical History:   Diagnosis Date    Adult onset hypothyroidism 8/23/2017    Anemia 8/23/2017    Anxiety 8/23/2017    Arteriosclerotic heart disease (ASHD) 8/23/2017    Arthritis     back, hip right, neck    CAD (coronary artery disease)          Cervical spondylosis 8/23/2017    Chronic pain     spinal stenosis, bulging disk and bone spurs in back    Claudication in peripheral vascular disease ([address] 8/23/2017    CVD (cerebrovascular disease)     S/P right CEA    Depression     Dyslipidemia 8/23/2017    Familial mitral valve prolapse 8/23/2017    GERD (gastroesophageal reflux disease)     Hyperlipidemia     Hypertension     Hypothyroidism     Lumbar spinal stenosis     Macrocytosis 8/23/2017    Menopause     Nausea & vomiting     surgery related, severe constipation    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 ([address] 8/23/2017    Osteopenia 8/23/2017    Osteoporosis     Other ill-defined conditions(799.89)     severe constipation from pain medication    PVD (peripheral vascular disease) ([address]     S/P stent Right CFA and Left iliac    Stroke ([address]     Temporomandibular joint pain dysfunction syndrome 8/23/2017    Thyroid disease     TIA (transient ischemic attack)     Tobacco user 8/23/2017    Trigger finger 8/23/2017       FAMILY HISTORY  family history includes Breast Cancer (age of onset: 54) in her sister; Cancer in her father and sister; Diabetes in her sister; Heart Disease in her brother and father; Hypertension in her brother, father, sister, sister, and sister; Lung Disease in her father; Migraines in her mother. SOCIAL HISTORY  She  reports that she quit smoking about 13 years ago.  She has a 35.00 pack-year smoking history. She has never used smokeless tobacco. She reports current alcohol use of about 6.0 standard drinks of alcohol per week. She reports that she does not use drugs. Social History     Social History Narrative    Not on file   Former secondary education . DATA  Visit Vitals  /68   Pulse 98   Temp 97.6 °F (36.4 °C)   Resp 18   Wt 116 lb (52.6 kg)   BMI 21.22 kg/m²    Body mass index is 21.22 kg/m². No flowsheet data found. General:  The patient is well developed, well nourished, alert, and in no apparent distress. Eyes: Sclera are anicteric. No conjunctival injection. HEENT:  Oropharynx is clear. No oral ulcers. Adequate salivary pooling. No cervical or supraclavicular lymphadenopathy. Lungs:  Clear to auscultation bilaterally, without wheeze or stridor. Normal respiratory effort. Cor:  Regular rate and rhythm. No murmur rub or gallop. Abdomen: Soft, non-tender, without hepatomegaly or masses. Extremities: No calf tenderness or edema. Warm and well perfused. Skin:  No significant abnormalities. No petechial, purpuric, or psoriaform rashes   Neuro: Nonfocal, normal gait, no foot or wrist drop  Musculoskeletal:    A comprehensive musculoskeletal exam was performed for all joints of each upper and lower extremity and assessed for swelling, tenderness and range of motion. Results are documented as below:  No evidence of synovitis in the small joints of the hands, wrists, shoulders, elbows, hips, knees or ankles. Still global shoulder crepitus with tenderness on passive ROM, +empty can and +Neer bilaterally. No distal deltoid tenderness, still no thigh tenderness. Bilateral CMC squaring and subluxation.       Labs   21: ESR 7, CRP 1mg/L  21: ESR 20, CRP <1mg/L  21: B12 785  12/15/20: , CBC o/w WNL; CMP WNL; TSH 2.9 (WNL)    Imagin21 DXA:  Findings:  Femoral Neck Left:  Bone mineral density (gm/cm2): 0.848  % of peak bone mass: 82  % for age matched controls: 115  T-score: -1.4  Z-score: 0.8  Total Hip Left:  Bone mineral density (gm/cm2): 0.889  % of peak bone mass: 88  % for age matched controls: 118  T-score: -0.9  Z-score: 1.1  Lumbar Spine: L1-4  Bone mineral density (gm/cm2): 1.492  % of peak bone mass: 124  % for age matched controls: 152  T-score: 2.4  Z-score: 4.4  33% Radius Left:  Bone mineral density (gm/cm2): 0.677  % of peak bone mass: 76  % for age matched controls:  101  T-score: -2.4  Z-score: 0.1  IMPRESSION  Impression: This patient is osteopenic using the World Health Organization criteria  As compared to the prior study, there has been a trend toward a decrease in the  left hip. 10 year probability of major osteoporotic fracture: 19.2%  10 year probability of hip fracture: 9.9%    3/19/19 DXA: Lspine excluded 2/2 degenerative changes. Findings:  Femoral Neck Left:  Bone mineral density (gm/cm2): 0.895  % of peak bone mass: 86  % for age matched controls: 118  T-score: -1.0  Z-score: 1.0  Total Hip Left:  Bone mineral density (gm/cm2): 0.927  % of peak bone mass: 92  % for age matched controls: 119  T-score: -0.6  Z-score: 1.2  33% Radius Right:  Bone mineral density (gm/cm2): 0.707  % of peak bone mass: 80  % for age matched controls: 103  T-score: -2.0  Z-score: 0.2  IMPRESSION  This patient is osteopenic using the World Health Organization criteria  10 year probability of major osteoporotic fracture: 15.1%  10 year probability of hip fracture: 6.5%      ASSESSMENT AND PLAN  Ms. Tye Pillai is a 67 y.o. female who presents for evaluation of polymyalgia with [id number] prednisone, limited by extensive osteoarthritis primarily in the shoulders. Acute phase reactants reassuringly remain low, blood pressure is stable, can continue weaning prednisone toward off.     She recalls having been treated with Fosamax for years previously and carries a history of osteoporosis, and in this setting DXA is not valid for distinguishing osteopenia from osteoporosis. Still high risk for fractures, applying for Prolia to reduce this risk. 1. Polymyalgia rheumatica ([address]  -Continue to taper prednisone toward off  -Labs from last visit    2. Other osteoporosis, unspecified pathological fracture presence  -Applying for Prolia every 6 months, will coordinate with next visit  -Cont 1000 units cholecalciferol daily  -Updating Vitamin D level      Patient Instructions   1. Reduce prednisone to 1 pill every other day for 2 weeks, then stop if you're not feeling more painful or lightheaded. 2. Labs ASAP to better understand if there are liver problems, ask at checkout if they can fit you in over there. 3. We'll pursue Prolia injections every 6 months for low bone density. 4. Return in 3 months. We could coordinate your Prolia with your next visit. No orders of the defined types were placed in this encounter. Medications: I am having Buddy Skeans. Dar Jackson maintain her aspirin, (multivitamin, stress formula), ascorbic acid (vitamin C), valACYclovir, Denta 5000 Plus, peg 400-propylene glycol, multivit-min/iron/folic/lutein (CENTRUM SILVER WOMEN PO), turmeric-turmeric ext-pepper, diclofenac, cilostazoL, lisinopriL, buPROPion SR, omeprazole, atorvastatin, levothyroxine, predniSONE, cyclobenzaprine, cholecalciferol, and atenoloL.     Follow up: 3 months    Face to face time: 30 minutes  Note preparation and records review day of service: 20 minutes  Total provider time day of service: 50 minutes      Anupama Mcghee MD    Adult Rheumatology   [facility] [place] [address], [address] [place] [address]   Phone [phone]  Fax [phone]

## 2021-06-24 LAB
25(OH)D3+25(OH)D2 SERPL-MCNC: 50.9 NG/ML (ref 30–100)
ALBUMIN SERPL-MCNC: 3.9 G/DL (ref 3.7–4.7)
ALBUMIN/GLOB SERPL: 1.5 {RATIO} (ref 1.2–2.2)
ALP SERPL-CCNC: 85 IU/L (ref 48–121)
ALT SERPL-CCNC: 36 IU/L (ref 0–32)
AST SERPL-CCNC: 45 IU/L (ref 0–40)
BASOPHILS # BLD AUTO: 0 X10E3/UL (ref 0–0.2)
BASOPHILS NFR BLD AUTO: 1 %
BILIRUB SERPL-MCNC: 0.3 MG/DL (ref 0–1.2)
BUN SERPL-MCNC: 18 MG/DL (ref 8–27)
BUN/CREAT SERPL: 27 (ref 12–28)
CALCIUM SERPL-MCNC: 9.4 MG/DL (ref 8.7–10.3)
CHLORIDE SERPL-SCNC: 107 MMOL/L (ref 96–106)
CK SERPL-CCNC: 42 U/L (ref 32–182)
CO2 SERPL-SCNC: 26 MMOL/L (ref 20–29)
CREAT SERPL-MCNC: 0.67 MG/DL (ref 0.57–1)
CRP SERPL-MCNC: 1 MG/L (ref 0–10)
EOSINOPHIL # BLD AUTO: 0.1 X10E3/UL (ref 0–0.4)
EOSINOPHIL NFR BLD AUTO: 1 %
ERYTHROCYTE [DISTWIDTH] IN BLOOD BY AUTOMATED COUNT: 12.8 % (ref 11.7–15.4)
ERYTHROCYTE [SEDIMENTATION RATE] IN BLOOD BY WESTERGREN METHOD: 9 MM/HR (ref 0–40)
GLOBULIN SER CALC-MCNC: 2.6 G/DL (ref 1.5–4.5)
GLUCOSE SERPL-MCNC: 143 MG/DL (ref 65–99)
HCT VFR BLD AUTO: 34.4 % (ref 34–46.6)
HGB BLD-MCNC: 11.9 G/DL (ref 11.1–15.9)
IMM GRANULOCYTES # BLD AUTO: 0 X10E3/UL (ref 0–0.1)
IMM GRANULOCYTES NFR BLD AUTO: 0 %
LYMPHOCYTES # BLD AUTO: 1.5 X10E3/UL (ref 0.7–3.1)
LYMPHOCYTES NFR BLD AUTO: 18 %
MCH RBC QN AUTO: 34.3 PG (ref 26.6–33)
MCHC RBC AUTO-ENTMCNC: 34.6 G/DL (ref 31.5–35.7)
MCV RBC AUTO: 99 FL (ref 79–97)
MONOCYTES # BLD AUTO: 0.9 X10E3/UL (ref 0.1–0.9)
MONOCYTES NFR BLD AUTO: 11 %
NEUTROPHILS # BLD AUTO: 5.6 X10E3/UL (ref 1.4–7)
NEUTROPHILS NFR BLD AUTO: 69 %
PLATELET # BLD AUTO: 228 X10E3/UL (ref 150–450)
POTASSIUM SERPL-SCNC: 4.3 MMOL/L (ref 3.5–5.2)
PROT SERPL-MCNC: 6.5 G/DL (ref 6–8.5)
RBC # BLD AUTO: 3.47 X10E6/UL (ref 3.77–5.28)
SODIUM SERPL-SCNC: 144 MMOL/L (ref 134–144)
WBC # BLD AUTO: 8.1 X10E3/UL (ref 3.4–10.8)

## 2021-06-30 ENCOUNTER — TELEPHONE (OUTPATIENT)
Dept: INTERNAL MEDICINE CLINIC | Age: 76
End: 2021-06-30

## 2021-06-30 NOTE — TELEPHONE ENCOUNTER
Patient called in stating she had a fall on Saturday. She states she hurt her ribs but is not sure if they are just bruised. Patient states she feels somewhat better today.  She wants to know [facility]  CB: [phone]

## 2021-07-11 NOTE — PROGRESS NOTES
Maverick Guerra is a 67 y.o. female and presents with Annual Wellness Visit      Subjective:  Patient comes in today for follow-up of AMW     Polymyalgia rheumatica with difficult to wean off prednisonecurrently on prednisone 1 mg every other day. Plan is to taper it off, once pain is under control. Remains on Cymbalta. She has been following up with rheumatology. Age-related osteoporosiswas on Fosamax in the past.  Following up with rheumatology. Plan is to start her on Prolia. Hypertensionwell-controlled on lisinopril and atenolol. Noted that amlodipine was discontinued secondary to hypotension. She does have a history of coronary artery disease s/p stents, peripheral vascular disease and CVD s/p endarterectomy. She remains on aspirin, statin and cilostazol. History of lumbar spinal stenosis with bilateral radiculopathies, right greater than left, and chronic back pain, reports persistent of symptoms. She was on gabapentin in the past however that provided only mild relief and she was switched to Cymbalta. Dose of Cymbalta was increased to 60 mg daily.       Hypothyroidismon replacement. She is concerned about excessive weight loss. Reports appetite is normal. Mood and energy are fine. History of mild depression ,anxiety on Wellbutrin.       Past Medical History:   Diagnosis Date    Adult onset hypothyroidism 8/23/2017    Anemia 8/23/2017    Anxiety 8/23/2017    Arteriosclerotic heart disease (ASHD) 8/23/2017    Arthritis     back, hip right, neck    CAD (coronary artery disease)          Cervical spondylosis 8/23/2017    Chronic pain     spinal stenosis, bulging disk and bone spurs in back    Claudication in peripheral vascular disease ([address] 8/23/2017    CVD (cerebrovascular disease)     S/P right CEA    Depression     Dyslipidemia 8/23/2017    Familial mitral valve prolapse 8/23/2017    GERD (gastroesophageal reflux disease)     Hyperlipidemia     Hypertension     Hypothyroidism     Lumbar spinal stenosis     Macrocytosis 8/23/2017    Menopause     Nausea & vomiting     surgery related, severe constipation    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 ([facility] 8/23/2017    Osteopenia 8/23/2017    Osteoporosis     Other ill-defined conditions(799.89)     severe constipation from pain medication    PVD (peripheral vascular disease) ([facility]     S/P stent Right CFA and Left iliac    Stroke ([facility]     Temporomandibular joint pain dysfunction syndrome 8/23/2017    Thyroid disease     TIA (transient ischemic attack)     Tobacco user 8/23/2017    Trigger finger 8/23/2017     Past Surgical History:   Procedure Laterality Date    HX CAROTID ENDARTERECTOMY      right cea    HX OTHER SURGICAL Right     excision melanoma right arm    OR BYPASS GRAFT OTHR,FEM-POP       RIGHT FEMORAL-POPLITEAL BYPASS  WITH POLYTETRA-FL UOROETHYLENE GRAFT     OR CARDIAC SURG PROCEDURE UNLIST      stents x2    VASCULAR SURGERY PROCEDURE UNLIST      femoral stent - left    VASCULAR SURGERY PROCEDURE UNLIST      X5 right femoral bypass     Allergies   Allergen Reactions    Neomycin Sulfate Unknown (comments)     Current Outpatient Medications   Medication Sig Dispense Refill    DULoxetine (CYMBALTA) 60 mg capsule Take 1 Capsule by mouth daily for 90 days. 90 Capsule 2    atenoloL (TENORMIN) 50 mg tablet TAKE 1 TABLET NIGHTLY 90 Tablet 3    cyclobenzaprine (FLEXERIL) 5 mg tablet Take 1 Tab by mouth every eight (8) hours as needed for Muscle Spasm(s). Use only needed for the onset of severe spasm. Note this increases your risk of falls. 10 Tab 0    cholecalciferol (VITAMIN D3) (1000 Units /25 mcg) tablet Take 1 Tab by mouth daily. 90 Tab 2    predniSONE (DELTASONE) 1 mg tablet Take 1 mg by mouth every other day.       atorvastatin (LIPITOR) 40 mg tablet TAKE 1 TABLET DAILY 90 Tab 3    levothyroxine (SYNTHROID) 75 mcg tablet TAKE 1 TABLET DAILY BEFORE BREAKFAST FOR A CONDITION WITH LOW THYROID HORMONE LEVELS 90 Tab 3    buPROPion SR (WELLBUTRIN SR) 150 mg SR tablet TAKE 1 TABLET DAILY 90 Tab 3    omeprazole (PRILOSEC) 20 mg capsule TAKE 1 CAPSULE DAILY 90 Cap 3    lisinopriL (PRINIVIL, ZESTRIL) 40 mg tablet Take 1 Tab by mouth daily. 90 Tab 3    diclofenac (VOLTAREN) 1 % gel Apply  to affected area four (4) times daily as needed for Pain. 1 Each 3    cilostazoL (PLETAL) 100 mg tablet TAKE 1 TABLET TWICE A  Tab 3    turmeric/turmeric ext/pepr ext (TURMERIC-TURMERIC EXT-PEPPER) 500-3 mg cap Take  by mouth.  multivit-min/iron/folic/lutein (CENTRUM SILVER WOMEN PO) Take  by mouth.  peg 400-propylene glycol (SYSTANE, PROPYLENE GLYCOL,) 0.4-0.3 % drop Administer 1 Drop to both eyes as needed.  DENTA 5000 PLUS 1.1 % crea Apply at night  5    valACYclovir (VALTREX) 1 gram tablet Take 2 Tabs by mouth two (2) times a day. 2 initially and 2 in 12 hr for fever blisters 8 Tab 5    ascorbic acid (VITAMIN C) 500 mg tablet Take 500 mg by mouth two (2) times a day.  aspirin (ASPIRIN) 325 mg tablet Take 325 mg by mouth daily.  multivitamin, stress formula (STRESS TAB) tablet Take 1 Tab by mouth daily. Social History     Socioeconomic History    Marital status:      Spouse name: Not on file    Number of children: Not on file    Years of education: Not on file    Highest education level: Not on file   Tobacco Use    Smoking status: Former Smoker     Packs/day: 1.00     Years: 35.00     Pack years: 35.00     Quit date: 2008     Years since quittin.4    Smokeless tobacco: Never Used   Vaping Use    Vaping Use: Never used   Substance and Sexual Activity    Alcohol use:  Yes     Alcohol/week: 6.0 standard drinks     Types: 6 Glasses of wine per week     Comment: occasionally    Drug use: No     Types: Prescription, OTC     Social Determinants of Health     Financial Resource Strain:     Difficulty of Paying Living Expenses:    Food Insecurity:     Worried About [address] [facility] in the Last Year:    [address] in the Last Year:    Transportation Needs:     Lack of Transportation (Medical):  Lack of Transportation (Non-Medical):    Physical Activity:     Days of Exercise per Week:     Minutes of Exercise per Session:    Stress:     Feeling of Stress :    Social Connections:     Frequency of Communication with Friends and Family:     Frequency of Social Gatherings with Friends and Family:     Attends Oriental orthodox Services:     Active Member of Clubs or Organizations:     Attends Club or Organization Meetings:     Marital Status:      Family History   Problem Relation Age of Onset   Edilma Tomlinson Migraines Mother     Heart Disease Father     Hypertension Father     Lung Disease Father     Cancer Father         H&N    Hypertension Sister     Diabetes Sister     Heart Disease Brother     Hypertension Brother     Hypertension Sister     Cancer Sister         breast    Breast Cancer Sister 54    Hypertension Sister        Review of Systems  ROS is unremarkable except for ones highlighted in bold.    Constitutional: negative for fevers, chills, anorexia and weight loss  Eyes:   negative for visual disturbance and irritation  ENT:   negative for tinnitus,sore throat,nasal congestion,ear pain,hoarseness  Respiratory:  negative for cough, hemoptysis, dyspnea,wheezing  CV:   negative for chest pain, palpitations, lower extremity edema  GI:   negative for nausea, vomiting, diarrhea, abdominal pain,melena  Endo:               negative for polyuria,polydipsia,polyphagia,heat intolerance, hair loss  Genitourinary: negative for frequency, dysuria and hematuria  Integumentary: negative for rash and pruritus  Hematologic:  negative for easy bruising and gum/nose bleeding  Musculoskel: negative for myalgias, arthralgias, back pain, muscle weakness, joint pain  Neurological:  negative for headaches, dizziness, vertigo, memory problems and gait   Behavl/Psych: negative for feelings of anxiety, depression, mood changes  ROS otherwise negative     Objective:  Visit Vitals  /64 (BP 1 Location: Left upper arm, BP Patient Position: Sitting, BP Cuff Size: Adult)   Pulse 74   Temp 97.3 °F (36.3 °C)   Resp 14   Ht 5' 2\" (1.575 m)   Wt 114 lb (51.7 kg)   SpO2 91%   BMI 20.85 kg/m²     Physical Exam:   General appearance - alert, well appearing, and in no distress  Mental status - alert, oriented to person, place, and time  EYE-RODNEY, EOMI, fundi normal, corneas normal, no foreign bodies  ENT-ENT exam normal, no neck nodes or sinus tenderness  Nose - normal and patent, no erythema, discharge or polyps  Mouth - mucous membranes moist, pharynx normal without lesions  Neck - supple, no significant adenopathy   Chest - clear to auscultation, no wheezes, rales or rhonchi, symmetric air entry   Heart - normal rate, regular rhythm, normal S1, S2, no murmurs, rubs, clicks or gallops   Abdomen - soft, nontender, nondistended, no masses or organomegaly  Lymph- no adenopathy palpable  Ext-peripheral pulses normal, no pedal edema, no clubbing or cyanosis  Skin-Warm and dry.  no hyperpigmentation, vitiligo, or suspicious lesions  Neuro -alert, oriented, normal speech, no focal findings or movement disorder noted  Musculoskeletal- FROM, no bony abnormalities,  point tenderness, positive drop arm test    Lab Review:  Results for orders placed or performed in visit on 06/09/21   CK   Result Value Ref Range    Creatine Kinase,Total 42 32 - 182 U/L   CBC WITH AUTOMATED DIFF   Result Value Ref Range    WBC 8.1 3.4 - 10.8 x10E3/uL    RBC 3.47 (L) 3.77 - 5.28 x10E6/uL    HGB 11.9 11.1 - 15.9 g/dL    HCT 34.4 34.0 - 46.6 %    MCV 99 (H) 79 - 97 fL    MCH 34.3 (H) 26.6 - 33.0 pg    MCHC 34.6 31.5 - 35.7 g/dL    RDW 12.8 11.7 - 15.4 %    PLATELET 859 301 - 452 x10E3/uL    NEUTROPHILS 69 Not Estab. %    Lymphocytes 18 Not Estab. %    MONOCYTES 11 Not Estab. %    EOSINOPHILS 1 Not Estab. %    BASOPHILS 1 Not Estab. %    ABS. NEUTROPHILS 5.6 1.4 - 7.0 x10E3/uL    Abs Lymphocytes 1.5 0.7 - 3.1 x10E3/uL    ABS. MONOCYTES 0.9 0.1 - 0.9 x10E3/uL    ABS. EOSINOPHILS 0.1 0.0 - 0.4 x10E3/uL    ABS. BASOPHILS 0.0 0.0 - 0.2 x10E3/uL    IMMATURE GRANULOCYTES 0 Not Estab. %    ABS. IMM. GRANS. 0.0 0.0 - 0.1 N54L2/II   METABOLIC PANEL, COMPREHENSIVE   Result Value Ref Range    Glucose 143 (H) 65 - 99 mg/dL    BUN 18 8 - 27 mg/dL    Creatinine 0.67 0.57 - 1.00 mg/dL    GFR est non-AA 85 >59 mL/min/1.73    GFR est AA 98 >59 mL/min/1.73    BUN/Creatinine ratio 27 12 - 28    Sodium 144 134 - 144 mmol/L    Potassium 4.3 3.5 - 5.2 mmol/L    Chloride 107 (H) 96 - 106 mmol/L    CO2 26 20 - 29 mmol/L    Calcium 9.4 8.7 - 10.3 mg/dL    Protein, total 6.5 6.0 - 8.5 g/dL    Albumin 3.9 3.7 - 4.7 g/dL    GLOBULIN, TOTAL 2.6 1.5 - 4.5 g/dL    A-G Ratio 1.5 1.2 - 2.2    Bilirubin, total 0.3 0.0 - 1.2 mg/dL    Alk. phosphatase 85 48 - 121 IU/L    AST (SGOT) 45 (H) 0 - 40 IU/L    ALT (SGPT) 36 (H) 0 - 32 IU/L   VITAMIN D, 25 HYDROXY   Result Value Ref Range    VITAMIN D, 25-HYDROXY 50.9 30.0 - 100.0 ng/mL   SED RATE (ESR)   Result Value Ref Range    Sed rate (ESR) 9 0 - 40 mm/hr   C REACTIVE PROTEIN, QT   Result Value Ref Range    C-Reactive Protein, Qt 1 0 - 10 mg/L        Documenation Review:  Cervical spondylosis with more recent problems with neck pain. She has also had bilateral upper arm pain. She finds it difficult to raise her arms and has noted pain radiating into her occiput as well. She has had a mildly elevated sed rate in the past, raising the question of polymyalgia as the cause of some of her upper arm symptoms, but more likely it relates to a cervical radiculopathy. At the time of the visit we elected to check her sed rate to see if she might benefit from a more prolonged course of prednisone.     History of lumbar spinal stenosis with bilateral radiculopathies, right greater than left, and chronic back pain, all of which are symptomatic at times, but not consistently. Hx of right coronary artery in December, 2009. At that time she underwent cardiac cath and had a 40% left anterior descending coronary artery lesion as well. She has had no recent problems with angina or CHF. She sees Dr. Isha Rivera on a six to twelve month basis. Her last stress test was in May of 2015 and negative. She has not had any recent issues with anginal type symptoms or symptoms of congestive heart failure. PVD, status post left common artery iliac stent and angioplasty in April of 2007 and a right CFA and popliteal bypass graft in April of 2009 with revision of this in September of 2009. She had another revision on the right side in January, 2011 with a patch angioplasty and thrombectomy. She has also undergone a left SFA stent as of September, 2012. Then in August, 2013 she underwent right femoral to tibial peroneal trunk bypass grafting. She then underwent right iliac artery angioplasty and stent and left iliac angioplasty in June of 2014. Most recently she underwent aortogram with runoff In December, 2018 and underwent stenting of a 60% left common iliac artery stenosis. Dr. Starr Sweet has been reluctant to do any further surgery as her limbs are not threatened. She does continue to have claudication at one-half to one block. She also has a mild ischemic neuropathy, particularly involving her great toes. CVD, status post right carotid endarterectomy in March, 2010. She has had stable carotid duplexes since. History of osteoporosis with previous treatment with [facility] Maintenance Issues and Ancillary Studies:  1. TDAP (pertussis and tetanus) vaccine was given in February, 2013. 2. Pneumovax 23 (PPSV23) was given in February, 2011.  3. Seasonal influenza vaccine was given in October, 2020. 4. Zostavax was given in February, 2011. 5. Prevnar 13 (PCV13) was given in April, 2015.   Updated every pain again we will going to continue until we do ESR and what we can do is that if you  6. She is on the list to receive Shingrix. 7. Lexiscan stress testing was negative in May, 2015. 8. Colonoscopy was negative in September, 2011, (ten year follow up indicated). 9. Reviewed DEXA scan from 2021. Patient is osteopenic using WHO criteria. 10-year probability of major osteoporotic fracture 19%, 10-year probability of hip fracture 9.9%. 10. EBT heart scan revealed a calcium score of 1,024 in October, 2009. 11. Cardiac cath in December, 2009 revealed the RCA stenosis resulting in PTCA and stenting of that artery. 12. Lumbosacral MRI in June, 2011 revealed L4-5 and L5-S1 foraminal and spinal stenosis. 13. Upper GI in July, 2010 revealed GERD. 14. CT scan of the cervical spine in October, 2013 revealed anterolisthesis of C3 on 4 and C5-6 and C6-7 stenosis. 15. Last pelvic was reportedly negative in May, 2014. 16. Mammogram was negative in September, 2020. 17. Carotid dopplers revealed mild plaque on the left and a clear right carotid endarterectomy on the right in November, 2018. 18. PVRs in November, 2018 revealed moderately decreased circulation on the right with an JOANA of 0.36 and mild decrease on the left with an JOANA of .64.  19. EKG in the office revealed nonspecific ST-T wave changes. 20. Aortogram with runoff in December, 2018 was as described above. 21. Health screening assessments were essentially normal.       Assessment/Plan:    Diagnoses and all orders for this visit:    1. Encounter for annual physical exam    2. Well woman exam (no gynecological exam)    3. Encounter for medication monitoring  -     CK; Future    4. Encounter for screening mammogram for breast cancer  -     [facility] MAMMO BI SCREENING INCL CAD; Future    5. Essential hypertension  -     CBC W/O DIFF; Future  -     METABOLIC PANEL, COMPREHENSIVE; Future    6. Dyslipidemia  -     LIPID PANEL; Future  -     CK; Future    7.  Primary osteoarthritis of both shoulders    8. Adult onset hypothyroidism  -     T4, FREE; Future  -     TSH 3RD GENERATION; Future    9. PMR (polymyalgia rheumatica) ([facility])  -     DULoxetine (CYMBALTA) 60 mg capsule; Take 1 Capsule by mouth daily for 90 days. 10. Age-related osteoporosis without current pathological fracture    11. Arteriosclerotic heart disease (ASHD)    12. PVD (peripheral vascular disease) ([facility] [address]    13. Vitamin D deficiency    14. Impaired glucose tolerance  -     HEMOGLOBIN A1C WITH EAG; Future         Patient is up-to-date on all immunization health screening. She is up-to-date on her COVID-19 vaccine. DEXA scan results discussed with patient today. High risk of fractures. She has been on Fosamax in the past. Plan is to start on Prolia. She would like to follow-up with her rheumatologist on this. Recommended her to get a dental clearance prior to it. She is currently on prednisone 1 mg p.o. every other day. She reports she has seen an increase in her shoulder pain since tapering steroids. These are her last 2 weeks of taper. She remains on Cymbalta. Continue current meds  I will call with lab results and make further recommendations or adjustments if necessary. Discussed lifestyle modifications including Na restriction, low carb/fat diet, weight reduction and exercise (at least a walking program). ICD-10-CM ICD-9-CM    1. Encounter for annual physical exam  Z00.00 V70.0    2. Well woman exam (no gynecological exam)  Z00.00 V70.0    3. Encounter for medication monitoring  Z51.81 V58.83 CK   4. Encounter for screening mammogram for breast cancer  Z12.31 V76.12 [facility] MAMMO BI SCREENING INCL CAD   5. Essential hypertension  I10 401.9 CBC W/O DIFF      METABOLIC PANEL, COMPREHENSIVE   6. Dyslipidemia  E78.5 272.4 LIPID PANEL      CK   7. Primary osteoarthritis of both shoulders  M19.011 715.11     M19.012     8. Adult onset hypothyroidism  E03.8 244.8 T4, FREE      TSH 3RD GENERATION   9.  PMR (polymyalgia rheumatica) ([facility]  M35.3 725 DULoxetine (CYMBALTA) 60 mg capsule   10. Age-related osteoporosis without current pathological fracture  M81.0 733.01    11. Arteriosclerotic heart disease (ASHD)  I25.10 414.00    12. PVD (peripheral vascular disease) (HCC)  I73.9 443.9    13. Vitamin D deficiency  E55.9 268.9    14. Impaired glucose tolerance  R73.02 790.22 HEMOGLOBIN A1C WITH EAG             I have reviewed with the patient details of the assessment and plan and all questions were answered. Relevent patient education was performed. Verbal and/or written instructions (see AVS) provided. The most recent lab findings were reviewed with the patient. Plan was discussed with patient who verbally expressed understanding. An After Visit Summary was printed and given to the patient.     Yariel Ferguson MD    Trial of weaning you off of steroids is 1 ESR

## 2021-07-12 ENCOUNTER — OFFICE VISIT (OUTPATIENT)
Dept: INTERNAL MEDICINE CLINIC | Age: 76
End: 2021-07-12
Payer: MEDICARE

## 2021-07-12 VITALS
OXYGEN SATURATION: 91 % | HEART RATE: 74 BPM | RESPIRATION RATE: 14 BRPM | BODY MASS INDEX: 20.98 KG/M2 | HEIGHT: 62 IN | WEIGHT: 114 LBS | TEMPERATURE: 97.3 F | DIASTOLIC BLOOD PRESSURE: 64 MMHG | SYSTOLIC BLOOD PRESSURE: 120 MMHG

## 2021-07-12 DIAGNOSIS — E55.9 VITAMIN D DEFICIENCY: ICD-10-CM

## 2021-07-12 DIAGNOSIS — E03.8 ADULT ONSET HYPOTHYROIDISM: ICD-10-CM

## 2021-07-12 DIAGNOSIS — I25.10 ARTERIOSCLEROTIC HEART DISEASE (ASHD): ICD-10-CM

## 2021-07-12 DIAGNOSIS — M19.012 PRIMARY OSTEOARTHRITIS OF BOTH SHOULDERS: ICD-10-CM

## 2021-07-12 DIAGNOSIS — I10 ESSENTIAL HYPERTENSION: ICD-10-CM

## 2021-07-12 DIAGNOSIS — Z51.81 ENCOUNTER FOR MEDICATION MONITORING: ICD-10-CM

## 2021-07-12 DIAGNOSIS — R73.02 IMPAIRED GLUCOSE TOLERANCE: ICD-10-CM

## 2021-07-12 DIAGNOSIS — I73.9 PVD (PERIPHERAL VASCULAR DISEASE) (HCC): ICD-10-CM

## 2021-07-12 DIAGNOSIS — Z12.31 ENCOUNTER FOR SCREENING MAMMOGRAM FOR BREAST CANCER: ICD-10-CM

## 2021-07-12 DIAGNOSIS — M35.3 PMR (POLYMYALGIA RHEUMATICA) (HCC): ICD-10-CM

## 2021-07-12 DIAGNOSIS — E78.5 DYSLIPIDEMIA: ICD-10-CM

## 2021-07-12 DIAGNOSIS — Z00.00 WELL WOMAN EXAM (NO GYNECOLOGICAL EXAM): ICD-10-CM

## 2021-07-12 DIAGNOSIS — M81.0 AGE-RELATED OSTEOPOROSIS WITHOUT CURRENT PATHOLOGICAL FRACTURE: ICD-10-CM

## 2021-07-12 DIAGNOSIS — Z00.00 ENCOUNTER FOR ANNUAL PHYSICAL EXAM: Primary | ICD-10-CM

## 2021-07-12 DIAGNOSIS — M19.011 PRIMARY OSTEOARTHRITIS OF BOTH SHOULDERS: ICD-10-CM

## 2021-07-12 PROCEDURE — G8420 CALC BMI NORM PARAMETERS: HCPCS | Performed by: INTERNAL MEDICINE

## 2021-07-12 PROCEDURE — 1090F PRES/ABSN URINE INCON ASSESS: CPT | Performed by: INTERNAL MEDICINE

## 2021-07-12 PROCEDURE — 1101F PT FALLS ASSESS-DOCD LE1/YR: CPT | Performed by: INTERNAL MEDICINE

## 2021-07-12 PROCEDURE — G8536 NO DOC ELDER MAL SCRN: HCPCS | Performed by: INTERNAL MEDICINE

## 2021-07-12 PROCEDURE — G0439 PPPS, SUBSEQ VISIT: HCPCS | Performed by: INTERNAL MEDICINE

## 2021-07-12 PROCEDURE — G8427 DOCREV CUR MEDS BY ELIG CLIN: HCPCS | Performed by: INTERNAL MEDICINE

## 2021-07-12 PROCEDURE — 99214 OFFICE O/P EST MOD 30 MIN: CPT | Performed by: INTERNAL MEDICINE

## 2021-07-12 PROCEDURE — G8754 DIAS BP LESS 90: HCPCS | Performed by: INTERNAL MEDICINE

## 2021-07-12 PROCEDURE — G8432 DEP SCR NOT DOC, RNG: HCPCS | Performed by: INTERNAL MEDICINE

## 2021-07-12 PROCEDURE — G8752 SYS BP LESS 140: HCPCS | Performed by: INTERNAL MEDICINE

## 2021-07-12 RX ORDER — DULOXETIN HYDROCHLORIDE 60 MG/1
60 CAPSULE, DELAYED RELEASE ORAL DAILY
Qty: 90 CAPSULE | Refills: 2
Start: 2021-07-12 | End: 2021-07-28 | Stop reason: SDUPTHER

## 2021-07-12 NOTE — PATIENT INSTRUCTIONS
The best way to stay healthy is to live a healthy lifestyle. A healthy lifestyle includes regular exercise, eating a well-balanced diet, keeping a healthy weight and not smoking. Regular physical exams and screening tests are another important way to take care of yourself. Preventive exams provided by health care providers can find health problems early when treatment works best and can keep you from getting certain diseases or illnesses. Preventive services include exams, lab tests, screenings, shots, monitoring and information to help you take care of your own health. All people over 65 should have a pneumonia shot. Pneumonia shots are usually only needed once in a lifetime unless your doctor decides differently. In addition to your physical exam, some screening tests are recommended:    All people over 65 should have a yearly flu shot. People over 65 are at medium to high risk for Hepatitis B. Three shots are needed for complete protection. Bone mass measurement (dexa scan) is recommended every two years. Diabetes Mellitus screening is recommended every year. Glaucoma is an eye disease caused by high pressure in the eye. An eye exam is recommended every year. Cardiovascular screening tests that check your cholesterol and other blood fat (lipid) levels are recommended every five years. Colorectal Cancer screening tests help to find pre-cancerous polyps (growths in the colon) so they can be removed before they turn into cancer. Tests ordered for screening depend on your personal and family history risk factors. Prostate Cancer Screening (annually up to age 76)    Screening for breast cancer is recommended yearly with a Mammogram.    Screening for cervical and vaginal cancer is recommended with a pelvic and Pap test every two years.  However if you have had an abnormal pap in the past  three years or at high risk for cervical or vaginal cancer Medicare will cover a pap test and a pelvic exam every year.      Here is a list of your current Health Maintenance items with a due date:  Health Maintenance Due   Topic Date Due    Hepatitis C Test  Never done    Shingles Vaccine (1 of 2) Never done    Lung Scan  Never done    Cholesterol Test   06/18/2021    Annual Well Visit  06/19/2021

## 2021-07-12 NOTE — PROGRESS NOTES
Chief Complaint   Patient presents with    Annual Wellness Visit       Depression Risk Factor Screening:     3 most recent PHQ Screens 6/18/2020   Little interest or pleasure in doing things Not at all   Feeling down, depressed, irritable, or hopeless Not at all   Total Score PHQ 2 0   Trouble falling or staying asleep, or sleeping too much Several days   Feeling tired or having little energy More than half the days   Poor appetite, weight loss, or overeating Not at all   Feeling bad about yourself - or that you are a failure or have let yourself or your family down Not at all   Trouble concentrating on things such as school, work, reading, or watching TV Several days   Moving or speaking so slowly that other people could have noticed; or the opposite being so fidgety that others notice Not at all   Thoughts of being better off dead, or hurting yourself in some way Not at all   PHQ 9 Score 4   How difficult have these problems made it for you to do your work, take care of your home and get along with others Not difficult at all       Functional Ability and Level of Safety:     Activities of Daily Living  ADL Assessment 3/17/2021   Feeding yourself No Help Needed   Getting from bed to chair No Help Needed   Getting dressed No Help Needed   Bathing or showering No Help Needed   Walk across the room (includes cane/walker) No Help Needed   Using the telphone No Help Needed   Taking your medications No Help Needed   Preparing meals No Help Needed   Managing money (expenses/bills) No Help Needed   Moderately strenuous housework (laundry) No Help Needed   Shopping for personal items (toiletries/medicines) No Help Needed   Shopping for groceries No Help Needed   Driving No Help Needed   Climbing a flight of stairs No Help Needed   Getting to places beyond walking distances No Help Needed       Fall Risk  Fall Risk Assessment, last 12 mths 6/23/2021   Able to walk? Yes   Fall in past 12 months? 0   Do you feel unsteady?  0 Are you worried about falling 0   Is TUG test greater than 12 seconds? -   Is the gait abnormal? -   Number of falls in past 12 months -   Fall with injury? -       Abuse Screen  Abuse Screening Questionnaire 3/22/2021   Do you ever feel afraid of your partner? N   Are you in a relationship with someone who physically or mentally threatens you? N   Is it safe for you to go home?  Y         Patient Care Team   Patient Care Team:  Priti Reich MD as PCP - General (Hospitalist)  Priti Reich MD as PCP - REHABILITATION [facility] Empaneled Provider  Tiana Adhikari MD (Vascular Surgery)  Olu Palacios MD (Ophthalmology)  Liliya Grimes MD (Rheumatology)

## 2021-07-13 LAB
ALBUMIN SERPL-MCNC: 3.7 G/DL (ref 3.5–5)
ALBUMIN/GLOB SERPL: 1.1 {RATIO} (ref 1.1–2.2)
ALP SERPL-CCNC: 127 U/L (ref 45–117)
ALT SERPL-CCNC: 31 U/L (ref 12–78)
ANION GAP SERPL CALC-SCNC: 4 MMOL/L (ref 5–15)
AST SERPL-CCNC: 22 U/L (ref 15–37)
BILIRUB SERPL-MCNC: 0.5 MG/DL (ref 0.2–1)
BUN SERPL-MCNC: 12 MG/DL (ref 6–20)
BUN/CREAT SERPL: 17 (ref 12–20)
CALCIUM SERPL-MCNC: 9.6 MG/DL (ref 8.5–10.1)
CHLORIDE SERPL-SCNC: 105 MMOL/L (ref 97–108)
CHOLEST SERPL-MCNC: 148 MG/DL
CK SERPL-CCNC: 33 U/L (ref 26–192)
CO2 SERPL-SCNC: 30 MMOL/L (ref 21–32)
CREAT SERPL-MCNC: 0.69 MG/DL (ref 0.55–1.02)
ERYTHROCYTE [DISTWIDTH] IN BLOOD BY AUTOMATED COUNT: 13.7 % (ref 11.5–14.5)
EST. AVERAGE GLUCOSE BLD GHB EST-MCNC: 108 MG/DL
GLOBULIN SER CALC-MCNC: 3.5 G/DL (ref 2–4)
GLUCOSE SERPL-MCNC: 81 MG/DL (ref 65–100)
HBA1C MFR BLD: 5.4 % (ref 4–5.6)
HCT VFR BLD AUTO: 39.9 % (ref 35–47)
HDLC SERPL-MCNC: 84 MG/DL
HDLC SERPL: 1.8 {RATIO} (ref 0–5)
HGB BLD-MCNC: 12.6 G/DL (ref 11.5–16)
LDLC SERPL CALC-MCNC: 52.8 MG/DL (ref 0–100)
MCH RBC QN AUTO: 33.2 PG (ref 26–34)
MCHC RBC AUTO-ENTMCNC: 31.6 G/DL (ref 30–36.5)
MCV RBC AUTO: 105.3 FL (ref 80–99)
NRBC # BLD: 0 K/UL (ref 0–0.01)
NRBC BLD-RTO: 0 PER 100 WBC
PLATELET # BLD AUTO: 295 K/UL (ref 150–400)
PMV BLD AUTO: 11.2 FL (ref 8.9–12.9)
POTASSIUM SERPL-SCNC: 5 MMOL/L (ref 3.5–5.1)
PROT SERPL-MCNC: 7.2 G/DL (ref 6.4–8.2)
RBC # BLD AUTO: 3.79 M/UL (ref 3.8–5.2)
SODIUM SERPL-SCNC: 139 MMOL/L (ref 136–145)
T4 FREE SERPL-MCNC: 1.2 NG/DL (ref 0.8–1.5)
TRIGL SERPL-MCNC: 56 MG/DL (ref ?–150)
TSH SERPL DL<=0.05 MIU/L-ACNC: 2.28 UIU/ML (ref 0.36–3.74)
VLDLC SERPL CALC-MCNC: 11.2 MG/DL
WBC # BLD AUTO: 8 K/UL (ref 3.6–11)

## 2021-07-28 DIAGNOSIS — M35.3 PMR (POLYMYALGIA RHEUMATICA) (HCC): ICD-10-CM

## 2021-07-29 DIAGNOSIS — I73.9 PVD (PERIPHERAL VASCULAR DISEASE) (HCC): ICD-10-CM

## 2021-07-29 RX ORDER — CILOSTAZOL 100 MG/1
TABLET ORAL
Qty: 180 TABLET | Refills: 3 | Status: SHIPPED | OUTPATIENT
Start: 2021-07-29 | End: 2022-07-25 | Stop reason: SDUPTHER

## 2021-07-29 RX ORDER — DULOXETIN HYDROCHLORIDE 60 MG/1
60 CAPSULE, DELAYED RELEASE ORAL DAILY
Qty: 90 CAPSULE | Refills: 2 | Status: SHIPPED | OUTPATIENT
Start: 2021-07-29 | End: 2021-10-27

## 2021-09-13 NOTE — PROGRESS NOTES
Sarah Murray is a 67 y.o. female and presents with Follow-up (2 month follow up)      Subjective:  Patient comes in today for follow-up     Since last office visit patient was seen by pain management Dr. Daria Wheat at [address] [place] [address] on 9/9/2021. She has a longstanding history of chronic low back pain, degenerative disc disease cervical and lumbar, osteoarthritis of cervical and lumbar spine, primary osteoarthritis of both shoulders. She is tried physical therapy with minimal relief. He did recommend EDGAR and facet joint injection which patient is thinking about. She reports she had a fall a month back and still is having some pain on her right shoulder. PMR-She felt her symptoms of chronic pain have flared up ever since she is been completely off of prednisone. Reports she has more pain which is back with regimens. Continues to follow-up with rheumatologist.    Hypertensionwell-controlled on current meds. Denies lightheadedness, dizziness, shortness of breath or ankle swelling. history of coronary artery disease s/p stents, peripheral vascular disease and CVD s/p endarterectomy. She remains on aspirin, statin and cilostazol. Age-related osteoporosiswe'll be starting Prolia infusions. DEXA scan revealed osteoporosis. Recap-  History of lumbar spinal stenosis with bilateral radiculopathies, right greater than left, and chronic back pain, reports persistent of symptoms. She was on gabapentin in the past however that provided only mild relief and she was switched to Cymbalta. Dose of Cymbalta was increased to 60 mg daily.       Hypothyroidismon replacement. She is concerned about excessive weight loss. Reports appetite is normal. Mood and energy are fine. History of mild depression ,anxiety on Wellbutrin.       Past Medical History:   Diagnosis Date    Adult onset hypothyroidism 8/23/2017    Anemia 8/23/2017    Anxiety 8/23/2017    Arteriosclerotic heart disease (ASHD) 8/23/2017    Arthritis     back, hip right, neck    CAD (coronary artery disease)          Cervical spondylosis 8/23/2017    Chronic pain     spinal stenosis, bulging disk and bone spurs in back    Claudication in peripheral vascular disease ([address] 8/23/2017    CVD (cerebrovascular disease)     S/P right CEA    Depression     Dyslipidemia 8/23/2017    Familial mitral valve prolapse 8/23/2017    GERD (gastroesophageal reflux disease)     Hyperlipidemia     Hypertension     Hypothyroidism     Lumbar spinal stenosis     Macrocytosis 8/23/2017    Menopause     Nausea & vomiting     surgery related, severe constipation    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 ([address] 8/23/2017    Osteopenia 8/23/2017    Osteoporosis     Other ill-defined conditions(799.89)     severe constipation from pain medication    PVD (peripheral vascular disease) ([address]     S/P stent Right CFA and Left iliac    Stroke ([address]     Temporomandibular joint pain dysfunction syndrome 8/23/2017    Thyroid disease     TIA (transient ischemic attack)     Tobacco user 8/23/2017    Trigger finger 8/23/2017     Past Surgical History:   Procedure Laterality Date    HX CAROTID ENDARTERECTOMY      right cea    HX OTHER SURGICAL Right     excision melanoma right arm    GA BYPASS GRAFT OTHR,FEM-POP       RIGHT FEMORAL-POPLITEAL BYPASS  WITH POLYTETRA-FL UOROETHYLENE GRAFT     GA CARDIAC SURG PROCEDURE UNLIST      stents x2    VASCULAR SURGERY PROCEDURE UNLIST      femoral stent - left    VASCULAR SURGERY PROCEDURE UNLIST      X5 right femoral bypass     Allergies   Allergen Reactions    Neomycin Sulfate Unknown (comments)     Current Outpatient Medications   Medication Sig Dispense Refill    DULoxetine (CYMBALTA) 60 mg capsule Take 1 Capsule by mouth daily for 90 days.  90 Capsule 2    cilostazoL (PLETAL) 100 mg tablet TAKE 1 TABLET TWICE A  Tablet 3    atenoloL (TENORMIN) 50 mg tablet TAKE 1 TABLET NIGHTLY 90 Tablet 3    cyclobenzaprine (FLEXERIL) 5 mg tablet Take 1 Tab by mouth every eight (8) hours as needed for Muscle Spasm(s). Use only needed for the onset of severe spasm. Note this increases your risk of falls. 10 Tab 0    cholecalciferol (VITAMIN D3) (1000 Units /25 mcg) tablet Take 1 Tab by mouth daily. 90 Tab 2    atorvastatin (LIPITOR) 40 mg tablet TAKE 1 TABLET DAILY 90 Tab 3    levothyroxine (SYNTHROID) 75 mcg tablet TAKE 1 TABLET DAILY BEFORE BREAKFAST FOR A CONDITION WITH LOW THYROID HORMONE LEVELS 90 Tab 3    buPROPion SR (WELLBUTRIN SR) 150 mg SR tablet TAKE 1 TABLET DAILY 90 Tab 3    omeprazole (PRILOSEC) 20 mg capsule TAKE 1 CAPSULE DAILY 90 Cap 3    lisinopriL (PRINIVIL, ZESTRIL) 40 mg tablet Take 1 Tab by mouth daily. 90 Tab 3    diclofenac (VOLTAREN) 1 % gel Apply  to affected area four (4) times daily as needed for Pain. 1 Each 3    turmeric/turmeric ext/pepr ext (TURMERIC-TURMERIC EXT-PEPPER) 500-3 mg cap Take  by mouth.  multivit-min/iron/folic/lutein (CENTRUM SILVER WOMEN PO) Take  by mouth.  peg 400-propylene glycol (SYSTANE, PROPYLENE GLYCOL,) 0.4-0.3 % drop Administer 1 Drop to both eyes as needed.  DENTA 5000 PLUS 1.1 % crea Apply at night  5    valACYclovir (VALTREX) 1 gram tablet Take 2 Tabs by mouth two (2) times a day. 2 initially and 2 in 12 hr for fever blisters 8 Tab 5    ascorbic acid (VITAMIN C) 500 mg tablet Take 500 mg by mouth two (2) times a day.  aspirin (ASPIRIN) 325 mg tablet Take 325 mg by mouth daily.        Social History     Socioeconomic History    Marital status:      Spouse name: Not on file    Number of children: Not on file    Years of education: Not on file    Highest education level: Not on file   Tobacco Use    Smoking status: Former Smoker     Packs/day: 1.00     Years: 35.00     Pack years: 35.00     Quit date: 2008     Years since quittin.6    Smokeless tobacco: Never Used   Vaping Use    Vaping Use: Never used Substance and Sexual Activity    Alcohol use: Yes     Alcohol/week: 6.0 standard drinks     Types: 6 Glasses of wine per week     Comment: occasionally    Drug use: No     Types: Prescription, OTC     Social Determinants of Health     Financial Resource Strain:     Difficulty of Paying Living Expenses:    Food Insecurity:     Worried About Running Out of Food in the Last Year:     [address] in the Last Year:    Transportation Needs:     Lack of Transportation (Medical):  Lack of Transportation (Non-Medical):    Physical Activity:     Days of Exercise per Week:     Minutes of Exercise per Session:    Stress:     Feeling of Stress :    Social Connections:     Frequency of Communication with Friends and Family:     Frequency of Social Gatherings with Friends and Family:     Attends [employer] Services:     Active Member of Clubs or Organizations:     Attends Club or Organization Meetings:     Marital Status:      Family History   Problem Relation Age of Onset   [facility] Migraines Mother     Heart Disease Father     Hypertension Father     Lung Disease Father     Cancer Father         H&N    Hypertension Sister     Diabetes Sister     Heart Disease Brother     Hypertension Brother     Hypertension Sister     Cancer Sister         breast    Breast Cancer Sister 54    Hypertension Sister        Review of Systems  ROS is unremarkable except for ones highlighted in bold.    Constitutional: negative for fevers, chills, anorexia and weight loss  Eyes:   negative for visual disturbance and irritation  ENT:   negative for tinnitus,sore throat,nasal congestion,ear pain,hoarseness  Respiratory:  negative for cough, hemoptysis, dyspnea,wheezing  CV:   negative for chest pain, palpitations, lower extremity edema  GI:   negative for nausea, vomiting, diarrhea, abdominal pain,melena  Endo:               negative for polyuria,polydipsia,polyphagia,heat intolerance, hair loss  Genitourinary: negative for frequency, dysuria and hematuria  Integumentary: negative for rash and pruritus  Hematologic:  negative for easy bruising and gum/nose bleeding  Musculoskel: negative for myalgias, arthralgias, back pain, muscle weakness, joint pain  Neurological:  negative for headaches, dizziness, vertigo, memory problems and gait   Behavl/Psych: negative for feelings of anxiety, depression, mood changes  ROS otherwise negative     Objective:  Visit Vitals  /68   Pulse (!) 117   Temp 98.9 °F (37.2 °C) (Oral)   Resp 18   Ht 5' 2\" (1.575 m)   Wt 115 lb (52.2 kg)   SpO2 95%   BMI 21.03 kg/m²     Physical Exam:   General appearance - alert, well appearing, and in no distress  Mental status - alert, oriented to person, place, and time  EYE-RODNEY, EOMI, fundi normal, corneas normal, no foreign bodies  ENT-ENT exam normal, no neck nodes or sinus tenderness  Nose - normal and patent, no erythema, discharge or polyps  Mouth - mucous membranes moist, pharynx normal without lesions  Neck - supple, no significant adenopathy   Chest - clear to auscultation, no wheezes, rales or rhonchi, symmetric air entry   Heart - normal rate, regular rhythm, normal S1, S2, no murmurs, rubs, clicks or gallops   Abdomen - soft, nontender, nondistended, no masses or organomegaly  Lymph- no adenopathy palpable  Ext-peripheral pulses normal, no pedal edema, no clubbing or cyanosis  Skin-Warm and dry.  no hyperpigmentation, vitiligo, or suspicious lesions  Neuro -alert, oriented, normal speech, no focal findings or movement disorder noted  Musculoskeletal- FROM, no bony abnormalities,  point tenderness, positive drop arm test    Lab Review:  Results for orders placed or performed in visit on 07/12/21   CK   Result Value Ref Range    CK 33 26 - 192 U/L   LIPID PANEL   Result Value Ref Range    Cholesterol, total 148 <200 MG/DL    Triglyceride 56 <150 MG/DL    HDL Cholesterol 84 MG/DL    LDL, calculated 52.8 0 - 100 MG/DL    VLDL, calculated 11.2 MG/DL CHOL/HDL Ratio 1.8 0.0 - 5.0     HEMOGLOBIN A1C WITH EAG   Result Value Ref Range    Hemoglobin A1c 5.4 4.0 - 5.6 %    Est. average glucose 108 mg/dL   TSH 3RD GENERATION   Result Value Ref Range    TSH 2.28 0.36 - 3.74 uIU/mL   T4, FREE   Result Value Ref Range    T4, Free 1.2 0.8 - 1.5 NG/DL   METABOLIC PANEL, COMPREHENSIVE   Result Value Ref Range    Sodium 139 136 - 145 mmol/L    Potassium 5.0 3.5 - 5.1 mmol/L    Chloride 105 97 - 108 mmol/L    CO2 30 21 - 32 mmol/L    Anion gap 4 (L) 5 - 15 mmol/L    Glucose 81 65 - 100 mg/dL    BUN 12 6 - 20 MG/DL    Creatinine 0.69 0.55 - 1.02 MG/DL    BUN/Creatinine ratio 17 12 - 20      GFR est AA >60 >60 ml/min/1.73m2    GFR est non-AA >60 >60 ml/min/1.73m2    Calcium 9.6 8.5 - 10.1 MG/DL    Bilirubin, total 0.5 0.2 - 1.0 MG/DL    ALT (SGPT) 31 12 - 78 U/L    AST (SGOT) 22 15 - 37 U/L    Alk. phosphatase 127 (H) 45 - 117 U/L    Protein, total 7.2 6.4 - 8.2 g/dL    Albumin 3.7 3.5 - 5.0 g/dL    Globulin 3.5 2.0 - 4.0 g/dL    A-G Ratio 1.1 1.1 - 2.2     CBC W/O DIFF   Result Value Ref Range    WBC 8.0 3.6 - 11.0 K/uL    RBC 3.79 (L) 3.80 - 5.20 M/uL    HGB 12.6 11.5 - 16.0 g/dL    HCT 39.9 35.0 - 47.0 %    .3 (H) 80.0 - 99.0 FL    MCH 33.2 26.0 - 34.0 PG    MCHC 31.6 30.0 - 36.5 g/dL    RDW 13.7 11.5 - 14.5 %    PLATELET 288 519 - 729 K/uL    MPV 11.2 8.9 - 12.9 FL    NRBC 0.0 0  WBC    ABSOLUTE NRBC 0.00 0.00 - 0.01 K/uL        Documenation Review:         Assessment/Plan:    Diagnoses and all orders for this visit:    1. PMR (polymyalgia rheumatica) ([facility])    2. Essential hypertension    3. PVD (peripheral vascular disease) ([facility]    4. Dyslipidemia    5. Adult onset hypothyroidism    6. Age-related osteoporosis without current pathological fracture    7. DDD (degenerative disc disease), cervical    8. DDD (degenerative disc disease), lumbar         Noted that patient is completely off steroids now. She reports her symptoms are worse.   She has an appointment with a rheumatologist next week. Continue current meds  I will call with lab results and make further recommendations or adjustments if necessary. Discussed lifestyle modifications including Na restriction, low carb/fat diet, weight reduction and exercise (at least a walking program). ICD-10-CM ICD-9-CM    1. PMR (polymyalgia rheumatica) ([facility])  M35.3 725    2. Essential hypertension  I10 401.9    3. PVD (peripheral vascular disease) ([facility])  I73.9 443.9    4. Dyslipidemia  E78.5 272.4    5. Adult onset hypothyroidism  E03.8 244.8    6. Age-related osteoporosis without current pathological fracture  M81.0 733.01    7. DDD (degenerative disc disease), cervical  M50.30 722.4    8. DDD (degenerative disc disease), lumbar  M51.36 722.52              I have reviewed with the patient details of the assessment and plan and all questions were answered. Relevent patient education was performed. Verbal and/or written instructions (see AVS) provided. The most recent lab findings were reviewed with the patient. Plan was discussed with patient who verbally expressed understanding. An After Visit Summary was printed and given to the patient.     Merlyn He MD    Trial of weaning you off of steroids is 1 ESR

## 2021-09-14 ENCOUNTER — OFFICE VISIT (OUTPATIENT)
Dept: INTERNAL MEDICINE CLINIC | Age: 76
End: 2021-09-14
Payer: MEDICARE

## 2021-09-14 VITALS
RESPIRATION RATE: 18 BRPM | HEART RATE: 117 BPM | HEIGHT: 62 IN | BODY MASS INDEX: 21.16 KG/M2 | OXYGEN SATURATION: 95 % | SYSTOLIC BLOOD PRESSURE: 110 MMHG | TEMPERATURE: 98.9 F | DIASTOLIC BLOOD PRESSURE: 68 MMHG | WEIGHT: 115 LBS

## 2021-09-14 DIAGNOSIS — M51.36 DDD (DEGENERATIVE DISC DISEASE), LUMBAR: ICD-10-CM

## 2021-09-14 DIAGNOSIS — E78.5 DYSLIPIDEMIA: ICD-10-CM

## 2021-09-14 DIAGNOSIS — E03.8 ADULT ONSET HYPOTHYROIDISM: ICD-10-CM

## 2021-09-14 DIAGNOSIS — I73.9 PVD (PERIPHERAL VASCULAR DISEASE) (HCC): ICD-10-CM

## 2021-09-14 DIAGNOSIS — M35.3 PMR (POLYMYALGIA RHEUMATICA) (HCC): Primary | ICD-10-CM

## 2021-09-14 DIAGNOSIS — I10 ESSENTIAL HYPERTENSION: ICD-10-CM

## 2021-09-14 DIAGNOSIS — M50.30 DDD (DEGENERATIVE DISC DISEASE), CERVICAL: ICD-10-CM

## 2021-09-14 DIAGNOSIS — M81.0 AGE-RELATED OSTEOPOROSIS WITHOUT CURRENT PATHOLOGICAL FRACTURE: ICD-10-CM

## 2021-09-14 PROCEDURE — G8432 DEP SCR NOT DOC, RNG: HCPCS | Performed by: INTERNAL MEDICINE

## 2021-09-14 PROCEDURE — 1101F PT FALLS ASSESS-DOCD LE1/YR: CPT | Performed by: INTERNAL MEDICINE

## 2021-09-14 PROCEDURE — G8420 CALC BMI NORM PARAMETERS: HCPCS | Performed by: INTERNAL MEDICINE

## 2021-09-14 PROCEDURE — G8427 DOCREV CUR MEDS BY ELIG CLIN: HCPCS | Performed by: INTERNAL MEDICINE

## 2021-09-14 PROCEDURE — 1090F PRES/ABSN URINE INCON ASSESS: CPT | Performed by: INTERNAL MEDICINE

## 2021-09-14 PROCEDURE — G8752 SYS BP LESS 140: HCPCS | Performed by: INTERNAL MEDICINE

## 2021-09-14 PROCEDURE — 99214 OFFICE O/P EST MOD 30 MIN: CPT | Performed by: INTERNAL MEDICINE

## 2021-09-14 PROCEDURE — G8536 NO DOC ELDER MAL SCRN: HCPCS | Performed by: INTERNAL MEDICINE

## 2021-09-14 PROCEDURE — G8754 DIAS BP LESS 90: HCPCS | Performed by: INTERNAL MEDICINE

## 2021-09-14 NOTE — PATIENT INSTRUCTIONS
Polymyalgia Rheumatica: Care Instructions  Your Care Instructions  Polymyalgia rheumatica causes pain and swelling in joints and muscles, mainly in the hips, neck, and shoulders. Pain and swelling may be worse in the morning. This condition can occur quickly and often lasts for a year or two. Your doctor will treat you with medicine to reduce swelling. Your symptoms should get much better in 1 to 3 days and go away in 2 to 4 weeks. Still, you may need to take medicine to prevent it from coming back. You may be on the medicine for 1 to 2 years or longer. Some people who have this also get giant cell arteritis (temporal arteritis), which causes swelling of some blood vessels in the head. Tell your doctor if you have any headaches, jaw pain, or tightness or tenderness along the temple or scalp. This condition can cause blindness if it is not treated. Tell your doctor if you have problems with your vision, including blurring or seeing double. Follow-up care is a key part of your treatment and safety. Be sure to make and go to all appointments, and call your doctor if you are having problems. It's also a good idea to know your test results and keep a list of the medicines you take. How can you care for yourself at home? · Take your medicines exactly as prescribed. Call your doctor if you think you are having a problem with your medicine. You may get medicines to reduce pain and to keep your bones from getting thin. · Take anti-inflammatory medicines to reduce pain, if your doctor recommends them. These include ibuprofen (Advil, Motrin) and naproxen (Aleve). Read and follow all instructions on the label. · Eat a healthy diet. Make sure to drink milk and eat dairy products, such as low-fat cheese and yogurt. Ask your doctor how much calcium you need. If you cannot eat dairy products or you do not get enough calcium from food, you may take pills. · Do gentle weight lifting to protect your bones.  This is very important for women who have gone through menopause. · Do not smoke or allow others to smoke around you. If you need help quitting, talk to your doctor about stop-smoking programs and medicines. These can increase your chances of quitting for good. When should you call for help? Call your doctor now or seek immediate medical care if:    · You have a headache, jaw pain, or problems seeing. Watch closely for changes in your health, and be sure to contact your doctor if:    · Your joint and muscle pain or stiffness gets worse.     · You have side effects from your corticosteroid medicine, such as:  ? Signs of diabetes (feeling thirsty all the time, needing to urinate often). ? Signs of infection (fever, chills, cough, burning during urination, severe sore throat, or skin infection). ? A large weight gain. ? Mood changes. ? Trouble sleeping. ? Bruising easily.     · You have any other problems with your medicine.     · You do not get better as expected. Where can you learn more? Go to http://www.gray.com/  Enter M396 in the search box to learn more about \"Polymyalgia Rheumatica: Care Instructions. \"  Current as of: August 5, 2020               Content Version: 12.8  © 4536-7711 [employer]. Care instructions adapted under license by [employer] (which disclaims liability or warranty for this information). If you have questions about a medical condition or this instruction, always ask your healthcare professional. [place] [address] any warranty or liability for your use of this information.

## 2021-09-14 NOTE — PROGRESS NOTES
Bee Mortensen is a 67 y.o. female  HIPAA verified by two patient identifiers. Health Maintenance Due   Topic    Hepatitis C Screening     Shingrix Vaccine Age 50> (1 of 2)    Low dose CT lung screening     Flu Vaccine (1)     Chief Complaint   Patient presents with    Follow-up     2 month follow up     Visit Vitals  /68   Pulse (!) 117   Temp 98.9 °F (37.2 °C) (Oral)   Resp 18   Ht 5' 2\" (1.575 m)   Wt 115 lb (52.2 kg)   SpO2 95%   BMI 21.03 kg/m²       Pain Scale: 5/10  Pain Location: Shoulder (right)  1. Have you been to the ER, urgent care clinic since your last visit? Hospitalized since your last visit? No    2. Have you seen or consulted any other health care providers outside of the [address] [place] [address] since your last visit? Include any pap smears or colon screening.  No

## 2021-09-23 ENCOUNTER — OFFICE VISIT (OUTPATIENT)
Dept: RHEUMATOLOGY | Age: 76
End: 2021-09-23
Payer: MEDICARE

## 2021-09-23 VITALS
OXYGEN SATURATION: 96 % | SYSTOLIC BLOOD PRESSURE: 109 MMHG | DIASTOLIC BLOOD PRESSURE: 67 MMHG | BODY MASS INDEX: 20.54 KG/M2 | WEIGHT: 111.6 LBS | HEIGHT: 62 IN | RESPIRATION RATE: 16 BRPM | TEMPERATURE: 98.1 F | HEART RATE: 78 BPM

## 2021-09-23 DIAGNOSIS — M81.8 OTHER OSTEOPOROSIS, UNSPECIFIED PATHOLOGICAL FRACTURE PRESENCE: ICD-10-CM

## 2021-09-23 DIAGNOSIS — M35.3 POLYMYALGIA RHEUMATICA (HCC): Primary | ICD-10-CM

## 2021-09-23 PROCEDURE — G8536 NO DOC ELDER MAL SCRN: HCPCS | Performed by: INTERNAL MEDICINE

## 2021-09-23 PROCEDURE — G8752 SYS BP LESS 140: HCPCS | Performed by: INTERNAL MEDICINE

## 2021-09-23 PROCEDURE — G8754 DIAS BP LESS 90: HCPCS | Performed by: INTERNAL MEDICINE

## 2021-09-23 PROCEDURE — 99214 OFFICE O/P EST MOD 30 MIN: CPT | Performed by: INTERNAL MEDICINE

## 2021-09-23 PROCEDURE — G8420 CALC BMI NORM PARAMETERS: HCPCS | Performed by: INTERNAL MEDICINE

## 2021-09-23 PROCEDURE — G8432 DEP SCR NOT DOC, RNG: HCPCS | Performed by: INTERNAL MEDICINE

## 2021-09-23 PROCEDURE — 1101F PT FALLS ASSESS-DOCD LE1/YR: CPT | Performed by: INTERNAL MEDICINE

## 2021-09-23 PROCEDURE — 1090F PRES/ABSN URINE INCON ASSESS: CPT | Performed by: INTERNAL MEDICINE

## 2021-09-23 PROCEDURE — G8427 DOCREV CUR MEDS BY ELIG CLIN: HCPCS | Performed by: INTERNAL MEDICINE

## 2021-09-23 PROCEDURE — G0463 HOSPITAL OUTPT CLINIC VISIT: HCPCS | Performed by: INTERNAL MEDICINE

## 2021-09-23 NOTE — PROGRESS NOTES
Chief Complaint   Patient presents with    Arthritis     1. Have you been to the ER, urgent care clinic since your last visit? Hospitalized since your last visit? No    2. Have you seen or consulted any other health care providers outside of the [address] [place] [address] since your last visit? Include any pap smears or colon screening.  No

## 2021-09-23 NOTE — PROGRESS NOTES
REASON FOR VISIT:   Josiah Suh is a 67 y.o. female with history of hypertension, osteopenia, and hypothyroidism who is returning for followup of polymyalgia rheumatica. HISTORY OF PRESENT ILLNESS    For the last 3 days, more basilar neck pain. No clear trigger this time. Saw Dr. Catherine Lozano who reminded her she has severe osteoarthritis and offered injection if pain persists. Physical therapist has been out of office for much of the last 2 months, planning to touch base next week to get restarted. She has had pruritic rash and flaking in scalp and eyebrows, saw Derm who told her likely fungal and has given her samples to start. Disease History:  Initial visit March 2019 developed bilateral shoulder pain, difficulty lifting her hands above her shoulders. Hasn't had significant thigh soreness. Wasn't having any headaches, visual changes, night sweats, or weight loss. Started on prednisone with immediate dramatic improvement. With weaning prednisone, would develop recrudescence of the shoulder and upper arm pain. Now on prednisone 5mg Tuesday, Thursday, and Saturday since November. Shoulders hurt more currently. Had previously been going to PT, can't go to pool classes during COVID so isn't exercising like she was. Has noticed hair thinning diffusely, comes out in clumps, pony tail not as thick as before. Switched from gabapentin to duloxetine, during the transition prednisone had been increased. Last year fell and irritated both shoulders, attributes to clumsiness--once tripped over a stool while carrying laundry, another time was reaching for shoes while sitting and carpet slipped from under her and she developed symptomatic rotator cuff tendinopathy for which she completed PT. Takes Tylenol for pain is only once every 2 weeks, when she is more active such as gardening or washing the floor. Has been advised to avoid NSAIDs.     Has had paralumbar low back pain every morning, stays in the low back. Also h/o neck spasms. Has had multiple LE bypasses and stents for symptomatic claudication. Now takes daily aspirin and cilostazol. REVIEW OF SYSTEMS  A comprehensive review of systems was negative except for that written in the HPI. A 10-point review of systems is per the new patient questionnaire, which has been reviewed extensively and scanned into the electronic medical record for future reference. Review of systems is as above and is otherwise negative. ALLERGIES  Neomycin sulfate    MEDICATIONS  Current Outpatient Medications   Medication Sig    DULoxetine (CYMBALTA) 60 mg capsule Take 1 Capsule by mouth daily for 90 days.  cilostazoL (PLETAL) 100 mg tablet TAKE 1 TABLET TWICE A DAY    atenoloL (TENORMIN) 50 mg tablet TAKE 1 TABLET NIGHTLY    cyclobenzaprine (FLEXERIL) 5 mg tablet Take 1 Tab by mouth every eight (8) hours as needed for Muscle Spasm(s). Use only needed for the onset of severe spasm. Note this increases your risk of falls.  cholecalciferol (VITAMIN D3) (1000 Units /25 mcg) tablet Take 1 Tab by mouth daily.  atorvastatin (LIPITOR) 40 mg tablet TAKE 1 TABLET DAILY    levothyroxine (SYNTHROID) 75 mcg tablet TAKE 1 TABLET DAILY BEFORE BREAKFAST FOR A CONDITION WITH LOW THYROID HORMONE LEVELS    buPROPion SR (WELLBUTRIN SR) 150 mg SR tablet TAKE 1 TABLET DAILY    omeprazole (PRILOSEC) 20 mg capsule TAKE 1 CAPSULE DAILY    lisinopriL (PRINIVIL, ZESTRIL) 40 mg tablet Take 1 Tab by mouth daily.  diclofenac (VOLTAREN) 1 % gel Apply  to affected area four (4) times daily as needed for Pain.  turmeric/turmeric ext/pepr ext (TURMERIC-TURMERIC EXT-PEPPER) 500-3 mg cap Take  by mouth.  multivit-min/iron/folic/lutein (CENTRUM SILVER WOMEN PO) Take  by mouth.  DENTA 5000 PLUS 1.1 % crea Apply at night    ascorbic acid (VITAMIN C) 500 mg tablet Take 500 mg by mouth two (2) times a day.  aspirin (ASPIRIN) 325 mg tablet Take 325 mg by mouth daily.     peg 400-propylene glycol (SYSTANE, PROPYLENE GLYCOL,) 0.4-0.3 % drop Administer 1 Drop to both eyes as needed. (Patient not taking: Reported on 9/23/2021)    valACYclovir (VALTREX) 1 gram tablet Take 2 Tabs by mouth two (2) times a day. 2 initially and 2 in 12 hr for fever blisters (Patient not taking: Reported on 9/23/2021)     No current facility-administered medications for this visit. PAST MEDICAL HISTORY  Past Medical History:   Diagnosis Date    Adult onset hypothyroidism 8/23/2017    Anemia 8/23/2017    Anxiety 8/23/2017    Arteriosclerotic heart disease (ASHD) 8/23/2017    Arthritis     back, hip right, neck    CAD (coronary artery disease)          Cervical spondylosis 8/23/2017    Chronic pain     spinal stenosis, bulging disk and bone spurs in back    Claudication in peripheral vascular disease ([address] 8/23/2017    CVD (cerebrovascular disease)     S/P right CEA    Depression     Dyslipidemia 8/23/2017    Familial mitral valve prolapse 8/23/2017    GERD (gastroesophageal reflux disease)     Hyperlipidemia     Hypertension     Hypothyroidism     Lumbar spinal stenosis     Macrocytosis 8/23/2017    Menopause     Nausea & vomiting     surgery related, severe constipation    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 ([address] 8/23/2017    Osteopenia 8/23/2017    Osteoporosis     Other ill-defined conditions(799.89)     severe constipation from pain medication    PVD (peripheral vascular disease) ([address]     S/P stent Right CFA and Left iliac    Stroke ([address]     Temporomandibular joint pain dysfunction syndrome 8/23/2017    Thyroid disease     TIA (transient ischemic attack)     Tobacco user 8/23/2017    Trigger finger 8/23/2017       FAMILY HISTORY  family history includes Breast Cancer (age of onset: 54) in her sister; Cancer in her father and sister; Diabetes in her sister;  Heart Disease in her brother and father; Hypertension in her brother, father, sister, sister, and sister; Lung Disease in her father; Migraines in her mother. SOCIAL HISTORY  She  reports that she quit smoking about 13 years ago. She has a 35.00 pack-year smoking history. She has never used smokeless tobacco. She reports current alcohol use of about 6.0 standard drinks of alcohol per week. She reports that she does not use drugs. Social History     Social History Narrative    Not on file   Former secondary education . DATA  Visit Vitals  /67 (BP 1 Location: Right arm, BP Patient Position: Sitting)   Pulse 78   Temp 98.1 °F (36.7 °C) (Oral)   Resp 16   Ht 5' 2\" (1.575 m)   Wt 111 lb 9.6 oz (50.6 kg)   SpO2 96%   BMI 20.41 kg/m²    Body mass index is 20.41 kg/m². No flowsheet data found. General:  The patient is well developed, well nourished, alert, and in no apparent distress. Eyes: Sclera are anicteric. No conjunctival injection. HEENT:  Oropharynx is clear. No oral ulcers. Adequate salivary pooling. No cervical or supraclavicular lymphadenopathy. Lungs:  Clear to auscultation bilaterally, without wheeze or stridor. Normal respiratory effort. Cor:  Regular rate and rhythm. No murmur rub or gallop. Abdomen: Soft, non-tender, without hepatomegaly or masses. Extremities: No calf tenderness or edema. Warm and well perfused. Skin:  No significant abnormalities. No petechial, purpuric, or psoriaform rashes   Neuro: Nonfocal, normal gait, no foot or wrist drop  Musculoskeletal:    A comprehensive musculoskeletal exam was performed for all joints of each upper and lower extremity and assessed for swelling, tenderness and range of motion. Results are documented as below:  No evidence of synovitis in the small joints of the hands, wrists, shoulders, elbows, hips, knees or ankles. Still global shoulder crepitus with tenderness on passive ROM, +empty can and +Neer bilaterally. No distal deltoid tenderness, still no thigh tenderness.   Bilateral CMC squaring and subluxation. Labs   21: Cr 0.69, Tbili 0.5, AST 22, ALT 31, AlkP 127; HDL 84, LDL 52.8; CK 33; TSH WNL  21: ESR 7, CRP 1mg/L  21: ESR 20, CRP <1mg/L  21: B12 785  12/15/20: , CBC o/w WNL; CMP WNL; TSH 2.9 (WNL)    Imagin21 DXA:  Findings:  Femoral Neck Left:  Bone mineral density (gm/cm2): 0.848  % of peak bone mass: 82  % for age matched controls: 115  T-score: -1.4  Z-score: 0.8  Total Hip Left:  Bone mineral density (gm/cm2): 0.889  % of peak bone mass: 88  % for age matched controls: 118  T-score: -0.9  Z-score: 1.1  Lumbar Spine: L1-4  Bone mineral density (gm/cm2): 1.492  % of peak bone mass: 124  % for age matched controls: 152  T-score: 2.4  Z-score: 4.4  33% Radius Left:  Bone mineral density (gm/cm2): 0.677  % of peak bone mass: 76  % for age matched controls:  101  T-score: -2.4  Z-score: 0.1  IMPRESSION  Impression: This patient is osteopenic using the World Health Organization criteria  As compared to the prior study, there has been a trend toward a decrease in the  left hip. 10 year probability of major osteoporotic fracture: 19.2%  10 year probability of hip fracture: 9.9%    3/19/19 DXA: Lspine excluded 2/2 degenerative changes.  Findings:  Femoral Neck Left:  Bone mineral density (gm/cm2): 0.895  % of peak bone mass: 86  % for age matched controls: 118  T-score: -1.0  Z-score: 1.0  Total Hip Left:  Bone mineral density (gm/cm2): 0.927  % of peak bone mass: 92  % for age matched controls: 119  T-score: -0.6  Z-score: 1.2  33% Radius Right:  Bone mineral density (gm/cm2): 0.707  % of peak bone mass: 80  % for age matched controls: 103  T-score: -2.0  Z-score: 0.2  IMPRESSION  This patient is osteopenic using the World Health Organization criteria  10 year probability of major osteoporotic fracture: 15.1%  10 year probability of hip fracture: 6.5%      ASSESSMENT AND PLAN  Ms. Rafita Kirby is a 67 y.o. female who presents for evaluation of polymyalgia with [id number] prednisone, limited by extensive osteoarthritis primarily in the shoulders. Acute phase reactants reassuringly remain low, blood pressure is stable, stable off of prednisone. Encouraged follow-through with PT and pain management as needed for cervicalgia. She has decided to defer Prolia. Encouraged continued 1000 units vitamin D daily and goal 1000mg elemental calcium (~3 servings high-Ca foods). 1. Polymyalgia rheumatica (HCC)  -Sed rate, CRP in 3 months  -Labs from last visit    2. Other osteoporosis, unspecified pathological fracture presence  -Cont 1000 units cholecalciferol daily, goal 3 servings calcium/day  -Holding off on Prolia per patient preference      Patient Instructions   1. Follow up with physical therapy as soon as you can get over there. 2. Continue heating packs and tylenol for neck pain, if you continue to have pain, reach out to Dr. Daniel Castro.    3. Let us know when you're comfortable pulling the trigger on Prolia. 4. Return in 6 months, barring progressive pain or joint swelling. Orders Placed This Encounter    C REACTIVE PROTEIN, QT    SED RATE (ESR)       Medications: I am having Doni Fast. Clemente Park maintain her aspirin, ascorbic acid (vitamin C), valACYclovir, Denta 5000 Plus, peg 400-propylene glycol, multivit-min/iron/folic/lutein (CENTRUM SILVER WOMEN PO), turmeric-turmeric ext-pepper, diclofenac, lisinopriL, buPROPion SR, omeprazole, atorvastatin, levothyroxine, cyclobenzaprine, cholecalciferol, atenoloL, DULoxetine, and cilostazoL.     Follow up: 6 months    Face to face time: 25 minutes  Note preparation and records review day of service: 10 minutes  Total provider time day of service: 35 minutes      Bradley Martinze MD    Adult Rheumatology   [address], [address] [place] [address] Road   Phone [phone]  Fax [phone]

## 2021-09-23 NOTE — PATIENT INSTRUCTIONS
1. Follow up with physical therapy as soon as you can get over there. 2. Continue heating packs and tylenol for neck pain, if you continue to have pain, reach out to Dr. Karla Frost.    3. Let us know when you're comfortable pulling the trigger on Prolia. 4. Return in 6 months, barring progressive pain or joint swelling.

## 2021-09-24 ENCOUNTER — HOSPITAL ENCOUNTER (OUTPATIENT)
Dept: MAMMOGRAPHY | Age: 76
Discharge: HOME | End: 2021-09-24
Attending: INTERNAL MEDICINE
Payer: MEDICARE

## 2021-09-24 DIAGNOSIS — Z12.31 ENCOUNTER FOR SCREENING MAMMOGRAM FOR BREAST CANCER: ICD-10-CM

## 2021-09-24 PROCEDURE — 77063 BREAST TOMOSYNTHESIS BI: CPT

## 2021-12-01 RX ORDER — BUPROPION HYDROCHLORIDE 150 MG/1
TABLET, EXTENDED RELEASE ORAL
Qty: 90 TABLET | Refills: 3 | Status: SHIPPED | OUTPATIENT
Start: 2021-12-01

## 2021-12-01 RX ORDER — LISINOPRIL 40 MG/1
40 TABLET ORAL DAILY
Qty: 90 TABLET | Refills: 3 | Status: SHIPPED | OUTPATIENT
Start: 2021-12-01

## 2021-12-14 ENCOUNTER — OFFICE VISIT (OUTPATIENT)
Dept: INTERNAL MEDICINE CLINIC | Age: 76
End: 2021-12-14
Payer: MEDICARE

## 2021-12-14 VITALS
TEMPERATURE: 98.2 F | SYSTOLIC BLOOD PRESSURE: 120 MMHG | HEART RATE: 99 BPM | OXYGEN SATURATION: 98 % | WEIGHT: 116.8 LBS | RESPIRATION RATE: 16 BRPM | DIASTOLIC BLOOD PRESSURE: 74 MMHG | BODY MASS INDEX: 21.49 KG/M2 | HEIGHT: 62 IN

## 2021-12-14 DIAGNOSIS — I10 ESSENTIAL HYPERTENSION: Primary | ICD-10-CM

## 2021-12-14 DIAGNOSIS — E78.5 DYSLIPIDEMIA: ICD-10-CM

## 2021-12-14 DIAGNOSIS — Z78.0 POST-MENOPAUSAL: ICD-10-CM

## 2021-12-14 DIAGNOSIS — I25.10 ARTERIOSCLEROTIC HEART DISEASE (ASHD): ICD-10-CM

## 2021-12-14 DIAGNOSIS — Z79.899 ON STATIN THERAPY: ICD-10-CM

## 2021-12-14 DIAGNOSIS — E03.8 ADULT ONSET HYPOTHYROIDISM: ICD-10-CM

## 2021-12-14 DIAGNOSIS — M81.0 AGE-RELATED OSTEOPOROSIS WITHOUT CURRENT PATHOLOGICAL FRACTURE: ICD-10-CM

## 2021-12-14 PROCEDURE — 1101F PT FALLS ASSESS-DOCD LE1/YR: CPT | Performed by: NURSE PRACTITIONER

## 2021-12-14 PROCEDURE — G8752 SYS BP LESS 140: HCPCS | Performed by: NURSE PRACTITIONER

## 2021-12-14 PROCEDURE — G8536 NO DOC ELDER MAL SCRN: HCPCS | Performed by: NURSE PRACTITIONER

## 2021-12-14 PROCEDURE — 1090F PRES/ABSN URINE INCON ASSESS: CPT | Performed by: NURSE PRACTITIONER

## 2021-12-14 PROCEDURE — G8427 DOCREV CUR MEDS BY ELIG CLIN: HCPCS | Performed by: NURSE PRACTITIONER

## 2021-12-14 PROCEDURE — 99214 OFFICE O/P EST MOD 30 MIN: CPT | Performed by: NURSE PRACTITIONER

## 2021-12-14 PROCEDURE — G8420 CALC BMI NORM PARAMETERS: HCPCS | Performed by: NURSE PRACTITIONER

## 2021-12-14 PROCEDURE — G8754 DIAS BP LESS 90: HCPCS | Performed by: NURSE PRACTITIONER

## 2021-12-14 PROCEDURE — G8432 DEP SCR NOT DOC, RNG: HCPCS | Performed by: NURSE PRACTITIONER

## 2021-12-14 RX ORDER — DULOXETIN HYDROCHLORIDE 60 MG/1
60 CAPSULE, DELAYED RELEASE ORAL DAILY
COMMUNITY
End: 2022-08-10 | Stop reason: SDUPTHER

## 2021-12-14 NOTE — PROGRESS NOTES
Chief Complaint   Patient presents with    Discuss Medications     3 month follow up/dr. Rodolfo Hobson       SUBJECTIVE:    Sera Larose is a 67 y.o. female who is here today for a follow up appointment regarding her hypertension, hypothyroidism, dyslipidemia, coronary artery disease with stents x2, age-related osteoporosis, and postmenopausal state. She states she is feeling fairly well overall, and denies any new or acute complaints at this time. Patient states she is taking her medication as directed for management of her blood pressure. She denies any adverse side effects of the medication. She does not check it on a regular basis. She denies any symptoms of chest pain, chest pressure, shortness of breath, headaches, dizziness, blurred vision, palpitations, or syncope episodes. She is taking atorvastatin 40 mg nightly for management of her lipids. She is tolerating this well. Patient is also on levothyroxine 75 mcg daily for management of her hypothyroidism. She denies any hot/cold intolerance. She has a history of osteoporosis secondary to postmenopausal state, and has been recommended to start Prolia. She has no history of pathological fracture. She is concerned about the medication and potential for adverse side effects. She does not currently take calcium supplementation, and was told to stop this sometime ago by her previous PCP due to \"calcium in the heart. \"     Current Outpatient Medications   Medication Sig Dispense Refill    buPROPion SR (WELLBUTRIN SR) 150 mg SR tablet TAKE 1 TABLET DAILY 90 Tablet 3    atenoloL (TENORMIN) 50 mg tablet TAKE 1 TABLET NIGHTLY 90 Tablet 3    cholecalciferol (VITAMIN D3) (1000 Units /25 mcg) tablet Take 1 Tab by mouth daily. 90 Tab 2    atorvastatin (LIPITOR) 40 mg tablet TAKE 1 TABLET DAILY 90 Tab 3    ascorbic acid (VITAMIN C) 500 mg tablet Take 500 mg by mouth two (2) times a day.  aspirin (ASPIRIN) 325 mg tablet Take 325 mg by mouth daily.  lisinopriL (PRINIVIL, ZESTRIL) 40 mg tablet Take 1 Tablet by mouth daily. 90 Tablet 3    cilostazoL (PLETAL) 100 mg tablet TAKE 1 TABLET TWICE A  Tablet 3    cyclobenzaprine (FLEXERIL) 5 mg tablet Take 1 Tab by mouth every eight (8) hours as needed for Muscle Spasm(s). Use only needed for the onset of severe spasm. Note this increases your risk of falls. 10 Tab 0    levothyroxine (SYNTHROID) 75 mcg tablet TAKE 1 TABLET DAILY BEFORE BREAKFAST FOR A CONDITION WITH LOW THYROID HORMONE LEVELS 90 Tab 3    omeprazole (PRILOSEC) 20 mg capsule TAKE 1 CAPSULE DAILY 90 Cap 3    diclofenac (VOLTAREN) 1 % gel Apply  to affected area four (4) times daily as needed for Pain. 1 Each 3    turmeric/turmeric ext/pepr ext (TURMERIC-TURMERIC EXT-PEPPER) 500-3 mg cap Take  by mouth.  multivit-min/iron/folic/lutein (CENTRUM SILVER WOMEN PO) Take  by mouth.  peg 400-propylene glycol (SYSTANE, PROPYLENE GLYCOL,) 0.4-0.3 % drop Administer 1 Drop to both eyes as needed. (Patient not taking: Reported on 9/23/2021)      DENTA 5000 PLUS 1.1 % crea Apply at night  5    valACYclovir (VALTREX) 1 gram tablet Take 2 Tabs by mouth two (2) times a day.  2 initially and 2 in 12 hr for fever blisters (Patient not taking: Reported on 9/23/2021) 8 Tab 5     Past Medical History:   Diagnosis Date    Adult onset hypothyroidism 8/23/2017    Anemia 8/23/2017    Anxiety 8/23/2017    Arteriosclerotic heart disease (ASHD) 8/23/2017    Arthritis     back, hip right, neck    CAD (coronary artery disease)          Cervical spondylosis 8/23/2017    Chronic pain     spinal stenosis, bulging disk and bone spurs in back    Claudication in peripheral vascular disease ([address] 8/23/2017    CVD (cerebrovascular disease)     S/P right CEA    Depression     Dyslipidemia 8/23/2017    Familial mitral valve prolapse 8/23/2017    GERD (gastroesophageal reflux disease)     Hyperlipidemia     Hypertension     Hypothyroidism     Lumbar spinal stenosis     Macrocytosis 8/23/2017    Menopause     Nausea & vomiting     surgery related, severe constipation    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 ([facility] 8/23/2017    Osteopenia 8/23/2017    Osteoporosis     Other ill-defined conditions(799.89)     severe constipation from pain medication    PVD (peripheral vascular disease) ([facility]     S/P stent Right CFA and Left iliac    Stroke [facility])     Temporomandibular joint pain dysfunction syndrome 8/23/2017    Thyroid disease     TIA (transient ischemic attack)     Tobacco user 8/23/2017    Trigger finger 8/23/2017     Past Surgical History:   Procedure Laterality Date    HX CAROTID ENDARTERECTOMY      right cea    HX OTHER SURGICAL Right     excision melanoma right arm    ME BYPASS GRAFT OTHR,FEM-POP       RIGHT FEMORAL-POPLITEAL BYPASS  WITH POLYTETRA-FL UOROETHYLENE GRAFT     ME CARDIAC SURG PROCEDURE UNLIST      stents x2    VASCULAR SURGERY PROCEDURE UNLIST      femoral stent - left    VASCULAR SURGERY PROCEDURE UNLIST      X5 right femoral bypass     Allergies   Allergen Reactions    Neomycin Sulfate Unknown (comments)       REVIEW OF SYSTEMS:                                        POSITIVE= bold text  Negative = regular text    General:                     fever, chills, sweats, generalized weakness, weight loss/gain,                                       loss of appetite   Eyes:                           blurred vision, eye pain, loss of vision, double vision  ENT:                            rhinorrhea, pharyngitis   Respiratory:               cough, sputum production, SOB, PLATA, wheezing, pleuritic pain   Cardiology:                chest pain, palpitations, orthopnea, PND, edema, syncope   Gastrointestinal:       abdominal pain , N/V, diarrhea, dysphagia, constipation, bleeding   Genitourinary:           frequency, urgency, dysuria, hematuria, incontinence   Muskuloskeletal :      arthralgia, myalgia, back pain  Hematology: easy bruising, nose or gum bleeding, lymphadenopathy   Dermatological:         rash, ulceration, pruritis, color change / jaundice  Endocrine:                 hot flashes or polydipsia   Neurological:             headache, dizziness, confusion, focal weakness, paresthesia,                                      Speech difficulties, memory loss, gait difficulty  Psychological:          Feelings of anxiety, depression, agitation        Social History     Socioeconomic History    Marital status:    Tobacco Use    Smoking status: Former Smoker     Packs/day: 1.00     Years: 35.00     Pack years: 35.00     Quit date: 2008     Years since quittin.9    Smokeless tobacco: Never Used   Vaping Use    Vaping Use: Never used   Substance and Sexual Activity    Alcohol use: Yes     Alcohol/week: 6.0 standard drinks     Types: 6 Glasses of wine per week     Comment: occasionally    Drug use: No     Types: Prescription, OTC     Family History   Problem Relation Age of Onset   Joe Salmark Migraines Mother     Heart Disease Father     Hypertension Father     Lung Disease Father     Cancer Father         H&N    Hypertension Sister     Diabetes Sister     Heart Disease Brother     Hypertension Brother     Hypertension Sister     Cancer Sister         breast    Breast Cancer Sister 54    Hypertension Sister        OBJECTIVE:     Visit Vitals  /74 (BP 1 Location: Left upper arm, BP Patient Position: Sitting, BP Cuff Size: Small adult)   Pulse 99   Temp 98.2 °F (36.8 °C)   Resp 16   Ht 5' 2\" (1.575 m)   Wt 116 lb 12.8 oz (53 kg)   SpO2 98%   BMI 21.36 kg/m²       Constitutional: She appears well nourished, of stated age, and dressed appropriately. Eyes: Sclera anicteric, PERRLA, EOMI  Neck: Supple without lymphadenopathy. Thyroid normal, No JVD or bruits  Respiratory: Clear to ascultation X5, normal inspiratory effort, no adventitious breath sounds.   Cardiovascular: Regular rate and rhythm, no rubs or gallops, PMI not displaced, No thrills, no peripheral edema  Neuro: Non-focal exam, A & O X 3.  Psychiatric: Appropriate affect and demeanor, pleasant and cooperative. Patient's thought content and thought processing appear to be within normal limits. ASSESSMENT/PLAN:     ICD-10-CM ICD-9-CM    1. Essential hypertension  H85 548.1 METABOLIC PANEL, COMPREHENSIVE      LIPID PANEL   2. Dyslipidemia  V02.3 757.3 METABOLIC PANEL, COMPREHENSIVE      LIPID PANEL   3. Arteriosclerotic heart disease (ASHD)  I25.10 414.00    4. Adult onset hypothyroidism  E03.8 244.8 TSH 3RD GENERATION   5. Age-related osteoporosis without current pathological fracture  M81.0 733.01    6. Post-menopausal  Z78.0 V49.81    7. On statin therapy  A66.076 B58.22 METABOLIC PANEL, COMPREHENSIVE      LIPID PANEL       1: Patient to return in approximately 2 weeks for fasting labs including: CMP, lipid panel, and TSH.  2: Patient to continue current medications for management of hypertension. Continue to monitor blood pressures on a regular basis. 3: Patient counseled on risks, benefits, and options regarding osteoporosis and use of Prolia. 4: Patient to continue atorvastatin nightly for management of cholesterol. 5: Patient recommended to maintain adequate amounts of dietary calcium due to postmenopausal state and initiation of Prolia. 6: Patient to continue all other medications as directed. 7: Patient to follow-up with me in approximately 3 months, or sooner as needed. Patient states understanding and agrees with plan. ATTENTION:   This medical record was transcribed using an electronic medical records system. Although proofread, it may and can contain electronic and spelling errors. Other human spelling and other errors may be present. Corrections may be executed at a later time. Please feel free to contact us for any clarifications as needed. Signed,  Stephy Maciel.  Rosalie Park, MSN APRN FNP-BC

## 2021-12-14 NOTE — PROGRESS NOTES
Anne Pan is a 67 y.o. female    Chief Complaint   Patient presents with    Discuss Medications     3 month follow up/dr. Gilberto Matias       Visit Vitals  /74 (BP 1 Location: Left upper arm, BP Patient Position: Sitting, BP Cuff Size: Small adult)   Pulse 99   Temp 98.2 °F (36.8 °C)   Resp 16   Ht 5' 2\" (1.575 m)   Wt 116 lb 12.8 oz (53 kg)   SpO2 98%   BMI 21.36 kg/m²           1. Have you been to the ER, urgent care clinic since your last visit? Hospitalized since your last visit? NO    2. Have you seen or consulted any other health care providers outside of the [address] [place] [address] since your last visit? Include any pap smears or colon screening.  NO

## 2021-12-31 DIAGNOSIS — M85.89 OSTEOPENIA OF MULTIPLE SITES: ICD-10-CM

## 2022-01-01 RX ORDER — GLUCOSAMINE SULFATE 1500 MG
POWDER IN PACKET (EA) ORAL
Qty: 90 CAPSULE | Refills: 2 | Status: SHIPPED | OUTPATIENT
Start: 2022-01-01 | End: 2022-10-30

## 2022-03-03 DIAGNOSIS — E03.8 ADULT ONSET HYPOTHYROIDISM: ICD-10-CM

## 2022-03-03 NOTE — TELEPHONE ENCOUNTER
PCP: Stepan Colon NP    Last appt: 1/4/2022  Future Appointments   Date Time Provider Liana Bartholomew   3/15/2022  2:30 PM Stepan Colon NP [facility] BS AMB   3/24/2022  2:00 PM Jarvis Cobos MD [facility] BS AMB       Requested Prescriptions     Pending Prescriptions Disp Refills    levothyroxine (SYNTHROID) 75 mcg tablet 90 Tablet 3       Prior labs and Blood pressures:  BP Readings from Last 3 Encounters:   12/14/21 120/74   09/23/21 109/67   09/14/21 110/68     Lab Results   Component Value Date/Time    Sodium 139 07/12/2021 03:01 PM    Potassium 5.0 07/12/2021 03:01 PM    Chloride 105 07/12/2021 03:01 PM    CO2 30 07/12/2021 03:01 PM    Anion gap 4 (L) 07/12/2021 03:01 PM    Glucose 81 07/12/2021 03:01 PM    BUN 12 07/12/2021 03:01 PM    Creatinine 0.69 07/12/2021 03:01 PM    BUN/Creatinine ratio 17 07/12/2021 03:01 PM    GFR est AA >60 07/12/2021 03:01 PM    GFR est non-AA >60 07/12/2021 03:01 PM    Calcium 9.6 07/12/2021 03:01 PM     Lab Results   Component Value Date/Time    Hemoglobin A1c 5.4 07/12/2021 03:01 PM     Lab Results   Component Value Date/Time    Cholesterol, total 148 07/12/2021 03:01 PM    Cholesterol (POC) 135.0 10/02/2018 02:05 PM    HDL Cholesterol 84 07/12/2021 03:01 PM    HDL Cholesterol (POC) 89.0 10/02/2018 02:05 PM    LDL Cholesterol (POC) 28.8 10/02/2018 02:05 PM    LDL, calculated 52.8 07/12/2021 03:01 PM    VLDL, calculated 11.2 07/12/2021 03:01 PM    Triglyceride 56 07/12/2021 03:01 PM    Triglycerides (POC) 86.0 10/02/2018 02:05 PM    CHOL/HDL Ratio 1.8 07/12/2021 03:01 PM     Lab Results   Component Value Date/Time    VITAMIN D, 25-HYDROXY 50.9 06/23/2021 12:00 AM       Lab Results   Component Value Date/Time    TSH 2.28 07/12/2021 03:01 PM    TSH, 3rd generation 2.94 12/15/2020 03:30 PM

## 2022-03-04 RX ORDER — LEVOTHYROXINE SODIUM 75 UG/1
TABLET ORAL
Qty: 90 TABLET | Refills: 1 | Status: SHIPPED | OUTPATIENT
Start: 2022-03-04 | End: 2022-04-04 | Stop reason: DRUGHIGH

## 2022-03-15 ENCOUNTER — OFFICE VISIT (OUTPATIENT)
Dept: INTERNAL MEDICINE CLINIC | Age: 77
End: 2022-03-15
Payer: MEDICARE

## 2022-03-15 VITALS
OXYGEN SATURATION: 98 % | DIASTOLIC BLOOD PRESSURE: 72 MMHG | TEMPERATURE: 98.2 F | SYSTOLIC BLOOD PRESSURE: 124 MMHG | HEIGHT: 62 IN | BODY MASS INDEX: 22.34 KG/M2 | HEART RATE: 82 BPM | WEIGHT: 121.4 LBS | RESPIRATION RATE: 16 BRPM

## 2022-03-15 DIAGNOSIS — Z87.39 HISTORY OF GOUT: ICD-10-CM

## 2022-03-15 DIAGNOSIS — E78.5 DYSLIPIDEMIA: ICD-10-CM

## 2022-03-15 DIAGNOSIS — G63 NEUROPATHY DUE TO PERIPHERAL VASCULAR DISEASE (HCC): ICD-10-CM

## 2022-03-15 DIAGNOSIS — Z79.899 ON STATIN THERAPY: ICD-10-CM

## 2022-03-15 DIAGNOSIS — I10 ESSENTIAL HYPERTENSION: Primary | ICD-10-CM

## 2022-03-15 DIAGNOSIS — E03.8 ADULT ONSET HYPOTHYROIDISM: ICD-10-CM

## 2022-03-15 DIAGNOSIS — I73.9 NEUROPATHY DUE TO PERIPHERAL VASCULAR DISEASE (HCC): ICD-10-CM

## 2022-03-15 PROCEDURE — 1090F PRES/ABSN URINE INCON ASSESS: CPT | Performed by: NURSE PRACTITIONER

## 2022-03-15 PROCEDURE — G8427 DOCREV CUR MEDS BY ELIG CLIN: HCPCS | Performed by: NURSE PRACTITIONER

## 2022-03-15 PROCEDURE — G8420 CALC BMI NORM PARAMETERS: HCPCS | Performed by: NURSE PRACTITIONER

## 2022-03-15 PROCEDURE — G8752 SYS BP LESS 140: HCPCS | Performed by: NURSE PRACTITIONER

## 2022-03-15 PROCEDURE — G8536 NO DOC ELDER MAL SCRN: HCPCS | Performed by: NURSE PRACTITIONER

## 2022-03-15 PROCEDURE — 1101F PT FALLS ASSESS-DOCD LE1/YR: CPT | Performed by: NURSE PRACTITIONER

## 2022-03-15 PROCEDURE — G8754 DIAS BP LESS 90: HCPCS | Performed by: NURSE PRACTITIONER

## 2022-03-15 PROCEDURE — 99214 OFFICE O/P EST MOD 30 MIN: CPT | Performed by: NURSE PRACTITIONER

## 2022-03-15 PROCEDURE — G8432 DEP SCR NOT DOC, RNG: HCPCS | Performed by: NURSE PRACTITIONER

## 2022-03-15 NOTE — PROGRESS NOTES
Claire Carty is a 67 y.o. female    Chief Complaint   Patient presents with    Follow-up     3 month follow up       Visit Vitals  /72 (BP 1 Location: Left upper arm, BP Patient Position: Sitting, BP Cuff Size: Small adult)   Pulse 82   Temp 98.2 °F (36.8 °C)   Resp 16   Ht 5' 2\" (1.575 m)   Wt 121 lb 6.4 oz (55.1 kg)   SpO2 98%   BMI 22.20 kg/m²           1. Have you been to the ER, urgent care clinic since your last visit? Hospitalized since your last visit? NO    2. Have you seen or consulted any other health care providers outside of the [address] [place] [address] since your last visit? Include any pap smears or colon screening.  NO

## 2022-03-15 NOTE — PROGRESS NOTES
Chief Complaint   Patient presents with    Follow-up     3 month follow up       SUBJECTIVE:    Napoleon Marinelli is a 67 y.o. female who is here today for a follow up appointment regarding her hypertension, hypothyroidism, dyslipidemia, neuropathy secondary to peripheral vascular disease, and recent history of \"gout attack\" on 02/22/2022. The patient continues to take her medications for management of her hypertension and cholesterol. She states she is tolerating these well, and denies any adverse side effects. She denies any chest pain, chest pressure, shortness of breath, headaches, dizziness, blurred vision, palpitations, or syncope episodes. She does not monitor her blood pressures at home on a regular basis. Her blood pressure appears well controlled at this time. She denies any muscle aches or myalgias related to statin use. Patient continues to take levothyroxine 75 mcg daily for management of her hypothyroidism. Her last TSH was within normal limits. She denies any adverse side effects of the medication. She denies any significant worsening of her neuropathy due to history of peripheral vascular disease. Patient was recently treated at [facility] for a gouty attack, and was given prescriptions for Keflex and a steroid pack. She has completed these and states her symptoms have resolved for the most part, but she continues to have some arthralgia to her right hand -especially to base of right thumb. Current Outpatient Medications   Medication Sig Dispense Refill    levothyroxine (SYNTHROID) 75 mcg tablet TAKE 1 TABLET DAILY BEFORE BREAKFAST FOR A CONDITION WITH LOW THYROID HORMONE LEVELS 90 Tablet 1    cholecalciferol (VITAMIN D3) 25 mcg (1,000 unit) cap TAKE 1 CAPSULE BY MOUTH EVERY DAY 90 Capsule 2    DULoxetine (CYMBALTA) 60 mg capsule Take 60 mg by mouth daily.  lisinopriL (PRINIVIL, ZESTRIL) 40 mg tablet Take 1 Tablet by mouth daily.  90 Tablet 3    buPROPion SR (WELLBUTRIN SR) 150 mg SR tablet TAKE 1 TABLET DAILY 90 Tablet 3    cilostazoL (PLETAL) 100 mg tablet TAKE 1 TABLET TWICE A  Tablet 3    atenoloL (TENORMIN) 50 mg tablet TAKE 1 TABLET NIGHTLY 90 Tablet 3    atorvastatin (LIPITOR) 40 mg tablet TAKE 1 TABLET DAILY 90 Tab 3    omeprazole (PRILOSEC) 20 mg capsule TAKE 1 CAPSULE DAILY 90 Cap 3    diclofenac (VOLTAREN) 1 % gel Apply  to affected area four (4) times daily as needed for Pain. 1 Each 3    turmeric/turmeric ext/pepr ext (TURMERIC-TURMERIC EXT-PEPPER) 500-3 mg cap Take  by mouth.  multivit-min/iron/folic/lutein (CENTRUM SILVER WOMEN PO) Take  by mouth.  peg 400-propylene glycol (SYSTANE, PROPYLENE GLYCOL,) 0.4-0.3 % drop Administer 1 Drop to both eyes as needed.  DENTA 5000 PLUS 1.1 % crea Apply at night  5    valACYclovir (VALTREX) 1 gram tablet Take 2 Tabs by mouth two (2) times a day. 2 initially and 2 in 12 hr for fever blisters 8 Tab 5    ascorbic acid (VITAMIN C) 500 mg tablet Take 500 mg by mouth two (2) times a day.  aspirin (ASPIRIN) 325 mg tablet Take 325 mg by mouth daily.  cyclobenzaprine (FLEXERIL) 5 mg tablet Take 1 Tab by mouth every eight (8) hours as needed for Muscle Spasm(s). Use only needed for the onset of severe spasm. Note this increases your risk of falls.  (Patient not taking: Reported on 12/14/2021) 10 Tab 0     Past Medical History:   Diagnosis Date    Adult onset hypothyroidism 8/23/2017    Anemia 8/23/2017    Anxiety 8/23/2017    Arteriosclerotic heart disease (ASHD) 8/23/2017    Arthritis     back, hip right, neck    CAD (coronary artery disease)          Cervical spondylosis 8/23/2017    Chronic pain     spinal stenosis, bulging disk and bone spurs in back    Claudication in peripheral vascular disease ([address] 8/23/2017    CVD (cerebrovascular disease)     S/P right CEA    Depression     Dyslipidemia 8/23/2017    Familial mitral valve prolapse 8/23/2017    GERD (gastroesophageal reflux disease)     Hyperlipidemia     Hypertension     Hypothyroidism     Lumbar spinal stenosis     Macrocytosis 8/23/2017    Menopause     Nausea & vomiting     surgery related, severe constipation    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 ([facility] [address] 8/23/2017    Osteopenia 8/23/2017    Osteoporosis     Other ill-defined conditions(799.89)     severe constipation from pain medication    PVD (peripheral vascular disease) ([facility] [address] [facility]     S/P stent Right CFA and Left iliac    Stroke [facility])     Temporomandibular joint pain dysfunction syndrome 8/23/2017    Thyroid disease     TIA (transient ischemic attack)     Tobacco user 8/23/2017    Trigger finger 8/23/2017     Past Surgical History:   Procedure Laterality Date    HX CAROTID ENDARTERECTOMY      right cea    HX OTHER SURGICAL Right     excision melanoma right arm    DC BYPASS GRAFT OTHR,FEM-POP       RIGHT FEMORAL-POPLITEAL BYPASS  WITH POLYTETRA-FL UOROETHYLENE GRAFT     DC CARDIAC SURG PROCEDURE UNLIST      stents x2    VASCULAR SURGERY PROCEDURE UNLIST      femoral stent - left    VASCULAR SURGERY PROCEDURE UNLIST      X5 right femoral bypass     Allergies   Allergen Reactions    Neomycin Sulfate Unknown (comments)       REVIEW OF SYSTEMS:                                        POSITIVE= bold text  Negative = regular text    General:                     fever, chills, sweats, generalized weakness, weight loss/gain,                                       loss of appetite   Eyes:                           blurred vision, eye pain, loss of vision, double vision  ENT:                            rhinorrhea, pharyngitis   Respiratory:               cough, sputum production, SOB, PLATA, wheezing, pleuritic pain   Cardiology:                chest pain, palpitations, orthopnea, PND, edema, syncope   Gastrointestinal:       abdominal pain , N/V, diarrhea, dysphagia, constipation, bleeding   Genitourinary:           frequency, urgency, dysuria, hematuria, incontinence   Muskuloskeletal :      arthralgia, myalgia, back pain  Hematology:              easy bruising, nose or gum bleeding, lymphadenopathy   Dermatological:         rash, ulceration, pruritis, color change / jaundice  Endocrine:                 hot flashes or polydipsia   Neurological:             headache, dizziness, confusion, focal weakness, paresthesia,                                      Speech difficulties, memory loss, gait difficulty  Psychological:          Feelings of anxiety, depression, agitation        Social History     Socioeconomic History    Marital status:    Tobacco Use    Smoking status: Former Smoker     Packs/day: 1.00     Years: 35.00     Pack years: 35.00     Quit date: 2008     Years since quittin.1    Smokeless tobacco: Never Used   Vaping Use    Vaping Use: Never used   Substance and Sexual Activity    Alcohol use: Yes     Alcohol/week: 6.0 standard drinks     Types: 6 Glasses of wine per week     Comment: occasionally    Drug use: No     Types: Prescription, OTC     Family History   Problem Relation Age of Onset   [place] Migraines Mother     Heart Disease Father     Hypertension Father     Lung Disease Father     Cancer Father         H&N    Hypertension Sister     Diabetes Sister     Heart Disease Brother     Hypertension Brother     Hypertension Sister     Cancer Sister         breast    Breast Cancer Sister 54    Hypertension Sister        OBJECTIVE:     Visit Vitals  /72 (BP 1 Location: Left upper arm, BP Patient Position: Sitting, BP Cuff Size: Small adult)   Pulse 82   Temp 98.2 °F (36.8 °C)   Resp 16   Ht 5' 2\" (1.575 m)   Wt 121 lb 6.4 oz (55.1 kg)   SpO2 98%   BMI 22.20 kg/m²       Constitutional: She appears well nourished, of stated age, and dressed appropriately. Eyes: Sclera anicteric, PERRLA, EOMI  Neck: Supple without lymphadenopathy.  Thyroid normal, No JVD or bruits  Respiratory: Clear to ascultation X5, normal inspiratory effort, no adventitious breath sounds. Cardiovascular: Regular rate and rhythm, no rubs or gallops, PMI not displaced, No thrills, no peripheral edema  Neuro: Non-focal exam, A & O X 3.  Psychiatric: Appropriate affect and demeanor, pleasant and cooperative. Patient's thought content and thought processing appear to be within normal limits. ASSESSMENT/PLAN:     ICD-10-CM ICD-9-CM    1. Essential hypertension  C01 231.7 METABOLIC PANEL, COMPREHENSIVE   2. Adult onset hypothyroidism  E03.8 244.8 TSH 3RD GENERATION   3. Dyslipidemia  E78.5 272.4 LIPID PANEL   4. 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 (HCC)  I73.9 443.9     G63 357.4    5. History of gout  Z87.39 V12.29    6. On statin therapy  Y77.550 V26.19 METABOLIC PANEL, COMPREHENSIVE     1: Patient to return for fasting labs including: CMP, lipid panel, and TSH.  2: Patient given handout on low purine diet. Increase hydration overall to avoid concentration of urine. 3: Patient to continue current medications for management of hypertension, hypothyroidism, and dyslipidemia.  4: Patient to continue healthy lifestyle management including: Low-fat/low-cholesterol diet, adequate amounts of fiber water, and regular exercise as tolerated. 5: Patient to continue all other medications and supplements as directed. 6: Patient to follow-up with me in approximately 6 months, or sooner as needed. Patient states understanding and agrees with plan. Orders Placed This Encounter    METABOLIC PANEL, COMPREHENSIVE    LIPID PANEL    TSH 3RD GENERATION         ATTENTION:   This medical record was transcribed using an electronic medical records system. Although proofread, it may and can contain electronic and spelling errors. Other human spelling and other errors may be present. Corrections may be executed at a later time. Please feel free to contact us for any clarifications as needed.       Follow-up and Dispositions    · Return in about 6 months (around 9/15/2022) for Follow up with fasting labs. Signed,  Miranda Pulido.  Toni Saleem, MSN APRN FNP-BC

## 2022-03-18 PROBLEM — M47.812 CERVICAL SPONDYLOSIS: Status: ACTIVE | Noted: 2017-08-23

## 2022-03-18 PROBLEM — Z78.0 POST-MENOPAUSAL: Status: ACTIVE | Noted: 2021-12-14

## 2022-03-18 PROBLEM — Z00.00 PE (PHYSICAL EXAM), ANNUAL: Status: ACTIVE | Noted: 2017-09-23

## 2022-03-18 PROBLEM — M35.3 PMR (POLYMYALGIA RHEUMATICA) (HCC): Status: ACTIVE | Noted: 2019-04-16

## 2022-03-18 PROBLEM — M48.061 LUMBAR SPINAL STENOSIS: Status: ACTIVE | Noted: 2017-09-23

## 2022-03-18 PROBLEM — M65.30 TRIGGER FINGER: Status: ACTIVE | Noted: 2017-08-23

## 2022-03-18 LAB
25(OH)D3+25(OH)D2 SERPL-MCNC: 45.9 NG/ML (ref 30–100)
ALBUMIN SERPL-MCNC: 4.5 G/DL (ref 3.7–4.7)
ALBUMIN/GLOB SERPL: 1.7 {RATIO} (ref 1.2–2.2)
ALP SERPL-CCNC: 78 IU/L (ref 44–121)
ALT SERPL-CCNC: 21 IU/L (ref 0–32)
AST SERPL-CCNC: 26 IU/L (ref 0–40)
BASOPHILS # BLD AUTO: 0 X10E3/UL (ref 0–0.2)
BASOPHILS NFR BLD AUTO: 0 %
BILIRUB SERPL-MCNC: 0.4 MG/DL (ref 0–1.2)
BUN SERPL-MCNC: 16 MG/DL (ref 8–27)
BUN/CREAT SERPL: 22 (ref 12–28)
CALCIUM SERPL-MCNC: 9.5 MG/DL (ref 8.7–10.3)
CHLORIDE SERPL-SCNC: 100 MMOL/L (ref 96–106)
CK SERPL-CCNC: 46 U/L (ref 32–182)
CO2 SERPL-SCNC: 24 MMOL/L (ref 20–29)
CREAT SERPL-MCNC: 0.72 MG/DL (ref 0.57–1)
CRP SERPL-MCNC: <1 MG/L (ref 0–10)
EGFR: 86 ML/MIN/1.73
EOSINOPHIL # BLD AUTO: 0.2 X10E3/UL (ref 0–0.4)
EOSINOPHIL NFR BLD AUTO: 2 %
ERYTHROCYTE [DISTWIDTH] IN BLOOD BY AUTOMATED COUNT: 13.5 % (ref 11.7–15.4)
ERYTHROCYTE [SEDIMENTATION RATE] IN BLOOD BY WESTERGREN METHOD: 17 MM/HR (ref 0–40)
GLOBULIN SER CALC-MCNC: 2.6 G/DL (ref 1.5–4.5)
GLUCOSE SERPL-MCNC: 93 MG/DL (ref 65–99)
HCT VFR BLD AUTO: 40.7 % (ref 34–46.6)
HGB BLD-MCNC: 12.8 G/DL (ref 11.1–15.9)
IMM GRANULOCYTES # BLD AUTO: 0 X10E3/UL (ref 0–0.1)
IMM GRANULOCYTES NFR BLD AUTO: 0 %
LYMPHOCYTES # BLD AUTO: 1.7 X10E3/UL (ref 0.7–3.1)
LYMPHOCYTES NFR BLD AUTO: 17 %
MCH RBC QN AUTO: 30.6 PG (ref 26.6–33)
MCHC RBC AUTO-ENTMCNC: 31.4 G/DL (ref 31.5–35.7)
MCV RBC AUTO: 97 FL (ref 79–97)
MONOCYTES # BLD AUTO: 1.3 X10E3/UL (ref 0.1–0.9)
MONOCYTES NFR BLD AUTO: 13 %
NEUTROPHILS # BLD AUTO: 6.7 X10E3/UL (ref 1.4–7)
NEUTROPHILS NFR BLD AUTO: 68 %
PLATELET # BLD AUTO: 307 X10E3/UL (ref 150–450)
POTASSIUM SERPL-SCNC: 4.6 MMOL/L (ref 3.5–5.2)
PROT SERPL-MCNC: 7.1 G/DL (ref 6–8.5)
PTH-INTACT SERPL-MCNC: 30 PG/ML (ref 15–65)
RBC # BLD AUTO: 4.18 X10E6/UL (ref 3.77–5.28)
SODIUM SERPL-SCNC: 141 MMOL/L (ref 134–144)
WBC # BLD AUTO: 9.9 X10E3/UL (ref 3.4–10.8)

## 2022-03-19 PROBLEM — Z72.0 TOBACCO USER: Status: ACTIVE | Noted: 2017-08-23

## 2022-03-19 PROBLEM — M12.812 ROTATOR CUFF ARTHROPATHY OF LEFT SHOULDER: Status: ACTIVE | Noted: 2020-02-05

## 2022-03-19 PROBLEM — M26.629 TEMPOROMANDIBULAR JOINT PAIN DYSFUNCTION SYNDROME: Status: ACTIVE | Noted: 2017-08-23

## 2022-03-19 PROBLEM — M79.10 MYALGIA: Status: ACTIVE | Noted: 2019-01-23

## 2022-03-19 PROBLEM — E78.5 DYSLIPIDEMIA: Status: ACTIVE | Noted: 2017-08-23

## 2022-03-19 PROBLEM — I25.10 ARTERIOSCLEROTIC HEART DISEASE (ASHD): Status: ACTIVE | Noted: 2017-08-23

## 2022-03-19 PROBLEM — E03.8 ADULT ONSET HYPOTHYROIDISM: Status: ACTIVE | Noted: 2017-08-23

## 2022-03-19 PROBLEM — I73.9 NEUROPATHY DUE TO PERIPHERAL VASCULAR DISEASE (HCC): Status: ACTIVE | Noted: 2017-08-23

## 2022-03-19 PROBLEM — M81.0 AGE-RELATED OSTEOPOROSIS WITHOUT CURRENT PATHOLOGICAL FRACTURE: Status: ACTIVE | Noted: 2017-08-23

## 2022-03-19 PROBLEM — M19.011 PRIMARY OSTEOARTHRITIS OF BOTH SHOULDERS: Status: ACTIVE | Noted: 2021-04-12

## 2022-03-19 PROBLEM — D75.89 MACROCYTOSIS: Status: ACTIVE | Noted: 2017-08-23

## 2022-03-19 PROBLEM — F41.9 ANXIETY: Status: ACTIVE | Noted: 2017-08-23

## 2022-03-19 PROBLEM — M19.012 PRIMARY OSTEOARTHRITIS OF BOTH SHOULDERS: Status: ACTIVE | Noted: 2021-04-12

## 2022-03-19 PROBLEM — Z79.899 ON STATIN THERAPY: Status: ACTIVE | Noted: 2018-10-02

## 2022-03-19 PROBLEM — G63 NEUROPATHY DUE TO PERIPHERAL VASCULAR DISEASE (HCC): Status: ACTIVE | Noted: 2017-08-23

## 2022-03-19 PROBLEM — I34.1: Status: ACTIVE | Noted: 2017-08-23

## 2022-03-20 PROBLEM — B00.1 FEVER BLISTER: Status: ACTIVE | Noted: 2017-09-23

## 2022-03-20 PROBLEM — I73.9 CLAUDICATION IN PERIPHERAL VASCULAR DISEASE (HCC): Status: ACTIVE | Noted: 2017-08-23

## 2022-03-28 ENCOUNTER — OFFICE VISIT (OUTPATIENT)
Dept: RHEUMATOLOGY | Age: 77
End: 2022-03-28
Payer: MEDICARE

## 2022-03-28 VITALS
DIASTOLIC BLOOD PRESSURE: 75 MMHG | RESPIRATION RATE: 18 BRPM | HEART RATE: 74 BPM | HEIGHT: 62 IN | BODY MASS INDEX: 22.26 KG/M2 | SYSTOLIC BLOOD PRESSURE: 121 MMHG | WEIGHT: 121 LBS | TEMPERATURE: 97.9 F

## 2022-03-28 DIAGNOSIS — M19.90 INFLAMMATORY ARTHRITIS: Primary | ICD-10-CM

## 2022-03-28 DIAGNOSIS — M81.8 OTHER OSTEOPOROSIS, UNSPECIFIED PATHOLOGICAL FRACTURE PRESENCE: ICD-10-CM

## 2022-03-28 DIAGNOSIS — E55.9 VITAMIN D DEFICIENCY: ICD-10-CM

## 2022-03-28 PROCEDURE — G8752 SYS BP LESS 140: HCPCS | Performed by: INTERNAL MEDICINE

## 2022-03-28 PROCEDURE — G0463 HOSPITAL OUTPT CLINIC VISIT: HCPCS | Performed by: INTERNAL MEDICINE

## 2022-03-28 PROCEDURE — G8754 DIAS BP LESS 90: HCPCS | Performed by: INTERNAL MEDICINE

## 2022-03-28 PROCEDURE — G8536 NO DOC ELDER MAL SCRN: HCPCS | Performed by: INTERNAL MEDICINE

## 2022-03-28 PROCEDURE — 99215 OFFICE O/P EST HI 40 MIN: CPT | Performed by: INTERNAL MEDICINE

## 2022-03-28 PROCEDURE — G8420 CALC BMI NORM PARAMETERS: HCPCS | Performed by: INTERNAL MEDICINE

## 2022-03-28 PROCEDURE — 1101F PT FALLS ASSESS-DOCD LE1/YR: CPT | Performed by: INTERNAL MEDICINE

## 2022-03-28 PROCEDURE — G8428 CUR MEDS NOT DOCUMENT: HCPCS | Performed by: INTERNAL MEDICINE

## 2022-03-28 PROCEDURE — G8432 DEP SCR NOT DOC, RNG: HCPCS | Performed by: INTERNAL MEDICINE

## 2022-03-28 PROCEDURE — 1090F PRES/ABSN URINE INCON ASSESS: CPT | Performed by: INTERNAL MEDICINE

## 2022-03-28 RX ORDER — COLCHICINE 0.6 MG/1
TABLET ORAL
Qty: 12 TABLET | Refills: 1 | Status: SHIPPED | OUTPATIENT
Start: 2022-03-28

## 2022-03-28 NOTE — PROGRESS NOTES
REASON FOR VISIT:   Shai Bauer is a 65 y.o. female with history of hypertension, osteopenia, and hypothyroidism who is returning for followup of polymyalgia rheumatica. HISTORY OF PRESENT ILLNESS      Pt reports of continued shoulder pain. Pt takes tylenol for pain. Pt reports of neck pain in January which lasted for a month. Pt was seen by PT. Pt reports an acute attack of right wrist and thumb-predominant pain and swelling about 3 weeks ago. She was seen at [facility] where she was prescribed a short course of prednisone prednisone and an antibiotic. Pain resolved around a week ago. She reports she had x-rays of her hands done at [facility] which she beliews showed changes consistent with gout (results not available). No preceding injury, dehydration, or medicaiton changes. Now no extended morning stiffness. Pt reports taking a statin. Disease History:  Initial visit March 2019 developed bilateral shoulder pain, difficulty lifting her hands above her shoulders. Hasn't had significant thigh soreness. Wasn't having any headaches, visual changes, night sweats, or weight loss. Started on prednisone with immediate dramatic improvement. With weaning prednisone, would develop recrudescence of the shoulder and upper arm pain. Now on prednisone 5mg Tuesday, Thursday, and Saturday since November. Shoulders hurt more currently. Had previously been going to PT, can't go to pool classes during COVID so isn't exercising like she was. Has noticed hair thinning diffusely, comes out in clumps, pony tail not as thick as before. Switched from gabapentin to duloxetine, during the transition prednisone had been increased.      Last year fell and irritated both shoulders, attributes to clumsiness--once tripped over a stool while carrying laundry, another time was reaching for shoes while sitting and carpet slipped from under her and she developed symptomatic rotator cuff tendinopathy for which she completed PT. Takes Tylenol for pain is only once every 2 weeks, when she is more active such as gardening or washing the floor. Has been advised to avoid NSAIDs. Has had paralumbar low back pain every morning, stays in the low back. Also h/o neck spasms. Has had multiple LE bypasses and stents for symptomatic claudication. Now takes daily aspirin and cilostazol. REVIEW OF SYSTEMS  A comprehensive review of systems was negative except for that written in the HPI. A 10-point review of systems is per the new patient questionnaire, which has been reviewed extensively and scanned into the electronic medical record for future reference. Review of systems is as above and is otherwise negative. ALLERGIES  Neomycin sulfate    MEDICATIONS  Current Outpatient Medications   Medication Sig    colchicine 0.6 mg tablet At the first sign of gout flare in hand, take 2 colchicine. After 2 hours, if still painful/swollen and no diarrhea then take a 3rd dose. Call the clinic for further instructions    levothyroxine (SYNTHROID) 75 mcg tablet TAKE 1 TABLET DAILY BEFORE BREAKFAST FOR A CONDITION WITH LOW THYROID HORMONE LEVELS    cholecalciferol (VITAMIN D3) 25 mcg (1,000 unit) cap TAKE 1 CAPSULE BY MOUTH EVERY DAY    DULoxetine (CYMBALTA) 60 mg capsule Take 60 mg by mouth daily.  lisinopriL (PRINIVIL, ZESTRIL) 40 mg tablet Take 1 Tablet by mouth daily.  buPROPion SR (WELLBUTRIN SR) 150 mg SR tablet TAKE 1 TABLET DAILY    cilostazoL (PLETAL) 100 mg tablet TAKE 1 TABLET TWICE A DAY    atenoloL (TENORMIN) 50 mg tablet TAKE 1 TABLET NIGHTLY    cyclobenzaprine (FLEXERIL) 5 mg tablet Take 1 Tab by mouth every eight (8) hours as needed for Muscle Spasm(s). Use only needed for the onset of severe spasm. Note this increases your risk of falls.     atorvastatin (LIPITOR) 40 mg tablet TAKE 1 TABLET DAILY    omeprazole (PRILOSEC) 20 mg capsule TAKE 1 CAPSULE DAILY    diclofenac (VOLTAREN) 1 % gel Apply  to affected area four (4) times daily as needed for Pain.  turmeric/turmeric ext/pepr ext (TURMERIC-TURMERIC EXT-PEPPER) 500-3 mg cap Take  by mouth.  multivit-min/iron/folic/lutein (CENTRUM SILVER WOMEN PO) Take  by mouth.  peg 400-propylene glycol (SYSTANE, PROPYLENE GLYCOL,) 0.4-0.3 % drop Administer 1 Drop to both eyes as needed.  DENTA 5000 PLUS 1.1 % crea Apply at night    ascorbic acid (VITAMIN C) 500 mg tablet Take 500 mg by mouth two (2) times a day.  aspirin (ASPIRIN) 325 mg tablet Take 325 mg by mouth daily.  valACYclovir (VALTREX) 1 gram tablet Take 2 Tabs by mouth two (2) times a day. 2 initially and 2 in 12 hr for fever blisters (Patient not taking: Reported on 3/28/2022)     No current facility-administered medications for this visit.        PAST MEDICAL HISTORY  Past Medical History:   Diagnosis Date    Adult onset hypothyroidism 8/23/2017    Anemia 8/23/2017    Anxiety 8/23/2017    Arteriosclerotic heart disease (ASHD) 8/23/2017    Arthritis     back, hip right, neck    CAD (coronary artery disease)          Cervical spondylosis 8/23/2017    Chronic pain     spinal stenosis, bulging disk and bone spurs in back    Claudication in peripheral vascular disease ([address] 8/23/2017    CVD (cerebrovascular disease)     S/P right CEA    Depression     Dyslipidemia 8/23/2017    Familial mitral valve prolapse 8/23/2017    GERD (gastroesophageal reflux disease)     Gout     Hyperlipidemia     Hypertension     Hypothyroidism     Lumbar spinal stenosis     Macrocytosis 8/23/2017    Menopause     Nausea & vomiting     surgery related, severe constipation    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 ([address] 8/23/2017    Osteopenia 8/23/2017    Osteoporosis     Other ill-defined conditions(799.89)     severe constipation from pain medication    PVD (peripheral vascular disease) ([address]     S/P stent Right CFA and Left iliac    Stroke ([address]     Temporomandibular joint pain dysfunction syndrome 8/23/2017    Thyroid disease     TIA (transient ischemic attack)     Tobacco user 8/23/2017    Trigger finger 8/23/2017       FAMILY HISTORY  family history includes Breast Cancer (age of onset: 54) in her sister; Cancer in her father and sister; Diabetes in her sister; Heart Disease in her brother and father; Hypertension in her brother, father, sister, sister, and sister; Lung Disease in her father; Migraines in her mother. SOCIAL HISTORY  She  reports that she quit smoking about 14 years ago. She has a 35.00 pack-year smoking history. She has never used smokeless tobacco. She reports current alcohol use of about 6.0 standard drinks of alcohol per week. She reports that she does not use drugs. Social History     Social History Narrative    Not on file   Former secondary education . DATA  Visit Vitals  /75   Pulse 74   Temp 97.9 °F (36.6 °C)   Resp 18   Ht 5' 2\" (1.575 m)   Wt 121 lb (54.9 kg)   BMI 22.13 kg/m²    Body mass index is 22.13 kg/m². No flowsheet data found. General:  The patient is well developed, well nourished, alert, and in no apparent distress. Eyes: Sclera are anicteric. No conjunctival injection. HEENT:  Oropharynx is clear. No oral ulcers. Adequate salivary pooling. No cervical or supraclavicular lymphadenopathy. Lungs:  Clear to auscultation bilaterally, without wheeze or stridor. Normal respiratory effort. Cor:  Regular rate and rhythm. No rub or gallop. 2/6 holosystolic murmur of the right upper sternal border. Abdomen: Soft, non-tender, without hepatomegaly or masses. Extremities: No calf tenderness or edema. Nonpalpable R DP pulse with 2 second capilary refill. 1/4 L DP pulse with 1 second capillary refill. Skin:  No significant abnormalities. No petechial, purpuric, or psoriaform rashes   Neuro: Decreased temperature sensation in the hands and feet.   Musculoskeletal:    A comprehensive musculoskeletal exam was performed for all joints of each upper and lower extremity and assessed for swelling, tenderness and range of motion. Results are documented as below:  Still bilateral global shoulder crepitus with tenderness on passive ROM,   Intact ROM of elbows. Bilateral CMC squaring. R>L 1st MCP subluxation, early subluxation of bilateral MCP2-3 without gómez synovitis  Jamieson neck deformities of the R 2nd and 5 fingers. Early swan neck deformities of the L 2-5 fingers. Labs   3/17/22: CRP <1 mg/L, CK 46, ESR 17, Vit D 45.9, Cr 0.72, LFT WNL, WBC 9.9, HGB 12.8, Plt 307.  21: Cr 0.69, Tbili 0.5, AST 22, ALT 31, AlkP 127; HDL 84, LDL 52.8; CK 33; TSH WNL  21: ESR 7, CRP 1mg/L  21: ESR 20, CRP <1mg/L  21: B12 785  12/15/20: , CBC o/w WNL; CMP WNL; TSH 2.9 (WNL)    Imagin21 DXA:  Findings:  Femoral Neck Left:  Bone mineral density (gm/cm2): 0.848  % of peak bone mass: 82  % for age matched controls: 115  T-score: -1.4  Z-score: 0.8  Total Hip Left:  Bone mineral density (gm/cm2): 0.889  % of peak bone mass: 88  % for age matched controls: 118  T-score: -0.9  Z-score: 1.1  Lumbar Spine: L1-4  Bone mineral density (gm/cm2): 1.492  % of peak bone mass: 124  % for age matched controls: 152  T-score: 2.4  Z-score: 4.4  33% Radius Left:  Bone mineral density (gm/cm2): 0.677  % of peak bone mass: 76  % for age matched controls:  101  T-score: -2.4  Z-score: 0.1  IMPRESSION  Impression: This patient is osteopenic using the World Health Organization criteria  As compared to the prior study, there has been a trend toward a decrease in the  left hip. 10 year probability of major osteoporotic fracture: 19.2%  10 year probability of hip fracture: 9.9%    3/19/19 DXA: Lspine excluded 2/2 degenerative changes.  Findings:  Femoral Neck Left:  Bone mineral density (gm/cm2): 0.895  % of peak bone mass: 86  % for age matched controls: 118  T-score: -1.0  Z-score: 1.0  Total Hip Left:  Bone mineral density (gm/cm2): 0. 927  % of peak bone mass: 92  % for age matched controls: 119  T-score: -0.6  Z-score: 1.2  33% Radius Right:  Bone mineral density (gm/cm2): 0.707  % of peak bone mass: 80  % for age matched controls: 103  T-score: -2.0  Z-score: 0.2  IMPRESSION  This patient is osteopenic using the World Health Organization criteria  10 year probability of major osteoporotic fracture: 15.1%  10 year probability of hip fracture: 6.5%      ASSESSMENT AND PLAN  Ms. Miley Egan is a 65 y.o. female who presents for evaluation of polymyalgia with [id number] prednisone, limited by extensive osteoarthritis primarily in the shoulders. Now recovered from a recent acute attack of wrist and thumb swelling which clinically is more suspicious for pseudogout than gout or rheumatoid arthritis. Checking baseline plain films and inflammatory arthritis serologies. With resolution of her synovitis and background statin use, holding off on standing colchicine, but will prescribe a course to use at the first sign of a flare with instructions to briefly hold her statin when taking this. Holding off on immunosuppression as lower suspicion for rheumatoid arthritis. Reviewed risks/benefits of Prolia again, she expresses ambivalence re: risk/benefit with both this and bisphosphonates. For now would like to continue to defer treatment, will reach out to us if she changes her mind. 1. Inflammatory arthritis  - colchicine 0.6 mg tablet; At the first sign of gout flare in hand, take 2 colchicine. After 2 hours, if still painful/swollen and no diarrhea then take a 3rd dose. Call the clinic for further instructions  Dispense: 12 Tablet; Refill: 1  - XR HAND RT MIN 3 V; Future  - XR HAND LT MIN 3 V; Future  - RHEUMATOID FACTOR BY TURB (RDL); Future  - CYCLIC CITRUL PEPTIDE AB, IGG; Future  - MAGNESIUM; Future  - URIC ACID; Future  - RHEUMATOID FACTOR BY TURB (RDL)  - CYCLIC CITRUL PEPTIDE AB, IGG  - MAGNESIUM  - URIC ACID    2.  Other osteoporosis, unspecified pathological fracture presence  - VITAMIN D, 25 HYDROXY; Future    3. Vitamin D deficiency  - VITAMIN D, 25 HYDROXY; Future        Patient Instructions   1. Ordered x-ray of hands. Please do at your earliest convenience. 2. May increase Aspirin use to 325 mg 2-3 times a day as needed. Continue with tylenol use. 3. Prescribing Colchicine. With first sign of gout, take 2 pills and 2 hours later take a 3rd pill. 4. Continue diclofenac gel as needed for hand pain. Recommended arthritic compression gloves. 5. Check labs at earliest convenience. 6. Follow up with us in 3 months. Orders Placed This Encounter    XR HAND RT MIN 3 V    XR HAND LT MIN 3 V    RHEUMATOID FACTOR BY TURB (RDL)    CYCLIC CITRUL PEPTIDE AB, IGG    MAGNESIUM    URIC ACID    VITAMIN D, 25 HYDROXY    colchicine 0.6 mg tablet       Medications: I am having Aarti Lopez start on colchicine. I am also having her maintain her aspirin, ascorbic acid (vitamin C), valACYclovir, Denta 5000 Plus, peg 400-propylene glycol, multivit-min/iron/folic/lutein (CENTRUM SILVER WOMEN PO), turmeric-turmeric ext-pepper, diclofenac, omeprazole, atorvastatin, cyclobenzaprine, atenoloL, cilostazoL, lisinopriL, buPROPion SR, DULoxetine, cholecalciferol, and levothyroxine. Face to face time: 35 minutes  Note preparation and records review day of service: 15 minutes  Total provider time day of service: 50 minutes    This was scribed by Caitie Mclain in the presence of Dr. Fiorella Dominguez.    Melida Pulido MD    Adult Rheumatology   [facility]  A Part of [facility], [address] [facility]   Phone [phone]  Fax [phone]

## 2022-03-28 NOTE — LETTER
3/28/2022    Patient: Kari Tirado   YOB: 1945   Date of Visit: 3/28/2022     Nydia Morales NP  [address]  [facility]  Via In Basket    Dear Nydia Morales NP,    We recently saw Ms. Van Richardson in the [facility] for evaluation. My notes for this consultation are attached. If you have questions, please do not hesitate to call me. I look forward to following your patient along with you.       Sincerely,    Kenny Goddard MD S  Cell: [phone]

## 2022-03-28 NOTE — PATIENT INSTRUCTIONS
1. Ordered x-ray of hands. Please do at your earliest convenience. 2. May increase Aspirin use to 325 mg 2-3 times a day as needed. Continue with tylenol use. 3. Prescribing Colchicine. With first sign of gout, take 2 pills and 2 hours later take a 3rd pill. 4. Continue diclofenac gel as needed for hand pain. Recommended arthritic compression gloves. 5. Check labs at earliest convenience. 6. Follow up with us in 3 months.

## 2022-03-31 ENCOUNTER — LAB ONLY (OUTPATIENT)
Dept: INTERNAL MEDICINE CLINIC | Age: 77
End: 2022-03-31

## 2022-03-31 DIAGNOSIS — E03.8 ADULT ONSET HYPOTHYROIDISM: ICD-10-CM

## 2022-03-31 DIAGNOSIS — Z79.899 ON STATIN THERAPY: ICD-10-CM

## 2022-03-31 DIAGNOSIS — E78.5 DYSLIPIDEMIA: ICD-10-CM

## 2022-03-31 DIAGNOSIS — I10 ESSENTIAL HYPERTENSION: ICD-10-CM

## 2022-04-01 LAB
ALBUMIN SERPL-MCNC: 3.8 G/DL (ref 3.5–5)
ALBUMIN/GLOB SERPL: 1.2 {RATIO} (ref 1.1–2.2)
ALP SERPL-CCNC: 74 U/L (ref 45–117)
ALT SERPL-CCNC: 37 U/L (ref 12–78)
ANION GAP SERPL CALC-SCNC: 6 MMOL/L (ref 5–15)
AST SERPL-CCNC: 27 U/L (ref 15–37)
BILIRUB SERPL-MCNC: 0.4 MG/DL (ref 0.2–1)
BUN SERPL-MCNC: 13 MG/DL (ref 6–20)
BUN/CREAT SERPL: 17 (ref 12–20)
CALCIUM SERPL-MCNC: 9.2 MG/DL (ref 8.5–10.1)
CHLORIDE SERPL-SCNC: 107 MMOL/L (ref 97–108)
CHOLEST SERPL-MCNC: 131 MG/DL
CO2 SERPL-SCNC: 28 MMOL/L (ref 21–32)
CREAT SERPL-MCNC: 0.78 MG/DL (ref 0.55–1.02)
GLOBULIN SER CALC-MCNC: 3.2 G/DL (ref 2–4)
GLUCOSE SERPL-MCNC: 95 MG/DL (ref 65–100)
HDLC SERPL-MCNC: 79 MG/DL
HDLC SERPL: 1.7 {RATIO} (ref 0–5)
LDLC SERPL CALC-MCNC: 42.8 MG/DL (ref 0–100)
POTASSIUM SERPL-SCNC: 4.6 MMOL/L (ref 3.5–5.1)
PROT SERPL-MCNC: 7 G/DL (ref 6.4–8.2)
SODIUM SERPL-SCNC: 141 MMOL/L (ref 136–145)
TRIGL SERPL-MCNC: 46 MG/DL (ref ?–150)
TSH SERPL DL<=0.05 MIU/L-ACNC: 6.96 UIU/ML (ref 0.36–3.74)
VLDLC SERPL CALC-MCNC: 9.2 MG/DL

## 2022-04-04 RX ORDER — LEVOTHYROXINE SODIUM 88 UG/1
88 TABLET ORAL
Qty: 30 TABLET | Refills: 1 | Status: SHIPPED | OUTPATIENT
Start: 2022-04-04 | End: 2022-04-29

## 2022-04-04 NOTE — PROGRESS NOTES
Metabolic panel is normal.  Cholesterol levels are normal.    Thyroid shows to be underfunctioning. I will increase your levothyroxine to 88 mcg daily. Recheck this level in 6 weeks. Make a lab only appointment for this.

## 2022-05-31 RX ORDER — OMEPRAZOLE 20 MG/1
CAPSULE, DELAYED RELEASE ORAL
Qty: 90 CAPSULE | Refills: 1 | Status: SHIPPED | OUTPATIENT
Start: 2022-05-31 | End: 2022-06-02 | Stop reason: SDUPTHER

## 2022-05-31 NOTE — TELEPHONE ENCOUNTER
PCP: Nazia Higuera NP    Last appt: 3/15/2022  Future Appointments   Date Time Provider Liana Bartholomew   7/7/2022 11:00 AM Elayne Kaye MD [facility] BS AMB   9/26/2022 10:00 AM Nazia Higuera NP [facility] BS AMB       Requested Prescriptions     Pending Prescriptions Disp Refills    omeprazole (PRILOSEC) 20 mg capsule 90 Capsule 3     Sig: TAKE 1 CAPSULE DAILY       Prior labs and Blood pressures:  BP Readings from Last 3 Encounters:   03/28/22 121/75   03/15/22 124/72   12/14/21 120/74     Lab Results   Component Value Date/Time    Sodium 141 03/31/2022 12:58 PM    Potassium 4.6 03/31/2022 12:58 PM    Chloride 107 03/31/2022 12:58 PM    CO2 28 03/31/2022 12:58 PM    Anion gap 6 03/31/2022 12:58 PM    Glucose 95 03/31/2022 12:58 PM    BUN 13 03/31/2022 12:58 PM    Creatinine 0.78 03/31/2022 12:58 PM    BUN/Creatinine ratio 17 03/31/2022 12:58 PM    GFR est AA >60 03/31/2022 12:58 PM    GFR est non-AA >60 03/31/2022 12:58 PM    Calcium 9.2 03/31/2022 12:58 PM

## 2022-06-02 NOTE — TELEPHONE ENCOUNTER
PCP: Carlos Diehl NP    Last appt: 3/15/2022  Future Appointments   Date Time Provider Liana Bartholomew   7/7/2022 11:00 AM Britton Verma MD [facility] BS AMB   9/26/2022 10:00 AM Carlos Diehl NP [facility] BS AMB       Requested Prescriptions     Pending Prescriptions Disp Refills    omeprazole (PRILOSEC) 20 mg capsule 90 Capsule 1     Sig: TAKE 1 CAPSULE DAILY       Prior labs and Blood pressures:  BP Readings from Last 3 Encounters:   03/28/22 121/75   03/15/22 124/72   12/14/21 120/74     Lab Results   Component Value Date/Time    Sodium 141 03/31/2022 12:58 PM    Potassium 4.6 03/31/2022 12:58 PM    Chloride 107 03/31/2022 12:58 PM    CO2 28 03/31/2022 12:58 PM    Anion gap 6 03/31/2022 12:58 PM    Glucose 95 03/31/2022 12:58 PM    BUN 13 03/31/2022 12:58 PM    Creatinine 0.78 03/31/2022 12:58 PM    BUN/Creatinine ratio 17 03/31/2022 12:58 PM    GFR est AA >60 03/31/2022 12:58 PM    GFR est non-AA >60 03/31/2022 12:58 PM    Calcium 9.2 03/31/2022 12:58 PM

## 2022-06-03 RX ORDER — OMEPRAZOLE 20 MG/1
CAPSULE, DELAYED RELEASE ORAL
Qty: 90 CAPSULE | Refills: 1 | Status: SHIPPED | OUTPATIENT
Start: 2022-06-03 | End: 2022-06-06 | Stop reason: SDUPTHER

## 2022-06-06 RX ORDER — OMEPRAZOLE 20 MG/1
CAPSULE, DELAYED RELEASE ORAL
Qty: 90 CAPSULE | Refills: 1 | Status: SHIPPED | OUTPATIENT
Start: 2022-06-06

## 2022-06-06 NOTE — TELEPHONE ENCOUNTER
PCP: Daniel Bass NP    Last appt: 3/15/2022  Future Appointments   Date Time Provider Liana Bartholomew   7/7/2022 11:00 AM Lyly Gaston MD [facility] BS AMB   9/26/2022 10:00 AM Daniel Bass NP [facility] BS AMB       Requested Prescriptions     Pending Prescriptions Disp Refills    omeprazole (PRILOSEC) 20 mg capsule 90 Capsule 1     Sig: TAKE 1 CAPSULE DAILY       Prior labs and Blood pressures:  BP Readings from Last 3 Encounters:   03/28/22 121/75   03/15/22 124/72   12/14/21 120/74     Lab Results   Component Value Date/Time    Sodium 141 03/31/2022 12:58 PM    Potassium 4.6 03/31/2022 12:58 PM    Chloride 107 03/31/2022 12:58 PM    CO2 28 03/31/2022 12:58 PM    Anion gap 6 03/31/2022 12:58 PM    Glucose 95 03/31/2022 12:58 PM    BUN 13 03/31/2022 12:58 PM    Creatinine 0.78 03/31/2022 12:58 PM    BUN/Creatinine ratio 17 03/31/2022 12:58 PM    GFR est AA >60 03/31/2022 12:58 PM    GFR est non-AA >60 03/31/2022 12:58 PM    Calcium 9.2 03/31/2022 12:58 PM

## 2022-07-06 LAB
25(OH)D3+25(OH)D2 SERPL-MCNC: 50 NG/ML (ref 30–100)
CCP IGA+IGG SERPL IA-ACNC: 8 UNITS (ref 0–19)
MAGNESIUM SERPL-MCNC: 1.6 MG/DL (ref 1.6–2.3)
RHEUMATOID FACT SERPL-ACNC: <14 IU/ML (ref 0–15)
URATE SERPL-MCNC: 5.2 MG/DL (ref 3.1–7.9)

## 2022-07-07 ENCOUNTER — OFFICE VISIT (OUTPATIENT)
Dept: RHEUMATOLOGY | Age: 77
End: 2022-07-07
Payer: MEDICARE

## 2022-07-07 VITALS
RESPIRATION RATE: 18 BRPM | BODY MASS INDEX: 22.93 KG/M2 | TEMPERATURE: 98.3 F | DIASTOLIC BLOOD PRESSURE: 64 MMHG | HEART RATE: 77 BPM | WEIGHT: 124.6 LBS | OXYGEN SATURATION: 99 % | HEIGHT: 62 IN | SYSTOLIC BLOOD PRESSURE: 124 MMHG

## 2022-07-07 DIAGNOSIS — M81.0 AGE-RELATED OSTEOPOROSIS WITHOUT CURRENT PATHOLOGICAL FRACTURE: Primary | ICD-10-CM

## 2022-07-07 DIAGNOSIS — M62.838 MUSCLE SPASMS OF NECK: ICD-10-CM

## 2022-07-07 DIAGNOSIS — E55.9 VITAMIN D DEFICIENCY: ICD-10-CM

## 2022-07-07 DIAGNOSIS — M11.89 PSEUDOGOUT INVOLVING MULTIPLE JOINTS: ICD-10-CM

## 2022-07-07 DIAGNOSIS — K21.9 GASTROESOPHAGEAL REFLUX DISEASE WITHOUT ESOPHAGITIS: ICD-10-CM

## 2022-07-07 DIAGNOSIS — M35.3 POLYMYALGIA RHEUMATICA (HCC): ICD-10-CM

## 2022-07-07 PROCEDURE — G8754 DIAS BP LESS 90: HCPCS | Performed by: INTERNAL MEDICINE

## 2022-07-07 PROCEDURE — G8752 SYS BP LESS 140: HCPCS | Performed by: INTERNAL MEDICINE

## 2022-07-07 PROCEDURE — 1090F PRES/ABSN URINE INCON ASSESS: CPT | Performed by: INTERNAL MEDICINE

## 2022-07-07 PROCEDURE — G8510 SCR DEP NEG, NO PLAN REQD: HCPCS | Performed by: INTERNAL MEDICINE

## 2022-07-07 PROCEDURE — G8420 CALC BMI NORM PARAMETERS: HCPCS | Performed by: INTERNAL MEDICINE

## 2022-07-07 PROCEDURE — 1123F ACP DISCUSS/DSCN MKR DOCD: CPT | Performed by: INTERNAL MEDICINE

## 2022-07-07 PROCEDURE — G0463 HOSPITAL OUTPT CLINIC VISIT: HCPCS | Performed by: INTERNAL MEDICINE

## 2022-07-07 PROCEDURE — 99215 OFFICE O/P EST HI 40 MIN: CPT | Performed by: INTERNAL MEDICINE

## 2022-07-07 PROCEDURE — G8536 NO DOC ELDER MAL SCRN: HCPCS | Performed by: INTERNAL MEDICINE

## 2022-07-07 PROCEDURE — 1101F PT FALLS ASSESS-DOCD LE1/YR: CPT | Performed by: INTERNAL MEDICINE

## 2022-07-07 PROCEDURE — G8427 DOCREV CUR MEDS BY ELIG CLIN: HCPCS | Performed by: INTERNAL MEDICINE

## 2022-07-07 RX ORDER — CYCLOBENZAPRINE HCL 5 MG
5 TABLET ORAL
Qty: 10 TABLET | Refills: 1 | Status: SHIPPED | OUTPATIENT
Start: 2022-07-07

## 2022-07-07 NOTE — PATIENT INSTRUCTIONS
1) I will apply for Prolia. Ask your dentist if you are going to need dental surgery in the near future because Prolia can cause osteonecrosis of the jaw and slow healing after dental surgery. 2)  I will prescribe Flexeril for you to use as needed for back pain. 3) You can take 650mg of Tylenol up to three times a day as needed for pain. 4) Wearing arthritis gloves with activity may be helpful to control you joint pain in your hands. 5) Check labs ASAP. 6) Follow up in 6 months. Let me know if you experience increased joint swelling, stiffness, or pain in the meantime.

## 2022-07-07 NOTE — PROGRESS NOTES
Chief Complaint   Patient presents with    Arthritis     1. Have you been to the ER, urgent care clinic since your last visit? Hospitalized since your last visit? No    2. Have you seen or consulted any other health care providers outside of the [address] [place] [address] since your last visit? Include any pap smears or colon screening.  No

## 2022-07-07 NOTE — PROGRESS NOTES
REASON FOR VISIT:   Brissa Mora is a 65 y.o. female with history of hypertension, osteopenia, and hypothyroidism who is returning for  in-office Rheumatology followup of polymyalgia rheumatica. HISTORY OF PRESENT ILLNESS    Pt returns for a follow up. She say that she is holding together pretty well. Pt reports that she had two bad bouts with her back since her last visit. One was 3 weeks ago and one was 3 weeks before that. In both of these bouts she had a lot of pain, and she was not able to do much for a week after both such instances. She said that the pain was so bad at one point she could not breath. The day before her back flared for the first time she was at a festival waking all day, with development of back pain later in the day. Pt has aches and pains in the morning. She is able to walk okay after 10 or 15 minutes but it takes about 30 minutes until she is able to bend down and pick things up. Pt has had pain in both of her wrists since her last visit, but she has not had another flare up of gout or acute onset swelling. She has not had to use colchicine since her last visit. Disease History:  Initial visit March 2019 developed bilateral shoulder pain, difficulty lifting her hands above her shoulders. Hasn't had significant thigh soreness. Wasn't having any headaches, visual changes, night sweats, or weight loss. Started on prednisone with immediate dramatic improvement. With weaning prednisone, would develop recrudescence of the shoulder and upper arm pain. Now on prednisone 5mg Tuesday, Thursday, and Saturday since November. Shoulders hurt more currently. Had previously been going to PT, can't go to pool classes during COVID so isn't exercising like she was. Has noticed hair thinning diffusely, comes out in clumps, pony tail not as thick as before. Switched from gabapentin to duloxetine, during the transition prednisone had been increased.      Last year fell and irritated both shoulders, attributes to clumsiness--once tripped over a stool while carrying laundry, another time was reaching for shoes while sitting and carpet slipped from under her and she developed symptomatic rotator cuff tendinopathy for which she completed PT. Takes Tylenol for pain is only once every 2 weeks, when she is more active such as gardening or washing the floor. Has been advised to avoid NSAIDs. Has had paralumbar low back pain every morning, stays in the low back. Also h/o neck spasms. Has had multiple LE bypasses and stents for symptomatic claudication. Now takes daily aspirin and cilostazol. REVIEW OF SYSTEMS  A comprehensive review of systems was negative except for that written in the HPI. A 10-point review of systems is per the new patient questionnaire, which has been reviewed extensively and scanned into the electronic medical record for future reference. Review of systems is as above and is otherwise negative. ALLERGIES  Neomycin sulfate    MEDICATIONS  Current Outpatient Medications   Medication Sig    omeprazole (PRILOSEC) 20 mg capsule TAKE 1 CAPSULE DAILY    atorvastatin (LIPITOR) 40 mg tablet TAKE 1 TABLET DAILY    levothyroxine (SYNTHROID) 88 mcg tablet TAKE 1 TABLET BY MOUTH EVERY DAY BEFORE BREAKFAST    colchicine 0.6 mg tablet At the first sign of gout flare in hand, take 2 colchicine. After 2 hours, if still painful/swollen and no diarrhea then take a 3rd dose. Call the clinic for further instructions    cholecalciferol (VITAMIN D3) 25 mcg (1,000 unit) cap TAKE 1 CAPSULE BY MOUTH EVERY DAY    DULoxetine (CYMBALTA) 60 mg capsule Take 60 mg by mouth daily. lisinopriL (PRINIVIL, ZESTRIL) 40 mg tablet Take 1 Tablet by mouth daily.     buPROPion SR (WELLBUTRIN SR) 150 mg SR tablet TAKE 1 TABLET DAILY    cilostazoL (PLETAL) 100 mg tablet TAKE 1 TABLET TWICE A DAY    atenoloL (TENORMIN) 50 mg tablet TAKE 1 TABLET NIGHTLY    cyclobenzaprine (FLEXERIL) 5 mg tablet Take 1 Tab by mouth every eight (8) hours as needed for Muscle Spasm(s). Use only needed for the onset of severe spasm. Note this increases your risk of falls. diclofenac (VOLTAREN) 1 % gel Apply  to affected area four (4) times daily as needed for Pain. turmeric/turmeric ext/pepr ext (TURMERIC-TURMERIC EXT-PEPPER) 500-3 mg cap Take  by mouth.    multivit-min/iron/folic/lutein (CENTRUM SILVER WOMEN PO) Take  by mouth.    peg 400-propylene glycol (SYSTANE, PROPYLENE GLYCOL,) 0.4-0.3 % drop Administer 1 Drop to both eyes as needed. DENTA 5000 PLUS 1.1 % crea Apply at night    valACYclovir (VALTREX) 1 gram tablet Take 2 Tabs by mouth two (2) times a day. 2 initially and 2 in 12 hr for fever blisters (Patient not taking: Reported on 3/28/2022)    ascorbic acid (VITAMIN C) 500 mg tablet Take 500 mg by mouth two (2) times a day. aspirin (ASPIRIN) 325 mg tablet Take 325 mg by mouth daily. No current facility-administered medications for this visit.        PAST MEDICAL HISTORY  Past Medical History:   Diagnosis Date    Adult onset hypothyroidism 8/23/2017    Anemia 8/23/2017    Anxiety 8/23/2017    Arteriosclerotic heart disease (ASHD) 8/23/2017    Arthritis     back, hip right, neck    CAD (coronary artery disease)          Cervical spondylosis 8/23/2017    Chronic pain     spinal stenosis, bulging disk and bone spurs in back    Claudication in peripheral vascular disease ([address] 8/23/2017    CVD (cerebrovascular disease)     S/P right CEA    Depression     Dyslipidemia 8/23/2017    Familial mitral valve prolapse 8/23/2017    GERD (gastroesophageal reflux disease)     Gout     Hyperlipidemia     Hypertension     Hypothyroidism     Lumbar spinal stenosis     Macrocytosis 8/23/2017    Menopause     Nausea & vomiting     surgery related, severe constipation    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 ([address] 8/23/2017    Osteopenia 8/23/2017    Osteoporosis     Other ill-defined conditions(799.89)     severe constipation from pain medication    PVD (peripheral vascular disease) ([facility]     S/P stent Right CFA and Left iliac    Stroke [facility])     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 SOCIAL HISTORY  She  reports that she quit smoking about 14 years ago. She has a 35.00 pack-year smoking history. She has never used smokeless tobacco. She reports current alcohol use of about 6.0 standard drinks of alcohol per week. She reports that she does not use drugs. Social History     Social History Narrative    Not on file   Former secondary education . DATA  Visit Vitals  /64 (BP 1 Location: Left upper arm, BP Patient Position: Sitting)   Pulse 77   Temp 98.3 °F (36.8 °C) (Oral)   Resp 18   Ht 5' 2\" (1.575 m)   Wt 124 lb 9.6 oz (56.5 kg)   SpO2 99%   BMI 22.79 kg/m²    Body mass index is 22.79 kg/m². No flowsheet data found. General:  The patient is well developed, well nourished, alert, and in no apparent distress. Eyes: Sclera are anicteric. No conjunctival injection. HEENT:  Oropharynx is clear. No oral ulcers. Adequate salivary pooling. No cervical or supraclavicular lymphadenopathy. Lungs:  Clear to auscultation bilaterally, without wheeze or stridor. Normal respiratory effort. Cor:  Regular rate and rhythm. No rub or gallop. Stable 2/6 holosystolic murmur of the right upper sternal border. Abdomen: Soft, non-tender, without hepatomegaly or masses. Extremities: No calf tenderness or edema. Stably decreased DP pulses without distal ulceration.   Skin:  No significant abnormalities. No petechial, purpuric, or psoriaform rashes   Neuro: Decreased temperature sensation in the hands and feet. Musculoskeletal:    A comprehensive musculoskeletal exam was performed for all joints of each upper and lower extremity and assessed for swelling, tenderness and range of motion. Results are documented as below:  Persistent bilateral global shoulder crepitus with mild tenderness on passive ROM. Intact ROM of elbows. Bilateral CMC squaring. R>L 1st MCP subluxation, early subluxation of bilateral MCP2-3 without synovitis  [place] neck deformities of the R 2nd and 5 fingers. Unchanged early swan neck deformities of the L 2-5 fingers. Labs   7/1/21: Vit D 50, uric acid 5.2, magnesium 1.6, CCP Ab IgC/IgA 8, Rheumatoid factor <14  3/17/22: CRP <1 mg/L, CK 46, ESR 17, Vit D 45.9, Cr 0.72, LFT WNL, WBC 9.9, HGB 12.8, Plt 307.  7/12/21: Cr 0.69, Tbili 0.5, AST 22, ALT 31, AlkP 127; HDL 84, LDL 52.8; CK 33; TSH WNL  6/8/21: ESR 7, CRP 1mg/L  2/17/21: ESR 20, CRP <1mg/L  1/25/21: B12 785  12/15/20: , CBC o/w WNL; CMP WNL; TSH 2.9 (WNL)    Imaging:  3/28/22 XR HANDS: Evidence of inflammatory and noninflammatory arthritides involving the bilateral hands, with particularly MCP2-3 subluxations, chondrocalcinosis of wrists, severe loss of CMC joint space, more subtle possible marginal erosions niraj 3rd fingers.     4/13/21 DXA:  Findings:  Femoral Neck Left:  Bone mineral density (gm/cm2): 0.848  % of peak bone mass: 82  % for age matched controls: 115  T-score: -1.4  Z-score: 0.8  Total Hip Left:  Bone mineral density (gm/cm2): 0.889  % of peak bone mass: 88  % for age matched controls: 118  T-score: -0.9  Z-score: 1.1  Lumbar Spine: L1-4  Bone mineral density (gm/cm2): 1.492  % of peak bone mass: 124  % for age matched controls: 152  T-score: 2.4  Z-score: 4.4  33% Radius Left:  Bone mineral density (gm/cm2): 0.677  % of peak bone mass: 76  % for age matched controls:  101  T-score: -2.4  Z-score: 0.1  IMPRESSION  Impression: This patient is osteopenic using the World Health Organization criteria  As compared to the prior study, there has been a trend toward a decrease in the  left hip. 10 year probability of major osteoporotic fracture: 19.2%  10 year probability of hip fracture: 9.9%    3/19/19 DXA: Lspine excluded 2/2 degenerative changes. Findings:  Femoral Neck Left:  Bone mineral density (gm/cm2): 0.895  % of peak bone mass: 86  % for age matched controls: 118  T-score: -1.0  Z-score: 1.0  Total Hip Left:  Bone mineral density (gm/cm2): 0.927  % of peak bone mass: 92  % for age matched controls: 119  T-score: -0.6  Z-score: 1.2  33% Radius Right:  Bone mineral density (gm/cm2): 0.707  % of peak bone mass: 80  % for age matched controls: 103  T-score: -2.0  Z-score: 0.2  IMPRESSION  This patient is osteopenic using the World Health Organization criteria  10 year probability of major osteoporotic fracture: 15.1%  10 year probability of hip fracture: 6.5%      ASSESSMENT AND PLAN  Ms. Rose Lyman is a 65 y.o. female who presents for evaluation of polymyalgia with [id number] prednisone, limited by extensive osteoarthritis primarily in the shoulders. Now doing well off of prednisone, without recent flares of her acute hand arthritis 2/2 pseudogout. Reviewed some radiographic features of RA, but without active synovitis or pain, the dominant processes seem to be OA and pseudogout, and favor repeating xray in 1 year off of DMARDs to ensure no progressive disease. She is amenable now to starting Prolia, she has seen Dr. Camacho Ruby in Endocrine in the past who had also suggested Prolia. She has chronic GERD managed with omeprazole, and had been treated for years in the past with Fosamax. Will apply for q6mo Prolia injections.     1. Age-related osteoporosis without current pathological fracture  - Applying to start Prolia injections [id number]  - METABOLIC PANEL, BASIC; Future  - VITAMIN D, 25 HYDROXY; Future  - SED RATE (ESR); Future  - C REACTIVE PROTEIN, QT; Future    2. Pseudogout involving multiple joints  - Cont colchicine 0.6mg at the first sign of attcks    3. Muscle spasms of neck  - cyclobenzaprine (FLEXERIL) 5 mg tablet; Take 1 Tablet by mouth every eight (8) hours as needed for Muscle Spasm(s). Use only needed for the onset of severe spasm. Note this increases your risk of falls. Dispense: 10 Tablet; Refill: 1    4. Vitamin D deficiency  - METABOLIC PANEL, BASIC; Future  - VITAMIN D, 25 HYDROXY; Future    5. Polymyalgia rheumatica (HCC)  - Stable off of prednisone  - METABOLIC PANEL, BASIC; Future  - SED RATE (ESR); Future  - C REACTIVE PROTEIN, QT; Future      Patient Instructions   1) I will apply for Prolia. Ask your dentist if you are going to need dental surgery in the near future because Prolia can cause osteonecrosis of the jaw and slow healing after dental surgery. 2)  I will prescribe Flexeril for you to use as needed for back pain. 3) You can take 650mg of Tylenol up to three times a day as needed for pain. 4) Wearing arthritis gloves with activity may be helpful to control you joint pain in your hands. 5) Check labs ASAP. 6) Follow up in 6 months. Let me know if you experience increased joint swelling, stiffness, or pain in the meantime. Orders Placed This Encounter    METABOLIC PANEL, BASIC    VITAMIN D, 25 HYDROXY    SED RATE (ESR)    C REACTIVE PROTEIN, QT    REFERRAL TO INFUSION THERAPY    cyclobenzaprine (FLEXERIL) 5 mg tablet       Medications: I have changed Rick Angela. Lopez's cyclobenzaprine. I am also having her maintain her aspirin, ascorbic acid (vitamin C), valACYclovir, Denta 5000 Plus, peg 400-propylene glycol, multivit-min/iron/folic/lutein (CENTRUM SILVER WOMEN PO), turmeric-turmeric ext-pepper, diclofenac, atenoloL, lisinopriL, buPROPion SR, DULoxetine, cholecalciferol, colchicine, levothyroxine, atorvastatin, and omeprazole.       Face to face time: 25 minutes  Note preparation and records review day of service: 20 minutes  Total provider time day of service: 45 minutes    This was scribed by Rakan Frankel in the presence of Dr. Kady Orellana MD    Adult Rheumatology   [facility]  A Part of [facility], [address] [place] [address]   Phone [phone]  Fax [phone]

## 2022-07-25 DIAGNOSIS — I73.9 PVD (PERIPHERAL VASCULAR DISEASE) (HCC): ICD-10-CM

## 2022-07-25 NOTE — TELEPHONE ENCOUNTER
PCP: Nisa Araujo NP    Last appt: 3/15/2022  Future Appointments   Date Time Provider Liana Bartholomew   2022 10:00 AM Nisa Araujo NP PCA BS AMB   2023 11:30 AM Gaurang Negro MD AO BS AMB       Requested Prescriptions     Pending Prescriptions Disp Refills    cilostazoL (PLETAL) 100 mg tablet 180 Tablet 3     Si Tablet two (2) times a day.          Other Comments:

## 2022-07-25 NOTE — TELEPHONE ENCOUNTER
PCP: Salvador Mora NP    Last appt: 3/15/2022  Future Appointments   Date Time Provider Liana Bartholomew   2022 10:00 AM Salvador Mora NP PC[facility] BS AMB   2023 11:30 AM Maceo Claude, MD AOCR BS AMB       Requested Prescriptions     Pending Prescriptions Disp Refills    cilostazoL (PLETAL) 100 mg tablet 180 Tablet 3     Si Tablet two (2) times a day.        Prior labs and Blood pressures:  BP Readings from Last 3 Encounters:   22 124/64   22 121/75   03/15/22 124/72     Lab Results   Component Value Date/Time    Sodium 141 2022 12:58 PM    Potassium 4.6 2022 12:58 PM    Chloride 107 2022 12:58 PM    CO2 28 2022 12:58 PM    Anion gap 6 2022 12:58 PM    Glucose 95 2022 12:58 PM    BUN 13 2022 12:58 PM    Creatinine 0.78 2022 12:58 PM    BUN/Creatinine ratio 17 2022 12:58 PM    GFR est AA >60 2022 12:58 PM    GFR est non-AA >60 2022 12:58 PM    Calcium 9.2 2022 12:58 PM

## 2022-07-27 RX ORDER — CILOSTAZOL 100 MG/1
100 TABLET ORAL 2 TIMES DAILY
Qty: 180 TABLET | Refills: 1 | Status: SHIPPED | OUTPATIENT
Start: 2022-07-27

## 2022-07-29 RX ORDER — DIPHENHYDRAMINE HYDROCHLORIDE 50 MG/ML
50 INJECTION, SOLUTION INTRAMUSCULAR; INTRAVENOUS AS NEEDED
Start: 2022-07-29

## 2022-07-29 RX ORDER — EPINEPHRINE 1 MG/ML
0.3 INJECTION, SOLUTION, CONCENTRATE INTRAVENOUS AS NEEDED
OUTPATIENT
Start: 2022-07-29

## 2022-07-29 RX ORDER — ONDANSETRON 2 MG/ML
8 INJECTION INTRAMUSCULAR; INTRAVENOUS AS NEEDED
OUTPATIENT
Start: 2022-07-29

## 2022-07-29 RX ORDER — ACETAMINOPHEN 325 MG/1
650 TABLET ORAL AS NEEDED
Start: 2022-07-29

## 2022-07-29 RX ORDER — ALBUTEROL SULFATE 0.83 MG/ML
2.5 SOLUTION RESPIRATORY (INHALATION) AS NEEDED
Start: 2022-07-29

## 2022-07-29 RX ORDER — DIPHENHYDRAMINE HYDROCHLORIDE 50 MG/ML
25 INJECTION, SOLUTION INTRAMUSCULAR; INTRAVENOUS AS NEEDED
Start: 2022-07-29

## 2022-07-29 RX ORDER — HYDROCORTISONE SODIUM SUCCINATE 100 MG/2ML
100 INJECTION, POWDER, FOR SOLUTION INTRAMUSCULAR; INTRAVENOUS AS NEEDED
OUTPATIENT
Start: 2022-07-29

## 2022-08-10 DIAGNOSIS — I10 ESSENTIAL HYPERTENSION: ICD-10-CM

## 2022-08-10 RX ORDER — DULOXETIN HYDROCHLORIDE 60 MG/1
60 CAPSULE, DELAYED RELEASE ORAL DAILY
Qty: 90 CAPSULE | Refills: 1 | Status: SHIPPED | OUTPATIENT
Start: 2022-08-10 | End: 2022-08-30 | Stop reason: SDUPTHER

## 2022-08-10 RX ORDER — ATENOLOL 50 MG/1
50 TABLET ORAL
Qty: 90 TABLET | Refills: 1 | Status: SHIPPED | OUTPATIENT
Start: 2022-08-10 | End: 2022-08-30 | Stop reason: SDUPTHER

## 2022-08-10 NOTE — TELEPHONE ENCOUNTER
PCP: Susan Hooper NP    Last appt: 3/15/2022  Future Appointments   Date Time Provider Liana Bartholomew   2022  9:20 AM Linnea Vaughn MD AO[facility] BS AMB   2022 10:00 AM Susan Hooper NP PCAM  AMB   2023 11:30 AM Linnea Vaughn MD AOCR BS AMB       Requested Prescriptions     Pending Prescriptions Disp Refills    atenoloL (TENORMIN) 50 mg tablet 90 Tablet 3     Si Tablet nightly. DULoxetine (CYMBALTA) 60 mg capsule 90 Capsule 3     Sig: Take 1 Capsule by mouth in the morning.        Prior labs and Blood pressures:  BP Readings from Last 3 Encounters:   22 124/64   22 121/75   03/15/22 124/72     Lab Results   Component Value Date/Time    Sodium 141 2022 12:58 PM    Potassium 4.6 2022 12:58 PM    Chloride 107 2022 12:58 PM    CO2 28 2022 12:58 PM    Anion gap 6 2022 12:58 PM    Glucose 95 2022 12:58 PM    BUN 13 2022 12:58 PM    Creatinine 0.78 2022 12:58 PM    BUN/Creatinine ratio 17 2022 12:58 PM    GFR est AA >60 2022 12:58 PM    GFR est non-AA >60 2022 12:58 PM    Calcium 9.2 2022 12:58 PM

## 2022-08-11 ENCOUNTER — OFFICE VISIT (OUTPATIENT)
Dept: RHEUMATOLOGY | Age: 77
End: 2022-08-11
Payer: MEDICARE

## 2022-08-11 VITALS
TEMPERATURE: 98 F | RESPIRATION RATE: 18 BRPM | BODY MASS INDEX: 22.31 KG/M2 | HEART RATE: 57 BPM | SYSTOLIC BLOOD PRESSURE: 127 MMHG | WEIGHT: 122 LBS | DIASTOLIC BLOOD PRESSURE: 71 MMHG

## 2022-08-11 DIAGNOSIS — M62.838 MUSCLE SPASMS OF NECK: ICD-10-CM

## 2022-08-11 DIAGNOSIS — M11.89 PSEUDOGOUT INVOLVING MULTIPLE JOINTS: Primary | ICD-10-CM

## 2022-08-11 DIAGNOSIS — E83.42 HYPOMAGNESEMIA: ICD-10-CM

## 2022-08-11 PROCEDURE — G0463 HOSPITAL OUTPT CLINIC VISIT: HCPCS | Performed by: INTERNAL MEDICINE

## 2022-08-11 PROCEDURE — G8510 SCR DEP NEG, NO PLAN REQD: HCPCS | Performed by: INTERNAL MEDICINE

## 2022-08-11 PROCEDURE — G8427 DOCREV CUR MEDS BY ELIG CLIN: HCPCS | Performed by: INTERNAL MEDICINE

## 2022-08-11 PROCEDURE — 1090F PRES/ABSN URINE INCON ASSESS: CPT | Performed by: INTERNAL MEDICINE

## 2022-08-11 PROCEDURE — G8754 DIAS BP LESS 90: HCPCS | Performed by: INTERNAL MEDICINE

## 2022-08-11 PROCEDURE — G8752 SYS BP LESS 140: HCPCS | Performed by: INTERNAL MEDICINE

## 2022-08-11 PROCEDURE — 1123F ACP DISCUSS/DSCN MKR DOCD: CPT | Performed by: INTERNAL MEDICINE

## 2022-08-11 PROCEDURE — G8536 NO DOC ELDER MAL SCRN: HCPCS | Performed by: INTERNAL MEDICINE

## 2022-08-11 PROCEDURE — G8420 CALC BMI NORM PARAMETERS: HCPCS | Performed by: INTERNAL MEDICINE

## 2022-08-11 PROCEDURE — 1101F PT FALLS ASSESS-DOCD LE1/YR: CPT | Performed by: INTERNAL MEDICINE

## 2022-08-11 PROCEDURE — 99215 OFFICE O/P EST HI 40 MIN: CPT | Performed by: INTERNAL MEDICINE

## 2022-08-11 RX ORDER — LANOLIN ALCOHOL/MO/W.PET/CERES
400 CREAM (GRAM) TOPICAL DAILY
Qty: 30 TABLET | Refills: 2 | Status: SHIPPED | OUTPATIENT
Start: 2022-08-11

## 2022-08-11 RX ORDER — PREDNISONE 10 MG/1
TABLET ORAL
Qty: 20 TABLET | Refills: 1 | Status: SHIPPED | OUTPATIENT
Start: 2022-08-11 | End: 2022-08-19

## 2022-08-11 RX ORDER — PREDNISONE 10 MG/1
20 TABLET ORAL DAILY
Qty: 10 TABLET | Refills: 0 | Status: SHIPPED | OUTPATIENT
Start: 2022-08-11 | End: 2022-08-16

## 2022-08-11 NOTE — PROGRESS NOTES
REASON FOR VISIT:   Clare Marroquin is a 65 y.o. female with history of hypertension, osteopenia, and hypothyroidism who is returning for  in-office Rheumatology followup of polymyalgia rheumatica. HISTORY OF PRESENT ILLNESS    Pt returns for a follow up. Pt reports that she had a \"gout flare up\" in her R hand one week ago from today. She said that she woke up and her wrist started to have pain when moving it. Her wrist then started to swell up. She recalls she was not able to take the Colchicine until after her wrist was fully flaring up. She says that today her hand is stiff, but her hand is starting to feel better. She notes that the base of her thumb is still painful. She denies any increase in activity that could have triggered this flare besides for gardening. Pt reports that she had a problem with her hip. She says that one day she was walking and her hip started to hurt. The pain then spread down her hip and she was limping for 3 weeks. Now it hurts when she sits for a long time and then get up. Pt takes has been taking 6 extra strength Tylenols per day during the past week. On a regular week she only takes 2-3 Tylenol per week. She does not take Ibuprofen because she was told not to take it by a doctor 10 years ago. Pt reports that she gets severe primarily right-sided neck spasms that sometimes send her to the hospital.     Pt denies taking any new medications. Pt denies skin rashes, trouble breathing, toe ulcers. Pt reports she is going to [facility] at the end of September and wants to wait to take the Prolia shot until she is back. Disease History:  Initial visit March 2019 developed bilateral shoulder pain, difficulty lifting her hands above her shoulders. Hasn't had significant thigh soreness. Wasn't having any headaches, visual changes, night sweats, or weight loss. Started on prednisone with immediate dramatic improvement.  With weaning prednisone, would develop recrudescence of the shoulder and upper arm pain. Now on prednisone 5mg Tuesday, Thursday, and Saturday since November. Shoulders hurt more currently. Had previously been going to PT, can't go to pool classes during COVID so isn't exercising like she was. Has noticed hair thinning diffusely, comes out in clumps, pony tail not as thick as before. Switched from gabapentin to duloxetine, during the transition prednisone had been increased. Last year fell and irritated both shoulders, attributes to clumsiness--once tripped over a stool while carrying laundry, another time was reaching for shoes while sitting and carpet slipped from under her and she developed symptomatic rotator cuff tendinopathy for which she completed PT. Takes Tylenol for pain is only once every 2 weeks, when she is more active such as gardening or washing the floor. Has been advised to avoid NSAIDs. Has had paralumbar low back pain every morning, stays in the low back. Also h/o neck spasms. Has had multiple LE bypasses and stents for symptomatic claudication. Now takes daily aspirin and cilostazol. REVIEW OF SYSTEMS  A comprehensive review of systems was negative except for that written in the HPI. A 10-point review of systems is per the new patient questionnaire, which has been reviewed extensively and scanned into the electronic medical record for future reference. Review of systems is as above and is otherwise negative. ALLERGIES  Neomycin sulfate    MEDICATIONS  Current Outpatient Medications   Medication Sig    atenoloL (TENORMIN) 50 mg tablet Take 1 Tablet by mouth nightly. DULoxetine (CYMBALTA) 60 mg capsule Take 1 Capsule by mouth in the morning. cilostazoL (PLETAL) 100 mg tablet Take 1 Tablet by mouth two (2) times a day. cyclobenzaprine (FLEXERIL) 5 mg tablet Take 1 Tablet by mouth every eight (8) hours as needed for Muscle Spasm(s). Use only needed for the onset of severe spasm.  Note this increases your risk of falls.    omeprazole (PRILOSEC) 20 mg capsule TAKE 1 CAPSULE DAILY    atorvastatin (LIPITOR) 40 mg tablet TAKE 1 TABLET DAILY    levothyroxine (SYNTHROID) 88 mcg tablet TAKE 1 TABLET BY MOUTH EVERY DAY BEFORE BREAKFAST    colchicine 0.6 mg tablet At the first sign of gout flare in hand, take 2 colchicine. After 2 hours, if still painful/swollen and no diarrhea then take a 3rd dose. Call the clinic for further instructions    cholecalciferol (VITAMIN D3) 25 mcg (1,000 unit) cap TAKE 1 CAPSULE BY MOUTH EVERY DAY    lisinopriL (PRINIVIL, ZESTRIL) 40 mg tablet Take 1 Tablet by mouth daily. buPROPion SR (WELLBUTRIN SR) 150 mg SR tablet TAKE 1 TABLET DAILY    diclofenac (VOLTAREN) 1 % gel Apply  to affected area four (4) times daily as needed for Pain. turmeric/turmeric ext/pepr ext (TURMERIC-TURMERIC EXT-PEPPER) 500-3 mg cap Take  by mouth.    multivit-min/iron/folic/lutein (CENTRUM SILVER WOMEN PO) Take  by mouth.    peg 400-propylene glycol (SYSTANE, PROPYLENE GLYCOL,) 0.4-0.3 % drop Administer 1 Drop to both eyes as needed. (Patient not taking: Reported on 7/7/2022)    DENTA 5000 PLUS 1.1 % crea Apply at night    valACYclovir (VALTREX) 1 gram tablet Take 2 Tabs by mouth two (2) times a day. 2 initially and 2 in 12 hr for fever blisters    ascorbic acid (VITAMIN C) 500 mg tablet Take 500 mg by mouth two (2) times a day. aspirin (ASPIRIN) 325 mg tablet Take 325 mg by mouth daily. No current facility-administered medications for this visit.        PAST MEDICAL HISTORY  Past Medical History:   Diagnosis Date    Adult onset hypothyroidism 8/23/2017    Anemia 8/23/2017    Anxiety 8/23/2017    Arteriosclerotic heart disease (ASHD) 8/23/2017    Arthritis     back, hip right, neck    CAD (coronary artery disease)          Cervical spondylosis 8/23/2017    Chronic pain     spinal stenosis, bulging disk and bone spurs in back    Claudication in peripheral vascular disease ([address] 8/23/2017    CVD (cerebrovascular disease)     S/P right CEA    Depression     Dyslipidemia 8/23/2017    Familial mitral valve prolapse 8/23/2017    GERD (gastroesophageal reflux disease)     Gout     Hyperlipidemia     Hypertension     Hypothyroidism     Lumbar spinal stenosis     Macrocytosis 8/23/2017    Menopause     Nausea & vomiting     surgery related, severe constipation    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 ([facility] [address] 8/23/2017    Osteopenia 8/23/2017    Osteoporosis     Other ill-defined conditions(799.89)     severe constipation from pain medication    PVD (peripheral vascular disease) ([facility] [address]     S/P stent Right CFA and Left iliac    Stroke [facility])     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 SOCIAL HISTORY  She  reports that she quit smoking about 14 years ago. She has a 35.00 pack-year smoking history. She has never used smokeless tobacco. She reports current alcohol use of about 6.0 standard drinks per week. She reports that she does not use drugs. Social History     Social History Narrative    Not on file   Former secondary education . DATA  Visit Vitals  /71   Pulse (!) 57   Temp 98 °F (36.7 °C)   Resp 18   Wt 122 lb (55.3 kg)   BMI 22.31 kg/m²      Body mass index is 22.31 kg/m². No flowsheet data found. General:  The patient is well developed, well nourished, alert, and in no apparent distress. Eyes: Sclera are anicteric. No conjunctival injection. HEENT:  Oropharynx is clear. No oral ulcers. Adequate salivary pooling.  No cervical or supraclavicular lymphadenopathy. Lungs:  Clear to auscultation bilaterally, without wheeze or stridor. Normal respiratory effort. Cor:  Regular rate and rhythm. No rub or gallop. Still 2/6 holosystolic murmur of the right upper sternal border. Abdomen: Soft, non-tender, without hepatomegaly or masses. Extremities: No calf tenderness or edema. Stably decreased DP pulses without distal ulceration. Skin:  No significant abnormalities. No petechial, purpuric, or psoriaform rashes   Neuro: Decreased temperature sensation in the hands and distal 1/3 LE's. Musculoskeletal:    A comprehensive musculoskeletal exam was performed for all joints of each upper and lower extremity and assessed for swelling, tenderness and range of motion. Results are documented as below:  Persistent bilateral global shoulder crepitus with mild tenderness on passive ROM. Intact ROM of elbows. Trace synovitis right wrist.  Bilateral CMC squaring. R>L 1st MCP subluxation, right 1st MCP tenderness, stable early subluxation of bilateral MCP2-3 without synovitis  [place] neck deformities of the R 2nd and 5 fingers without synovitis. Unchanged early swan neck deformities of the L 2-5 fingers. Labs     7/1/22: Vit D 50, uric acid 5.2, magnesium 1.6, CCP Ab IgC/IgA 8, Rheumatoid factor <14  3/17/22: CRP <1 mg/L, CK 46, ESR 17, Vit D 45.9, Cr 0.72, LFT WNL, WBC 9.9, HGB 12.8, Plt 307.  7/12/21: Cr 0.69, Tbili 0.5, AST 22, ALT 31, AlkP 127; HDL 84, LDL 52.8; CK 33; TSH WNL  6/8/21: ESR 7, CRP 1mg/L  2/17/21: ESR 20, CRP <1mg/L  1/25/21: B12 785  12/15/20: , CBC o/w WNL; CMP WNL; TSH 2.9 (WNL)    Imaging:  3/28/22 XR HANDS: Evidence of inflammatory and noninflammatory arthritides involving the bilateral hands, with particularly MCP2-3 subluxations, chondrocalcinosis of wrists, severe loss of CMC joint space, more subtle possible marginal erosions niraj 3rd fingers.     4/13/21 DXA:  Findings:  Femoral Neck Left:  Bone mineral density (gm/cm2): 0. 848  % of peak bone mass: 82  % for age matched controls: 115  T-score: -1.4  Z-score: 0.8  Total Hip Left:  Bone mineral density (gm/cm2): 0.889  % of peak bone mass: 88  % for age matched controls: 118  T-score: -0.9  Z-score: 1.1  Lumbar Spine: L1-4  Bone mineral density (gm/cm2): 1.492  % of peak bone mass: 124  % for age matched controls: 152  T-score: 2.4  Z-score: 4.4  33% Radius Left:  Bone mineral density (gm/cm2): 0.677  % of peak bone mass: 76  % for age matched controls:  101  T-score: -2.4  Z-score: 0.1  IMPRESSION  Impression: This patient is osteopenic using the World Health Organization criteria  As compared to the prior study, there has been a trend toward a decrease in the  left hip. 10 year probability of major osteoporotic fracture: 19.2%  10 year probability of hip fracture: 9.9%    3/19/19 DXA: Lspine excluded 2/2 degenerative changes.  Findings:  Femoral Neck Left:  Bone mineral density (gm/cm2): 0.895  % of peak bone mass: 86  % for age matched controls: 118  T-score: -1.0  Z-score: 1.0  Total Hip Left:  Bone mineral density (gm/cm2): 0.927  % of peak bone mass: 92  % for age matched controls: 119  T-score: -0.6  Z-score: 1.2  33% Radius Right:  Bone mineral density (gm/cm2): 0.707  % of peak bone mass: 80  % for age matched controls: 103  T-score: -2.0  Z-score: 0.2  IMPRESSION  This patient is osteopenic using the World Health Organization criteria  10 year probability of major osteoporotic fracture: 15.1%  10 year probability of hip fracture: 6.5%      ASSESSMENT AND PLAN  Ms. Rose Lyman is a 65 y.o. female who presents for evaluation of polymyalgia with [id number] prednisone, limited by extensive osteoarthritis primarily in the shoulders and recovering from a likely recent pseudogout flare in the wrist. Prescribing prednisone to facilitate recovery from current flare, prescribing magnesium for suppression, reviewed early treatment of flares with colchicine and transition to prednisone if flares persists more than 12 hours. 1. Pseudogout involving multiple joints  - predniSONE (DELTASONE) 10 mg tablet; Take 20 mg by mouth in the morning for 5 days. Dispense: 10 Tablet; Refill: 0  - predniSONE (DELTASONE) 10 mg tablet; Take 40 mg by mouth daily (with breakfast) for 2 days, THEN 30 mg daily (with breakfast) for 2 days, THEN 20 mg daily (with breakfast) for 2 days, THEN 10 mg daily (with breakfast) for 2 days. Take as needed for arthritis flare that doesn't respond within 12 hours to colchicine. Dispense: 20 Tablet; Refill: 1  - CBC WITH AUTOMATED DIFF; Future  - METABOLIC PANEL, COMPREHENSIVE; Future  - magnesium oxide (MAG-OX) 400 mg tablet; Take 1 Tablet by mouth in the morning. Dispense: 30 Tablet; Refill: 2    2. Muscle spasms of neck  - predniSONE (DELTASONE) 10 mg tablet; Take 40 mg by mouth daily (with breakfast) for 2 days, THEN 30 mg daily (with breakfast) for 2 days, THEN 20 mg daily (with breakfast) for 2 days, THEN 10 mg daily (with breakfast) for 2 days. Take as needed for arthritis flare that doesn't respond within 12 hours to colchicine. Dispense: 20 Tablet; Refill: 1  - C REACTIVE PROTEIN, QT; Future  - CBC WITH AUTOMATED DIFF; Future  - METABOLIC PANEL, COMPREHENSIVE; Future  - SED RATE (ESR); Future  - URIC ACID; Future  - MAGNESIUM; Future  - magnesium oxide (MAG-OX) 400 mg tablet; Take 1 Tablet by mouth in the morning. Dispense: 30 Tablet; Refill: 2  - C REACTIVE PROTEIN, QT  - CBC WITH AUTOMATED DIFF  - METABOLIC PANEL, COMPREHENSIVE  - SED RATE (ESR)  - URIC ACID  - MAGNESIUM    3. Hypomagnesemia  - magnesium oxide (MAG-OX) 400 mg tablet; Take 1 Tablet by mouth in the morning. Dispense: 30 Tablet; Refill: 2       Patient Instructions   1) Upon the first sign of your next flare up take 2 Colchicine and call me. Take one more pill an hour later if you are not feeling better. 2) I will prescribe you a short term low dose Prednisone taper.  Follow the directions on the bottle. I will give you another taper that you can use if you have another flare when you are on vacation. 3) You can continue to take 650mg of Tylenol up to 3 times a day for joint pain. 4) You can take one cyclobenzaprine up to three times a day for muscle spasms as long as this does not make you overly drowsy. 5) I am prescribing 400mg of Magnesium for you to take once a day with food. Stop taking this is you have diarrhea. 6) Check labs before your next visit. 7) Follow up in 3 months. Let me know if your have any questions or concerns in the meantime. Orders Placed This Encounter    C REACTIVE PROTEIN, QT    CBC WITH AUTOMATED DIFF    METABOLIC PANEL, COMPREHENSIVE    SED RATE (ESR)    URIC ACID    MAGNESIUM    predniSONE (DELTASONE) 10 mg tablet    predniSONE (DELTASONE) 10 mg tablet    magnesium oxide (MAG-OX) 400 mg tablet         Medications: I am having Jeffery Alen. Keyona Valenzuela maintain her aspirin, ascorbic acid (vitamin C), valACYclovir, Denta 5000 Plus, peg 400-propylene glycol, multivit-min/iron/folic/lutein (CENTRUM SILVER WOMEN PO), turmeric-turmeric ext-pepper, diclofenac, lisinopriL, buPROPion SR, cholecalciferol, colchicine, levothyroxine, atorvastatin, omeprazole, cyclobenzaprine, cilostazoL, atenoloL, and DULoxetine. Face to face time: 35 minutes  Note preparation and records review day of service: 20 minutes  Total provider time day of service: 55 minutes    This was scribed by Ramon Araujo in the presence of Dr. Damián Posadas.      Conor Gunter MD    Adult Rheumatology   [facility]  A Part of [facility], [address]   Phone [phone]  Fax [phone]

## 2022-08-11 NOTE — PATIENT INSTRUCTIONS
1) Upon the first sign of your next flare up take 2 Colchicine and call me. Take one more pill an hour later if you are not feeling better. 2) I will prescribe you a short term low dose Prednisone taper. Follow the directions on the bottle. I will give you another taper that you can use if you have another flare when you are on vacation. 3) You can continue to take 650mg of Tylenol up to 3 times a day for joint pain. 4) You can take one cyclobenzaprine up to three times a day for muscle spasms as long as this does not make you overly drowsy. 5) I am prescribing 400mg of Magnesium for you to take once a day with food. Stop taking this is you have diarrhea. 6) Check labs before your next visit. 7) Follow up in 3 months. Let me know if your have any questions or concerns in the meantime.

## 2022-08-16 ENCOUNTER — OFFICE VISIT (OUTPATIENT)
Dept: INTERNAL MEDICINE CLINIC | Age: 77
End: 2022-08-16
Payer: MEDICARE

## 2022-08-16 VITALS
HEIGHT: 62 IN | HEART RATE: 77 BPM | DIASTOLIC BLOOD PRESSURE: 80 MMHG | BODY MASS INDEX: 22.97 KG/M2 | SYSTOLIC BLOOD PRESSURE: 132 MMHG | WEIGHT: 124.8 LBS | OXYGEN SATURATION: 95 % | TEMPERATURE: 98.5 F | RESPIRATION RATE: 16 BRPM

## 2022-08-16 DIAGNOSIS — I10 ESSENTIAL HYPERTENSION: ICD-10-CM

## 2022-08-16 DIAGNOSIS — Z00.00 ENCOUNTER FOR SUBSEQUENT ANNUAL WELLNESS VISIT (AWV) IN MEDICARE PATIENT: Primary | ICD-10-CM

## 2022-08-16 DIAGNOSIS — Z71.89 ACP (ADVANCE CARE PLANNING): ICD-10-CM

## 2022-08-16 DIAGNOSIS — Z79.899 ON STATIN THERAPY: ICD-10-CM

## 2022-08-16 DIAGNOSIS — I73.9 PVD (PERIPHERAL VASCULAR DISEASE) (HCC): ICD-10-CM

## 2022-08-16 DIAGNOSIS — E03.8 ADULT ONSET HYPOTHYROIDISM: ICD-10-CM

## 2022-08-16 DIAGNOSIS — E78.5 DYSLIPIDEMIA: ICD-10-CM

## 2022-08-16 DIAGNOSIS — M81.0 AGE-RELATED OSTEOPOROSIS WITHOUT CURRENT PATHOLOGICAL FRACTURE: ICD-10-CM

## 2022-08-16 LAB
ALBUMIN SERPL-MCNC: 4 G/DL (ref 3.5–5)
ALBUMIN/GLOB SERPL: 1.1 {RATIO} (ref 1.1–2.2)
ALP SERPL-CCNC: 69 U/L (ref 45–117)
ALT SERPL-CCNC: 38 U/L (ref 12–78)
ANION GAP SERPL CALC-SCNC: 4 MMOL/L (ref 5–15)
AST SERPL-CCNC: 30 U/L (ref 15–37)
BILIRUB SERPL-MCNC: 0.5 MG/DL (ref 0.2–1)
BUN SERPL-MCNC: 17 MG/DL (ref 6–20)
BUN/CREAT SERPL: 15 (ref 12–20)
CALCIUM SERPL-MCNC: 10.1 MG/DL (ref 8.5–10.1)
CHLORIDE SERPL-SCNC: 105 MMOL/L (ref 97–108)
CHOLEST SERPL-MCNC: 145 MG/DL
CK SERPL-CCNC: 48 U/L (ref 26–192)
CO2 SERPL-SCNC: 32 MMOL/L (ref 21–32)
CREAT SERPL-MCNC: 1.1 MG/DL (ref 0.55–1.02)
GLOBULIN SER CALC-MCNC: 3.7 G/DL (ref 2–4)
GLUCOSE SERPL-MCNC: 78 MG/DL (ref 65–100)
HDLC SERPL-MCNC: 87 MG/DL
HDLC SERPL: 1.7 {RATIO} (ref 0–5)
LDLC SERPL CALC-MCNC: 39.6 MG/DL (ref 0–100)
POTASSIUM SERPL-SCNC: 5.1 MMOL/L (ref 3.5–5.1)
PROT SERPL-MCNC: 7.7 G/DL (ref 6.4–8.2)
SODIUM SERPL-SCNC: 141 MMOL/L (ref 136–145)
T4 FREE SERPL-MCNC: 1.2 NG/DL (ref 0.8–1.5)
TRIGL SERPL-MCNC: 92 MG/DL (ref ?–150)
TSH SERPL DL<=0.05 MIU/L-ACNC: 1.01 UIU/ML (ref 0.36–3.74)
VLDLC SERPL CALC-MCNC: 18.4 MG/DL

## 2022-08-16 PROCEDURE — G8432 DEP SCR NOT DOC, RNG: HCPCS | Performed by: NURSE PRACTITIONER

## 2022-08-16 PROCEDURE — G8420 CALC BMI NORM PARAMETERS: HCPCS | Performed by: NURSE PRACTITIONER

## 2022-08-16 PROCEDURE — 1123F ACP DISCUSS/DSCN MKR DOCD: CPT | Performed by: NURSE PRACTITIONER

## 2022-08-16 PROCEDURE — G8752 SYS BP LESS 140: HCPCS | Performed by: NURSE PRACTITIONER

## 2022-08-16 PROCEDURE — G8427 DOCREV CUR MEDS BY ELIG CLIN: HCPCS | Performed by: NURSE PRACTITIONER

## 2022-08-16 PROCEDURE — G0439 PPPS, SUBSEQ VISIT: HCPCS | Performed by: NURSE PRACTITIONER

## 2022-08-16 PROCEDURE — 1101F PT FALLS ASSESS-DOCD LE1/YR: CPT | Performed by: NURSE PRACTITIONER

## 2022-08-16 PROCEDURE — G8536 NO DOC ELDER MAL SCRN: HCPCS | Performed by: NURSE PRACTITIONER

## 2022-08-16 PROCEDURE — G8754 DIAS BP LESS 90: HCPCS | Performed by: NURSE PRACTITIONER

## 2022-08-16 RX ORDER — VALACYCLOVIR HYDROCHLORIDE 1 G/1
2000 TABLET, FILM COATED ORAL 2 TIMES DAILY
Qty: 8 TABLET | Refills: 5 | Status: SHIPPED | OUTPATIENT
Start: 2022-08-16

## 2022-08-16 NOTE — PROGRESS NOTES
Jas Patel is a 65 y.o. female     Chief Complaint   Patient presents with    Annual Wellness Visit     Medicare wellness       Visit Vitals  /80 (BP 1 Location: Left arm, BP Patient Position: Sitting, BP Cuff Size: Adult)   Pulse 77   Temp 98.5 °F (36.9 °C) (Oral)   Resp 16   Ht 5' 2\" (1.575 m)   Wt 124 lb 12.8 oz (56.6 kg)   SpO2 95%   BMI 22.83 kg/m²       Health Maintenance Due   Topic Date Due    Shingrix Vaccine Age 49> (1 of 2) Never done    Low dose CT lung screening  Never done    COVID-19 Vaccine (3 - Booster for Avalos Peter series) 08/31/2021    Medicare Yearly Exam  07/13/2022         1. \"Have you been to the ER, urgent care clinic since your last visit? Hospitalized since your last visit? \" No    2. \"Have you seen or consulted any other health care providers outside of the [address] [place] [address] since your last visit? \"  Yes seen rhematologist.      3. For patients aged 39-70: Has the patient had a colonoscopy / FIT/ Cologuard? Yes - no Care Gap present      If the patient is female:    4. For patients aged 41-77: Has the patient had a mammogram within the past 2 years? NA - based on age or sex      11. For patients aged 21-65: Has the patient had a pap smear?  NA - based on age or sex

## 2022-08-16 NOTE — PROGRESS NOTES
HPI:    Ms. Saul Garcia is a 79-year-old  female who is here for her annual wellness visit subsequent encounter as well as follow-up regarding current conditions which include: Essential hypertension, peripheral vascular disease, adult onset hypothyroidism, dyslipidemia, long-term use of statin therapy, age-related osteoporosis without pathological fracture, and questions regarding management of her osteoporosis with Prolia. She states she is feeling well otherwise, and denies any new or acute complaints at this time. The patient was recommended to start Prolia for management of her osteoporosis. However, due to potential listed side effects of medication, she is leery about beginning this for treatment. She continues to take levothyroxine as prescribed for management of her hypothyroidism and tolerating this well. She continues to take atorvastatin for management of her cholesterol. She is using blood pressure medication as prescribed, and denies any recent episodes of chest pain, chest pressure, shortness of breath, headaches, dizziness, blurred vision, palpitations, or syncope episodes. She is taking several over-the-counter supplements for her health. She states she is trying to be watchful of her diet as well. She denies any issues with incontinence. Annual Wellness Visit    End of Life Planning: This was discussed with her today and she has NO advanced directive - not interested in additional information. Reviewed DNR/DNI and patient is not interested.       Depression Screen:  3 most recent PHQ Screens 8/16/2022   Little interest or pleasure in doing things Not at all   Feeling down, depressed, irritable, or hopeless Not at all   Total Score PHQ 2 0   Trouble falling or staying asleep, or sleeping too much Not at all   Feeling tired or having little energy Not at all   Poor appetite, weight loss, or overeating Not at all   Feeling bad about yourself - or that you are a failure or have let yourself or your family down Not at all   Trouble concentrating on things such as school, work, reading, or watching TV Not at all   Moving or speaking so slowly that other people could have noticed; or the opposite being so fidgety that others notice Not at all   Thoughts of being better off dead, or hurting yourself in some way Not at all   PHQ 9 Score 0   How difficult have these problems made it for you to do your work, take care of your home and get along with others -         Fall Risk:   Fall Risk Assessment, last 12 mths 8/16/2022   Able to walk? Yes   Fall in past 12 months? 0   Do you feel unsteady? 0   Are you worried about falling 0   Is TUG test greater than 12 seconds? -   Is the gait abnormal? -   Number of falls in past 12 months -   Fall with injury? -       Abuse Screen:  Abuse Screening Questionnaire 8/16/2022   Do you ever feel afraid of your partner? N   Are you in a relationship with someone who physically or mentally threatens you? N   Is it safe for you to go home? Y         Alcohol Risk Factor Screening: On any occasion during the past 3 months, have you had more than 3 drinks containing alcohol? No  Do you average more than 7 drinks per week? Yes    Hearing Loss:  Hearing is good. Activities of Daily Living:  Self-care. Requires assistance with: no ADLs    Adult Nutrition Screen:  No risk factors noted. Health Maintenance  Daily Aspirin: yes  Bone Density: up to date  Glaucoma Screening: Yes    Immunizations:                Influenza: up to date. Tetanus: up to date. Shingles: not up to date -recommended. Pneumovax: up to date. COVID-19: Up-to-date. Cancer screening:               Cervical: N/A. Breast: N/A, reviewed SBE. Colon: N/A.       Patient Care Team:  Cherylene Gip, NP as PCP - General (Internal Medicine Physician)  Cherylene Gip, NP as PCP - [facility] Empaneled Provider  Clayton Lee MD (Vascular Surgery)  Tom Pratt MD (Ophthalmology)  Travon Carmen MD (Rheumatology Internal Medicine)       Prior to Admission medications    Medication Sig Start Date End Date Taking? Authorizing Provider   valACYclovir (Valtrex) 1 gram tablet Take 2 Tablets by mouth two (2) times a day. 2 initially and 2 in 12 hr for fever blisters 8/16/22  Yes Delia KWAN NP   predniSONE (DELTASONE) 10 mg tablet Take 20 mg by mouth in the morning for 5 days. 8/11/22 8/16/22 Yes Travon Carmen MD   magnesium oxide (MAG-OX) 400 mg tablet Take 1 Tablet by mouth in the morning. 8/11/22  Yes Travon Carmen MD   atenoloL (TENORMIN) 50 mg tablet Take 1 Tablet by mouth nightly. 8/10/22  Yes Delia KWAN NP   DULoxetine (CYMBALTA) 60 mg capsule Take 1 Capsule by mouth in the morning. 8/10/22  Yes Delia KWAN NP   cilostazoL (PLETAL) 100 mg tablet Take 1 Tablet by mouth two (2) times a day. 7/27/22  Yes Delia KWAN NP   cyclobenzaprine (FLEXERIL) 5 mg tablet Take 1 Tablet by mouth every eight (8) hours as needed for Muscle Spasm(s). Use only needed for the onset of severe spasm. Note this increases your risk of falls. 7/7/22  Yes Travon Carmen MD   omeprazole (PRILOSEC) 20 mg capsule TAKE 1 CAPSULE DAILY 6/6/22  Yes Delia KWAN NP   atorvastatin (LIPITOR) 40 mg tablet TAKE 1 TABLET DAILY 5/26/22  Yes Delia KWAN NP   levothyroxine (SYNTHROID) 88 mcg tablet TAKE 1 TABLET BY MOUTH EVERY DAY BEFORE BREAKFAST 4/29/22  Yes Delia KWAN NP   cholecalciferol (VITAMIN D3) 25 mcg (1,000 unit) cap TAKE 1 CAPSULE BY MOUTH EVERY DAY 1/1/22  Yes Travon Carmen MD   lisinopriL (PRINIVIL, ZESTRIL) 40 mg tablet Take 1 Tablet by mouth daily. 12/1/21  Yes Jaz Reed MD   buPROPion SR [facility]) 150 mg SR tablet TAKE 1 TABLET DAILY 12/1/21  Yes Jaz Reed MD   diclofenac (VOLTAREN) 1 % gel Apply  to affected area four (4) times daily as needed for Pain.  6/18/20  Yes So Kilgore MD   turmeric/turmeric ext/pepr ext [facility] EXT-PEPPER) 500-3 mg cap Take  by mouth. Yes Provider, Historical   multivit-min/iron/folic/lutein (CENTRUM SILVER WOMEN PO) Take  by mouth. Yes Provider, Historical   peg 400-propylene glycol (SYSTANE) 0.4-0.3 % drop Administer 1 Drop to both eyes as needed. Yes Provider, Historical   DENTA 5000 PLUS 1.1 % crea Apply at night 10/11/18  Yes Provider, Historical   ascorbic acid, vitamin C, (VITAMIN C) 500 mg tablet Take 500 mg by mouth two (2) times a day. 1/21/11  Yes Provider, Historical   aspirin (ASPIRIN) 325 mg tablet Take 325 mg by mouth daily. Yes Provider, Historical   predniSONE (DELTASONE) 10 mg tablet Take 40 mg by mouth daily (with breakfast) for 2 days, THEN 30 mg daily (with breakfast) for 2 days, THEN 20 mg daily (with breakfast) for 2 days, THEN 10 mg daily (with breakfast) for 2 days. Take as needed for arthritis flare that doesn't respond within 12 hours to colchicine. Patient not taking: Reported on 8/16/2022 8/11/22 8/19/22  Zora Reza MD   colchicine 0.6 mg tablet At the first sign of gout flare in hand, take 2 colchicine. After 2 hours, if still painful/swollen and no diarrhea then take a 3rd dose. Call the clinic for further instructions  Patient not taking: Reported on 8/16/2022 3/28/22   Zora Reza MD   valACYclovir (VALTREX) 1 gram tablet Take 2 Tabs by mouth two (2) times a day.  2 initially and 2 in 12 hr for fever blisters 10/2/18 8/16/22  Olimpia Issa MD        Allergies   Allergen Reactions    Neomycin Sulfate Unknown (comments)         Past Medical History:   Diagnosis Date    Adult onset hypothyroidism 8/23/2017    Anemia 8/23/2017    Anxiety 8/23/2017    Arteriosclerotic heart disease (ASHD) 8/23/2017    Arthritis     back, hip right, neck    CAD (coronary artery disease)          Cervical spondylosis 8/23/2017    Chronic pain     spinal stenosis, bulging disk and bone spurs in back    Claudication in peripheral vascular disease ([address] 8/23/2017    CVD (cerebrovascular disease) S/P right CEA    Depression     Dyslipidemia 2017    Familial mitral valve prolapse 2017    GERD (gastroesophageal reflux disease)     Gout     Hyperlipidemia     Hypertension     Hypothyroidism     Lumbar spinal stenosis     Macrocytosis 2017    Menopause     Nausea & vomiting     surgery related, severe constipation    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 ([address] 2017    Osteopenia 2017    Osteoporosis     Other ill-defined conditions(799.89)     severe constipation from pain medication    PVD (peripheral vascular disease) ([facility])     S/P stent Right CFA and Left iliac    Stroke [facility])     Temporomandibular joint pain dysfunction syndrome 2017    Thyroid disease     TIA (transient ischemic attack)     Tobacco user 2017    Trigger finger 2017       Past Surgical History:   Procedure Laterality Date    HX CAROTID ENDARTERECTOMY      right cea    HX OTHER SURGICAL Right     excision melanoma right arm    VA BYPASS GRAFT OTHR,FEM-POP       RIGHT FEMORAL-POPLITEAL BYPASS  WITH POLYTETRA-FL UOROETHYLENE GRAFT     VA CARDIAC SURG PROCEDURE UNLIST      stents x2    VASCULAR SURGERY PROCEDURE UNLIST      femoral stent - left    VASCULAR SURGERY PROCEDURE UNLIST      X5 right femoral bypass       Social History     Socioeconomic History    Marital status:      Spouse name: Not on file    Number of children: Not on file    Years of education: Not on file    Highest education level: Not on file   Occupational History    Not on file   Tobacco Use    Smoking status: Former     Packs/day: 1.00     Years: 35.00     Pack years: 35.00     Types: Cigarettes     Quit date: 2008     Years since quittin.5    Smokeless tobacco: Never   Vaping Use    Vaping Use: Never used   Substance and Sexual Activity    Alcohol use:  Yes     Alcohol/week: 6.0 standard drinks     Types: 6 Glasses of wine per week     Comment: occasionally    Drug use: No     Types: Prescription, OTC Sexual activity: Not on file   Other Topics Concern    Not on file   Social History Narrative    Not on file     Social Determinants of Health     Financial Resource Strain: Low Risk     Difficulty of Paying Living Expenses: Not hard at all   Food Insecurity: No Food Insecurity    Worried About Running Out of Food in the Last Year: Never true    Ran Out of Food in the Last Year: Never true   Transportation Needs: Not on file   Physical Activity: Not on file   Stress: Not on file   Social Connections: Not on file   Intimate Partner Violence: Not on file   Housing Stability: Not on file       Family History   Problem Relation Age of Onset    Migraines Mother     Heart Disease Father     Hypertension Father     Lung Disease Father     Cancer Father         H&N    Hypertension Sister     Diabetes Sister     Heart Disease Brother     Hypertension Brother     Hypertension Sister     Cancer Sister         breast    Breast Cancer Sister 54    Hypertension Sister        Patient Active Problem List   Diagnosis Code    PVD (peripheral vascular disease) ([facility] I73.9    CAD (coronary artery disease) I25.10    Essential hypertension I10    History of TIA (transient ischemic attack) Z86.73    GERD (gastroesophageal reflux disease) K21.9    CVD (cerebrovascular disease) I67.9    Adult onset hypothyroidism E03.8    Anxiety F41.9    Arteriosclerotic heart disease (ASHD) I25.10    Cervical spondylosis M47.812    Claudication in peripheral vascular disease (HCC) I73.9    Dyslipidemia E78.5    Familial mitral valve prolapse I34.1    Macrocytosis D75.89    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 (HCC) I73.9, G63    Age-related osteoporosis without current pathological fracture M81.0    Temporomandibular joint pain dysfunction syndrome M26.629    Tobacco user Z72.0    Trigger finger M65.30    Fever blister B00.1    Lumbar spinal stenosis M48.061    PE (physical exam), annual Z00.00    On statin therapy Z79.899    Myalgia M79.10 PMR (polymyalgia rheumatica) ([facility]) M35.3    Rotator cuff arthropathy of left shoulder M12.812    Primary osteoarthritis of both shoulders M19.011, M19.012    Post-menopausal Z78.0           Review of Systems   Constitutional: Negative. HENT: Negative. Eyes: Negative. Respiratory: Negative. Cardiovascular: Negative. Gastrointestinal: Negative. Genitourinary: Negative. Musculoskeletal:  Positive for joint pain. Negative for falls. Skin: Negative. Neurological: Negative. Endo/Heme/Allergies: Negative. Psychiatric/Behavioral: Negative. Physical Exam  Vitals and nursing note reviewed. Constitutional:       General: She is not in acute distress. HENT:      Head: Normocephalic. Eyes:      Pupils: Pupils are equal, round, and reactive to light. Cardiovascular:      Rate and Rhythm: Normal rate and regular rhythm. Pulses: Normal pulses. Heart sounds: Normal heart sounds. Pulmonary:      Effort: Pulmonary effort is normal.      Breath sounds: Normal breath sounds. Musculoskeletal:      Cervical back: Neck supple. Skin:     General: Skin is warm. Neurological:      General: No focal deficit present. Mental Status: She is alert and oriented to person, place, and time. Psychiatric:         Mood and Affect: Mood normal.         Behavior: Behavior normal.          Visit Vitals  /80 (BP 1 Location: Left arm, BP Patient Position: Sitting, BP Cuff Size: Adult)   Pulse 77   Temp 98.5 °F (36.9 °C) (Oral)   Resp 16   Ht 5' 2\" (1.575 m)   Wt 124 lb 12.8 oz (56.6 kg)   SpO2 95%   BMI 22.83 kg/m²         Assessment & Plan:    ICD-10-CM ICD-9-CM    1. Encounter for subsequent annual wellness visit (AWV) in Medicare patient  Z00.00 V70.0  WELLNESS EXAM      2. ACP (advance care planning)  Z71.89 V65.49       3. Essential hypertension  L47 303.1 METABOLIC PANEL, COMPREHENSIVE      METABOLIC PANEL, COMPREHENSIVE      4.  Adult onset hypothyroidism  E03.8 244.8 TSH 3RD GENERATION      T4, FREE      T4, FREE      TSH 3RD GENERATION      5. Dyslipidemia  E78.5 272.4 LIPID PANEL      LIPID PANEL      6. PVD (peripheral vascular disease) ([facility])  I73.9 443.9       7. Age-related osteoporosis without current pathological fracture  M81.0 733.01       8. On statin therapy  Z79.899 V58.69 CK      CK        1: I have reviewed risk, benefits, and options regarding Prolia and the treatment of osteoporosis with patient today. As stated, I think the patient will likely do well with use of Prolia given minimal likelihood of adverse reaction. Patient states she will consider. 2: Patient to continue current medications for management of hypertension, hypothyroidism, and dyslipidemia. 3: Continue healthy lifestyle management including: Low-fat/low-cholesterol diet, adequate amounts of fiber water, and exercise as tolerated. 4: Continue all other medications and supplements as desired. 5: Patient to follow-up with me in approximately 3 to 4 months, or sooner as needed. Patient states understanding and agrees with plan. 6: Patient to do labs including: CMP, TSH, free T4, lipid panel, and CK. Advised her to call back or return to office if symptoms worsen/change/persist.  Discussed expected course/resolution/complications of diagnosis in detail with patient. Medication risks/benefits/costs/interactions/alternatives discussed with patient. She was given an after visit summary which includes diagnoses, current medications, & vitals. She expressed understanding with the diagnosis and plan. Signed,  Derick Sorto.  Xu Pedroza, MSN APRN FNP-BC

## 2022-08-17 NOTE — PROGRESS NOTES
Thyroid in normal functioning range. Continue levothyroxine 88 mcg daily. Cholesterol levels look good overall. Continue atorvastatin 40 mg nightly. Metabolic panel shows kidney function slightly lower than before. Please avoid NSAID use such as ibuprofen, Advil, Aleve, Motrin, or naproxen sodium.     Lets recheck this in 3-4 months

## 2022-08-30 ENCOUNTER — TELEPHONE (OUTPATIENT)
Dept: INTERNAL MEDICINE CLINIC | Age: 77
End: 2022-08-30

## 2022-08-30 DIAGNOSIS — I10 ESSENTIAL HYPERTENSION: ICD-10-CM

## 2022-08-30 NOTE — TELEPHONE ENCOUNTER
PCP: Stefany Canas NP    Last appt: 8/16/2022  Future Appointments   Date Time Provider Liana Bartholmoew   11/16/2022  1:30 PM Jorge Greenberg MD [facility] BS AMB   1/9/2023 11:30 AM MD LIDIA Morris BS AMB   1/16/2023  1:00 PM Stefany Canas NP PCA BS AMB       Requested Prescriptions     Pending Prescriptions Disp Refills    DULoxetine (CYMBALTA) 60 mg capsule 90 Capsule 1     Sig: Take 1 Capsule by mouth daily. atenoloL (TENORMIN) 50 mg tablet 90 Tablet 1     Sig: Take 1 Tablet by mouth nightly.        Prior labs and Blood pressures:  BP Readings from Last 3 Encounters:   08/16/22 132/80   08/11/22 127/71   07/07/22 124/64     Lab Results   Component Value Date/Time    Sodium 141 08/16/2022 12:09 PM    Potassium 5.1 08/16/2022 12:09 PM    Chloride 105 08/16/2022 12:09 PM    CO2 32 08/16/2022 12:09 PM    Anion gap 4 (L) 08/16/2022 12:09 PM    Glucose 78 08/16/2022 12:09 PM    BUN 17 08/16/2022 12:09 PM    Creatinine 1.10 (H) 08/16/2022 12:09 PM    BUN/Creatinine ratio 15 08/16/2022 12:09 PM    GFR est AA 58 (L) 08/16/2022 12:09 PM    GFR est non-AA 48 (L) 08/16/2022 12:09 PM    Calcium 10.1 08/16/2022 12:09 PM

## 2022-08-31 RX ORDER — DULOXETIN HYDROCHLORIDE 60 MG/1
60 CAPSULE, DELAYED RELEASE ORAL DAILY
Qty: 90 CAPSULE | Refills: 1 | Status: SHIPPED | OUTPATIENT
Start: 2022-08-31

## 2022-08-31 RX ORDER — ATENOLOL 50 MG/1
50 TABLET ORAL
Qty: 90 TABLET | Refills: 1 | Status: SHIPPED | OUTPATIENT
Start: 2022-08-31

## 2022-09-12 NOTE — TELEPHONE ENCOUNTER
Mónica Buchanan   Requested Prescriptions     Pending Prescriptions Disp Refills    gabapentin (NEURONTIN) 300 mg capsule 360 Cap 1     Sig: TAKE 2 CAPSULES TWICE A DAY
no

## 2022-10-28 ENCOUNTER — TRANSCRIBE ORDER (OUTPATIENT)
Dept: SCHEDULING | Age: 77
End: 2022-10-28

## 2022-10-28 DIAGNOSIS — Z12.31 VISIT FOR SCREENING MAMMOGRAM: Primary | ICD-10-CM

## 2022-10-29 DIAGNOSIS — M85.89 OSTEOPENIA OF MULTIPLE SITES: ICD-10-CM

## 2022-10-30 RX ORDER — GLUCOSAMINE SULFATE 1500 MG
POWDER IN PACKET (EA) ORAL
Qty: 90 CAPSULE | Refills: 2 | Status: SHIPPED | OUTPATIENT
Start: 2022-10-30

## 2022-11-08 DIAGNOSIS — I10 ESSENTIAL HYPERTENSION: Primary | ICD-10-CM

## 2022-11-08 RX ORDER — LISINOPRIL 40 MG/1
40 TABLET ORAL DAILY
Qty: 90 TABLET | Refills: 1 | Status: SHIPPED | OUTPATIENT
Start: 2022-11-08

## 2022-11-08 RX ORDER — LISINOPRIL 40 MG/1
TABLET ORAL
Qty: 90 TABLET | Refills: 3 | OUTPATIENT
Start: 2022-11-08

## 2022-11-15 DIAGNOSIS — I73.9 PVD (PERIPHERAL VASCULAR DISEASE) (HCC): ICD-10-CM

## 2022-11-15 DIAGNOSIS — E03.8 ADULT ONSET HYPOTHYROIDISM: ICD-10-CM

## 2022-11-15 DIAGNOSIS — I10 ESSENTIAL HYPERTENSION: ICD-10-CM

## 2022-11-15 RX ORDER — CILOSTAZOL 100 MG/1
100 TABLET ORAL 2 TIMES DAILY
Qty: 180 TABLET | Refills: 1 | Status: SHIPPED | OUTPATIENT
Start: 2022-11-15

## 2022-11-15 RX ORDER — ATORVASTATIN CALCIUM 40 MG/1
TABLET, FILM COATED ORAL
Qty: 90 TABLET | Refills: 1 | Status: SHIPPED | OUTPATIENT
Start: 2022-11-15

## 2022-11-15 RX ORDER — LEVOTHYROXINE SODIUM 88 UG/1
88 TABLET ORAL
Qty: 90 TABLET | Refills: 1 | Status: SHIPPED | OUTPATIENT
Start: 2022-11-15 | End: 2022-11-16 | Stop reason: SDUPTHER

## 2022-11-15 RX ORDER — ATENOLOL 50 MG/1
50 TABLET ORAL
Qty: 90 TABLET | Refills: 1 | Status: SHIPPED | OUTPATIENT
Start: 2022-11-15

## 2022-11-15 NOTE — TELEPHONE ENCOUNTER
PCP: Fawn Huerta NP    Last appt: 8/16/2022  Future Appointments   Date Time Provider Liana Bartholomew   11/16/2022  1:40 PM Gerhardt Jerry, MD [facility] BS AMB   11/21/2022  3:00 PM [facility] MANOJ 4 Any [place]   1/16/2023  1:00 PM Charlette KWAN NP [facility] BS AMB       Requested Prescriptions     Pending Prescriptions Disp Refills    atenoloL (TENORMIN) 50 mg tablet 90 Tablet 1     Sig: Take 1 Tablet by mouth nightly. atorvastatin (LIPITOR) 40 mg tablet 90 Tablet 1     Sig: TAKE 1 TABLET DAILY    cilostazoL (PLETAL) 100 mg tablet 180 Tablet 1     Sig: Take 1 Tablet by mouth two (2) times a day. levothyroxine (SYNTHROID) 88 mcg tablet 90 Tablet 1     Sig: Take 1 Tablet by mouth Daily (before breakfast).        Prior labs and Blood pressures:  BP Readings from Last 3 Encounters:   08/16/22 132/80   08/11/22 127/71   07/07/22 124/64     Lab Results   Component Value Date/Time    Sodium 141 08/16/2022 12:09 PM    Potassium 5.1 08/16/2022 12:09 PM    Chloride 105 08/16/2022 12:09 PM    CO2 32 08/16/2022 12:09 PM    Anion gap 4 (L) 08/16/2022 12:09 PM    Glucose 78 08/16/2022 12:09 PM    BUN 17 08/16/2022 12:09 PM    Creatinine 1.10 (H) 08/16/2022 12:09 PM    BUN/Creatinine ratio 15 08/16/2022 12:09 PM    GFR est AA 58 (L) 08/16/2022 12:09 PM    GFR est non-AA 48 (L) 08/16/2022 12:09 PM    Calcium 10.1 08/16/2022 12:09 PM

## 2022-11-16 ENCOUNTER — TELEPHONE (OUTPATIENT)
Dept: RHEUMATOLOGY | Age: 77
End: 2022-11-16

## 2022-11-16 ENCOUNTER — OFFICE VISIT (OUTPATIENT)
Dept: RHEUMATOLOGY | Age: 77
End: 2022-11-16
Payer: MEDICARE

## 2022-11-16 VITALS
SYSTOLIC BLOOD PRESSURE: 145 MMHG | WEIGHT: 124 LBS | DIASTOLIC BLOOD PRESSURE: 83 MMHG | TEMPERATURE: 98.2 F | OXYGEN SATURATION: 97 % | HEART RATE: 92 BPM | BODY MASS INDEX: 22.68 KG/M2 | RESPIRATION RATE: 16 BRPM

## 2022-11-16 DIAGNOSIS — M35.3 POLYMYALGIA RHEUMATICA (HCC): ICD-10-CM

## 2022-11-16 DIAGNOSIS — E83.42 HYPOMAGNESEMIA: ICD-10-CM

## 2022-11-16 DIAGNOSIS — M81.0 AGE-RELATED OSTEOPOROSIS WITHOUT CURRENT PATHOLOGICAL FRACTURE: ICD-10-CM

## 2022-11-16 DIAGNOSIS — M11.89 PSEUDOGOUT INVOLVING MULTIPLE JOINTS: Primary | ICD-10-CM

## 2022-11-16 DIAGNOSIS — M70.62 TROCHANTERIC BURSITIS, LEFT HIP: ICD-10-CM

## 2022-11-16 DIAGNOSIS — E55.9 VITAMIN D DEFICIENCY: ICD-10-CM

## 2022-11-16 DIAGNOSIS — E03.8 ADULT ONSET HYPOTHYROIDISM: ICD-10-CM

## 2022-11-16 PROCEDURE — G0463 HOSPITAL OUTPT CLINIC VISIT: HCPCS | Performed by: INTERNAL MEDICINE

## 2022-11-16 PROCEDURE — 1123F ACP DISCUSS/DSCN MKR DOCD: CPT | Performed by: INTERNAL MEDICINE

## 2022-11-16 PROCEDURE — G8427 DOCREV CUR MEDS BY ELIG CLIN: HCPCS | Performed by: INTERNAL MEDICINE

## 2022-11-16 PROCEDURE — 99214 OFFICE O/P EST MOD 30 MIN: CPT | Performed by: INTERNAL MEDICINE

## 2022-11-16 PROCEDURE — G8753 SYS BP > OR = 140: HCPCS | Performed by: INTERNAL MEDICINE

## 2022-11-16 PROCEDURE — 1090F PRES/ABSN URINE INCON ASSESS: CPT | Performed by: INTERNAL MEDICINE

## 2022-11-16 PROCEDURE — G8420 CALC BMI NORM PARAMETERS: HCPCS | Performed by: INTERNAL MEDICINE

## 2022-11-16 PROCEDURE — 3074F SYST BP LT 130 MM HG: CPT | Performed by: INTERNAL MEDICINE

## 2022-11-16 PROCEDURE — 1101F PT FALLS ASSESS-DOCD LE1/YR: CPT | Performed by: INTERNAL MEDICINE

## 2022-11-16 PROCEDURE — G8754 DIAS BP LESS 90: HCPCS | Performed by: INTERNAL MEDICINE

## 2022-11-16 PROCEDURE — G8536 NO DOC ELDER MAL SCRN: HCPCS | Performed by: INTERNAL MEDICINE

## 2022-11-16 PROCEDURE — G8432 DEP SCR NOT DOC, RNG: HCPCS | Performed by: INTERNAL MEDICINE

## 2022-11-16 PROCEDURE — 3078F DIAST BP <80 MM HG: CPT | Performed by: INTERNAL MEDICINE

## 2022-11-16 NOTE — PROGRESS NOTES
Chief Complaint   Patient presents with    Joint Pain     1. Have you been to the ER, urgent care clinic since your last visit? Hospitalized since your last visit? No    2. Have you seen or consulted any other health care providers outside of the [address] [place] [address] since your last visit? Include any pap smears or colon screening.  No

## 2022-11-16 NOTE — TELEPHONE ENCOUNTER
Pt called M on 11/15/22 @ [id number] returning calls from previous months to discuss Prolia shot      # [phone]

## 2022-11-16 NOTE — PATIENT INSTRUCTIONS
1) You can take 650mg of Tylenol up to 3 times a day for joint pain. 2) Let me know if you want me to give you a hip injection before your scheduled follow up.     3) I am going to refer you to physical therapy. You can take this referral to wherever is most convenient to you. 4) If you do not hear from us within a week about pricing call the clinic. 5) Continue to take your Vitamin D3 and Magnesium supplements. 6) Check labs ASAP. 7) Follow up in 3 months. Let me know if you have any questions or concerns int he meantime.

## 2022-11-16 NOTE — PROGRESS NOTES
REASON FOR VISIT:   Johnnie Medrano is a 65 y.o. female with history of hypertension, osteopenia, and hypothyroidism who is returning for  in-office Rheumatology followup of polymyalgia rheumatica. HISTORY OF PRESENT ILLNESS    Pt returns for a follow up. Pt does not take Prednisone. She does not feel like she misses the Prednisone since she stopped it. Pt says that her L hip has been bothering her lately. She says that her pain goes from her hip all the way down her leg. The pain makes it hard for her to sleep at night. She notes that she has had this pain since August, but it has been a little bit better for the past few weeks. She notes that her hip hurts when she first stands up, and then it starts to feel better once she gets moving. She denies every doing physical therapy for he her hip or having it injected. She does not have any days when her hip becomes swollen and inflamed. Pt denies head aches, hand swelling, night sweats, cough    Pt has been taking Mg and vitamin D supplements for the past 2 months. Disease History:  Initial visit March 2019 developed bilateral shoulder pain, difficulty lifting her hands above her shoulders. Hasn't had significant thigh soreness. Wasn't having any headaches, visual changes, night sweats, or weight loss. Started on prednisone with immediate dramatic improvement. With weaning prednisone, would develop recrudescence of the shoulder and upper arm pain. Now on prednisone 5mg Tuesday, Thursday, and Saturday since November. Shoulders hurt more currently. Had previously been going to PT, can't go to pool classes during COVID so isn't exercising like she was. Has noticed hair thinning diffusely, comes out in clumps, pony tail not as thick as before. Switched from gabapentin to duloxetine, during the transition prednisone had been increased.      Last year fell and irritated both shoulders, attributes to clumsiness--once tripped over a stool while carrying laundry, another time was reaching for shoes while sitting and carpet slipped from under her and she developed symptomatic rotator cuff tendinopathy for which she completed PT. Takes Tylenol for pain is only once every 2 weeks, when she is more active such as gardening or washing the floor. Has been advised to avoid NSAIDs. Has had paralumbar low back pain every morning, stays in the low back. Also h/o neck spasms. Has had multiple LE bypasses and stents for symptomatic claudication. Now takes daily aspirin and cilostazol. REVIEW OF SYSTEMS  A comprehensive review of systems was negative except for that written in the HPI. A 10-point review of systems is per the new patient questionnaire, which has been reviewed extensively and scanned into the electronic medical record for future reference. Review of systems is as above and is otherwise negative. ALLERGIES  Neomycin sulfate    MEDICATIONS  Current Outpatient Medications   Medication Sig    atenoloL (TENORMIN) 50 mg tablet Take 1 Tablet by mouth nightly. atorvastatin (LIPITOR) 40 mg tablet TAKE 1 TABLET DAILY    cilostazoL (PLETAL) 100 mg tablet Take 1 Tablet by mouth two (2) times a day. levothyroxine (SYNTHROID) 88 mcg tablet Take 1 Tablet by mouth Daily (before breakfast). lisinopriL (PRINIVIL, ZESTRIL) 40 mg tablet Take 1 Tablet by mouth daily. cholecalciferol (VITAMIN D3) 25 mcg (1,000 unit) cap TAKE 1 CAPSULE BY MOUTH EVERY DAY    DULoxetine (CYMBALTA) 60 mg capsule Take 1 Capsule by mouth daily. valACYclovir (Valtrex) 1 gram tablet Take 2 Tablets by mouth two (2) times a day. 2 initially and 2 in 12 hr for fever blisters    magnesium oxide (MAG-OX) 400 mg tablet Take 1 Tablet by mouth in the morning. cyclobenzaprine (FLEXERIL) 5 mg tablet Take 1 Tablet by mouth every eight (8) hours as needed for Muscle Spasm(s). Use only needed for the onset of severe spasm. Note this increases your risk of falls.     omeprazole (PRILOSEC) 20 mg capsule TAKE 1 CAPSULE DAILY    colchicine 0.6 mg tablet At the first sign of gout flare in hand, take 2 colchicine. After 2 hours, if still painful/swollen and no diarrhea then take a 3rd dose. Call the clinic for further instructions (Patient not taking: Reported on 8/16/2022)    buPROPion SR (WELLBUTRIN SR) 150 mg SR tablet TAKE 1 TABLET DAILY    diclofenac (VOLTAREN) 1 % gel Apply  to affected area four (4) times daily as needed for Pain. turmeric/turmeric ext/pepr ext (TURMERIC-TURMERIC EXT-PEPPER) 500-3 mg cap Take  by mouth.    multivit-min/iron/folic/lutein (CENTRUM SILVER WOMEN PO) Take  by mouth.    peg 400-propylene glycol (SYSTANE) 0.4-0.3 % drop Administer 1 Drop to both eyes as needed. DENTA 5000 PLUS 1.1 % crea Apply at night    ascorbic acid, vitamin C, (VITAMIN C) 500 mg tablet Take 500 mg by mouth two (2) times a day. aspirin (ASPIRIN) 325 mg tablet Take 325 mg by mouth daily. No current facility-administered medications for this visit.        PAST MEDICAL HISTORY  Past Medical History:   Diagnosis Date    Adult onset hypothyroidism 8/23/2017    Anemia 8/23/2017    Anxiety 8/23/2017    Arteriosclerotic heart disease (ASHD) 8/23/2017    Arthritis     back, hip right, neck    CAD (coronary artery disease)          Cervical spondylosis 8/23/2017    Chronic pain     spinal stenosis, bulging disk and bone spurs in back    Claudication in peripheral vascular disease ([address] 8/23/2017    CVD (cerebrovascular disease)     S/P right CEA    Depression     Dyslipidemia 8/23/2017    Familial mitral valve prolapse 8/23/2017    GERD (gastroesophageal reflux disease)     Gout     Hyperlipidemia     Hypertension     Hypothyroidism     Lumbar spinal stenosis     Macrocytosis 8/23/2017    Menopause     Nausea & vomiting     surgery related, severe constipation    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 ([address] 8/23/2017    Osteopenia 8/23/2017    Osteoporosis     Other ill-defined conditions(799.89)     severe constipation from pain medication    PVD (peripheral vascular disease) ([address]     S/P stent Right CFA and Left iliac    Stroke [facility])     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 SOCIAL HISTORY  She  reports that she quit smoking about 14 years ago. Her smoking use included cigarettes. She has a 35.00 pack-year smoking history. She has never used smokeless tobacco. She reports current alcohol use of about 6.0 standard drinks per week. She reports that she does not use drugs. Social History     Social History Narrative    Not on file   Former secondary education . DATA  Visit Vitals  BP (!) 145/83 (BP 1 Location: Left upper arm, BP Patient Position: Sitting, BP Cuff Size: Adult)   Pulse 92   Temp 98.2 °F (36.8 °C) (Oral)   Resp 16   Wt 124 lb (56.2 kg)   SpO2 97%   BMI 22.68 kg/m²       General:  The patient is well developed, well nourished, alert, and in no apparent distress. Eyes: Sclera are anicteric. No conjunctival injection. HEENT:  Oropharynx is clear. No oral ulcers. Adequate salivary pooling. No cervical or supraclavicular lymphadenopathy. Lungs:  Clear to auscultation bilaterally, without wheeze or stridor. Normal respiratory effort. Cor:  Regular rate and rhythm. No rub or gallop. Stable 2/6 holosystolic murmur of the right upper sternal border. Abdomen: Soft, non-tender, without hepatomegaly or masses. Extremities: No calf tenderness or edema. Stably decreased DP pulses without distal ulceration or pain.   Skin: No significant abnormalities. No petechial, purpuric, or psoriaform rashes   Neuro: Decreased temperature sensation in the hands and distal 1/3 LE's. Musculoskeletal:    A comprehensive musculoskeletal exam was performed for all joints of each upper and lower extremity and assessed for swelling, tenderness and range of motion. Results are documented as below:  Persistent bilateral global shoulder crepitus with mild tenderness on passive ROM. Intact ROM of elbows. No wrist synovitis currently. Bilateral CMC squaring. R>L 1st MCP subluxation, right 1st MCP tenderness, stable early subluxation of bilateral MCP2-3 without synovitis  [place] neck deformities of the R 2nd and 5 fingers without synovitis. Stable early swan neck deformities of the L 2-5 fingers. Left trochanteric tenderness        Labs   8/16/22: CK 48, Cr 1.1, LFT WNL, Lipid panel normal, TSH 1.01, T4 free 1.2  7/1/22: Vit D 50, uric acid 5.2, magnesium 1.6, CCP Ab IgC/IgA 8, Rheumatoid factor <14  3/17/22: CRP <1 mg/L, CK 46, ESR 17, Vit D 45.9, Cr 0.72, LFT WNL, WBC 9.9, HGB 12.8, Plt 307.  7/12/21: Cr 0.69, Tbili 0.5, AST 22, ALT 31, AlkP 127; HDL 84, LDL 52.8; CK 33; TSH WNL  6/8/21: ESR 7, CRP 1mg/L  2/17/21: ESR 20, CRP <1mg/L  1/25/21: B12 785  12/15/20: , CBC o/w WNL; CMP WNL; TSH 2.9 (WNL)    Imaging:  3/28/22 XR HANDS: Evidence of inflammatory and noninflammatory arthritides involving the bilateral hands, with particularly MCP2-3 subluxations, chondrocalcinosis of wrists, severe loss of CMC joint space, more subtle possible marginal erosions niraj 3rd fingers.     4/13/21 DXA:  Findings:  Femoral Neck Left:  Bone mineral density (gm/cm2): 0.848  % of peak bone mass: 82  % for age matched controls: 115  T-score: -1.4  Z-score: 0.8  Total Hip Left:  Bone mineral density (gm/cm2): 0.889  % of peak bone mass: 88  % for age matched controls: 118  T-score: -0.9  Z-score: 1.1  Lumbar Spine: L1-4  Bone mineral density (gm/cm2): 1.492  % of peak bone mass: 124  % for age matched controls: 152  T-score: 2.4  Z-score: 4.4  33% Radius Left:  Bone mineral density (gm/cm2): 0.677  % of peak bone mass: 76  % for age matched controls:  101  T-score: -2.4  Z-score: 0.1  IMPRESSION  Impression: This patient is osteopenic using the World Health Organization criteria  As compared to the prior study, there has been a trend toward a decrease in the  left hip. 10 year probability of major osteoporotic fracture: 19.2%  10 year probability of hip fracture: 9.9%    3/19/19 DXA: Lspine excluded 2/2 degenerative changes. Findings:  Femoral Neck Left:  Bone mineral density (gm/cm2): 0.895  % of peak bone mass: 86  % for age matched controls: 118  T-score: -1.0  Z-score: 1.0  Total Hip Left:  Bone mineral density (gm/cm2): 0.927  % of peak bone mass: 92  % for age matched controls: 119  T-score: -0.6  Z-score: 1.2  33% Radius Right:  Bone mineral density (gm/cm2): 0.707  % of peak bone mass: 80  % for age matched controls: 103  T-score: -2.0  Z-score: 0.2  IMPRESSION  This patient is osteopenic using the World Health Organization criteria  10 year probability of major osteoporotic fracture: 15.1%  10 year probability of hip fracture: 6.5%      ASSESSMENT AND PLAN  Ms. Jahaira Sims is a 65 y.o. female who presents for evaluation of polymyalgia now with controlled pain off of prednisone. She is interested now in starting Prolia injections for her clinical osteoporosis, pending assessment of copays, will re-apply. 1. Pseudogout involving multiple joints  - Patient has colchicine for early use in flares  - MAGNESIUM; Future  - C-TELOPEPTIDE; Future  - C REACTIVE PROTEIN, QT; Future  - CBC WITH AUTOMATED DIFF; Future  - METABOLIC PANEL, COMPREHENSIVE; Future  - SED RATE (ESR); Future  - VITAMIN D, 25 HYDROXY; Future    2. Hypomagnesemia  - Trend levels, continue 400mg daily    3. Age-related osteoporosis without current pathological fracture  - C-TELOPEPTIDE;  Future  - Applying for Prolia q6mo    4. Polymyalgia rheumatica (HCC)  - C REACTIVE PROTEIN, QT; Future  - SED RATE (ESR); Future    5. Trochanteric bursitis, left hip  - REFERRAL TO PHYSICAL THERAPY    6. Vitamin D deficiency  - VITAMIN D, 25 HYDROXY; Future      Patient Instructions   1) You can take 650mg of Tylenol up to 3 times a day for joint pain. 2) Let me know if you want me to give you a hip injection before your scheduled follow up.     3) I am going to refer you to physical therapy. You can take this referral to wherever is most convenient to you. 4) If you do not hear from us within a week about pricing call the clinic. 5) Continue to take your Vitamin D3 and Magnesium supplements. 6) Check labs ASAP. 7) Follow up in 3 months. Let me know if you have any questions or concerns int he meantime. Orders Placed This Encounter    MAGNESIUM    C-TELOPEPTIDE    C REACTIVE PROTEIN, QT    CBC WITH AUTOMATED DIFF    METABOLIC PANEL, COMPREHENSIVE    SED RATE (ESR)    VITAMIN D, 25 HYDROXY    REFERRAL TO PHYSICAL THERAPY       Medications: I am having Lexie Sonali. Edward Martinez maintain her aspirin, ascorbic acid (vitamin C), Denta 5000 Plus, peg 400-propylene glycol, multivit-min/iron/folic/lutein (CENTRUM SILVER WOMEN PO), turmeric-turmeric ext-pepper, diclofenac, buPROPion SR, colchicine, omeprazole, cyclobenzaprine, magnesium oxide, valACYclovir, DULoxetine, cholecalciferol, lisinopriL, atenoloL, atorvastatin, and cilostazoL. Face to face time: 20 minutes  Note preparation and records review day of service: 16 minutes  Total provider time day of service: 36 minutes    This was scribed by eLona Rosenberg in the presence of Dr. Everardo Prado. The note was reviewed and amended personally, and I agree with the above information.     Nadine Ryan MD    Adult Rheumatology   [facility]  A Part of [facility], [address]   Phone [phone]  Fax [phone]

## 2022-11-16 NOTE — TELEPHONE ENCOUNTER
Last Refill: Patient out of medication waiting on Mail order  Last Visit: 8/16/2022   Next Visit: 1/16/2023    Requested Prescriptions     Pending Prescriptions Disp Refills    levothyroxine (SYNTHROID) 88 mcg tablet 15 Tablet 0     Sig: Take 1 Tablet by mouth Daily (before breakfast).

## 2022-11-17 ENCOUNTER — TELEPHONE (OUTPATIENT)
Dept: INTERNAL MEDICINE CLINIC | Age: 77
End: 2022-11-17

## 2022-11-17 RX ORDER — LEVOTHYROXINE SODIUM 88 UG/1
88 TABLET ORAL
Qty: 90 TABLET | Refills: 1 | Status: SHIPPED | OUTPATIENT
Start: 2022-11-17

## 2022-11-17 NOTE — TELEPHONE ENCOUNTER
Pt just requested [facility] because she's completely [facility] and [facility] may take a week or so. [facility] says [facility] because [facility]'s too [facility]. Please advise. Pt is requesting a call back with what needs to be done.

## 2022-11-20 LAB
25(OH)D3+25(OH)D2 SERPL-MCNC: 55.3 NG/ML (ref 30–100)
ALBUMIN SERPL-MCNC: 4.5 G/DL (ref 3.7–4.7)
ALBUMIN/GLOB SERPL: 1.8 {RATIO} (ref 1.2–2.2)
ALP SERPL-CCNC: 73 IU/L (ref 44–121)
ALT SERPL-CCNC: 18 IU/L (ref 0–32)
AST SERPL-CCNC: 21 IU/L (ref 0–40)
BASOPHILS # BLD AUTO: 0 X10E3/UL (ref 0–0.2)
BASOPHILS NFR BLD AUTO: 1 %
BILIRUB SERPL-MCNC: 0.3 MG/DL (ref 0–1.2)
BUN SERPL-MCNC: 15 MG/DL (ref 8–27)
BUN/CREAT SERPL: 19 (ref 12–28)
CALCIUM SERPL-MCNC: 9.5 MG/DL (ref 8.7–10.3)
CHLORIDE SERPL-SCNC: 99 MMOL/L (ref 96–106)
CO2 SERPL-SCNC: 24 MMOL/L (ref 20–29)
COLLAGEN CTX SERPL-MCNC: 237 PG/ML
CREAT SERPL-MCNC: 0.77 MG/DL (ref 0.57–1)
CRP SERPL-MCNC: 2 MG/L (ref 0–10)
EGFR: 79 ML/MIN/1.73
EOSINOPHIL # BLD AUTO: 0.2 X10E3/UL (ref 0–0.4)
EOSINOPHIL NFR BLD AUTO: 2 %
ERYTHROCYTE [DISTWIDTH] IN BLOOD BY AUTOMATED COUNT: 12.9 % (ref 11.7–15.4)
ERYTHROCYTE [SEDIMENTATION RATE] IN BLOOD BY WESTERGREN METHOD: 16 MM/HR (ref 0–40)
GLOBULIN SER CALC-MCNC: 2.5 G/DL (ref 1.5–4.5)
GLUCOSE SERPL-MCNC: 103 MG/DL (ref 70–99)
HCT VFR BLD AUTO: 38.1 % (ref 34–46.6)
HGB BLD-MCNC: 12.8 G/DL (ref 11.1–15.9)
IMM GRANULOCYTES # BLD AUTO: 0 X10E3/UL (ref 0–0.1)
IMM GRANULOCYTES NFR BLD AUTO: 0 %
LYMPHOCYTES # BLD AUTO: 2.1 X10E3/UL (ref 0.7–3.1)
LYMPHOCYTES NFR BLD AUTO: 27 %
MAGNESIUM SERPL-MCNC: 1.9 MG/DL (ref 1.6–2.3)
MCH RBC QN AUTO: 31.3 PG (ref 26.6–33)
MCHC RBC AUTO-ENTMCNC: 33.6 G/DL (ref 31.5–35.7)
MCV RBC AUTO: 93 FL (ref 79–97)
MONOCYTES # BLD AUTO: 1 X10E3/UL (ref 0.1–0.9)
MONOCYTES NFR BLD AUTO: 14 %
NEUTROPHILS # BLD AUTO: 4.4 X10E3/UL (ref 1.4–7)
NEUTROPHILS NFR BLD AUTO: 56 %
PLATELET # BLD AUTO: 307 X10E3/UL (ref 150–450)
POTASSIUM SERPL-SCNC: 4.7 MMOL/L (ref 3.5–5.2)
PROT SERPL-MCNC: 7 G/DL (ref 6–8.5)
RBC # BLD AUTO: 4.09 X10E6/UL (ref 3.77–5.28)
SODIUM SERPL-SCNC: 139 MMOL/L (ref 134–144)
WBC # BLD AUTO: 7.7 X10E3/UL (ref 3.4–10.8)

## 2022-11-21 ENCOUNTER — HOSPITAL ENCOUNTER (OUTPATIENT)
Dept: MAMMOGRAPHY | Age: 77
Discharge: HOME | End: 2022-11-21
Attending: NURSE PRACTITIONER
Payer: MEDICARE

## 2022-11-21 DIAGNOSIS — Z12.31 VISIT FOR SCREENING MAMMOGRAM: ICD-10-CM

## 2022-11-21 PROCEDURE — 77063 BREAST TOMOSYNTHESIS BI: CPT

## 2023-01-06 RX ORDER — ATORVASTATIN CALCIUM 40 MG/1
TABLET, FILM COATED ORAL
Qty: 90 TABLET | Refills: 1 | Status: SHIPPED | OUTPATIENT
Start: 2023-01-06

## 2023-01-16 ENCOUNTER — OFFICE VISIT (OUTPATIENT)
Dept: INTERNAL MEDICINE CLINIC | Age: 78
End: 2023-01-16
Payer: MEDICARE

## 2023-01-16 VITALS
SYSTOLIC BLOOD PRESSURE: 128 MMHG | BODY MASS INDEX: 24.14 KG/M2 | DIASTOLIC BLOOD PRESSURE: 72 MMHG | TEMPERATURE: 98.5 F | OXYGEN SATURATION: 99 % | HEART RATE: 88 BPM | WEIGHT: 131.2 LBS | HEIGHT: 62 IN | RESPIRATION RATE: 16 BRPM

## 2023-01-16 DIAGNOSIS — I73.9 NEUROPATHY DUE TO PERIPHERAL VASCULAR DISEASE (HCC): ICD-10-CM

## 2023-01-16 DIAGNOSIS — I10 ESSENTIAL HYPERTENSION: Primary | ICD-10-CM

## 2023-01-16 DIAGNOSIS — E03.8 ADULT ONSET HYPOTHYROIDISM: ICD-10-CM

## 2023-01-16 DIAGNOSIS — Z79.899 HIGH RISK MEDICATION USE: ICD-10-CM

## 2023-01-16 DIAGNOSIS — B35.1 ONYCHOMYCOSIS OF TOENAIL: ICD-10-CM

## 2023-01-16 DIAGNOSIS — M35.3 POLYMYALGIA RHEUMATICA (HCC): ICD-10-CM

## 2023-01-16 DIAGNOSIS — G63 NEUROPATHY DUE TO PERIPHERAL VASCULAR DISEASE (HCC): ICD-10-CM

## 2023-01-16 DIAGNOSIS — I73.9 PVD (PERIPHERAL VASCULAR DISEASE) (HCC): ICD-10-CM

## 2023-01-16 DIAGNOSIS — Z79.899 ON STATIN THERAPY: ICD-10-CM

## 2023-01-16 DIAGNOSIS — E78.5 DYSLIPIDEMIA: ICD-10-CM

## 2023-01-16 PROCEDURE — 1123F ACP DISCUSS/DSCN MKR DOCD: CPT | Performed by: NURSE PRACTITIONER

## 2023-01-16 PROCEDURE — G8427 DOCREV CUR MEDS BY ELIG CLIN: HCPCS | Performed by: NURSE PRACTITIONER

## 2023-01-16 PROCEDURE — 99214 OFFICE O/P EST MOD 30 MIN: CPT | Performed by: NURSE PRACTITIONER

## 2023-01-16 PROCEDURE — G8420 CALC BMI NORM PARAMETERS: HCPCS | Performed by: NURSE PRACTITIONER

## 2023-01-16 PROCEDURE — 3078F DIAST BP <80 MM HG: CPT | Performed by: NURSE PRACTITIONER

## 2023-01-16 PROCEDURE — 1090F PRES/ABSN URINE INCON ASSESS: CPT | Performed by: NURSE PRACTITIONER

## 2023-01-16 PROCEDURE — G8432 DEP SCR NOT DOC, RNG: HCPCS | Performed by: NURSE PRACTITIONER

## 2023-01-16 PROCEDURE — 1101F PT FALLS ASSESS-DOCD LE1/YR: CPT | Performed by: NURSE PRACTITIONER

## 2023-01-16 PROCEDURE — 3074F SYST BP LT 130 MM HG: CPT | Performed by: NURSE PRACTITIONER

## 2023-01-16 PROCEDURE — G8536 NO DOC ELDER MAL SCRN: HCPCS | Performed by: NURSE PRACTITIONER

## 2023-01-16 RX ORDER — TERBINAFINE HYDROCHLORIDE 250 MG/1
250 TABLET ORAL DAILY
Qty: 30 TABLET | Refills: 2 | Status: SHIPPED | OUTPATIENT
Start: 2023-01-16 | End: 2023-01-16

## 2023-01-16 RX ORDER — TERBINAFINE HYDROCHLORIDE 250 MG/1
250 TABLET ORAL DAILY
Qty: 30 TABLET | Refills: 2 | Status: SHIPPED | OUTPATIENT
Start: 2023-01-16

## 2023-01-16 NOTE — PROGRESS NOTES
Angelika Agarwal is a 65 y.o. female     Chief Complaint   Patient presents with    Hypertension     5M       Visit Vitals  /72 (BP 1 Location: Left upper arm, BP Patient Position: Sitting, BP Cuff Size: Adult)   Pulse 88   Temp 98.5 °F (36.9 °C) (Oral)   Resp 16   Ht 5' 2\" (1.575 m)   Wt 131 lb 3.2 oz (59.5 kg)   SpO2 99%   BMI 24.00 kg/m²       Health Maintenance Due   Topic Date Due    Shingles Vaccine (1 of 2) Never done    Low dose CT lung screening  Never done    COVID-19 Vaccine (3 - Booster for [employer] series) 05/26/2021         1. \"Have you been to the ER, urgent care clinic since your last visit? Hospitalized since your last visit? \" No    2. \"Have you seen or consulted any other health care providers outside of the [address] [place] [address] since your last visit? \"  Yes seen rheumatologist, vascular surgeon and podiatrist.      3. For patients aged 39-70: Has the patient had a colonoscopy / FIT/ Cologuard? Yes - no Care Gap present      If the patient is female:    4. For patients aged 41-77: Has the patient had a mammogram within the past 2 years? Yes - no Care Gap present      5. For patients aged 21-65: Has the patient had a pap smear?  NA - based on age or sex

## 2023-01-16 NOTE — PROGRESS NOTES
Chief Complaint   Patient presents with    Hypertension     4M       SUBJECTIVE:    Sylvia Ponce is a 65 y.o. 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 She would also like her toenails evaluated today possible. Patient continues to take medication for management of her blood pressure and is tolerating this well. Her blood pressure appears well controlled and without significant abnormality. She continues to take atorvastatin daily for management of her dyslipidemia. She is tolerating this well. She denies any recent episodes of chest pain, chest pressure, shortness of breath, headaches, dizziness, blurred vision, palpitations, or syncope episodes. She continues to take levothyroxine daily for management of her hypothyroidism. She is stable on this and last TSH/free T4 was normal.     Her peripheral vascular disease has remained stable. She is currently being seen by vascular surgeon for this. She denies any significant changes in her peripheral neuropathy. The patient has longstanding history of onychomycosis to her toenails to bilateral feet. She states she was recommended to start Lamisil for this, and to obtain through her PCP. She would like to discuss the medication and possible prescription today. Current Outpatient Medications   Medication Sig Dispense Refill    terbinafine HCL (LAMISIL) 250 mg tablet Take 1 Tablet by mouth daily. Indications: fungal infection of toenail 30 Tablet 2    atorvastatin (LIPITOR) 40 mg tablet TAKE 1 TABLET BY MOUTH EVERY DAY 90 Tablet 1    magnesium oxide (MAG-OX) 400 mg tablet Take 1 Tablet by mouth daily. 90 Tablet 1    cholecalciferol (VITAMIN D3) 25 mcg (1,000 unit) cap Take 1 Capsule by mouth daily.  90 Capsule 2    levothyroxine (SYNTHROID) 88 mcg tablet Take 1 Tablet by mouth Daily (before breakfast). 90 Tablet 1    atenoloL (TENORMIN) 50 mg tablet Take 1 Tablet by mouth nightly. 90 Tablet 1    cilostazoL (PLETAL) 100 mg tablet Take 1 Tablet by mouth two (2) times a day. 180 Tablet 1    lisinopriL (PRINIVIL, ZESTRIL) 40 mg tablet Take 1 Tablet by mouth daily. 90 Tablet 1    DULoxetine (CYMBALTA) 60 mg capsule Take 1 Capsule by mouth daily. 90 Capsule 1    valACYclovir (Valtrex) 1 gram tablet Take 2 Tablets by mouth two (2) times a day. 2 initially and 2 in 12 hr for fever blisters 8 Tablet 5    cyclobenzaprine (FLEXERIL) 5 mg tablet Take 1 Tablet by mouth every eight (8) hours as needed for Muscle Spasm(s). Use only needed for the onset of severe spasm. Note this increases your risk of falls. 10 Tablet 1    omeprazole (PRILOSEC) 20 mg capsule TAKE 1 CAPSULE DAILY 90 Capsule 1    colchicine 0.6 mg tablet At the first sign of gout flare in hand, take 2 colchicine. After 2 hours, if still painful/swollen and no diarrhea then take a 3rd dose. Call the clinic for further instructions 12 Tablet 1    buPROPion SR (WELLBUTRIN SR) 150 mg SR tablet TAKE 1 TABLET DAILY 90 Tablet 3    diclofenac (VOLTAREN) 1 % gel Apply  to affected area four (4) times daily as needed for Pain. 1 Each 3    turmeric/turmeric ext/pepr ext (TURMERIC-TURMERIC EXT-PEPPER) 500-3 mg cap Take  by mouth.      multivit-min/iron/folic/lutein (CENTRUM SILVER WOMEN PO) Take  by mouth.      peg 400-propylene glycol (SYSTANE) 0.4-0.3 % drop Administer 1 Drop to both eyes as needed. DENTA 5000 PLUS 1.1 % crea Apply at night  5    ascorbic acid, vitamin C, (VITAMIN C) 500 mg tablet Take 500 mg by mouth two (2) times a day. aspirin (ASPIRIN) 325 mg tablet Take 325 mg by mouth daily.        Past Medical History:   Diagnosis Date    Adult onset hypothyroidism 8/23/2017    Anemia 8/23/2017    Anxiety 8/23/2017    Arteriosclerotic heart disease (ASHD) 8/23/2017    Arthritis back, hip right, neck    CAD (coronary artery disease)          Cervical spondylosis 8/23/2017    Chronic pain     spinal stenosis, bulging disk and bone spurs in back    Claudication in peripheral vascular disease ([facility] [address] 8/23/2017    CVD (cerebrovascular disease)     S/P right CEA    Depression     Dyslipidemia 8/23/2017    Familial mitral valve prolapse 8/23/2017    GERD (gastroesophageal reflux disease)     Gout     Hyperlipidemia     Hypertension     Hypothyroidism     Lumbar spinal stenosis     Macrocytosis 8/23/2017    Menopause     Nausea & vomiting     surgery related, severe constipation    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 ([facility] [address] 8/23/2017    Osteopenia 8/23/2017    Osteoporosis     Other ill-defined conditions(799.89)     severe constipation from pain medication    PVD (peripheral vascular disease) ([facility])     S/P stent Right CFA and Left iliac    Stroke [facility])     Temporomandibular joint pain dysfunction syndrome 8/23/2017    Thyroid disease     TIA (transient ischemic attack)     Tobacco user 8/23/2017    Trigger finger 8/23/2017     Past Surgical History:   Procedure Laterality Date    HX CAROTID ENDARTERECTOMY      right cea    HX OTHER SURGICAL Right     excision melanoma right arm    NV BYP OTH/THN VEIN FEMORAL-POPLITEAL       RIGHT FEMORAL-POPLITEAL BYPASS  WITH POLYTETRA-FL UOROETHYLENE GRAFT     NV UNLISTED PROCEDURE CARDIAC SURGERY      stents x2    NV UNLISTED PROCEDURE VASCULAR SURGERY      femoral stent - left    NV UNLISTED PROCEDURE VASCULAR SURGERY      X5 right femoral bypass     Allergies   Allergen Reactions    Neomycin Sulfate Unknown (comments)       REVIEW OF SYSTEMS:                                        POSITIVE= bold text  Negative = regular text    General:                     fever, chills, sweats, generalized weakness, weight loss/gain,                                       loss of appetite   Eyes:                           blurred vision, eye pain, loss of vision, double vision  ENT:                            rhinorrhea, pharyngitis   Respiratory:               cough, sputum production, SOB, PLATA, wheezing, pleuritic pain   Cardiology:                chest pain, palpitations, orthopnea, PND, edema, syncope   Gastrointestinal:       abdominal pain , N/V, diarrhea, dysphagia, constipation, bleeding   Genitourinary:           frequency, urgency, dysuria, hematuria, incontinence   Muskuloskeletal :      arthralgia, myalgia, back pain  Hematology:              easy bruising, nose or gum bleeding, lymphadenopathy   Dermatological:         rash, ulceration, pruritis, color change / jaundice, thickened toenails  Endocrine:                 hot flashes or polydipsia   Neurological:             headache, dizziness, confusion, focal weakness, paresthesia,                                      Speech difficulties, memory loss, gait difficulty  Psychological:          Feelings of anxiety, depression, agitation        Social History     Socioeconomic History    Marital status:    Tobacco Use    Smoking status: Former     Packs/day: 1.00     Years: 35.00     Pack years: 35.00     Types: Cigarettes     Quit date: 2008     Years since quittin.9    Smokeless tobacco: Never   Vaping Use    Vaping Use: Never used   Substance and Sexual Activity    Alcohol use:  Yes     Alcohol/week: 6.0 standard drinks     Types: 6 Glasses of wine per week     Comment: occasionally    Drug use: No     Types: Prescription, OTC     Social Determinants of Health     Financial Resource Strain: Low Risk     Difficulty of Paying Living Expenses: Not hard at all   Food Insecurity: No Food Insecurity    Worried About Running Out of Food in the Last Year: Never true    Ran Out of Food in the Last Year: Never true     Family History   Problem Relation Age of Onset    Migraines Mother     Heart Disease Father     Hypertension Father     Lung Disease Father     Cancer Father         H&N    Hypertension Sister Diabetes Sister     Heart Disease Brother     Hypertension Brother     Hypertension Sister     Cancer Sister         breast    Breast Cancer Sister 54    Hypertension Sister        OBJECTIVE:     Visit Vitals  /72 (BP 1 Location: Left upper arm, BP Patient Position: Sitting, BP Cuff Size: Adult)   Pulse 88   Temp 98.5 °F (36.9 °C) (Oral)   Resp 16   Ht 5' 2\" (1.575 m)   Wt 131 lb 3.2 oz (59.5 kg)   SpO2 99%   BMI 24.00 kg/m²       Constitutional: She appears well nourished, of stated age, and dressed appropriately. Eyes: Sclera anicteric, PERRLA, EOMI  Neck: Supple without lymphadenopathy. Thyroid normal, No JVD or bruits  Respiratory: Clear to ascultation X5, normal inspiratory effort, no adventitious breath sounds. Cardiovascular: Regular rate and rhythm, no rubs or gallops, PMI not displaced, No thrills, no peripheral edema  Integumentary: Thickened yellow toenails to feet bilaterally consistent with onychomycosis. Peripheral Vascular: Normal pulses palpable to PT/DP. Neuro: Non-focal exam, A & O X 3.   Psychiatric: Appropriate affect and demeanor, pleasant and cooperative. Patient's thought content and thought processing appear to be within normal limits. ASSESSMENT/PLAN:     ICD-10-CM ICD-9-CM    1. Essential hypertension  X03 916.8 METABOLIC PANEL, COMPREHENSIVE      2. 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 ([facility])  I73.9 443.9     G63 357.4       3. Polymyalgia rheumatica ([facility])  M35.3 725       4. PVD (peripheral vascular disease) ([facility])  I73.9 443.9       5. Adult onset hypothyroidism  E03.8 244.8 TSH 3RD GENERATION      T4, FREE      6. Dyslipidemia  E78.5 272.4 LIPID PANEL      7. Onychomycosis of toenail  B35.1 110.1 terbinafine HCL (LAMISIL) 250 mg tablet      DISCONTINUED: terbinafine HCL (LAMISIL) 250 mg tablet      8. On statin therapy  V65.018 Y10.19 METABOLIC PANEL, COMPREHENSIVE      9.  High risk medication use  G88.490 S54.61 METABOLIC PANEL, COMPREHENSIVE        1: Labs ordered today include: CMP, TSH, free T4, lipid panel to be done in approximately 1 month from now. 2: Patient will be started on Lamisil 250 mg daily for management of onychomycosis. 3: Patient to continue current medications for management of hypertension. Blood pressure appears stable and well controlled at this time. 4: Continue levothyroxine daily for management of hypothyroidism. Patient tolerating well. 5: Continue atorvastatin 40 mg daily for management of dyslipidemia. Cholesterol levels well controlled. 6: Continue healthy lifestyle management and appropriate dietary intake. 7: Continue all other medications as prescribed. 8: Patient to follow-up with me in approximately 4 months, or sooner as needed. Patient states understanding and agrees with plan. Orders Placed This Encounter    METABOLIC PANEL, COMPREHENSIVE    LIPID PANEL    TSH 3RD GENERATION    T4, FREE    terbinafine HCL (LAMISIL) 250 mg tablet         ATTENTION:   This medical record was transcribed using an electronic medical records system. Although proofread, it may and can contain electronic and spelling errors. Other human spelling and other errors may be present. Corrections may be executed at a later time. Please feel free to contact us for any clarifications as needed. Follow-up and Dispositions    Return in about 4 months (around 5/16/2023) for Follow-up with fasting labs. Jessica Murray, VICTOR M APRN FNP-BC

## 2023-02-06 RX ORDER — BUPROPION HYDROCHLORIDE 150 MG/1
TABLET, EXTENDED RELEASE ORAL
Qty: 90 TABLET | Refills: 3 | Status: SHIPPED | OUTPATIENT
Start: 2023-02-06

## 2023-02-06 RX ORDER — BUPROPION HYDROCHLORIDE 150 MG/1
TABLET, EXTENDED RELEASE ORAL
Qty: 90 TABLET | Refills: 3 | OUTPATIENT
Start: 2023-02-06

## 2023-02-16 ENCOUNTER — OFFICE VISIT (OUTPATIENT)
Dept: RHEUMATOLOGY | Age: 78
End: 2023-02-16
Payer: MEDICARE

## 2023-02-16 VITALS
DIASTOLIC BLOOD PRESSURE: 63 MMHG | HEART RATE: 100 BPM | SYSTOLIC BLOOD PRESSURE: 97 MMHG | BODY MASS INDEX: 23.78 KG/M2 | TEMPERATURE: 98.6 F | OXYGEN SATURATION: 95 % | WEIGHT: 130 LBS | RESPIRATION RATE: 16 BRPM

## 2023-02-16 DIAGNOSIS — M11.89 PSEUDOGOUT INVOLVING MULTIPLE JOINTS: Primary | ICD-10-CM

## 2023-02-16 DIAGNOSIS — M81.0 AGE-RELATED OSTEOPOROSIS WITHOUT CURRENT PATHOLOGICAL FRACTURE: ICD-10-CM

## 2023-02-16 DIAGNOSIS — M15.9 PRIMARY OSTEOARTHRITIS INVOLVING MULTIPLE JOINTS: ICD-10-CM

## 2023-02-16 DIAGNOSIS — M53.3 CHRONIC LEFT SACROILIAC PAIN: ICD-10-CM

## 2023-02-16 DIAGNOSIS — G89.29 CHRONIC LEFT SACROILIAC PAIN: ICD-10-CM

## 2023-02-16 DIAGNOSIS — M35.3 POLYMYALGIA RHEUMATICA (HCC): ICD-10-CM

## 2023-02-16 PROCEDURE — G0463 HOSPITAL OUTPT CLINIC VISIT: HCPCS | Performed by: INTERNAL MEDICINE

## 2023-02-16 NOTE — PATIENT INSTRUCTIONS
1) Make sure you are eating 3-4 servings of high calcium foods per day. You can obtain these through dairy, nut milk, leafy greens, and legumes. For every serving you miss you can take a serving of a calcium citrate supplement. 2) Continue to take your vitamin D and magnesium supplements. 3) Update hand xrays ASAP. 4) Call your medicare representative and also your [employer] supplemental insurance to find ut what they will cover for the following administration fees for Prolia given in a doctor's office:  [id number] for administration charge,  And  for Prolia itself  Diagnostic code M81.0    5) Check labs in 3 months    6) Follow up in 4 months. Let me know if you have any questions or concerns in the meantime.

## 2023-02-16 NOTE — PROGRESS NOTES
REASON FOR VISIT:   Blake Negron is a 65 y.o. female with history of hypertension, osteopenia, and hypothyroidism who is returning for  in-office Rheumatology followup of polymyalgia rheumatica. HISTORY OF PRESENT ILLNESS    Pt returns for a follow up. Last visit 11/16/2022. She says that she feels pretty good today. Pt never took Colchicine. She still takes a vitamin D and Mg supplement. Pt is not currently on any Prednisone. She never started on Prolia because she did not know what the price would be. She also read the side effects, which scared her. She was never on any medications for bone density in the past.     Pt says that her teeth have been chipping and breaking, but she has not needed any large dental procedures recently. Pt has had some aching in the base of her R thumb for the past couple of weeks. Last Sunday and Monday she had acute L wrist pain that went away. She still has intermittent pain in her L hip that goes down her leg. She has about 15 minutes of morning joint stiffness. Pt denies cold color changes in hier fingers. Pt eats a lot of yogurt, almond milk, and whipped cream. She does not think that she eats 3-4 servings of calcium daily. She does not currently take calcium supplements. Disease History:  Initial visit March 2019 developed bilateral shoulder pain, difficulty lifting her hands above her shoulders. Hasn't had significant thigh soreness. Wasn't having any headaches, visual changes, night sweats, or weight loss. Started on prednisone with immediate dramatic improvement. With weaning prednisone, would develop recrudescence of the shoulder and upper arm pain. Now on prednisone 5mg Tuesday, Thursday, and Saturday since November. Shoulders hurt more currently. Had previously been going to PT, can't go to pool classes during COVID so isn't exercising like she was. Has noticed hair thinning diffusely, comes out in clumps, pony tail not as thick as before. Switched from gabapentin to duloxetine, during the transition prednisone had been increased. Last year fell and irritated both shoulders, attributes to clumsiness--once tripped over a stool while carrying laundry, another time was reaching for shoes while sitting and carpet slipped from under her and she developed symptomatic rotator cuff tendinopathy for which she completed PT. Takes Tylenol for pain is only once every 2 weeks, when she is more active such as gardening or washing the floor. Has been advised to avoid NSAIDs. Has had paralumbar low back pain every morning, stays in the low back. Also h/o neck spasms. Has had multiple LE bypasses and stents for symptomatic claudication. Now takes daily aspirin and cilostazol. REVIEW OF SYSTEMS  A comprehensive review of systems was negative except for that written in the HPI. A 10-point review of systems is per the new patient questionnaire, which has been reviewed extensively and scanned into the electronic medical record for future reference. Review of systems is as above and is otherwise negative. ALLERGIES  Neomycin sulfate    MEDICATIONS  Current Outpatient Medications   Medication Sig    buPROPion SR (WELLBUTRIN SR) 150 mg SR tablet TAKE 1 TABLET DAILY    terbinafine HCL (LAMISIL) 250 mg tablet Take 1 Tablet by mouth daily. Indications: fungal infection of toenail    atorvastatin (LIPITOR) 40 mg tablet TAKE 1 TABLET BY MOUTH EVERY DAY    magnesium oxide (MAG-OX) 400 mg tablet Take 1 Tablet by mouth daily. cholecalciferol (VITAMIN D3) 25 mcg (1,000 unit) cap Take 1 Capsule by mouth daily. levothyroxine (SYNTHROID) 88 mcg tablet Take 1 Tablet by mouth Daily (before breakfast). atenoloL (TENORMIN) 50 mg tablet Take 1 Tablet by mouth nightly. cilostazoL (PLETAL) 100 mg tablet Take 1 Tablet by mouth two (2) times a day. lisinopriL (PRINIVIL, ZESTRIL) 40 mg tablet Take 1 Tablet by mouth daily.     DULoxetine (CYMBALTA) 60 mg capsule Take 1 Capsule by mouth daily. valACYclovir (Valtrex) 1 gram tablet Take 2 Tablets by mouth two (2) times a day. 2 initially and 2 in 12 hr for fever blisters    cyclobenzaprine (FLEXERIL) 5 mg tablet Take 1 Tablet by mouth every eight (8) hours as needed for Muscle Spasm(s). Use only needed for the onset of severe spasm. Note this increases your risk of falls. omeprazole (PRILOSEC) 20 mg capsule TAKE 1 CAPSULE DAILY    colchicine 0.6 mg tablet At the first sign of gout flare in hand, take 2 colchicine. After 2 hours, if still painful/swollen and no diarrhea then take a 3rd dose. Call the clinic for further instructions    diclofenac (VOLTAREN) 1 % gel Apply  to affected area four (4) times daily as needed for Pain. turmeric/turmeric ext/pepr ext (TURMERIC-TURMERIC EXT-PEPPER) 500-3 mg cap Take  by mouth.    multivit-min/iron/folic/lutein (CENTRUM SILVER WOMEN PO) Take  by mouth.    peg 400-propylene glycol (SYSTANE) 0.4-0.3 % drop Administer 1 Drop to both eyes as needed. DENTA 5000 PLUS 1.1 % crea Apply at night    ascorbic acid, vitamin C, (VITAMIN C) 500 mg tablet Take 500 mg by mouth two (2) times a day. aspirin (ASPIRIN) 325 mg tablet Take 325 mg by mouth daily. No current facility-administered medications for this visit.        PAST MEDICAL HISTORY  Past Medical History:   Diagnosis Date    Adult onset hypothyroidism 8/23/2017    Anemia 8/23/2017    Anxiety 8/23/2017    Arteriosclerotic heart disease (ASHD) 8/23/2017    Arthritis     back, hip right, neck    CAD (coronary artery disease)          Cervical spondylosis 8/23/2017    Chronic pain     spinal stenosis, bulging disk and bone spurs in back    Claudication in peripheral vascular disease ([facility] [address] 8/23/2017    CVD (cerebrovascular disease)     S/P right CEA    Depression     Dyslipidemia 8/23/2017    Familial mitral valve prolapse 8/23/2017    GERD (gastroesophageal reflux disease)     Gout     Hyperlipidemia     Hypertension Hypothyroidism     Lumbar spinal stenosis     Macrocytosis 8/23/2017    Menopause     Nausea & vomiting     surgery related, severe constipation    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 ([facility] [address] 8/23/2017    Osteopenia 8/23/2017    Osteoporosis     Other ill-defined conditions(799.89)     severe constipation from pain medication    PVD (peripheral vascular disease) ([facility] [address]     S/P stent Right CFA and Left iliac    Stroke [facility])     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 SOCIAL HISTORY  She  reports that she quit smoking about 15 years ago. Her smoking use included cigarettes. She has a 35.00 pack-year smoking history. She has never used smokeless tobacco. She reports current alcohol use of about 6.0 standard drinks per week. She reports that she does not use drugs. Social History     Social History Narrative    Not on file   Former secondary education . DATA  There were no vitals taken for this visit. General:  The patient is well developed, well nourished, alert, and in no apparent distress. Eyes: Sclera are anicteric. No conjunctival injection. HEENT:  Oropharynx is clear. No oral ulcers. Adequate salivary pooling. No cervical or supraclavicular lymphadenopathy. Lungs:  Clear to auscultation bilaterally, without wheeze or stridor. Normal respiratory effort. Cor:  Regular rate and rhythm. No murmur rub or gallop. Abdomen: Soft, non-tender, without hepatomegaly or masses. Extremities: No calf tenderness or edema. Warm and well perfused. Skin:  No significant abnormalities. Neuro: Nonfocal  Musculoskeletal:    A comprehensive musculoskeletal exam was performed for all joints of each upper and lower extremity and assessed for swelling, tenderness and range of motion. Results are documented as below:  No evidence of synovitis in the small joints of the hands, wrists, shoulders, elbows, hips, knees or ankles. ___  General:  The patient is well developed, well nourished, alert, and in no apparent distress. Eyes: Sclera are anicteric. No conjunctival injection. HEENT:  Oropharynx is clear. No oral ulcers. Adequate salivary pooling. No cervical or supraclavicular lymphadenopathy. Lungs:  Clear to auscultation bilaterally, without wheeze or stridor. Normal respiratory effort. Cor:  Regular rate and rhythm. No rub or gallop. Stable 2/6 holosystolic murmur of the right upper sternal border. Abdomen: Soft, non-tender, without hepatomegaly or masses. Extremities: No calf tenderness or edema. Stably decreased DP pulses without distal ulceration or pain. Skin:  No significant abnormalities. No petechial, purpuric, or psoriaform rashes   Neuro: Decreased temperature sensation in the hands and distal 1/3 LE's. Musculoskeletal:    A comprehensive musculoskeletal exam was performed for all joints of each upper and lower extremity and assessed for swelling, tenderness and range of motion. Results are documented as below:  Persistent bilateral global shoulder crepitus with mild tenderness on passive ROM. Intact ROM of elbows. No wrist synovitis currently. Bilateral CMC squaring. R>L 1st MCP subluxation, right 1st MCP tenderness, stable early subluxation of bilateral MCP2-3 without synovitis  Popejoy neck deformities of the R 2nd and 5 fingers without synovitis. Stable early swan neck deformities of the L 2-5 fingers.    Left trochanteric tenderness        Labs   11/16/22: Vit D 55.3, ESR 16, Cr 0.77, LFT WNL, CBC WNL, CRP 2 mg/L, C telopeptide 237, Mg 1.9  8/16/22: CK 48, Cr 1.1, LFT WNL, Lipid panel normal, TSH 1.01, T4 free 1.2  7/1/22: Vit D 50, uric acid 5.2, magnesium 1.6, CCP Ab IgC/IgA 8, Rheumatoid factor <14  3/17/22: CRP <1 mg/L, CK 46, ESR 17, Vit D 45.9, Cr 0.72, LFT WNL, WBC 9.9, HGB 12.8, Plt 307.  7/12/21: Cr 0.69, Tbili 0.5, AST 22, ALT 31, AlkP 127; HDL 84, LDL 52.8; CK 33; TSH WNL  6/8/21: ESR 7, CRP 1mg/L  2/17/21: ESR 20, CRP <1mg/L  1/25/21: B12 785  12/15/20: , CBC o/w WNL; CMP WNL; TSH 2.9 (WNL)    Imaging:  3/28/22 XR HANDS: Evidence of inflammatory and noninflammatory arthritides involving the bilateral hands, with particularly MCP2-3 subluxations, chondrocalcinosis of wrists, severe loss of CMC joint space, more subtle possible marginal erosions niraj 3rd fingers. 4/13/21 DXA:  Findings:  Femoral Neck Left:  Bone mineral density (gm/cm2): 0.848  % of peak bone mass: 82  % for age matched controls: 115  T-score: -1.4  Z-score: 0.8  Total Hip Left:  Bone mineral density (gm/cm2): 0.889  % of peak bone mass: 88  % for age matched controls: 118  T-score: -0.9  Z-score: 1.1  Lumbar Spine: L1-4  Bone mineral density (gm/cm2): 1.492  % of peak bone mass: 124  % for age matched controls: 152  T-score: 2.4  Z-score: 4.4  33% Radius Left:  Bone mineral density (gm/cm2): 0.677  % of peak bone mass: 76  % for age matched controls:  101  T-score: -2.4  Z-score: 0.1  IMPRESSION  Impression: This patient is osteopenic using the World Health Organization criteria  As compared to the prior study, there has been a trend toward a decrease in the  left hip. 10 year probability of major osteoporotic fracture: 19.2%  10 year probability of hip fracture: 9.9%    3/19/19 DXA: Lspine excluded 2/2 degenerative changes.  Findings:  Femoral Neck Left:  Bone mineral density (gm/cm2): 0.895  % of peak bone mass: 86  % for age matched controls: 118  T-score: -1.0  Z-score: 1.0  Total Hip Left:  Bone mineral density (gm/cm2): 0.927  % of peak bone mass: 92  % for age matched controls: 119  T-score: -0.6  Z-score: 1.2  33% Radius Right:  Bone mineral density (gm/cm2): 0.707  % of peak bone mass: 80  % for age matched controls: 103  T-score: -2.0  Z-score: 0.2  IMPRESSION  This patient is osteopenic using the World Health Organization criteria  10 year probability of major osteoporotic fracture: 15.1%  10 year probability of hip fracture: 6.5%      ASSESSMENT AND PLAN  Ms. Luiz Anderson is a 65 y.o. female who presents for evaluation of polymyalgia now with controlled pain off of prednisone. She is interested now in starting Prolia injections for her clinical osteoporosis, pending assessment of copays, will re-apply. 1. Pseudogout involving multiple joints  - Patient has colchicine for early use in flares  - MAGNESIUM; Future  - C-TELOPEPTIDE; Future  - C REACTIVE PROTEIN, QT; Future  - CBC WITH AUTOMATED DIFF; Future  - METABOLIC PANEL, COMPREHENSIVE; Future  - SED RATE (ESR); Future  - VITAMIN D, 25 HYDROXY; Future    2. Hypomagnesemia  - Trend levels, continue 400mg daily    3. Age-related osteoporosis without current pathological fracture  - C-TELOPEPTIDE; Future  - Applying for Prolia q6mo    4. Polymyalgia rheumatica (HCC)  - C REACTIVE PROTEIN, QT; Future  - SED RATE (ESR); Future    5. Trochanteric bursitis, left hip  - REFERRAL TO PHYSICAL THERAPY    6. Vitamin D deficiency  - VITAMIN D, 25 HYDROXY; Future      There are no Patient Instructions on file for this visit. No orders of the defined types were placed in this encounter. Medications: I am having Eleanor Farah.  Luiz Anderson maintain her aspirin, ascorbic acid (vitamin C), Denta 5000 Plus, peg 400-propylene glycol, multivit-min/iron/folic/lutein (CENTRUM SILVER WOMEN PO), turmeric-turmeric ext-pepper, diclofenac, colchicine, omeprazole, cyclobenzaprine, valACYclovir, DULoxetine, lisinopriL, atenoloL, cilostazoL, levothyroxine, magnesium oxide, cholecalciferol, atorvastatin, terbinafine HCL, and buPROPion SR. Face to face time: minutes  Note preparation and records review day of service: minutes  Total provider time day of service: minutes    This was scribed by Oleg Harris in the presence of Dr. Ramos Gardner. The note was reviewed and amended personally, and I agree with the above information.

## 2023-02-16 NOTE — PROGRESS NOTES
Chief Complaint   Patient presents with    Joint Pain     1. Have you been to the ER, urgent care clinic since your last visit? Hospitalized since your last visit? No    2. Have you seen or consulted any other health care providers outside of the [address] [place] [address] since your last visit? Include any pap smears or colon screening.  No

## 2023-03-01 DIAGNOSIS — E78.5 DYSLIPIDEMIA: ICD-10-CM

## 2023-03-01 DIAGNOSIS — Z79.899 HIGH RISK MEDICATION USE: ICD-10-CM

## 2023-03-01 DIAGNOSIS — Z79.899 ON STATIN THERAPY: ICD-10-CM

## 2023-03-01 DIAGNOSIS — E03.8 ADULT ONSET HYPOTHYROIDISM: ICD-10-CM

## 2023-03-01 DIAGNOSIS — I10 ESSENTIAL HYPERTENSION: ICD-10-CM

## 2023-03-02 LAB
ALBUMIN SERPL-MCNC: 3.7 G/DL (ref 3.5–5)
ALBUMIN/GLOB SERPL: 1 (ref 1.1–2.2)
ALP SERPL-CCNC: 80 U/L (ref 45–117)
ALT SERPL-CCNC: 25 U/L (ref 12–78)
ANION GAP SERPL CALC-SCNC: 8 MMOL/L (ref 5–15)
AST SERPL-CCNC: 18 U/L (ref 15–37)
BILIRUB SERPL-MCNC: 0.4 MG/DL (ref 0.2–1)
BUN SERPL-MCNC: 16 MG/DL (ref 6–20)
BUN/CREAT SERPL: 20 (ref 12–20)
CALCIUM SERPL-MCNC: 9.3 MG/DL (ref 8.5–10.1)
CHLORIDE SERPL-SCNC: 104 MMOL/L (ref 97–108)
CHOLEST SERPL-MCNC: 153 MG/DL
CO2 SERPL-SCNC: 28 MMOL/L (ref 21–32)
CREAT SERPL-MCNC: 0.82 MG/DL (ref 0.55–1.02)
GLOBULIN SER CALC-MCNC: 3.6 G/DL (ref 2–4)
GLUCOSE SERPL-MCNC: 100 MG/DL (ref 65–100)
HDLC SERPL-MCNC: 80 MG/DL
HDLC SERPL: 1.9 (ref 0–5)
LDLC SERPL CALC-MCNC: 60.8 MG/DL (ref 0–100)
POTASSIUM SERPL-SCNC: 5.1 MMOL/L (ref 3.5–5.1)
PROT SERPL-MCNC: 7.3 G/DL (ref 6.4–8.2)
SODIUM SERPL-SCNC: 140 MMOL/L (ref 136–145)
T4 FREE SERPL-MCNC: 1.4 NG/DL (ref 0.8–1.5)
TRIGL SERPL-MCNC: 61 MG/DL (ref ?–150)
TSH SERPL DL<=0.05 MIU/L-ACNC: 4.25 UIU/ML (ref 0.36–3.74)
VLDLC SERPL CALC-MCNC: 12.2 MG/DL

## 2023-03-02 NOTE — PROGRESS NOTES
Metabolic panel is unremarkable. Cholesterol levels are normal.    Thyroid shows to be slightly underfunctioning. Continue levothyroxine 88 mcg daily. I would like to recheck this level at your next appointment.

## 2023-04-22 DIAGNOSIS — M11.89 PSEUDOGOUT INVOLVING MULTIPLE JOINTS: ICD-10-CM

## 2023-04-22 DIAGNOSIS — M81.0 AGE-RELATED OSTEOPOROSIS WITHOUT CURRENT PATHOLOGICAL FRACTURE: Primary | ICD-10-CM

## 2023-04-23 DIAGNOSIS — M11.89 PSEUDOGOUT INVOLVING MULTIPLE JOINTS: ICD-10-CM

## 2023-04-23 DIAGNOSIS — M81.0 AGE-RELATED OSTEOPOROSIS WITHOUT CURRENT PATHOLOGICAL FRACTURE: Primary | ICD-10-CM

## 2023-04-24 DIAGNOSIS — M11.89 PSEUDOGOUT INVOLVING MULTIPLE JOINTS: ICD-10-CM

## 2023-04-24 DIAGNOSIS — M81.0 AGE-RELATED OSTEOPOROSIS WITHOUT CURRENT PATHOLOGICAL FRACTURE: Primary | ICD-10-CM

## 2023-05-08 RX ORDER — LISINOPRIL 40 MG/1
TABLET ORAL
Qty: 90 TABLET | Refills: 1 | Status: SHIPPED | OUTPATIENT
Start: 2023-05-08

## 2023-05-16 ENCOUNTER — OFFICE VISIT (OUTPATIENT)
Facility: CLINIC | Age: 78
End: 2023-05-16
Payer: MEDICARE

## 2023-05-16 VITALS
HEART RATE: 75 BPM | TEMPERATURE: 97.7 F | SYSTOLIC BLOOD PRESSURE: 132 MMHG | DIASTOLIC BLOOD PRESSURE: 74 MMHG | HEIGHT: 62 IN | RESPIRATION RATE: 16 BRPM | BODY MASS INDEX: 23.48 KG/M2 | OXYGEN SATURATION: 98 % | WEIGHT: 127.6 LBS

## 2023-05-16 DIAGNOSIS — E03.9 ACQUIRED HYPOTHYROIDISM: ICD-10-CM

## 2023-05-16 DIAGNOSIS — E78.2 MIXED HYPERLIPIDEMIA: ICD-10-CM

## 2023-05-16 DIAGNOSIS — M81.0 AGE-RELATED OSTEOPOROSIS WITHOUT CURRENT PATHOLOGICAL FRACTURE: ICD-10-CM

## 2023-05-16 DIAGNOSIS — M72.0 DUPUYTREN'S CONTRACTURE OF BOTH HANDS: ICD-10-CM

## 2023-05-16 DIAGNOSIS — Z79.899 ON STATIN THERAPY: ICD-10-CM

## 2023-05-16 DIAGNOSIS — Z71.89 ENCOUNTER FOR MEDICATION COUNSELING: ICD-10-CM

## 2023-05-16 DIAGNOSIS — I10 ESSENTIAL HYPERTENSION: Primary | ICD-10-CM

## 2023-05-16 PROCEDURE — 3075F SYST BP GE 130 - 139MM HG: CPT | Performed by: NURSE PRACTITIONER

## 2023-05-16 PROCEDURE — 1123F ACP DISCUSS/DSCN MKR DOCD: CPT | Performed by: NURSE PRACTITIONER

## 2023-05-16 PROCEDURE — 3078F DIAST BP <80 MM HG: CPT | Performed by: NURSE PRACTITIONER

## 2023-05-16 PROCEDURE — 99214 OFFICE O/P EST MOD 30 MIN: CPT | Performed by: NURSE PRACTITIONER

## 2023-05-16 RX ORDER — PHENOL 1.4 %
1 AEROSOL, SPRAY (ML) MUCOUS MEMBRANE DAILY
COMMUNITY

## 2023-05-16 RX ORDER — KETOCONAZOLE 20 MG/ML
SHAMPOO TOPICAL
COMMUNITY
Start: 2023-03-24

## 2023-05-16 RX ORDER — CLOBETASOL PROPIONATE 0.05 G/100ML
SHAMPOO TOPICAL
COMMUNITY
Start: 2023-03-24

## 2023-05-16 SDOH — ECONOMIC STABILITY: FOOD INSECURITY: WITHIN THE PAST 12 MONTHS, THE FOOD YOU BOUGHT JUST DIDN'T LAST AND YOU DIDN'T HAVE MONEY TO GET MORE.: NEVER TRUE

## 2023-05-16 SDOH — ECONOMIC STABILITY: HOUSING INSECURITY
IN THE LAST 12 MONTHS, WAS THERE A TIME WHEN YOU DID NOT HAVE A STEADY PLACE TO SLEEP OR SLEPT IN A SHELTER (INCLUDING NOW)?: NO

## 2023-05-16 SDOH — ECONOMIC STABILITY: INCOME INSECURITY: HOW HARD IS IT FOR YOU TO PAY FOR THE VERY BASICS LIKE FOOD, HOUSING, MEDICAL CARE, AND HEATING?: NOT HARD AT ALL

## 2023-05-16 SDOH — ECONOMIC STABILITY: FOOD INSECURITY: WITHIN THE PAST 12 MONTHS, YOU WORRIED THAT YOUR FOOD WOULD RUN OUT BEFORE YOU GOT MONEY TO BUY MORE.: NEVER TRUE

## 2023-05-16 NOTE — PROGRESS NOTES
Chief Complaint   Patient presents with    Hypertension     3M       SUBJECTIVE:    Yovany Oates is a 77 y.o. female who is here today for a follow up appointment regarding several medical conditions including: Essential hypertension, hypothyroidism, mixed lipidemia, and osteoporosis. The patient continues to take medication as prescribed for management of her hypertension. Her blood pressure has been stable and she is compliant with her medications as stated. She denies any adverse side effects of the medication. She continues to take atorvastatin daily for management of her cholesterol and tolerating this well. She denies any recent concerns of chest pain, chest pressure, shortness of breath, headaches, dizziness, blurred vision, palpitations, or syncope episodes. She continues to take levothyroxine as prescribed for management of her hypothyroidism. She has been tolerating this well. Her last TSH was a bit elevated. We will recheck this again today. She also like to discuss Prolia and safety concerns. Likewise, she would like to discuss if she needs calcium supplementation or not. Additionally, she has been having some abnormal development to the palmar surfaces of both hands. She would like this to be evaluated today.     Current Outpatient Medications   Medication Sig Dispense Refill    calcium carbonate 600 MG TABS tablet Take 1 tablet by mouth daily      Multiple Vitamins-Minerals (CENTRUM SILVER 50+WOMEN PO) Take by mouth      Clobetasol Propionate 0.05 % SHAM APPLY TOPICALLY TO AFFECTED AREA OF SCALP EVERY OTHER DAY      ketoconazole (NIZORAL) 2 % shampoo APPLY TOPICALLY TO AFFECTED AREA OF SCALP EVERY OTHER DAY      Turmeric (QC TUMERIC COMPLEX PO) Take by mouth      lisinopril (PRINIVIL;ZESTRIL) 40 MG tablet TAKE 1 TABLET DAILY 90 tablet 1    ascorbic acid (VITAMIN C) 500 MG tablet Take 1 tablet by mouth 2 times daily      aspirin 325 MG tablet Take 1 tablet by mouth daily

## 2023-05-16 NOTE — PROGRESS NOTES
Catherine Cardenas is a 77 y.o. female     Chief Complaint   Patient presents with    Hypertension     3M       /74 (Site: Left Upper Arm, Position: Sitting, Cuff Size: Large Adult)   Pulse 75   Temp 97.7 °F (36.5 °C) (Oral)   Resp 16   Ht 5' 2\" (1.575 m)   Wt 127 lb 9.6 oz (57.9 kg)   SpO2 98%   BMI 23.34 kg/m²     Health Maintenance Due   Topic Date Due    Shingles vaccine (2 of 3) 03/28/2008    COVID-19 Vaccine (3 - Booster for Pfizer series) 05/26/2021    DTaP/Tdap/Td vaccine (2 - Td or Tdap) 02/18/2023         1. \"Have you been to the ER, urgent care clinic since your last visit? Hospitalized since your last visit? \" No     2. \"Have you seen or consulted any other health care providers outside of the [address] [place] [address] since your last visit? \" Yes seen Dermatologist.     3. For patients aged 39-70: Has the patient had a colonoscopy / FIT/ Cologuard? Yes      If the patient is female:    4. For patients aged 41-77: Has the patient had a mammogram within the past 2 years? Yes       5. For patients aged 21-65: Has the patient had a pap smear?  N/A

## 2023-05-17 LAB
ALBUMIN SERPL-MCNC: 3.8 G/DL (ref 3.5–5)
ALBUMIN SERPL-MCNC: 3.8 G/DL (ref 3.5–5)
ALBUMIN/GLOB SERPL: 1.2 (ref 1.1–2.2)
ALP SERPL-CCNC: 70 U/L (ref 45–117)
ALT SERPL-CCNC: 27 U/L (ref 12–78)
ANION GAP SERPL CALC-SCNC: 2 MMOL/L (ref 5–15)
AST SERPL-CCNC: 26 U/L (ref 15–37)
BILIRUB DIRECT SERPL-MCNC: 0.2 MG/DL (ref 0–0.2)
BILIRUB SERPL-MCNC: 0.4 MG/DL (ref 0.2–1)
BUN SERPL-MCNC: 17 MG/DL (ref 6–20)
BUN/CREAT SERPL: 22 (ref 12–20)
CALCIUM SERPL-MCNC: 9.2 MG/DL (ref 8.5–10.1)
CHLORIDE SERPL-SCNC: 107 MMOL/L (ref 97–108)
CHOLEST SERPL-MCNC: 144 MG/DL
CO2 SERPL-SCNC: 28 MMOL/L (ref 21–32)
COMMENT:: NORMAL
CREAT SERPL-MCNC: 0.77 MG/DL (ref 0.55–1.02)
GLOBULIN SER CALC-MCNC: 3.3 G/DL (ref 2–4)
GLUCOSE SERPL-MCNC: 96 MG/DL (ref 65–100)
HDLC SERPL-MCNC: 90 MG/DL
HDLC SERPL: 1.6 (ref 0–5)
LDLC SERPL CALC-MCNC: 38.8 MG/DL (ref 0–100)
PHOSPHATE SERPL-MCNC: 3.3 MG/DL (ref 2.6–4.7)
POTASSIUM SERPL-SCNC: 4.3 MMOL/L (ref 3.5–5.1)
PROT SERPL-MCNC: 7.1 G/DL (ref 6.4–8.2)
SODIUM SERPL-SCNC: 137 MMOL/L (ref 136–145)
SPECIMEN HOLD: NORMAL
T4 FREE SERPL-MCNC: 1.5 NG/DL (ref 0.8–1.5)
TRIGL SERPL-MCNC: 76 MG/DL
TSH SERPL DL<=0.05 MIU/L-ACNC: 1.71 UIU/ML (ref 0.36–3.74)
VLDLC SERPL CALC-MCNC: 15.2 MG/DL

## 2023-06-06 RX ORDER — CILOSTAZOL 100 MG/1
TABLET ORAL
Qty: 180 TABLET | Refills: 1 | Status: SHIPPED | OUTPATIENT
Start: 2023-06-06 | End: 2023-06-08 | Stop reason: SDUPTHER

## 2023-06-06 RX ORDER — OMEPRAZOLE 20 MG/1
CAPSULE, DELAYED RELEASE ORAL
Qty: 90 CAPSULE | Refills: 1 | Status: SHIPPED | OUTPATIENT
Start: 2023-06-06

## 2023-06-06 NOTE — TELEPHONE ENCOUNTER
PCP: KAMI Colunga NP    Last appt: 5/16/2023  Future Appointments   Date Time Provider [address]   6/16/2023  2:00 PM Esther Lew MD [facility] BS AMB   9/18/2023  1:30 PM KAMI Colunga NP [facility] BS AMB       Requested Prescriptions     Pending Prescriptions Disp Refills    omeprazole (PRILOSEC) 20 MG delayed release capsule [Pharmacy Med Name: OMEPRAZOLE DR CAPS 20MG] 90 capsule 3     Sig: TAKE 1 CAPSULE DAILY    cilostazol (PLETAL) 100 MG tablet [Pharmacy Med Name: CILOSTAZOL TABS 100MG] 180 tablet 3     Sig: TAKE 1 TABLET TWICE A DAY       Prior labs and Blood pressures:  BP Readings from Last 3 Encounters:   05/16/23 132/74   02/16/23 97/63   01/16/23 128/72     Lab Results   Component Value Date/Time     05/16/2023 11:35 AM    K 4.3 05/16/2023 11:35 AM     05/16/2023 11:35 AM    CO2 28 05/16/2023 11:35 AM    BUN 17 05/16/2023 11:35 AM    GFRAA 58 08/16/2022 12:09 PM     No results found for: HBA1C, [id number]  Lab Results   Component Value Date/Time    CHOL 144 05/16/2023 11:35 AM    HDL 90 05/16/2023 11:35 AM    VLDL 15 06/18/2020 11:57 AM     No results found for: VITD3, VD3RIA        Lab Results   Component Value Date/Time    TSH 4.25 03/01/2023 01:52 PM

## 2023-06-07 ENCOUNTER — TELEPHONE (OUTPATIENT)
Age: 78
End: 2023-06-07

## 2023-06-07 RX ORDER — ATORVASTATIN CALCIUM 40 MG/1
40 TABLET, FILM COATED ORAL DAILY
Qty: 90 TABLET | Refills: 1 | Status: SHIPPED | OUTPATIENT
Start: 2023-06-07

## 2023-06-07 NOTE — TELEPHONE ENCOUNTER
Pt requesting 2 week rx be sent to [address] [place] [address] as she will run out before [facility] will be able to get her rx to her.     cilostazol (PLETAL) 100 MG tablet [[id number]]     Order Details  Dose, Route, Frequency: As Directed   Dispense Quantity: 180 tablet Refills: 1          Sig: TAKE 1 TABLET TWICE A DAY         Start Date: 06/06/23 End Date: --   Written Date: 06/06/23 Expiration Date: 06/05/24   Original Order:  cilostazol (PLETAL) 100 MG tablet [[id number]]

## 2023-06-07 NOTE — TELEPHONE ENCOUNTER
PCP: KAMI Camp NP    Last appt: 5/16/2023  Future Appointments   Date Time Provider Colette Mina   6/16/2023  2:00 PM Baljit Crowder MD [facility] BS AMB   9/18/2023  1:30 PM KAMI Camp NP [facility] BS AMB       Requested Prescriptions     Pending Prescriptions Disp Refills    atorvastatin (LIPITOR) 40 MG tablet 90 tablet 1     Sig: Take 1 tablet by mouth daily       Prior labs and Blood pressures:  BP Readings from Last 3 Encounters:   05/16/23 132/74   02/16/23 97/63   01/16/23 128/72     Lab Results   Component Value Date/Time     05/16/2023 11:35 AM    K 4.3 05/16/2023 11:35 AM     05/16/2023 11:35 AM    CO2 28 05/16/2023 11:35 AM    BUN 17 05/16/2023 11:35 AM    GFRAA 58 08/16/2022 12:09 PM     No results found for: HBA1C, IAT1DUOO  Lab Results   Component Value Date/Time    CHOL 144 05/16/2023 11:35 AM    HDL 90 05/16/2023 11:35 AM    VLDL 15 06/18/2020 11:57 AM     No results found for: VITD3, VD3RIA        Lab Results   Component Value Date/Time    TSH 4.25 03/01/2023 01:52 PM

## 2023-06-07 NOTE — TELEPHONE ENCOUNTER
PT left a VM asking how to schedule xrays, I called back and had to leave a VM with central scheduling's number and explained that it was for xrays. I also left the office number.
no

## 2023-06-08 RX ORDER — CILOSTAZOL 100 MG/1
100 TABLET ORAL 2 TIMES DAILY
Qty: 28 TABLET | Refills: 0 | Status: SHIPPED | OUTPATIENT
Start: 2023-06-08

## 2023-06-09 ENCOUNTER — HOSPITAL ENCOUNTER (OUTPATIENT)
Facility: HOSPITAL | Age: 78
Discharge: HOME | End: 2023-06-09
Payer: COMMERCIAL

## 2023-06-09 DIAGNOSIS — M11.89 OTHER SPECIFIED CRYSTAL ARTHROPATHIES, MULTIPLE SITES: ICD-10-CM

## 2023-06-09 DIAGNOSIS — M81.0 POSTMENOPAUSAL OSTEOPOROSIS: ICD-10-CM

## 2023-06-09 PROCEDURE — 73130 X-RAY EXAM OF HAND: CPT

## 2023-06-15 ENCOUNTER — TELEPHONE (OUTPATIENT)
Age: 78
End: 2023-06-15

## 2023-06-15 NOTE — TELEPHONE ENCOUNTER
Chief Complaint   Patient presents with   • Ear Pain     R ear pain X 4 weeks, getting worse.       SUBJECTIVE--  Patient presents to Immediate Care with self.     Luz Dye is a 79 year old female that presents to Walk-In Care at [facility] for decreased hearing and intermittent ear pain to right ear for several week. Admits to on and off sinus/ rhinitis and feels she always has that. Denies any trauma, vertigo, headache.      Any cold or sinus symptoms:  She always has sinus / nasal congestion, frontal headache and lots pnd   Any cough: no  Any fevers: no  Any ear pain: yes on and off for few weeks    Medications tried for the ear congestion: no      ROS- negative eye symptoms.   No associated chest pains, SOB, abdominal or back pain.    Patient Active Problem List   Diagnosis   • Essential hypertension, benign   • Essential thrombocythemia (CMS/HCC)   • Hypothyroidism   • Knee joint replacement by other means   • Personal history of colonic polyps   • Primary localized osteoarthrosis, lower leg   • Spinal stenosis of lumbar region   • Chronic low back pain   • Lumbar facet joint syndrome   • DDD (degenerative disc disease), lumbar   • Lumbar radiculopathy   • Left hip pain   • Osteoarthritis of left hip   • Accessory navicular bone of both feet   • Anatomical narrow angle   • Cortical senile cataract, bilateral   • Diverticulosis of large intestine without hemorrhage       Current Outpatient Medications   Medication Sig Dispense Refill   • hydroxyurea (HYDREA) 500 MG capsule TAKE 2 CAPSULES EVERY  capsule 3   • HYDROcodone-acetaminophen (NORCO) 5-325 MG per tablet Take 1 tablet by mouth 2 times daily as needed for Pain. 60 tablet 0   • levothyroxine (SYNTHROID, LEVOTHROID) 100 MCG tablet Take 1 tablet by mouth daily. 90 tablet 1   • metoPROLOL succinate (TOPROL-XL) 25 MG 24 hr tablet TAKE 1 TABLET DAILY. (REPLACES ATENOLOL) 90 tablet 1   • lisinopril-hydroCHLOROthiazide (PRINZIDE,ZESTORETIC)  PT called and stated that she got her labs and xray's done yesterday and would like Brian Carpenter to review them today because her appointment is tomorrow. 20-25 MG per tablet TAKE 2 TABLETS EVERY  tablet 1   • aspirin 81 MG tablet Take 81 mg by mouth.     • Blood Glucose Monitoring Suppl (ACCU-CHEK NADIA PLUS) w/Device Kit Accu chek Nadia Plus Test 2X daily Dx DM TYPE II-UNCOMPL E11.9 Insulin:No     • Lancets Misc Accu chek Nadia Plus Test 2X daily Dx DM TYPE II-UNCOMPL E11.9 Insulin:No     • potassium chloride (KLOR-CON M) 20 MEQ chasidy ER tablet Take 20 mEq by mouth.       No current facility-administered medications for this visit.        Family history is noncontributory    Soc Hx:  nonsmoker    OBJECTIVE  Visit Vitals  /70   Pulse 76   Temp 98 °F (36.7 °C) (Tympanic)   Resp 16   SpO2 99%       Gen: alert ; nontoxic appearance  HEENT- MARCIAL, EOMI; nasal mucosa is moist with normal turbinates; pharynx with moist mucosa; no pharyngeal erythema or exudates    right Ear:  Cerumen impaction noted; no pain with exam.    left Ear: normal exam; no cerumen impaction      NECK- supple; no significant lymphadenopathy; no meningeal signs    Heart:  regular rate and rhythm  LUNG- clear to auscultate bilat; no rale/rhonci/wheezing bilat; good inspiratory effort    Workup at IC:  Irrigated the  right ear but unable to remove the plug as it is very dry--- I was able to manual extract the wax plug easily using ear loop and alligator foreceps. She tolerated this well and appreciates it.  No signs of trauma in ear canal or to TM; the   TM is intact and normal.  no complaints of pain; clinically she can hear now.      Assesement/Plan  Cerumen impaction- irrigation / manual extraction performed with successful results    Pt has been successfully removed and clinically is feeling better..      Recommend to expect continued improvement.    Do not put anything in ear-- qtip are to be used to clean external portion of ear.    Follow up if concerns or new symptoms.  Patient expresses understanding of plans and follow up as directed.     Micha Chandler,   02/21/20

## 2023-06-16 ENCOUNTER — OFFICE VISIT (OUTPATIENT)
Age: 78
End: 2023-06-16
Payer: MEDICARE

## 2023-06-16 VITALS
OXYGEN SATURATION: 92 % | SYSTOLIC BLOOD PRESSURE: 95 MMHG | WEIGHT: 126.2 LBS | DIASTOLIC BLOOD PRESSURE: 63 MMHG | RESPIRATION RATE: 16 BRPM | BODY MASS INDEX: 23.08 KG/M2 | TEMPERATURE: 97.7 F | HEART RATE: 90 BPM

## 2023-06-16 DIAGNOSIS — M48.061 SPINAL STENOSIS OF LUMBAR REGION WITHOUT NEUROGENIC CLAUDICATION: ICD-10-CM

## 2023-06-16 DIAGNOSIS — M11.89 OTHER SPECIFIED CRYSTAL ARTHROPATHIES, MULTIPLE SITES: Primary | ICD-10-CM

## 2023-06-16 DIAGNOSIS — M81.0 AGE-RELATED OSTEOPOROSIS WITHOUT CURRENT PATHOLOGICAL FRACTURE: ICD-10-CM

## 2023-06-16 DIAGNOSIS — M62.830 MUSCLE SPASM OF BACK: ICD-10-CM

## 2023-06-16 DIAGNOSIS — M70.62 TROCHANTERIC BURSITIS, LEFT HIP: ICD-10-CM

## 2023-06-16 PROCEDURE — G8427 DOCREV CUR MEDS BY ELIG CLIN: HCPCS | Performed by: INTERNAL MEDICINE

## 2023-06-16 PROCEDURE — G8420 CALC BMI NORM PARAMETERS: HCPCS | Performed by: INTERNAL MEDICINE

## 2023-06-16 PROCEDURE — 1090F PRES/ABSN URINE INCON ASSESS: CPT | Performed by: INTERNAL MEDICINE

## 2023-06-16 PROCEDURE — G8399 PT W/DXA RESULTS DOCUMENT: HCPCS | Performed by: INTERNAL MEDICINE

## 2023-06-16 PROCEDURE — 99214 OFFICE O/P EST MOD 30 MIN: CPT | Performed by: INTERNAL MEDICINE

## 2023-06-16 PROCEDURE — 1123F ACP DISCUSS/DSCN MKR DOCD: CPT | Performed by: INTERNAL MEDICINE

## 2023-06-16 PROCEDURE — 3074F SYST BP LT 130 MM HG: CPT | Performed by: INTERNAL MEDICINE

## 2023-06-16 PROCEDURE — 3078F DIAST BP <80 MM HG: CPT | Performed by: INTERNAL MEDICINE

## 2023-06-16 PROCEDURE — 1036F TOBACCO NON-USER: CPT | Performed by: INTERNAL MEDICINE

## 2023-06-16 RX ORDER — COLCHICINE 0.6 MG/1
TABLET ORAL
Qty: 20 TABLET | Refills: 0 | Status: SHIPPED | OUTPATIENT
Start: 2023-06-16

## 2023-06-16 RX ORDER — CYCLOBENZAPRINE HCL 5 MG
5 TABLET ORAL EVERY 8 HOURS PRN
Qty: 20 TABLET | Refills: 0 | Status: SHIPPED | OUTPATIENT
Start: 2023-06-16

## 2023-06-16 ASSESSMENT — PATIENT HEALTH QUESTIONNAIRE - PHQ9
2. FEELING DOWN, DEPRESSED OR HOPELESS: 0
1. LITTLE INTEREST OR PLEASURE IN DOING THINGS: 0
SUM OF ALL RESPONSES TO PHQ QUESTIONS 1-9: 0
SUM OF ALL RESPONSES TO PHQ9 QUESTIONS 1 & 2: 0

## 2023-07-12 NOTE — TELEPHONE ENCOUNTER
PCP: KAMI Nieto NP    Last appt: 5/16/2023  Future Appointments   Date Time Provider [address]   9/18/2023  1:30 PM KAMI Nieto NP [facility] AMB       Requested Prescriptions     Pending Prescriptions Disp Refills    atenolol (TENORMIN) 50 MG tablet [Pharmacy Med Name: ATENOLOL TABS 50MG] 90 tablet 3     Sig: TAKE 1 TABLET NIGHTLY       Prior labs and Blood pressures:  BP Readings from Last 3 Encounters:   06/16/23 95/63   05/16/23 132/74   02/16/23 97/63     Lab Results   Component Value Date/Time     06/09/2023 03:36 PM    K 4.6 06/09/2023 03:36 PM     06/09/2023 03:36 PM    CO2 23 06/09/2023 03:36 PM    BUN 14 06/09/2023 03:36 PM    GFRAA 58 08/16/2022 12:09 PM     No results found for: HBA1C, [id number]  Lab Results   Component Value Date/Time    CHOL 144 05/16/2023 11:35 AM    HDL 90 05/16/2023 11:35 AM    VLDL 15 06/18/2020 11:57 AM     No results found for: VITD3, VD3RIA        Lab Results   Component Value Date/Time    TSH 4.25 03/01/2023 01:52 PM

## 2023-07-13 RX ORDER — ATENOLOL 50 MG/1
TABLET ORAL
Qty: 90 TABLET | Refills: 1 | Status: SHIPPED | OUTPATIENT
Start: 2023-07-13

## 2023-08-14 DIAGNOSIS — E03.8 OTHER SPECIFIED HYPOTHYROIDISM: ICD-10-CM

## 2023-08-14 RX ORDER — ATORVASTATIN CALCIUM 40 MG/1
TABLET, FILM COATED ORAL
Qty: 90 TABLET | Refills: 1 | Status: SHIPPED | OUTPATIENT
Start: 2023-08-14

## 2023-08-14 RX ORDER — LEVOTHYROXINE SODIUM 88 UG/1
TABLET ORAL
Qty: 90 TABLET | Refills: 1 | Status: SHIPPED | OUTPATIENT
Start: 2023-08-14

## 2023-08-14 NOTE — TELEPHONE ENCOUNTER
PCP: Duard Bloch, APRN - NP    Last appt: 5/16/2023  Future Appointments   Date Time Provider [address]   9/18/2023  1:30 PM Duard Bloch, APRN - NP [facility] AMB       Requested Prescriptions     Pending Prescriptions Disp Refills    atorvastatin (LIPITOR) 40 MG tablet [Pharmacy Med Name: ATORVASTATIN 40 MG TABLET] 90 tablet 1     Sig: TAKE 1 TABLET BY MOUTH EVERY DAY    levothyroxine (SYNTHROID) 88 MCG tablet [Pharmacy Med Name: LEVOTHYROXINE 88 MCG TABLET] 90 tablet 1     Sig: TAKE 1 TABLET BY MOUTH EVERY DAY BEFORE BREAKFAST       Prior labs and Blood pressures:  BP Readings from Last 3 Encounters:   06/16/23 95/63   05/16/23 132/74   02/16/23 97/63     Lab Results   Component Value Date/Time     06/09/2023 03:36 PM    K 4.6 06/09/2023 03:36 PM     06/09/2023 03:36 PM    CO2 23 06/09/2023 03:36 PM    BUN 14 06/09/2023 03:36 PM    GFRAA 58 08/16/2022 12:09 PM     No results found for: HBA1C, [id number]  Lab Results   Component Value Date/Time    CHOL 144 05/16/2023 11:35 AM    HDL 90 05/16/2023 11:35 AM    VLDL 15 06/18/2020 11:57 AM     No results found for: VITD3, VD3RIA        Lab Results   Component Value Date/Time    TSH 4.25 03/01/2023 01:52 PM
no

## 2023-08-28 RX ORDER — DULOXETIN HYDROCHLORIDE 60 MG/1
60 CAPSULE, DELAYED RELEASE ORAL DAILY
Qty: 90 CAPSULE | Refills: 1 | Status: SHIPPED | OUTPATIENT
Start: 2023-08-28

## 2023-08-28 NOTE — TELEPHONE ENCOUNTER
PCP: KAMI Soni NP    Last appt: 5/16/2023  Future Appointments   Date Time Provider [address]   9/18/2023  1:30 PM KAMI Soni NP [facility] AMB       Requested Prescriptions     Pending Prescriptions Disp Refills    DULoxetine (CYMBALTA) 60 MG extended release capsule 90 capsule 1     Sig: Take 1 capsule by mouth daily       Prior labs and Blood pressures:  BP Readings from Last 3 Encounters:   06/16/23 95/63   05/16/23 132/74   02/16/23 97/63     Lab Results   Component Value Date/Time     06/09/2023 03:36 PM    K 4.6 06/09/2023 03:36 PM     06/09/2023 03:36 PM    CO2 23 06/09/2023 03:36 PM    BUN 14 06/09/2023 03:36 PM    GFRAA 58 08/16/2022 12:09 PM     No results found for: HBA1C, [id number]  Lab Results   Component Value Date/Time    CHOL 144 05/16/2023 11:35 AM    HDL 90 05/16/2023 11:35 AM    VLDL 15 06/18/2020 11:57 AM     No results found for: VITD3, VD3RIA        Lab Results   Component Value Date/Time    TSH 4.25 03/01/2023 01:52 PM

## 2023-09-28 ENCOUNTER — OFFICE VISIT (OUTPATIENT)
Facility: CLINIC | Age: 78
End: 2023-09-28
Payer: MEDICARE

## 2023-09-28 VITALS
SYSTOLIC BLOOD PRESSURE: 126 MMHG | BODY MASS INDEX: 23.48 KG/M2 | TEMPERATURE: 97.5 F | DIASTOLIC BLOOD PRESSURE: 70 MMHG | WEIGHT: 127.6 LBS | HEART RATE: 78 BPM | OXYGEN SATURATION: 98 % | RESPIRATION RATE: 16 BRPM | HEIGHT: 62 IN

## 2023-09-28 DIAGNOSIS — Z71.89 ACP (ADVANCE CARE PLANNING): ICD-10-CM

## 2023-09-28 DIAGNOSIS — E78.2 MIXED HYPERLIPIDEMIA: ICD-10-CM

## 2023-09-28 DIAGNOSIS — E03.9 ACQUIRED HYPOTHYROIDISM: ICD-10-CM

## 2023-09-28 DIAGNOSIS — Z78.0 OSTEOPENIA AFTER MENOPAUSE: ICD-10-CM

## 2023-09-28 DIAGNOSIS — M85.80 OSTEOPENIA AFTER MENOPAUSE: ICD-10-CM

## 2023-09-28 DIAGNOSIS — Z00.00 MEDICARE ANNUAL WELLNESS VISIT, SUBSEQUENT: Primary | ICD-10-CM

## 2023-09-28 DIAGNOSIS — I10 ESSENTIAL HYPERTENSION: ICD-10-CM

## 2023-09-28 DIAGNOSIS — Z23 NEEDS FLU SHOT: ICD-10-CM

## 2023-09-28 PROCEDURE — 3074F SYST BP LT 130 MM HG: CPT | Performed by: NURSE PRACTITIONER

## 2023-09-28 PROCEDURE — 1123F ACP DISCUSS/DSCN MKR DOCD: CPT | Performed by: NURSE PRACTITIONER

## 2023-09-28 PROCEDURE — G0008 ADMIN INFLUENZA VIRUS VAC: HCPCS | Performed by: NURSE PRACTITIONER

## 2023-09-28 PROCEDURE — 3078F DIAST BP <80 MM HG: CPT | Performed by: NURSE PRACTITIONER

## 2023-09-28 PROCEDURE — G0439 PPPS, SUBSEQ VISIT: HCPCS | Performed by: NURSE PRACTITIONER

## 2023-09-28 PROCEDURE — 90694 VACC AIIV4 NO PRSRV 0.5ML IM: CPT | Performed by: NURSE PRACTITIONER

## 2023-09-28 ASSESSMENT — PATIENT HEALTH QUESTIONNAIRE - PHQ9
SUM OF ALL RESPONSES TO PHQ QUESTIONS 1-9: 0
2. FEELING DOWN, DEPRESSED OR HOPELESS: 0
1. LITTLE INTEREST OR PLEASURE IN DOING THINGS: 0
SUM OF ALL RESPONSES TO PHQ QUESTIONS 1-9: 0
SUM OF ALL RESPONSES TO PHQ9 QUESTIONS 1 & 2: 0

## 2023-09-28 ASSESSMENT — LIFESTYLE VARIABLES
HOW OFTEN DO YOU HAVE A DRINK CONTAINING ALCOHOL: MONTHLY OR LESS
HOW MANY STANDARD DRINKS CONTAINING ALCOHOL DO YOU HAVE ON A TYPICAL DAY: 1 OR 2

## 2023-09-28 NOTE — PATIENT INSTRUCTIONS
Advance Directives: Care Instructions  Overview  An advance directive is a legal way to state your wishes at the end of your life. It tells your family and your doctor what to do if you can't say what you want. There are two main types of advance directives. You can change them any time your wishes change. Living will. This form tells your family and your doctor your wishes about life support and other treatment. The form is also called a declaration. Medical power of . This form lets you name a person to make treatment decisions for you when you can't speak for yourself. This person is called a health care agent (health care proxy, health care surrogate). The form is also called a durable power of  for health care. If you do not have an advance directive, decisions about your medical care may be made by a family member, or by a doctor or a  who doesn't know you. It may help to think of an advance directive as a gift to the people who care for you. If you have one, they won't have to make tough decisions by themselves. For more information, including forms for your state, see the [address] [place] [address] website (www.caringinfo.org/planning/advance-directives/). Follow-up care is a key part of your treatment and safety. Be sure to make and go to all appointments, and call your doctor if you are having problems. It's also a good idea to know your test results and keep a list of the medicines you take. What should you include in an advance directive? Many states have a unique advance directive form. (It may ask you to address specific issues.) Or you might use a universal form that's approved by many states. If your form doesn't tell you what to address, it may be hard to know what to include in your advance directive. Use the questions below to help you get started. Who do you want to make decisions about your medical care if you are not able to?   What life-support measures do you want if you

## 2023-09-29 ENCOUNTER — CLINICAL DOCUMENTATION (OUTPATIENT)
Dept: SPIRITUAL SERVICES | Age: 78
End: 2023-09-29

## 2023-09-29 NOTE — PROGRESS NOTES
Advance Care Planning   Ambulatory ACP Specialist Patient Outreach    Date:  9/29/2023    ACP Specialist:  [id number] [employer] call to patient in follow-up to ACP Specialist referral from:KAMI Taylor NP    [x] PCP  [] Provider   [] Ambulatory Care Management [] Other     For:                  [x] Advance Directive Assistance              [] Complete Portable DNR order              [] Complete POST/POLST/MOST              [x] Code Status Discussion             [x] Discuss Goals of Care             [] Early ACP Decision-Making              [] Other (Specify)    Date Referral Received: 9/28/23    Next Step:   [] ACP scheduled conversation  [x] Outreach again in one week               [] Email / Mail [address]  [] Email / Mail Advance Directive   [] Closing referral.  Routing closure to referring provider/staff and to ACP Specialist . [] Closure letter mailed to patient with invitation to contact ACP Specialist if / when ready. [] Other (Specify here):         [x] At this time, Healthcare Decision [place] Decision Maker:    Secondary Decision Maker: Shelley Jack - Child - [phone]       [] Primary agent named in scanned advance directive. [x] Legal Next of Kin. [] Unable to determine legal decision maker at this time. Outreaches:         [x] 1st -  Date:  9/29/23               Intervention:  [] Spoke with Patient   [x] Left Voice mail [] Email / Mail    [] [employer]  [] Other [id number]) : Outcomes: Outreach phone call to the patient. Unable to reach, left voicemail message with call back information. Will attempt to follow up in one week. [] 2nd -  Date:                 Intervention:  [] Spoke with Patient  [] Left Voice mail [] Email / Mail    [] [employer]  [] Other [id number] :               Outcomes:                [] 3rd -  Date:                Intervention:  [] Spoke with Patient   [] Left Voice mail []

## 2023-10-06 ENCOUNTER — HOSPITAL ENCOUNTER (OUTPATIENT)
Facility: HOSPITAL | Age: 78
End: 2023-10-06
Payer: MEDICARE

## 2023-10-06 ENCOUNTER — CLINICAL DOCUMENTATION (OUTPATIENT)
Dept: CASE MANAGEMENT | Age: 78
End: 2023-10-06

## 2023-10-06 VITALS — WEIGHT: 128 LBS | BODY MASS INDEX: 23.55 KG/M2 | HEIGHT: 62 IN

## 2023-10-06 DIAGNOSIS — M85.80 OSTEOPENIA AFTER MENOPAUSE: ICD-10-CM

## 2023-10-06 DIAGNOSIS — Z78.0 OSTEOPENIA AFTER MENOPAUSE: ICD-10-CM

## 2023-10-06 PROCEDURE — 77080 DXA BONE DENSITY AXIAL: CPT

## 2023-10-06 NOTE — ACP (ADVANCE CARE PLANNING)
Advance Care Planning   Ambulatory ACP Specialist Patient Outreach    Date:  10/6/2023    ACP Specialist:  Eliane Haider [place]    Outreach call to patient in follow-up to ACP Specialist referral from:Kiya Torres APRN - NP    [x] PCP  [] Provider   [] Ambulatory Care Management [] Other     For:                  [x] Advance Directive Assistance              [] Complete Portable DNR order              [] Complete POST/POLST/MOST              [x] Code Status Discussion             [x] Discuss Goals of Care             [] Early ACP Decision-Making              [] Other (Specify)    Date Referral Received:9/28/23    Next Step:   [] ACP scheduled conversation  [x] Outreach again in one week               [] Email / Mail [address]  [] Email / Mail Advance Directive   [] Closing referral.  Routing closure to referring provider/staff and to ACP Specialist . [] Closure letter mailed to patient with invitation to contact ACP Specialist if / when ready. [] Other (Specify here):         [x] At this time, Healthcare Decision Maker Is:      Advance Care Planning   Healthcare Decision Maker:    Secondary Decision Maker: Shelley Santiago [facility] [phone]      [] Primary agent named in scanned advance directive. [x] Legal Next of Kin. [] Unable to determine legal decision maker at this time. Outreaches:       [] 2nd -  Date:  10/6/23               Intervention:  [] Spoke with Patient  [x] Left Voice mail [] Email / Mail    [] [employer]  [] Other [id number]) : Outcomes:ACP team called to try and touch base with this patient again. However, ACP team got pt's voice mail and had to leave a message again.   If ACP team doesn't hear form pt in a week ACP team will move to send out closure letter and close this referral.                 [] 3rd -  Date:                Intervention:  [] Spoke with Patient   [] Left Voice mail [] Email / Mail    [] [employer]  [] Other [phone]) :

## 2023-10-12 ENCOUNTER — CLINICAL DOCUMENTATION (OUTPATIENT)
Dept: SPIRITUAL SERVICES | Age: 78
End: 2023-10-12

## 2023-10-31 ENCOUNTER — TELEPHONE (OUTPATIENT)
Facility: CLINIC | Age: 78
End: 2023-10-31

## 2023-10-31 DIAGNOSIS — M85.80 OSTEOPENIA, UNSPECIFIED LOCATION: Primary | ICD-10-CM

## 2023-10-31 RX ORDER — ALENDRONATE SODIUM 35 MG/1
35 TABLET ORAL
Qty: 12 TABLET | Refills: 1 | Status: SHIPPED | OUTPATIENT
Start: 2023-10-31

## 2023-10-31 NOTE — TELEPHONE ENCOUNTER
----- Message from Christiano Hartman LPN sent at [phone]  4:24 PM EDT -----  Patient notified and would like the medication sent to her pharmacy

## 2023-11-03 RX ORDER — LISINOPRIL 40 MG/1
TABLET ORAL
Qty: 90 TABLET | Refills: 1 | Status: SHIPPED | OUTPATIENT
Start: 2023-11-03

## 2023-11-03 NOTE — TELEPHONE ENCOUNTER
PCP: Endy Torres APRN - NP    Last appt: 9/28/2023    Future Appointments   Date Time Provider Department Center   1/31/2024 10:30 AM Endy Torres APRN - NP [facility] AMB       Requested Prescriptions     Pending Prescriptions Disp Refills    lisinopril (PRINIVIL;ZESTRIL) 40 MG tablet [Pharmacy Med Name: LISINOPRIL TABS 40MG] 90 tablet 3     Sig: TAKE 1 TABLET DAILY

## 2023-11-17 ENCOUNTER — HOSPITAL ENCOUNTER (OUTPATIENT)
Age: 78
End: 2023-11-17
Payer: MEDICARE

## 2023-11-17 DIAGNOSIS — M51.36 DDD (DEGENERATIVE DISC DISEASE), LUMBAR: ICD-10-CM

## 2023-11-17 DIAGNOSIS — M54.16 RADICULOPATHY, LUMBAR REGION: ICD-10-CM

## 2023-11-17 DIAGNOSIS — M54.50 LUMBAR SPINE PAIN: ICD-10-CM

## 2023-11-17 PROCEDURE — 72148 MRI LUMBAR SPINE W/O DYE: CPT

## 2024-01-08 RX ORDER — ATENOLOL 50 MG/1
TABLET ORAL
Qty: 90 TABLET | Refills: 1 | Status: SHIPPED | OUTPATIENT
Start: 2024-01-08

## 2024-01-08 NOTE — TELEPHONE ENCOUNTER
PCP: Endy Torres APRN - NP    Last appt: 9/28/2023    Future Appointments   Date Time Provider Department Center   1/31/2024 10:30 AM Endy Torres APRN - NP [facility] AMB       Requested Prescriptions     Pending Prescriptions Disp Refills    atenolol (TENORMIN) 50 MG tablet [Pharmacy Med Name: ATENOLOL TABS 50MG] 90 tablet 3     Sig: TAKE 1 TABLET NIGHTLY

## 2024-01-24 DIAGNOSIS — M85.80 OSTEOPENIA, UNSPECIFIED LOCATION: ICD-10-CM

## 2024-01-24 NOTE — TELEPHONE ENCOUNTER
Pt will not get mail order until next week and needs a few pills/short order to go to local pharm for her.       alendronate (FOSAMAX) 35 MG tablet  qty of 2 tablets.       To [facility] #[phone]      Pt states she needs this as soon as possible.

## 2024-01-25 RX ORDER — ALENDRONATE SODIUM 35 MG/1
35 TABLET ORAL
Qty: 12 TABLET | Refills: 1 | Status: SHIPPED | OUTPATIENT
Start: 2024-01-25

## 2024-01-25 NOTE — TELEPHONE ENCOUNTER
PCP: Endy Torres APRN - NP    Last appt: 9/28/2023  Future Appointments   Date Time Provider Department Center   1/31/2024 10:30 AM Endy Torres APRN - NP [facility] AMB       Requested Prescriptions     Pending Prescriptions Disp Refills    alendronate (FOSAMAX) 35 MG tablet 12 tablet 1     Sig: Take 1 tablet by mouth every 7 days       Prior labs and Blood pressures:  BP Readings from Last 3 Encounters:   09/28/23 126/70   06/16/23 95/63   05/16/23 132/74     Lab Results   Component Value Date/Time     06/09/2023 03:36 PM    K 4.6 06/09/2023 03:36 PM     06/09/2023 03:36 PM    CO2 23 06/09/2023 03:36 PM    BUN 14 06/09/2023 03:36 PM    GFRAA 58 08/16/2022 12:09 PM     No results found for: \"HBA1C\", \"[id number]\"  Lab Results   Component Value Date/Time    CHOL 144 05/16/2023 11:35 AM    HDL 90 05/16/2023 11:35 AM    VLDL 15 06/18/2020 11:57 AM     No results found for: \"VITD3\", \"VD3RIA\"        Lab Results   Component Value Date/Time    TSH 4.25 03/01/2023 01:52 PM

## 2024-01-31 ENCOUNTER — OFFICE VISIT (OUTPATIENT)
Facility: CLINIC | Age: 79
End: 2024-01-31
Payer: MEDICARE

## 2024-01-31 ENCOUNTER — TELEPHONE (OUTPATIENT)
Facility: CLINIC | Age: 79
End: 2024-01-31

## 2024-01-31 VITALS
DIASTOLIC BLOOD PRESSURE: 86 MMHG | HEIGHT: 62 IN | SYSTOLIC BLOOD PRESSURE: 142 MMHG | OXYGEN SATURATION: 100 % | TEMPERATURE: 97.9 F | HEART RATE: 75 BPM | BODY MASS INDEX: 23.92 KG/M2 | RESPIRATION RATE: 16 BRPM | WEIGHT: 130 LBS

## 2024-01-31 DIAGNOSIS — G63 POLYNEUROPATHY IN DISEASES CLASSIFIED ELSEWHERE (HCC): ICD-10-CM

## 2024-01-31 DIAGNOSIS — I10 ESSENTIAL HYPERTENSION: ICD-10-CM

## 2024-01-31 DIAGNOSIS — E03.9 ACQUIRED HYPOTHYROIDISM: ICD-10-CM

## 2024-01-31 DIAGNOSIS — M54.32 SCIATICA, LEFT SIDE: ICD-10-CM

## 2024-01-31 DIAGNOSIS — I73.9 PERIPHERAL VASCULAR DISEASE, UNSPECIFIED (HCC): ICD-10-CM

## 2024-01-31 DIAGNOSIS — M35.3 POLYMYALGIA RHEUMATICA (HCC): Primary | ICD-10-CM

## 2024-01-31 DIAGNOSIS — M70.62 TROCHANTERIC BURSITIS OF LEFT HIP: ICD-10-CM

## 2024-01-31 DIAGNOSIS — E78.2 MIXED HYPERLIPIDEMIA: ICD-10-CM

## 2024-01-31 PROCEDURE — G8399 PT W/DXA RESULTS DOCUMENT: HCPCS | Performed by: NURSE PRACTITIONER

## 2024-01-31 PROCEDURE — 1090F PRES/ABSN URINE INCON ASSESS: CPT | Performed by: NURSE PRACTITIONER

## 2024-01-31 PROCEDURE — G8427 DOCREV CUR MEDS BY ELIG CLIN: HCPCS | Performed by: NURSE PRACTITIONER

## 2024-01-31 PROCEDURE — 1123F ACP DISCUSS/DSCN MKR DOCD: CPT | Performed by: NURSE PRACTITIONER

## 2024-01-31 PROCEDURE — 3079F DIAST BP 80-89 MM HG: CPT | Performed by: NURSE PRACTITIONER

## 2024-01-31 PROCEDURE — G8420 CALC BMI NORM PARAMETERS: HCPCS | Performed by: NURSE PRACTITIONER

## 2024-01-31 PROCEDURE — 99214 OFFICE O/P EST MOD 30 MIN: CPT | Performed by: NURSE PRACTITIONER

## 2024-01-31 PROCEDURE — 3077F SYST BP >= 140 MM HG: CPT | Performed by: NURSE PRACTITIONER

## 2024-01-31 PROCEDURE — G8484 FLU IMMUNIZE NO ADMIN: HCPCS | Performed by: NURSE PRACTITIONER

## 2024-01-31 PROCEDURE — 1036F TOBACCO NON-USER: CPT | Performed by: NURSE PRACTITIONER

## 2024-01-31 RX ORDER — TRAMADOL HYDROCHLORIDE 50 MG/1
50 TABLET ORAL EVERY 8 HOURS PRN
Qty: 21 TABLET | Refills: 0 | Status: SHIPPED | OUTPATIENT
Start: 2024-01-31 | End: 2024-02-07

## 2024-01-31 RX ORDER — IBUPROFEN 200 MG
1 CAPSULE ORAL DAILY
COMMUNITY

## 2024-01-31 SDOH — ECONOMIC STABILITY: INCOME INSECURITY: HOW HARD IS IT FOR YOU TO PAY FOR THE VERY BASICS LIKE FOOD, HOUSING, MEDICAL CARE, AND HEATING?: NOT HARD AT ALL

## 2024-01-31 SDOH — ECONOMIC STABILITY: FOOD INSECURITY: WITHIN THE PAST 12 MONTHS, YOU WORRIED THAT YOUR FOOD WOULD RUN OUT BEFORE YOU GOT MONEY TO BUY MORE.: NEVER TRUE

## 2024-01-31 SDOH — ECONOMIC STABILITY: FOOD INSECURITY: WITHIN THE PAST 12 MONTHS, THE FOOD YOU BOUGHT JUST DIDN'T LAST AND YOU DIDN'T HAVE MONEY TO GET MORE.: NEVER TRUE

## 2024-01-31 ASSESSMENT — PATIENT HEALTH QUESTIONNAIRE - PHQ9
1. LITTLE INTEREST OR PLEASURE IN DOING THINGS: 0
2. FEELING DOWN, DEPRESSED OR HOPELESS: 0
SUM OF ALL RESPONSES TO PHQ QUESTIONS 1-9: 0
SUM OF ALL RESPONSES TO PHQ9 QUESTIONS 1 & 2: 0
SUM OF ALL RESPONSES TO PHQ QUESTIONS 1-9: 0

## 2024-01-31 NOTE — PROGRESS NOTES
Chief Complaint   Patient presents with    Thyroid Problem     4 month follow-up     Hypertension     4 month follow-up          Health Maintenance Due   Topic Date Due    Respiratory Syncytial Virus (RSV) Pregnant or age 60 yrs+ (1 - 1-dose 60+ series) Never done    Shingles vaccine (2 of 3) 03/28/2008    DTaP/Tdap/Td vaccine (2 - Td or Tdap) 02/18/2023    COVID-19 Vaccine (3 - 2023-24 season) 09/01/2023         \"Have you been to the ER, urgent care clinic since your last visit?  Hospitalized since your last visit?\"    NO    “Have you seen or consulted any other health care providers outside of [facility] since your last visit?”    Yes, on file            
melanoma right arm    CT UNLISTED PROCEDURE CARDIAC SURGERY      stents x2    VASCULAR SURGERY      X5 right femoral bypass    VASCULAR SURGERY      femoral stent - left     Allergies   Allergen Reactions    Neomycin      Other reaction(s): Unknown (comments)       REVIEW OF SYSTEMS:                                        POSITIVE= bold text  Negative = regular text    General:                     fever, chills, sweats, generalized weakness, weight loss/gain,                                       loss of appetite   Eyes:                           blurred vision, eye pain, loss of vision, double vision  ENT:                            rhinorrhea, pharyngitis   Respiratory:               cough, sputum production, SOB, GARZA, wheezing, pleuritic pain   Cardiology:                chest pain, palpitations, orthopnea, PND, edema, syncope   Gastrointestinal:       abdominal pain , N/V, diarrhea, dysphagia, constipation, bleeding   Genitourinary:           frequency, urgency, dysuria, hematuria, incontinence   Muskuloskeletal :      arthralgia, myalgia, back pain  Hematology:              easy bruising, nose or gum bleeding, lymphadenopathy   Dermatological:         rash, ulceration, pruritis, color change / jaundice  Endocrine:                 hot flashes or polydipsia   Neurological:             headache, dizziness, confusion, focal weakness, paresthesia,                                      Speech difficulties, memory loss, gait difficulty  Psychological:          Feelings of anxiety, depression, agitation        Social History     Socioeconomic History    Marital status:      Spouse name: None    Number of children: None    Years of education: None    Highest education level: None   Tobacco Use    Smoking status: Former     Current packs/day: 0.00     Average packs/day: 1 pack/day for 25.0 years (25.0 ttl pk-yrs)     Types: Cigarettes     Start date: 1/21/1983     Quit date: 1/21/2008     Years since quitting:

## 2024-01-31 NOTE — TELEPHONE ENCOUNTER
Patient had a follow up question regarding her Fosamax.      She states that she would like to know is she supposed to take Fosamax and Calcium and if she is supposed to take them, what dose should she be taking.

## 2024-02-01 LAB
ALBUMIN SERPL-MCNC: 3.9 G/DL (ref 3.5–5)
ALBUMIN/GLOB SERPL: 1.1 (ref 1.1–2.2)
ALP SERPL-CCNC: 65 U/L (ref 45–117)
ALT SERPL-CCNC: 26 U/L (ref 12–78)
ANION GAP SERPL CALC-SCNC: 6 MMOL/L (ref 5–15)
AST SERPL-CCNC: 20 U/L (ref 15–37)
BILIRUB SERPL-MCNC: 0.4 MG/DL (ref 0.2–1)
BUN SERPL-MCNC: 11 MG/DL (ref 6–20)
BUN/CREAT SERPL: 12 (ref 12–20)
CALCIUM SERPL-MCNC: 9.5 MG/DL (ref 8.5–10.1)
CHLORIDE SERPL-SCNC: 107 MMOL/L (ref 97–108)
CHOLEST SERPL-MCNC: 148 MG/DL
CO2 SERPL-SCNC: 28 MMOL/L (ref 21–32)
CREAT SERPL-MCNC: 0.92 MG/DL (ref 0.55–1.02)
ERYTHROCYTE [DISTWIDTH] IN BLOOD BY AUTOMATED COUNT: 14 % (ref 11.5–14.5)
GLOBULIN SER CALC-MCNC: 3.5 G/DL (ref 2–4)
GLUCOSE SERPL-MCNC: 107 MG/DL (ref 65–100)
HCT VFR BLD AUTO: 38.6 % (ref 35–47)
HDLC SERPL-MCNC: 92 MG/DL
HDLC SERPL: 1.6 (ref 0–5)
HGB BLD-MCNC: 12.9 G/DL (ref 11.5–16)
LDLC SERPL CALC-MCNC: 45.6 MG/DL (ref 0–100)
MCH RBC QN AUTO: 33.2 PG (ref 26–34)
MCHC RBC AUTO-ENTMCNC: 33.4 G/DL (ref 30–36.5)
MCV RBC AUTO: 99.2 FL (ref 80–99)
NRBC # BLD: 0 K/UL (ref 0–0.01)
NRBC BLD-RTO: 0 PER 100 WBC
PLATELET # BLD AUTO: 248 K/UL (ref 150–400)
PMV BLD AUTO: 10.4 FL (ref 8.9–12.9)
POTASSIUM SERPL-SCNC: 4.6 MMOL/L (ref 3.5–5.1)
PROT SERPL-MCNC: 7.4 G/DL (ref 6.4–8.2)
RBC # BLD AUTO: 3.89 M/UL (ref 3.8–5.2)
SODIUM SERPL-SCNC: 141 MMOL/L (ref 136–145)
T4 FREE SERPL-MCNC: 1.1 NG/DL (ref 0.8–1.5)
TRIGL SERPL-MCNC: 52 MG/DL
TSH SERPL DL<=0.05 MIU/L-ACNC: 3.88 UIU/ML (ref 0.36–3.74)
VLDLC SERPL CALC-MCNC: 10.4 MG/DL
WBC # BLD AUTO: 8.7 K/UL (ref 3.6–11)

## 2024-02-01 RX ORDER — LEVOTHYROXINE SODIUM 0.1 MG/1
100 TABLET ORAL DAILY
Qty: 90 TABLET | Refills: 1 | Status: SHIPPED | OUTPATIENT
Start: 2024-02-01

## 2024-02-01 RX ORDER — BUPROPION HYDROCHLORIDE 150 MG/1
150 TABLET, EXTENDED RELEASE ORAL DAILY
Qty: 90 TABLET | Refills: 3 | OUTPATIENT
Start: 2024-02-01

## 2024-02-01 NOTE — TELEPHONE ENCOUNTER
PCP: Endy Torres APRN - NP    Last appt: 1/31/2024    Future Appointments   Date Time Provider Department Center   3/13/2024  1:20 PM LAB ONLY [facility]   6/4/2024  1:00 PM Endy Torres APRN - NP [facility] BS AMB       Requested Prescriptions     Pending Prescriptions Disp Refills    buPROPion (WELLBUTRIN SR) 150 MG extended release tablet [Pharmacy Med Name: BUPROPION HCL SR TABS 150MG] 90 tablet 3     Sig: TAKE 1 TABLET DAILY

## 2024-02-02 DIAGNOSIS — E03.9 ACQUIRED HYPOTHYROIDISM: Primary | ICD-10-CM

## 2024-02-28 RX ORDER — ATORVASTATIN CALCIUM 40 MG/1
40 TABLET, FILM COATED ORAL DAILY
Qty: 90 TABLET | Refills: 1 | Status: SHIPPED | OUTPATIENT
Start: 2024-02-28

## 2024-02-28 NOTE — TELEPHONE ENCOUNTER
PCP: Endy Torres APRN - NP    Last appt: 1/31/2024    Future Appointments   Date Time Provider Department Center   3/13/2024  1:20 PM LAB ONLY [facility]   6/4/2024  1:00 PM Endy Torres, APRN - NP [facility] BS AMB       Requested Prescriptions     Pending Prescriptions Disp Refills    atorvastatin (LIPITOR) 40 MG tablet [Pharmacy Med Name: ATORVASTATIN TABS 40MG] 90 tablet 3     Sig: TAKE 1 TABLET DAILY

## 2024-03-13 ENCOUNTER — NURSE ONLY (OUTPATIENT)
Facility: CLINIC | Age: 79
End: 2024-03-13

## 2024-03-13 DIAGNOSIS — E03.9 ACQUIRED HYPOTHYROIDISM: ICD-10-CM

## 2024-03-14 LAB
T4 FREE SERPL-MCNC: 1.3 NG/DL (ref 0.8–1.5)
TSH SERPL DL<=0.05 MIU/L-ACNC: 1.84 UIU/ML (ref 0.36–3.74)

## 2024-03-28 DIAGNOSIS — M85.80 OSTEOPENIA, UNSPECIFIED LOCATION: ICD-10-CM

## 2024-03-28 RX ORDER — ALENDRONATE SODIUM 35 MG/1
35 TABLET ORAL
Qty: 12 TABLET | Refills: 1 | Status: SHIPPED | OUTPATIENT
Start: 2024-03-28

## 2024-03-28 NOTE — TELEPHONE ENCOUNTER
PCP: Endy Torres APRN - NP    Last appt: 1/31/2024    Future Appointments   Date Time Provider Department Center   6/4/2024  1:00 PM Endy Torres APRN - NP [facility] AMB       Requested Prescriptions     Pending Prescriptions Disp Refills    alendronate (FOSAMAX) 35 MG tablet [Pharmacy Med Name: ALENDRONATE SOD TABS 4'S 35MG] 12 tablet 3     Sig: TAKE 1 TABLET EVERY 7 DAYS

## 2024-03-29 RX ORDER — DULOXETIN HYDROCHLORIDE 60 MG/1
60 CAPSULE, DELAYED RELEASE ORAL DAILY
Qty: 90 CAPSULE | Refills: 0 | Status: SHIPPED | OUTPATIENT
Start: 2024-03-29

## 2024-03-29 NOTE — TELEPHONE ENCOUNTER
PCP: Endy Torres APRN - NP    Last appt: 1/31/2024    Future Appointments   Date Time Provider Department Center   6/4/2024  1:00 PM Endy Torres APRN - NP [facility] AMB       Requested Prescriptions     Pending Prescriptions Disp Refills    DULoxetine (CYMBALTA) 60 MG extended release capsule [Pharmacy Med Name: DULOXETINE HCL DR CAPS 60MG] 90 capsule 0     Sig: TAKE 1 CAPSULE DAILY

## 2024-05-01 RX ORDER — LISINOPRIL 40 MG/1
TABLET ORAL
Qty: 90 TABLET | Refills: 1 | Status: SHIPPED | OUTPATIENT
Start: 2024-05-01

## 2024-05-01 NOTE — TELEPHONE ENCOUNTER
PCP: Endy Torres APRN - NP    Last appt: 1/31/2024    Future Appointments   Date Time Provider Department Center   6/4/2024  1:00 PM Endy Torres APRN - NP [facility] BS AMB       Requested Prescriptions     Pending Prescriptions Disp Refills    lisinopril (PRINIVIL;ZESTRIL) 40 MG tablet [Pharmacy Med Name: LISINOPRIL TABS 40MG] 90 tablet 3     Sig: TAKE 1 TABLET DAILY

## 2024-05-28 RX ORDER — CILOSTAZOL 100 MG/1
100 TABLET ORAL 2 TIMES DAILY
Qty: 180 TABLET | Refills: 1 | Status: SHIPPED | OUTPATIENT
Start: 2024-05-28

## 2024-05-28 NOTE — TELEPHONE ENCOUNTER
PCP: Endy Torres APRN - NP    Last appt: 1/31/2024    Future Appointments   Date Time Provider Department Center   6/4/2024  1:00 PM Endy Torres APRN - NP [facility] AMB       Requested Prescriptions     Pending Prescriptions Disp Refills    cilostazol (PLETAL) 100 MG tablet [Pharmacy Med Name: CILOSTAZOL TABS 100MG] 180 tablet 3     Sig: TAKE 1 TABLET TWICE A DAY

## 2024-06-04 ENCOUNTER — OFFICE VISIT (OUTPATIENT)
Facility: CLINIC | Age: 79
End: 2024-06-04
Payer: MEDICARE

## 2024-06-04 VITALS
WEIGHT: 130.2 LBS | HEART RATE: 83 BPM | RESPIRATION RATE: 16 BRPM | OXYGEN SATURATION: 98 % | BODY MASS INDEX: 23.96 KG/M2 | HEIGHT: 62 IN | DIASTOLIC BLOOD PRESSURE: 70 MMHG | SYSTOLIC BLOOD PRESSURE: 124 MMHG | TEMPERATURE: 98.1 F

## 2024-06-04 DIAGNOSIS — F41.8 ANTICIPATORY ANXIETY: ICD-10-CM

## 2024-06-04 DIAGNOSIS — E03.9 ACQUIRED HYPOTHYROIDISM: ICD-10-CM

## 2024-06-04 DIAGNOSIS — I10 ESSENTIAL HYPERTENSION: Primary | ICD-10-CM

## 2024-06-04 DIAGNOSIS — M62.830 MUSCLE SPASM OF BACK: ICD-10-CM

## 2024-06-04 DIAGNOSIS — E78.2 MIXED HYPERLIPIDEMIA: ICD-10-CM

## 2024-06-04 PROCEDURE — G8399 PT W/DXA RESULTS DOCUMENT: HCPCS | Performed by: NURSE PRACTITIONER

## 2024-06-04 PROCEDURE — 3078F DIAST BP <80 MM HG: CPT | Performed by: NURSE PRACTITIONER

## 2024-06-04 PROCEDURE — 1036F TOBACCO NON-USER: CPT | Performed by: NURSE PRACTITIONER

## 2024-06-04 PROCEDURE — G8427 DOCREV CUR MEDS BY ELIG CLIN: HCPCS | Performed by: NURSE PRACTITIONER

## 2024-06-04 PROCEDURE — 3074F SYST BP LT 130 MM HG: CPT | Performed by: NURSE PRACTITIONER

## 2024-06-04 PROCEDURE — 99214 OFFICE O/P EST MOD 30 MIN: CPT | Performed by: NURSE PRACTITIONER

## 2024-06-04 PROCEDURE — 1123F ACP DISCUSS/DSCN MKR DOCD: CPT | Performed by: NURSE PRACTITIONER

## 2024-06-04 PROCEDURE — G8420 CALC BMI NORM PARAMETERS: HCPCS | Performed by: NURSE PRACTITIONER

## 2024-06-04 PROCEDURE — 1090F PRES/ABSN URINE INCON ASSESS: CPT | Performed by: NURSE PRACTITIONER

## 2024-06-04 RX ORDER — EMOLLIENT COMBINATION NO.43
CREAM (GRAM) TOPICAL
COMMUNITY

## 2024-06-04 RX ORDER — TIZANIDINE 2 MG/1
2 TABLET ORAL 3 TIMES DAILY PRN
Qty: 30 TABLET | Refills: 0 | Status: SHIPPED | OUTPATIENT
Start: 2024-06-04

## 2024-06-04 RX ORDER — ALCLOMETASONE DIPROPIONATE 0.5 MG/G
OINTMENT TOPICAL
COMMUNITY
Start: 2024-05-15

## 2024-06-04 RX ORDER — CICLOPIROX 80 MG/ML
SOLUTION TOPICAL
COMMUNITY
Start: 2024-02-26

## 2024-06-04 RX ORDER — ALPRAZOLAM 0.25 MG/1
0.25 TABLET ORAL 3 TIMES DAILY PRN
Qty: 10 TABLET | Refills: 0 | Status: SHIPPED | OUTPATIENT
Start: 2024-06-04 | End: 2024-07-04

## 2024-06-04 NOTE — PROGRESS NOTES
Chief Complaint   Patient presents with    Thyroid Problem     4M follow up       SUBJECTIVE:    Robyn Valenzuela is a 79 y.o. female who is here today for a follow up appointment regarding current medical conditions including: Essential hypertension, acquired hypothyroidism, mixed hyperlipidemia, and would like to discuss concerns about flying, anxiety, and history of muscle spasms to her low back.    The patient remains on atenolol and lisinopril daily for management of her hypertension.  Her blood pressure has remained stable and in good control overall.  She denies any adverse side effects of the medication.  She is also on atorvastatin daily for management of her mixed hyperlipidemia and tolerates this well.  She denies any adverse side effects of the medication.  She denies any recent episodes of chest pain, chest pressure, shortness of breath, headaches, dizziness, blurred vision, palpitations, or syncope episodes.    She continues to take levothyroxine daily for management of her hypothyroidism and is tolerating this well.  Her last TSH was within normal limits.    Additionally, she would like a small prescription of Xanax to help for a planned flight/vacation in the near future.  She states she has taken this in the past and the medication has been helpful.  She would also like a refill of the muscle relaxer as she sometimes has occasional low back spasms which this is helpful for her.      Current Outpatient Medications   Medication Sig Dispense Refill    ciclopirox (PENLAC) 8 % solution APPLY TOPICALLY TO ALL TOENAILS ONCE DAILY, REMOVE WITH ALCOHOL ONCE EVERY 7 DAYS      alclomethasone (ACLOVATE) 0.05 % ointment APPLY TO AFFECTED AREA( NOSE) 4 TIMES A DAY      Antiseborrheic Products, Misc. (PROMISEB) CREA Apply topically      cilostazol (PLETAL) 100 MG tablet TAKE 1 TABLET TWICE A  tablet 1    lisinopril (PRINIVIL;ZESTRIL) 40 MG tablet TAKE 1 TABLET DAILY 90 tablet 1    DULoxetine
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
## 2024-06-10 RX ORDER — OMEPRAZOLE 20 MG/1
CAPSULE, DELAYED RELEASE ORAL
Qty: 90 CAPSULE | Refills: 1 | Status: SHIPPED | OUTPATIENT
Start: 2024-06-10

## 2024-06-10 NOTE — TELEPHONE ENCOUNTER
PCP: Endy Torres APRN - NP    Last appt: 6/4/2024    Future Appointments   Date Time Provider Department Center   10/7/2024  1:00 PM Endy Torres APRN - NP [facility]       Requested Prescriptions     Pending Prescriptions Disp Refills    omeprazole (PRILOSEC) 20 MG delayed release capsule [Pharmacy Med Name: OMEPRAZOLE DR CAPS 20MG] 90 capsule 1     Sig: TAKE 1 CAPSULE DAILY

## 2024-06-14 RX ORDER — DULOXETIN HYDROCHLORIDE 60 MG/1
60 CAPSULE, DELAYED RELEASE ORAL DAILY
Qty: 90 CAPSULE | Refills: 1 | Status: SHIPPED | OUTPATIENT
Start: 2024-06-14

## 2024-06-14 NOTE — TELEPHONE ENCOUNTER
PCP: Endy Torres APRN - NP    Last appt: 6/4/2024    Future Appointments   Date Time Provider Department Center   10/7/2024  1:00 PM Endy Torres APRN - NP [facility]       Requested Prescriptions     Pending Prescriptions Disp Refills    DULoxetine (CYMBALTA) 60 MG extended release capsule [Pharmacy Med Name: DULOXETINE HCL DR CAPS 60MG] 90 capsule 3     Sig: TAKE 1 CAPSULE DAILY

## 2024-07-08 RX ORDER — ATENOLOL 50 MG/1
TABLET ORAL
Qty: 90 TABLET | Refills: 1 | Status: SHIPPED | OUTPATIENT
Start: 2024-07-08

## 2024-07-08 NOTE — TELEPHONE ENCOUNTER
PCP: Endy Torres APRN - NP    Last appt: 6/4/2024    Future Appointments   Date Time Provider Department Center   10/7/2024  1:00 PM Endy Torres APRN - NP [facility]       Requested Prescriptions     Pending Prescriptions Disp Refills    atenolol (TENORMIN) 50 MG tablet [Pharmacy Med Name: ATENOLOL TABS 50MG] 90 tablet 1     Sig: TAKE 1 TABLET NIGHTLY

## 2024-08-05 DIAGNOSIS — E03.9 ACQUIRED HYPOTHYROIDISM: ICD-10-CM

## 2024-08-05 RX ORDER — LEVOTHYROXINE SODIUM 0.1 MG/1
100 TABLET ORAL DAILY
Qty: 90 TABLET | Refills: 1 | Status: SHIPPED | OUTPATIENT
Start: 2024-08-05

## 2024-08-05 NOTE — TELEPHONE ENCOUNTER
Pharmacy: [facility] on [facility] [address] [facility]    levothyroxine (SYNTHROID) 100 MCG tablet [[id number]]    Order Details  Dose: 100 mcg Route: Oral Frequency: DAILY   Dispense Quantity: 90 tablet Refills: 1          Sig: Take 1 tablet by mouth daily

## 2024-08-05 NOTE — TELEPHONE ENCOUNTER
PCP: Endy Torres APRN - NP    Last appt: 6/4/2024    Future Appointments   Date Time Provider Department Center   10/7/2024  1:00 PM Endy Torres APRN - NP [facility] DEP       Requested Prescriptions     Pending Prescriptions Disp Refills    levothyroxine (SYNTHROID) 100 MCG tablet 90 tablet 1     Sig: Take 1 tablet by mouth daily   \

## 2024-08-26 RX ORDER — ATORVASTATIN CALCIUM 40 MG/1
40 TABLET, FILM COATED ORAL DAILY
Qty: 90 TABLET | Refills: 1 | Status: SHIPPED | OUTPATIENT
Start: 2024-08-26

## 2024-08-26 NOTE — TELEPHONE ENCOUNTER
PCP: Endy Torres APRN - NP    Last appt: 6/4/2024    Future Appointments   Date Time Provider Department Center   10/7/2024  1:00 PM Endy Torres APRN - NP [facility] DE[facility]       Requested Prescriptions     Pending Prescriptions Disp Refills    atorvastatin (LIPITOR) 40 MG tablet [Pharmacy Med Name: ATORVASTATIN TABS 40MG] 90 tablet 1     Sig: TAKE 1 TABLET DAILY

## 2024-09-12 DIAGNOSIS — M85.80 OSTEOPENIA, UNSPECIFIED LOCATION: ICD-10-CM

## 2024-09-13 RX ORDER — ALENDRONATE SODIUM 35 MG/1
35 TABLET ORAL
Qty: 12 TABLET | Refills: 3 | Status: SHIPPED | OUTPATIENT
Start: 2024-09-13

## 2024-10-07 ENCOUNTER — OFFICE VISIT (OUTPATIENT)
Facility: CLINIC | Age: 79
End: 2024-10-07
Payer: MEDICARE

## 2024-10-07 VITALS
DIASTOLIC BLOOD PRESSURE: 92 MMHG | HEART RATE: 87 BPM | RESPIRATION RATE: 16 BRPM | TEMPERATURE: 97.9 F | WEIGHT: 127.8 LBS | BODY MASS INDEX: 23.37 KG/M2 | SYSTOLIC BLOOD PRESSURE: 164 MMHG | OXYGEN SATURATION: 95 %

## 2024-10-07 DIAGNOSIS — I25.10 CORONARY ARTERY DISEASE INVOLVING NATIVE CORONARY ARTERY OF NATIVE HEART WITHOUT ANGINA PECTORIS: ICD-10-CM

## 2024-10-07 DIAGNOSIS — Z23 NEEDS FLU SHOT: ICD-10-CM

## 2024-10-07 DIAGNOSIS — I73.9 PVD (PERIPHERAL VASCULAR DISEASE) (HCC): ICD-10-CM

## 2024-10-07 DIAGNOSIS — K21.9 GASTROESOPHAGEAL REFLUX DISEASE WITHOUT ESOPHAGITIS: ICD-10-CM

## 2024-10-07 DIAGNOSIS — E03.9 ACQUIRED HYPOTHYROIDISM: ICD-10-CM

## 2024-10-07 DIAGNOSIS — Z00.00 MEDICARE ANNUAL WELLNESS VISIT, SUBSEQUENT: Primary | ICD-10-CM

## 2024-10-07 DIAGNOSIS — I10 ESSENTIAL HYPERTENSION: ICD-10-CM

## 2024-10-07 DIAGNOSIS — E78.2 MIXED HYPERLIPIDEMIA: ICD-10-CM

## 2024-10-07 DIAGNOSIS — Z71.89 ACP (ADVANCE CARE PLANNING): ICD-10-CM

## 2024-10-07 PROCEDURE — G0439 PPPS, SUBSEQ VISIT: HCPCS | Performed by: NURSE PRACTITIONER

## 2024-10-07 PROCEDURE — G8482 FLU IMMUNIZE ORDER/ADMIN: HCPCS | Performed by: NURSE PRACTITIONER

## 2024-10-07 PROCEDURE — G0008 ADMIN INFLUENZA VIRUS VAC: HCPCS | Performed by: NURSE PRACTITIONER

## 2024-10-07 PROCEDURE — 3077F SYST BP >= 140 MM HG: CPT | Performed by: NURSE PRACTITIONER

## 2024-10-07 PROCEDURE — 3078F DIAST BP <80 MM HG: CPT | Performed by: NURSE PRACTITIONER

## 2024-10-07 PROCEDURE — 1123F ACP DISCUSS/DSCN MKR DOCD: CPT | Performed by: NURSE PRACTITIONER

## 2024-10-07 PROCEDURE — 90653 IIV ADJUVANT VACCINE IM: CPT | Performed by: NURSE PRACTITIONER

## 2024-10-07 RX ORDER — HYDROCHLOROTHIAZIDE 12.5 MG/1
12.5 TABLET ORAL DAILY
Qty: 90 TABLET | Refills: 0 | Status: SHIPPED | OUTPATIENT
Start: 2024-10-07

## 2024-10-07 ASSESSMENT — PATIENT HEALTH QUESTIONNAIRE - PHQ9
8. MOVING OR SPEAKING SO SLOWLY THAT OTHER PEOPLE COULD HAVE NOTICED. OR THE OPPOSITE, BEING SO FIGETY OR RESTLESS THAT YOU HAVE BEEN MOVING AROUND A LOT MORE THAN USUAL: NOT AT ALL
7. TROUBLE CONCENTRATING ON THINGS, SUCH AS READING THE NEWSPAPER OR WATCHING TELEVISION: NOT AT ALL
10. IF YOU CHECKED OFF ANY PROBLEMS, HOW DIFFICULT HAVE THESE PROBLEMS MADE IT FOR YOU TO DO YOUR WORK, TAKE CARE OF THINGS AT HOME, OR GET ALONG WITH OTHER PEOPLE: NOT DIFFICULT AT ALL
SUM OF ALL RESPONSES TO PHQ QUESTIONS 1-9: 7
9. THOUGHTS THAT YOU WOULD BE BETTER OFF DEAD, OR OF HURTING YOURSELF: NOT AT ALL
3. TROUBLE FALLING OR STAYING ASLEEP: NOT AT ALL
SUM OF ALL RESPONSES TO PHQ9 QUESTIONS 1 & 2: 4
2. FEELING DOWN, DEPRESSED OR HOPELESS: SEVERAL DAYS
4. FEELING TIRED OR HAVING LITTLE ENERGY: NEARLY EVERY DAY
SUM OF ALL RESPONSES TO PHQ QUESTIONS 1-9: 7
1. LITTLE INTEREST OR PLEASURE IN DOING THINGS: NEARLY EVERY DAY
SUM OF ALL RESPONSES TO PHQ QUESTIONS 1-9: 7
SUM OF ALL RESPONSES TO PHQ QUESTIONS 1-9: 7
6. FEELING BAD ABOUT YOURSELF - OR THAT YOU ARE A FAILURE OR HAVE LET YOURSELF OR YOUR FAMILY DOWN: NOT AT ALL

## 2024-10-07 ASSESSMENT — LIFESTYLE VARIABLES
HOW OFTEN DO YOU HAVE A DRINK CONTAINING ALCOHOL: 2-3 TIMES A WEEK
HOW MANY STANDARD DRINKS CONTAINING ALCOHOL DO YOU HAVE ON A TYPICAL DAY: 1 OR 2

## 2024-10-07 NOTE — PROGRESS NOTES
Medicare Annual Wellness Visit    Robyn Valenzuela is here for Medicare AWV    Assessment & Plan   Medicare annual wellness visit, subsequent  PVD (peripheral vascular disease) (HCC)  Coronary artery disease involving native coronary artery of native heart without angina pectoris  Essential hypertension  -     CBC; Future  -     Comprehensive Metabolic Panel; Future  -     hydroCHLOROthiazide 12.5 MG tablet; Take 1 tablet by mouth daily, Disp-90 tablet, R-0Normal  Mixed hyperlipidemia  -     Lipid Panel; Future  Acquired hypothyroidism  -     TSH + Free T4 Panel; Future  Gastroesophageal reflux disease without esophagitis  Needs flu shot  -     Influenza, FLUAD Trivalent, (age 65 y+), IM, Preservative Free, 0.5mL  ACP (advance care planning)    Recommendations for Preventive Services Due: see orders and patient instructions/AVS.  Recommended screening schedule for the next 5-10 years is provided to the patient in written form: see Patient Instructions/AVS.     Return in about 6 weeks (around 11/18/2024) for BP follow up, No labs planned.     Subjective       Patient's complete Health Risk Assessment and screening values have been reviewed and are found in Flowsheets. The following problems were reviewed today and where indicated follow up appointments were made and/or referrals ordered.    Positive Risk Factor Screenings with Interventions:    Fall Risk:  Do you feel unsteady or are you worried about falling? : (!) yes  2 or more falls in past year?: no  Fall with injury in past year?: no     Interventions:    Reviewed medications, home hazards, visual acuity, and co-morbidities that can increase risk for falls     Depression:  PHQ-2 Score: 4  PHQ-9 Total Score: 7  Total Score Interpretation: 5-9 = mild depression  Interventions:  Patient declines any further evaluation or treatment          General HRA Questions:  Select all that apply: (!) New or Increased Fatigue, Stress  Interventions Fatigue:  Patient

## 2024-10-07 NOTE — PROGRESS NOTES
Chief Complaint   Patient presents with    Medicare AWV    1. Have you been to the ER, urgent care clinic since your last visit?  Hospitalized since your last visit?no    2. Have you seen or consulted any other health care providers outside of the [facility] since your last visit?  Include any pap smears or colon screening. no

## 2024-10-08 LAB
ALBUMIN SERPL-MCNC: 3.7 G/DL (ref 3.5–5)
ALBUMIN/GLOB SERPL: 1.1 (ref 1.1–2.2)
ALP SERPL-CCNC: 60 U/L (ref 45–117)
ALT SERPL-CCNC: 25 U/L (ref 12–78)
ANION GAP SERPL CALC-SCNC: 4 MMOL/L (ref 2–12)
AST SERPL-CCNC: 19 U/L (ref 15–37)
BILIRUB SERPL-MCNC: 0.4 MG/DL (ref 0.2–1)
BUN SERPL-MCNC: 10 MG/DL (ref 6–20)
BUN/CREAT SERPL: 13 (ref 12–20)
CALCIUM SERPL-MCNC: 9.2 MG/DL (ref 8.5–10.1)
CHLORIDE SERPL-SCNC: 107 MMOL/L (ref 97–108)
CHOLEST SERPL-MCNC: 147 MG/DL
CO2 SERPL-SCNC: 28 MMOL/L (ref 21–32)
CREAT SERPL-MCNC: 0.78 MG/DL (ref 0.55–1.02)
ERYTHROCYTE [DISTWIDTH] IN BLOOD BY AUTOMATED COUNT: 14.4 % (ref 11.5–14.5)
GLOBULIN SER CALC-MCNC: 3.3 G/DL (ref 2–4)
GLUCOSE SERPL-MCNC: 87 MG/DL (ref 65–100)
HCT VFR BLD AUTO: 37.4 % (ref 35–47)
HDLC SERPL-MCNC: 94 MG/DL
HDLC SERPL: 1.6 (ref 0–5)
HGB BLD-MCNC: 11.8 G/DL (ref 11.5–16)
LDLC SERPL CALC-MCNC: 41.4 MG/DL (ref 0–100)
MCH RBC QN AUTO: 31.4 PG (ref 26–34)
MCHC RBC AUTO-ENTMCNC: 31.6 G/DL (ref 30–36.5)
MCV RBC AUTO: 99.5 FL (ref 80–99)
NRBC # BLD: 0 K/UL (ref 0–0.01)
NRBC BLD-RTO: 0 PER 100 WBC
PLATELET # BLD AUTO: 282 K/UL (ref 150–400)
PMV BLD AUTO: 10.4 FL (ref 8.9–12.9)
POTASSIUM SERPL-SCNC: 5.7 MMOL/L (ref 3.5–5.1)
PROT SERPL-MCNC: 7 G/DL (ref 6.4–8.2)
RBC # BLD AUTO: 3.76 M/UL (ref 3.8–5.2)
SODIUM SERPL-SCNC: 139 MMOL/L (ref 136–145)
T4 FREE SERPL-MCNC: 1.3 NG/DL (ref 0.8–1.5)
TRIGL SERPL-MCNC: 58 MG/DL
TSH SERPL DL<=0.05 MIU/L-ACNC: 0.96 UIU/ML (ref 0.36–3.74)
VLDLC SERPL CALC-MCNC: 11.6 MG/DL
WBC # BLD AUTO: 8.7 K/UL (ref 3.6–11)

## 2024-10-14 ENCOUNTER — TELEPHONE (OUTPATIENT)
Facility: CLINIC | Age: 79
End: 2024-10-14

## 2024-10-14 NOTE — TELEPHONE ENCOUNTER
Ms. Valenzuela would like a call back she stated she needs help figuring out what blood pressure medication she is to take since she saw Endy last week. She can be reached at [phone]

## 2024-10-28 DIAGNOSIS — I10 ESSENTIAL HYPERTENSION: Primary | ICD-10-CM

## 2024-10-28 RX ORDER — LISINOPRIL 40 MG/1
TABLET ORAL
Qty: 90 TABLET | Refills: 1 | Status: SHIPPED | OUTPATIENT
Start: 2024-10-28

## 2024-10-28 NOTE — TELEPHONE ENCOUNTER
PCP: Endy Torres APRN - NP    Last appt: 10/7/2024    Future Appointments   Date Time Provider Department Center   11/18/2024  1:30 PM Endy Torres APRN - NP [facility] DEP       Requested Prescriptions     Pending Prescriptions Disp Refills    lisinopril (PRINIVIL;ZESTRIL) 40 MG tablet [Pharmacy Med Name: LISINOPRIL TABS 40MG] 90 tablet 3     Sig: TAKE 1 TABLET DAILY

## 2024-11-18 ENCOUNTER — OFFICE VISIT (OUTPATIENT)
Facility: CLINIC | Age: 79
End: 2024-11-18
Payer: MEDICARE

## 2024-11-18 VITALS
DIASTOLIC BLOOD PRESSURE: 72 MMHG | BODY MASS INDEX: 23.7 KG/M2 | HEART RATE: 74 BPM | RESPIRATION RATE: 16 BRPM | HEIGHT: 62 IN | TEMPERATURE: 98.1 F | OXYGEN SATURATION: 98 % | SYSTOLIC BLOOD PRESSURE: 126 MMHG | WEIGHT: 128.8 LBS

## 2024-11-18 DIAGNOSIS — I10 ESSENTIAL HYPERTENSION: Primary | ICD-10-CM

## 2024-11-18 DIAGNOSIS — E87.5 HYPERKALEMIA: ICD-10-CM

## 2024-11-18 PROCEDURE — 1123F ACP DISCUSS/DSCN MKR DOCD: CPT | Performed by: NURSE PRACTITIONER

## 2024-11-18 PROCEDURE — 1159F MED LIST DOCD IN RCRD: CPT | Performed by: NURSE PRACTITIONER

## 2024-11-18 PROCEDURE — 3074F SYST BP LT 130 MM HG: CPT | Performed by: NURSE PRACTITIONER

## 2024-11-18 PROCEDURE — 1126F AMNT PAIN NOTED NONE PRSNT: CPT | Performed by: NURSE PRACTITIONER

## 2024-11-18 PROCEDURE — G8420 CALC BMI NORM PARAMETERS: HCPCS | Performed by: NURSE PRACTITIONER

## 2024-11-18 PROCEDURE — G8427 DOCREV CUR MEDS BY ELIG CLIN: HCPCS | Performed by: NURSE PRACTITIONER

## 2024-11-18 PROCEDURE — 3078F DIAST BP <80 MM HG: CPT | Performed by: NURSE PRACTITIONER

## 2024-11-18 PROCEDURE — 1090F PRES/ABSN URINE INCON ASSESS: CPT | Performed by: NURSE PRACTITIONER

## 2024-11-18 PROCEDURE — 1160F RVW MEDS BY RX/DR IN RCRD: CPT | Performed by: NURSE PRACTITIONER

## 2024-11-18 PROCEDURE — 1036F TOBACCO NON-USER: CPT | Performed by: NURSE PRACTITIONER

## 2024-11-18 PROCEDURE — G8399 PT W/DXA RESULTS DOCUMENT: HCPCS | Performed by: NURSE PRACTITIONER

## 2024-11-18 PROCEDURE — 99213 OFFICE O/P EST LOW 20 MIN: CPT | Performed by: NURSE PRACTITIONER

## 2024-11-18 PROCEDURE — G8482 FLU IMMUNIZE ORDER/ADMIN: HCPCS | Performed by: NURSE PRACTITIONER

## 2024-11-18 NOTE — PROGRESS NOTES
Chief Complaint   Patient presents with    Blood Pressure Check     6wk       SUBJECTIVE:    Robyn Valenzuela is a 79 y.o. female who is here today for a follow up appointment regarding her hypertension, as well as hyperkalemia on prior labs.    At her previous encounter on 10/07/2024, the patient was found to have an elevated blood pressure above normal, despite use of current antihypertensive medication.  At that time, added hydrochlorothiazide 12.5 mg to her current regimen, and she has been taking this daily as prescribed.  She denies any adverse side effects of the medication.  She has been checking her blood pressures fairly routinely, and these have been improved overall.  She shows to be within defined limits today.  She denies any recent episodes of chest pain, chest pressure, shortness of breath, headaches, dizziness, blurred vision, palpitations, or syncope episodes.    Current Outpatient Medications   Medication Sig Dispense Refill    lisinopril (PRINIVIL;ZESTRIL) 40 MG tablet TAKE 1 TABLET DAILY 90 tablet 1    hydroCHLOROthiazide 12.5 MG tablet Take 1 tablet by mouth daily 90 tablet 0    atorvastatin (LIPITOR) 40 MG tablet TAKE 1 TABLET DAILY 90 tablet 1    levothyroxine (SYNTHROID) 100 MCG tablet Take 1 tablet by mouth daily 90 tablet 1    DULoxetine (CYMBALTA) 60 MG extended release capsule TAKE 1 CAPSULE DAILY 90 capsule 1    omeprazole (PRILOSEC) 20 MG delayed release capsule TAKE 1 CAPSULE DAILY 90 capsule 1    ciclopirox (PENLAC) 8 % solution APPLY TOPICALLY TO ALL TOENAILS ONCE DAILY, REMOVE WITH ALCOHOL ONCE EVERY 7 DAYS      tiZANidine (ZANAFLEX) 2 MG tablet Take 1 tablet by mouth 3 times daily as needed (muscle spasms) 30 tablet 0    cilostazol (PLETAL) 100 MG tablet TAKE 1 TABLET TWICE A  tablet 1    cyclobenzaprine (FLEXERIL) 5 MG tablet Take 1 tablet by mouth every 8 hours as needed for Muscle spasms 20 tablet 0    Multiple Vitamins-Minerals (CENTRUM SILVER 50+WOMEN PO) Take

## 2024-11-18 NOTE — PROGRESS NOTES
Robyn Valenzuela is a 79 y.o. female     Chief Complaint   Patient presents with    Blood Pressure Check     6wk       /72 (Site: Left Upper Arm, Position: Sitting, Cuff Size: Medium Adult)   Pulse 74   Temp 98.1 °F (36.7 °C) (Oral)   Resp 16   Ht 1.575 m (5' 2\")   Wt 58.4 kg (128 lb 12.8 oz)   SpO2 98%   BMI 23.56 kg/m²     Health Maintenance Due   Topic Date Due    Shingles vaccine (2 of 3) 03/28/2008    Respiratory Syncytial Virus (RSV) Pregnant or age 60 yrs+ (1 - 1-dose 75+ series) Never done    DTaP/Tdap/Td vaccine (2 - Td or Tdap) 02/18/2023    COVID-19 Vaccine (3 - 2023-24 season) 09/01/2024         \"Have you been to the ER, urgent care clinic since your last visit?  Hospitalized since your last visit?\"    NO    “Have you seen or consulted any other health care providers outside of [facility] since your last visit?”    NO

## 2024-11-19 LAB
ANION GAP SERPL CALC-SCNC: 7 MMOL/L (ref 2–12)
BUN SERPL-MCNC: 21 MG/DL (ref 6–20)
BUN/CREAT SERPL: 23 (ref 12–20)
CALCIUM SERPL-MCNC: 9.2 MG/DL (ref 8.5–10.1)
CHLORIDE SERPL-SCNC: 106 MMOL/L (ref 97–108)
CO2 SERPL-SCNC: 28 MMOL/L (ref 21–32)
CREAT SERPL-MCNC: 0.92 MG/DL (ref 0.55–1.02)
GLUCOSE SERPL-MCNC: 146 MG/DL (ref 65–100)
POTASSIUM SERPL-SCNC: 4.9 MMOL/L (ref 3.5–5.1)
SODIUM SERPL-SCNC: 141 MMOL/L (ref 136–145)

## 2024-11-25 RX ORDER — CILOSTAZOL 100 MG/1
100 TABLET ORAL 2 TIMES DAILY
Qty: 180 TABLET | Refills: 1 | Status: SHIPPED | OUTPATIENT
Start: 2024-11-25

## 2024-11-25 NOTE — TELEPHONE ENCOUNTER
PCP: Endy Torres APRN - NP    Last appt: 11/18/2024    Future Appointments   Date Time Provider Department Center   4/21/2025 11:00 AM Endy Torres APRN - NP [facility] DEP       Requested Prescriptions     Pending Prescriptions Disp Refills    cilostazol (PLETAL) 100 MG tablet [Pharmacy Med Name: CILOSTAZOL TABS 100MG] 180 tablet 3     Sig: TAKE 1 TABLET TWICE A DAY

## 2024-12-09 DIAGNOSIS — M35.3 POLYMYALGIA RHEUMATICA (HCC): Primary | ICD-10-CM

## 2024-12-09 DIAGNOSIS — K21.9 GASTROESOPHAGEAL REFLUX DISEASE WITHOUT ESOPHAGITIS: ICD-10-CM

## 2024-12-09 RX ORDER — DULOXETIN HYDROCHLORIDE 60 MG/1
60 CAPSULE, DELAYED RELEASE ORAL DAILY
Qty: 90 CAPSULE | Refills: 1 | Status: SHIPPED | OUTPATIENT
Start: 2024-12-09

## 2024-12-09 NOTE — TELEPHONE ENCOUNTER
PCP: Endy Torres APRN - NP    Last appt: 11/18/2024    Future Appointments   Date Time Provider Department Center   4/21/2025 11:00 AM Enyd Torres APRN - NP [facility]       Requested Prescriptions     Pending Prescriptions Disp Refills    DULoxetine (CYMBALTA) 60 MG extended release capsule [Pharmacy Med Name: DULOXETINE HCL DR CAPS 60MG] 90 capsule 3     Sig: TAKE 1 CAPSULE DAILY    omeprazole (PRILOSEC) 20 MG delayed release capsule [Pharmacy Med Name: OMEPRAZOLE DR CAPS 20MG] 90 capsule 3     Sig: TAKE 1 CAPSULE DAILY

## 2024-12-31 ENCOUNTER — TELEPHONE (OUTPATIENT)
Facility: CLINIC | Age: 79
End: 2024-12-31

## 2024-12-31 DIAGNOSIS — I10 ESSENTIAL HYPERTENSION: ICD-10-CM

## 2024-12-31 NOTE — TELEPHONE ENCOUNTER
Patient is requesting a refill on hydroCHLOROthiazide 12.5 MG tablet. Please send to [facility] (on file)

## 2025-01-01 ENCOUNTER — APPOINTMENT (OUTPATIENT)
Facility: HOSPITAL | Age: 80
DRG: 336 | End: 2025-01-01
Payer: MEDICARE

## 2025-01-01 ENCOUNTER — HOSPITAL ENCOUNTER (INPATIENT)
Facility: HOSPITAL | Age: 80
LOS: 12 days | Discharge: NURSING, REHAB OR LONG-TERM CARE | DRG: 336 | End: 2025-01-13
Attending: STUDENT IN AN ORGANIZED HEALTH CARE EDUCATION/TRAINING PROGRAM | Admitting: FAMILY MEDICINE
Payer: MEDICARE

## 2025-01-01 DIAGNOSIS — K55.9 ISCHEMIC COLITIS (HCC): ICD-10-CM

## 2025-01-01 DIAGNOSIS — K55.1 SUPERIOR MESENTERIC ARTERY STENOSIS (HCC): ICD-10-CM

## 2025-01-01 DIAGNOSIS — K52.9 ENTERITIS: Primary | ICD-10-CM

## 2025-01-01 DIAGNOSIS — I10 ESSENTIAL HYPERTENSION: ICD-10-CM

## 2025-01-01 DIAGNOSIS — I77.4 CELIAC ARTERY STENOSIS (HCC): ICD-10-CM

## 2025-01-01 DIAGNOSIS — E87.20 LACTIC ACIDOSIS: ICD-10-CM

## 2025-01-01 LAB
ABO + RH BLD: NORMAL
ALBUMIN SERPL-MCNC: 3.2 G/DL (ref 3.5–5)
ALBUMIN/GLOB SERPL: 0.8 (ref 1.1–2.2)
ALP SERPL-CCNC: 74 U/L (ref 45–117)
ALT SERPL-CCNC: 14 U/L (ref 12–78)
ANION GAP SERPL CALC-SCNC: 12 MMOL/L (ref 2–12)
ANION GAP SERPL CALC-SCNC: 3 MMOL/L (ref 2–12)
APPEARANCE UR: CLEAR
AST SERPL-CCNC: 25 U/L (ref 15–37)
BACTERIA URNS QL MICRO: NEGATIVE /HPF
BASOPHILS # BLD: 0 K/UL (ref 0–0.1)
BASOPHILS NFR BLD: 0 % (ref 0–1)
BILIRUB SERPL-MCNC: 0.6 MG/DL (ref 0.2–1)
BILIRUB UR QL: NEGATIVE
BLOOD GROUP ANTIBODIES SERPL: NORMAL
BUN SERPL-MCNC: 19 MG/DL (ref 6–20)
BUN SERPL-MCNC: 26 MG/DL (ref 6–20)
BUN/CREAT SERPL: 20 (ref 12–20)
BUN/CREAT SERPL: 25 (ref 12–20)
CALCIUM SERPL-MCNC: 8.4 MG/DL (ref 8.5–10.1)
CALCIUM SERPL-MCNC: 9 MG/DL (ref 8.5–10.1)
CHLORIDE SERPL-SCNC: 102 MMOL/L (ref 97–108)
CHLORIDE SERPL-SCNC: 104 MMOL/L (ref 97–108)
CO2 SERPL-SCNC: 18 MMOL/L (ref 21–32)
CO2 SERPL-SCNC: 25 MMOL/L (ref 21–32)
COLOR UR: ABNORMAL
COMMENT:: NORMAL
CREAT SERPL-MCNC: 0.97 MG/DL (ref 0.55–1.02)
CREAT SERPL-MCNC: 1.06 MG/DL (ref 0.55–1.02)
DIFFERENTIAL METHOD BLD: ABNORMAL
EOSINOPHIL # BLD: 0 K/UL (ref 0–0.4)
EOSINOPHIL NFR BLD: 0 % (ref 0–7)
EPITH CASTS URNS QL MICRO: ABNORMAL /LPF
ERYTHROCYTE [DISTWIDTH] IN BLOOD BY AUTOMATED COUNT: 13.2 % (ref 11.5–14.5)
GLOBULIN SER CALC-MCNC: 4 G/DL (ref 2–4)
GLUCOSE SERPL-MCNC: 128 MG/DL (ref 65–100)
GLUCOSE SERPL-MCNC: 151 MG/DL (ref 65–100)
GLUCOSE UR STRIP.AUTO-MCNC: NEGATIVE MG/DL
HCT VFR BLD AUTO: 33.7 % (ref 35–47)
HGB BLD-MCNC: 11.1 G/DL (ref 11.5–16)
HGB UR QL STRIP: NEGATIVE
HYALINE CASTS URNS QL MICRO: ABNORMAL /LPF (ref 0–5)
IMM GRANULOCYTES # BLD AUTO: 0 K/UL (ref 0–0.04)
IMM GRANULOCYTES NFR BLD AUTO: 0 % (ref 0–0.5)
KETONES UR QL STRIP.AUTO: 15 MG/DL
LACTATE SERPL-SCNC: 1 MMOL/L (ref 0.4–2)
LACTATE SERPL-SCNC: 2.8 MMOL/L (ref 0.4–2)
LEUKOCYTE ESTERASE UR QL STRIP.AUTO: NEGATIVE
LIPASE SERPL-CCNC: 35 U/L (ref 13–75)
LYMPHOCYTES # BLD: 0.6 K/UL (ref 0.8–3.5)
LYMPHOCYTES NFR BLD: 6 % (ref 12–49)
MCH RBC QN AUTO: 31.7 PG (ref 26–34)
MCHC RBC AUTO-ENTMCNC: 32.9 G/DL (ref 30–36.5)
MCV RBC AUTO: 96.3 FL (ref 80–99)
MONOCYTES # BLD: 0.6 K/UL (ref 0–1)
MONOCYTES NFR BLD: 6 % (ref 5–13)
NEUTS SEG # BLD: 8.3 K/UL (ref 1.8–8)
NEUTS SEG NFR BLD: 88 % (ref 32–75)
NITRITE UR QL STRIP.AUTO: NEGATIVE
NRBC # BLD: 0 K/UL (ref 0–0.01)
NRBC BLD-RTO: 0 PER 100 WBC
PH UR STRIP: 5 (ref 5–8)
PLATELET # BLD AUTO: 266 K/UL (ref 150–400)
PMV BLD AUTO: 10.2 FL (ref 8.9–12.9)
POTASSIUM SERPL-SCNC: 4.8 MMOL/L (ref 3.5–5.1)
POTASSIUM SERPL-SCNC: 4.8 MMOL/L (ref 3.5–5.1)
PROT SERPL-MCNC: 7.2 G/DL (ref 6.4–8.2)
PROT UR STRIP-MCNC: NEGATIVE MG/DL
RBC # BLD AUTO: 3.5 M/UL (ref 3.8–5.2)
RBC #/AREA URNS HPF: ABNORMAL /HPF (ref 0–5)
RBC MORPH BLD: ABNORMAL
SODIUM SERPL-SCNC: 132 MMOL/L (ref 136–145)
SODIUM SERPL-SCNC: 132 MMOL/L (ref 136–145)
SP GR UR REFRACTOMETRY: 1.01 (ref 1–1.03)
SPECIMEN EXP DATE BLD: NORMAL
SPECIMEN HOLD: NORMAL
SPECIMEN HOLD: NORMAL
UROBILINOGEN UR QL STRIP.AUTO: 0.2 EU/DL (ref 0.2–1)
WBC # BLD AUTO: 9.5 K/UL (ref 3.6–11)
WBC URNS QL MICRO: ABNORMAL /HPF (ref 0–4)

## 2025-01-01 PROCEDURE — 81001 URINALYSIS AUTO W/SCOPE: CPT

## 2025-01-01 PROCEDURE — 1200000000 HC SEMI PRIVATE

## 2025-01-01 PROCEDURE — 6360000004 HC RX CONTRAST MEDICATION: Performed by: STUDENT IN AN ORGANIZED HEALTH CARE EDUCATION/TRAINING PROGRAM

## 2025-01-01 PROCEDURE — 83690 ASSAY OF LIPASE: CPT

## 2025-01-01 PROCEDURE — 2500000003 HC RX 250 WO HCPCS: Performed by: FAMILY MEDICINE

## 2025-01-01 PROCEDURE — 2580000003 HC RX 258: Performed by: STUDENT IN AN ORGANIZED HEALTH CARE EDUCATION/TRAINING PROGRAM

## 2025-01-01 PROCEDURE — 99285 EMERGENCY DEPT VISIT HI MDM: CPT

## 2025-01-01 PROCEDURE — 86901 BLOOD TYPING SEROLOGIC RH(D): CPT

## 2025-01-01 PROCEDURE — 2580000003 HC RX 258: Performed by: SURGERY

## 2025-01-01 PROCEDURE — 2500000003 HC RX 250 WO HCPCS: Performed by: STUDENT IN AN ORGANIZED HEALTH CARE EDUCATION/TRAINING PROGRAM

## 2025-01-01 PROCEDURE — 86850 RBC ANTIBODY SCREEN: CPT

## 2025-01-01 PROCEDURE — 6360000002 HC RX W HCPCS: Performed by: STUDENT IN AN ORGANIZED HEALTH CARE EDUCATION/TRAINING PROGRAM

## 2025-01-01 PROCEDURE — 83605 ASSAY OF LACTIC ACID: CPT

## 2025-01-01 PROCEDURE — 87040 BLOOD CULTURE FOR BACTERIA: CPT

## 2025-01-01 PROCEDURE — 96374 THER/PROPH/DIAG INJ IV PUSH: CPT

## 2025-01-01 PROCEDURE — 96375 TX/PRO/DX INJ NEW DRUG ADDON: CPT

## 2025-01-01 PROCEDURE — 85025 COMPLETE CBC W/AUTO DIFF WBC: CPT

## 2025-01-01 PROCEDURE — 86900 BLOOD TYPING SEROLOGIC ABO: CPT

## 2025-01-01 PROCEDURE — 74174 CTA ABD&PLVS W/CONTRAST: CPT

## 2025-01-01 PROCEDURE — 80053 COMPREHEN METABOLIC PANEL: CPT

## 2025-01-01 PROCEDURE — 6370000000 HC RX 637 (ALT 250 FOR IP): Performed by: FAMILY MEDICINE

## 2025-01-01 PROCEDURE — 6360000002 HC RX W HCPCS: Performed by: FAMILY MEDICINE

## 2025-01-01 PROCEDURE — 36415 COLL VENOUS BLD VENIPUNCTURE: CPT

## 2025-01-01 RX ORDER — LISINOPRIL 10 MG/1
40 TABLET ORAL DAILY
Status: DISCONTINUED | OUTPATIENT
Start: 2025-01-01 | End: 2025-01-03

## 2025-01-01 RX ORDER — MAGNESIUM SULFATE IN WATER 40 MG/ML
2000 INJECTION, SOLUTION INTRAVENOUS PRN
Status: DISCONTINUED | OUTPATIENT
Start: 2025-01-01 | End: 2025-01-13 | Stop reason: HOSPADM

## 2025-01-01 RX ORDER — ONDANSETRON 4 MG/1
4 TABLET, ORALLY DISINTEGRATING ORAL EVERY 8 HOURS PRN
Status: DISCONTINUED | OUTPATIENT
Start: 2025-01-01 | End: 2025-01-04

## 2025-01-01 RX ORDER — IOPAMIDOL 755 MG/ML
100 INJECTION, SOLUTION INTRAVASCULAR
Status: COMPLETED | OUTPATIENT
Start: 2025-01-01 | End: 2025-01-01

## 2025-01-01 RX ORDER — 0.9 % SODIUM CHLORIDE 0.9 %
1000 INTRAVENOUS SOLUTION INTRAVENOUS ONCE
Status: COMPLETED | OUTPATIENT
Start: 2025-01-01 | End: 2025-01-01

## 2025-01-01 RX ORDER — SODIUM CHLORIDE 9 MG/ML
INJECTION, SOLUTION INTRAVENOUS PRN
Status: DISCONTINUED | OUTPATIENT
Start: 2025-01-01 | End: 2025-01-13 | Stop reason: HOSPADM

## 2025-01-01 RX ORDER — ACETAMINOPHEN 325 MG/1
650 TABLET ORAL EVERY 6 HOURS PRN
Status: DISCONTINUED | OUTPATIENT
Start: 2025-01-01 | End: 2025-01-04

## 2025-01-01 RX ORDER — POTASSIUM CHLORIDE 7.45 MG/ML
10 INJECTION INTRAVENOUS PRN
Status: ACTIVE | OUTPATIENT
Start: 2025-01-01 | End: 2025-01-02

## 2025-01-01 RX ORDER — PANTOPRAZOLE SODIUM 40 MG/1
40 TABLET, DELAYED RELEASE ORAL
Status: DISCONTINUED | OUTPATIENT
Start: 2025-01-01 | End: 2025-01-06

## 2025-01-01 RX ORDER — SODIUM CHLORIDE 0.9 % (FLUSH) 0.9 %
5-40 SYRINGE (ML) INJECTION PRN
Status: DISCONTINUED | OUTPATIENT
Start: 2025-01-01 | End: 2025-01-13 | Stop reason: HOSPADM

## 2025-01-01 RX ORDER — POTASSIUM CHLORIDE 750 MG/1
40 TABLET, EXTENDED RELEASE ORAL PRN
Status: ACTIVE | OUTPATIENT
Start: 2025-01-01 | End: 2025-01-02

## 2025-01-01 RX ORDER — MORPHINE SULFATE 4 MG/ML
4 INJECTION, SOLUTION INTRAMUSCULAR; INTRAVENOUS ONCE
Status: DISCONTINUED | OUTPATIENT
Start: 2025-01-01 | End: 2025-01-01

## 2025-01-01 RX ORDER — ATENOLOL 25 MG/1
50 TABLET ORAL NIGHTLY
Status: DISCONTINUED | OUTPATIENT
Start: 2025-01-01 | End: 2025-01-13 | Stop reason: HOSPADM

## 2025-01-01 RX ORDER — METRONIDAZOLE 500 MG/100ML
500 INJECTION, SOLUTION INTRAVENOUS EVERY 8 HOURS
Status: DISCONTINUED | OUTPATIENT
Start: 2025-01-01 | End: 2025-01-05

## 2025-01-01 RX ORDER — POLYETHYLENE GLYCOL 3350 17 G/17G
17 POWDER, FOR SOLUTION ORAL DAILY PRN
Status: DISCONTINUED | OUTPATIENT
Start: 2025-01-01 | End: 2025-01-13 | Stop reason: HOSPADM

## 2025-01-01 RX ORDER — ONDANSETRON 2 MG/ML
4 INJECTION INTRAMUSCULAR; INTRAVENOUS ONCE
Status: COMPLETED | OUTPATIENT
Start: 2025-01-01 | End: 2025-01-01

## 2025-01-01 RX ORDER — CILOSTAZOL 50 MG/1
100 TABLET ORAL 2 TIMES DAILY
Status: DISCONTINUED | OUTPATIENT
Start: 2025-01-01 | End: 2025-01-13 | Stop reason: HOSPADM

## 2025-01-01 RX ORDER — ATORVASTATIN CALCIUM 40 MG/1
40 TABLET, FILM COATED ORAL DAILY
Status: DISCONTINUED | OUTPATIENT
Start: 2025-01-01 | End: 2025-01-13 | Stop reason: HOSPADM

## 2025-01-01 RX ORDER — MORPHINE SULFATE 4 MG/ML
4 INJECTION, SOLUTION INTRAMUSCULAR; INTRAVENOUS ONCE
Status: COMPLETED | OUTPATIENT
Start: 2025-01-01 | End: 2025-01-01

## 2025-01-01 RX ORDER — DULOXETIN HYDROCHLORIDE 60 MG/1
60 CAPSULE, DELAYED RELEASE ORAL DAILY
Status: DISCONTINUED | OUTPATIENT
Start: 2025-01-01 | End: 2025-01-13 | Stop reason: HOSPADM

## 2025-01-01 RX ORDER — SODIUM CHLORIDE 0.9 % (FLUSH) 0.9 %
5-40 SYRINGE (ML) INJECTION EVERY 12 HOURS SCHEDULED
Status: DISCONTINUED | OUTPATIENT
Start: 2025-01-01 | End: 2025-01-13 | Stop reason: HOSPADM

## 2025-01-01 RX ORDER — ONDANSETRON 2 MG/ML
4 INJECTION INTRAMUSCULAR; INTRAVENOUS EVERY 6 HOURS PRN
Status: DISCONTINUED | OUTPATIENT
Start: 2025-01-01 | End: 2025-01-04

## 2025-01-01 RX ORDER — MORPHINE SULFATE 4 MG/ML
4 INJECTION, SOLUTION INTRAMUSCULAR; INTRAVENOUS EVERY 4 HOURS PRN
Status: DISCONTINUED | OUTPATIENT
Start: 2025-01-01 | End: 2025-01-02

## 2025-01-01 RX ORDER — LEVOTHYROXINE SODIUM 100 UG/1
100 TABLET ORAL DAILY
Status: DISCONTINUED | OUTPATIENT
Start: 2025-01-01 | End: 2025-01-13 | Stop reason: HOSPADM

## 2025-01-01 RX ORDER — SODIUM CHLORIDE 9 MG/ML
INJECTION, SOLUTION INTRAVENOUS CONTINUOUS
Status: DISCONTINUED | OUTPATIENT
Start: 2025-01-01 | End: 2025-01-06

## 2025-01-01 RX ORDER — HYDROCHLOROTHIAZIDE 25 MG/1
12.5 TABLET ORAL DAILY
Status: DISCONTINUED | OUTPATIENT
Start: 2025-01-01 | End: 2025-01-13 | Stop reason: HOSPADM

## 2025-01-01 RX ORDER — ACETAMINOPHEN 650 MG/1
650 SUPPOSITORY RECTAL EVERY 6 HOURS PRN
Status: DISCONTINUED | OUTPATIENT
Start: 2025-01-01 | End: 2025-01-04

## 2025-01-01 RX ADMIN — CILOSTAZOL 100 MG: 50 TABLET ORAL at 08:10

## 2025-01-01 RX ADMIN — SODIUM CHLORIDE, PRESERVATIVE FREE 10 ML: 5 INJECTION INTRAVENOUS at 20:45

## 2025-01-01 RX ADMIN — DULOXETINE HYDROCHLORIDE 60 MG: 60 CAPSULE, DELAYED RELEASE ORAL at 08:10

## 2025-01-01 RX ADMIN — IOPAMIDOL 100 ML: 755 INJECTION, SOLUTION INTRAVENOUS at 02:34

## 2025-01-01 RX ADMIN — MORPHINE SULFATE 4 MG: 4 INJECTION, SOLUTION INTRAMUSCULAR; INTRAVENOUS at 23:21

## 2025-01-01 RX ADMIN — PANTOPRAZOLE SODIUM 40 MG: 40 TABLET, DELAYED RELEASE ORAL at 08:10

## 2025-01-01 RX ADMIN — ONDANSETRON 4 MG: 2 INJECTION INTRAMUSCULAR; INTRAVENOUS at 23:26

## 2025-01-01 RX ADMIN — LEVOTHYROXINE SODIUM 100 MCG: 0.1 TABLET ORAL at 08:10

## 2025-01-01 RX ADMIN — METRONIDAZOLE 500 MG: 500 INJECTION, SOLUTION INTRAVENOUS at 03:53

## 2025-01-01 RX ADMIN — CILOSTAZOL 100 MG: 50 TABLET ORAL at 20:44

## 2025-01-01 RX ADMIN — SODIUM CHLORIDE 1000 ML: 9 INJECTION, SOLUTION INTRAVENOUS at 01:28

## 2025-01-01 RX ADMIN — METRONIDAZOLE 500 MG: 500 INJECTION, SOLUTION INTRAVENOUS at 10:59

## 2025-01-01 RX ADMIN — SODIUM CHLORIDE: 9 INJECTION, SOLUTION INTRAVENOUS at 05:40

## 2025-01-01 RX ADMIN — MORPHINE SULFATE 4 MG: 4 INJECTION, SOLUTION INTRAMUSCULAR; INTRAVENOUS at 01:24

## 2025-01-01 RX ADMIN — ACETAMINOPHEN 650 MG: 325 TABLET ORAL at 06:13

## 2025-01-01 RX ADMIN — SODIUM CHLORIDE: 9 INJECTION, SOLUTION INTRAVENOUS at 20:46

## 2025-01-01 RX ADMIN — WATER 1000 MG: 1 INJECTION INTRAMUSCULAR; INTRAVENOUS; SUBCUTANEOUS at 03:37

## 2025-01-01 RX ADMIN — ATORVASTATIN CALCIUM 40 MG: 40 TABLET, FILM COATED ORAL at 08:10

## 2025-01-01 RX ADMIN — METRONIDAZOLE 500 MG: 500 INJECTION, SOLUTION INTRAVENOUS at 18:17

## 2025-01-01 RX ADMIN — ONDANSETRON 4 MG: 2 INJECTION INTRAMUSCULAR; INTRAVENOUS at 01:27

## 2025-01-01 RX ADMIN — LISINOPRIL 40 MG: 10 TABLET ORAL at 08:10

## 2025-01-01 RX ADMIN — ATENOLOL 50 MG: 25 TABLET ORAL at 20:44

## 2025-01-01 RX ADMIN — MORPHINE SULFATE 4 MG: 4 INJECTION, SOLUTION INTRAMUSCULAR; INTRAVENOUS at 11:03

## 2025-01-01 ASSESSMENT — PAIN DESCRIPTION - LOCATION
LOCATION: ABDOMEN
LOCATION: GENERALIZED
LOCATION: ABDOMEN
LOCATION: GENERALIZED

## 2025-01-01 ASSESSMENT — PAIN SCALES - GENERAL
PAINLEVEL_OUTOF10: 6
PAINLEVEL_OUTOF10: 8
PAINLEVEL_OUTOF10: 8
PAINLEVEL_OUTOF10: 6
PAINLEVEL_OUTOF10: 4

## 2025-01-01 ASSESSMENT — PAIN DESCRIPTION - ORIENTATION: ORIENTATION: MID;LEFT

## 2025-01-01 ASSESSMENT — PAIN DESCRIPTION - DESCRIPTORS
DESCRIPTORS: ACHING
DESCRIPTORS: CRAMPING;ACHING
DESCRIPTORS: ACHING

## 2025-01-01 NOTE — ED PROVIDER NOTES
[facility] EMERGENCY DEP  EMERGENCY DEPARTMENT ENCOUNTER      Pt Name: Robyn Valenzuela  MRN: [id number]  Birthdate 1945  Date of evaluation: 1/1/2025  Provider: Dhruv Montelongo DO    CHIEF COMPLAINT       Chief Complaint   Patient presents with    Vomiting    Nausea    Abdominal Pain         HISTORY OF PRESENT ILLNESS   (Location/Symptom, Timing/Onset, Context/Setting, Quality, Duration, Modifying Factors, Severity)  Note limiting factors.   Patient is an 79-year-old female history of polymyalgia rheumatica, coronary disease, TIA, tobacco use, anxiety, anemia, hypothyroidism, GERD, hypertension, hyperlipidemia presenting today secondary to abdominal pain.  She started earlier in the day with diarrhea and about 6 PM had sudden onset of severe diffuse abdominal pain but worse in the upper abdomen.  It is a 10 out of 10 sharp pain.  Nothing makes it better, nothing makes it worse.  Has had nausea and vomiting as well.  Of note 2 days ago she had revascularization surgery of both legs in the outpatient setting (states stenting of the right leg, angioplasty of the left leg).  She states that her legs feel fine and did not seem to be problematic tonight.            Review of External Medical Records:     Nursing Notes were reviewed.    REVIEW OF SYSTEMS    (2-9 systems for level 4, 10 or more for level 5)     Review of Systems   Gastrointestinal:  Positive for abdominal pain, diarrhea, nausea and vomiting.       Except as noted above the remainder of the review of systems was reviewed and negative.       PAST MEDICAL HISTORY     Past Medical History:   Diagnosis Date    Adult onset hypothyroidism 8/23/2017    Anemia 8/23/2017    Anxiety 8/23/2017    Arteriosclerotic heart disease (ASHD) 8/23/2017    Arthritis     back, hip right, neck    CAD (coronary artery disease)          Cervical spondylosis 8/23/2017    Chronic pain     spinal stenosis, bulging disk and bone spurs in back    Claudication in peripheral

## 2025-01-01 NOTE — ED NOTES
39 (Caution)  Factor Value    Calculated 1/1/2025 04:28 32% Age 79 years old    Deterioration Index Model 28% Supplemental oxygen Nasal cannula     10% Respiratory rate 14     9% Potassium 4.8 mmol/L     8% Systolic 156     6% Sodium abnormal (132 mmol/L)     3% WBC count 9.5 K/uL     2% BUN abnormal (26 MG/DL)     1% Pulse 62     1% Hematocrit abnormal (33.7 %)     0% Pulse oximetry 97 %     0% Temperature 97.8 °F (36.6 °C)         FiO2 (%): N/A  O2 Flow Rate: Room air  Cardiac Rhythm:      Pain Scale:    Last documented pain score: (0-10 scale)    Last documented pain medication administered: 4mg of Morphine @ 0124     Mental Status: oriented and alert  Orientation Level:    NIH Score:    C-SSRS: Risk of Suicide: No Risk    PO Status: Nothing by Mouth  Bedside swallow:    [place] Coma Scale (GCS): Valerie Coma Scale  Eye Opening: Spontaneous  Best Verbal Response: Oriented  Best Motor Response: Obeys commands  Valerie Coma Scale Score: 15    Active LDA's:   Peripheral IV 01/01/25 Posterior;Right Hand (Active)       Peripheral IV 01/01/25 Left Antecubital (Active)     Pertinent or High Risk Medications/Drips: no   Titratable drips: no   Pending Blood Product Administration: no     Sepsis: Sepsis Risk Score   Predictive Model Details          15 (Low)  Factor Value    Calculated 1/1/2025 04:22 14% O2 Delivery Method NASAL CANNULA    [facility] EARLY DETECTION OF SEPSIS VERSION 2 Model 7% Age 79 years old     7% Total Active Inpatient Meds 14     7% Albumin 3.2 g/dL     6% Has Imaging Procedure present     4% Count of Active Peripheral IVs 2     -3% Chloride 102 mmol/L     -3% AST 25 U/L     -3% Pulse 62     3% Absolute Lymphocytes 0.6 K/UL      Recent Labs     01/01/25  0155   WBC 9.5     Blood Cultures Drawn: Yes  0314 am  Repeat LA: N/A  Antibiotic Given: Yes  Fluid Resuscitation: Total needed 1000mL, Status completed, amount 1000mL    VS x 2 post-fluid resuscitation: YES    Recommendation    Plan for next 24

## 2025-01-01 NOTE — ED TRIAGE NOTES
Per EMS, patient coming from home. Severe abdominal pain x 4 hours, nausea and vomiting. Small amount of vomiting in route.    VSS  A&Ox4  Recent vascular sx

## 2025-01-01 NOTE — H&P
(1/31/2024)    PRAPARE - Transportation     Lack of Transportation (Medical): Not on file     Lack of Transportation (Non-Medical): No   Physical Activity: Insufficiently Active (10/7/2024)    Exercise Vital Sign     Days of Exercise per Week: 2 days     Minutes of Exercise per Session: 30 min   Stress: Not on file   Social Connections: Not on file   Intimate Partner Violence: Not on file   Depression: At risk (10/7/2024)    PHQ-2     PHQ-2 Score: 7   Housing Stability: Unknown (1/31/2024)    Housing Stability Vital Sign     Unable to Pay for Housing in the Last Year: Not on file     Number of Places Lived in the Last Year: Not on file     Unstable Housing in the Last Year: No   Interpersonal Safety: Not on file   Utilities: Not on file        Medications were reconciled to the best of my ability given all available resources at the time of admission. Route is PO if not otherwise noted.     Family and social history were personally reviewed, all pertinent and relevant details are outlined as above.    Objective:   BP (!) 156/66   Pulse 62   Temp 97.8 °F (36.6 °C) (Oral)   Resp 14   Ht 1.575 m (5' 2\")   Wt 59.3 kg (130 lb 11.7 oz)   SpO2 97%   BMI 23.91 kg/m²         PHYSICAL EXAM:   General: Alert x oriented x 3, awake, no acute distress,   HEENT: PEERL, EOMI, moist mucus membranes  Neck: Supple, no JVD, no meningeal signs  Chest: Clear to auscultation bilaterally   CVS: RRR, S1 S2 heard, no murmurs/rubs/gallops  Abd: Soft, non-tender, non-distended, +bowel sounds   Ext: No clubbing, no cyanosis, no edema  Neuro/Psych: Pleasant mood and affect, CN 2-12 grossly intact, sensory grossly within normal limit, Strength 5/5 in all extremities  Cap refill: Brisk, less than 3 seconds  Pulses: 2+, symmetric in all extremities  Skin: Warm, dry, without rashes or lesions    Data Review:   I have independently reviewed and interpreted patient's lab and all other diagnostic data    Abnormal Labs Reviewed   CBC WITH AUTO

## 2025-01-02 LAB
BASOPHILS # BLD: 0 K/UL (ref 0–0.1)
BASOPHILS # BLD: 0 K/UL (ref 0–0.1)
BASOPHILS NFR BLD: 0 % (ref 0–1)
BASOPHILS NFR BLD: 0 % (ref 0–1)
COMMENT:: NORMAL
DIFFERENTIAL METHOD BLD: ABNORMAL
DIFFERENTIAL METHOD BLD: ABNORMAL
EOSINOPHIL # BLD: 0.1 K/UL (ref 0–0.4)
EOSINOPHIL # BLD: 0.1 K/UL (ref 0–0.4)
EOSINOPHIL NFR BLD: 0 % (ref 0–7)
EOSINOPHIL NFR BLD: 1 % (ref 0–7)
ERYTHROCYTE [DISTWIDTH] IN BLOOD BY AUTOMATED COUNT: 13.5 % (ref 11.5–14.5)
ERYTHROCYTE [DISTWIDTH] IN BLOOD BY AUTOMATED COUNT: 13.7 % (ref 11.5–14.5)
HCT VFR BLD AUTO: 34 % (ref 35–47)
HCT VFR BLD AUTO: 36.7 % (ref 35–47)
HGB BLD-MCNC: 11.4 G/DL (ref 11.5–16)
HGB BLD-MCNC: 11.8 G/DL (ref 11.5–16)
IMM GRANULOCYTES # BLD AUTO: 0 K/UL (ref 0–0.04)
IMM GRANULOCYTES # BLD AUTO: 0.1 K/UL (ref 0–0.04)
IMM GRANULOCYTES NFR BLD AUTO: 0 % (ref 0–0.5)
IMM GRANULOCYTES NFR BLD AUTO: 1 % (ref 0–0.5)
LACTATE SERPL-SCNC: 1 MMOL/L (ref 0.4–2)
LYMPHOCYTES # BLD: 0.9 K/UL (ref 0.8–3.5)
LYMPHOCYTES # BLD: 1.2 K/UL (ref 0.8–3.5)
LYMPHOCYTES NFR BLD: 11 % (ref 12–49)
LYMPHOCYTES NFR BLD: 8 % (ref 12–49)
MCH RBC QN AUTO: 31.5 PG (ref 26–34)
MCH RBC QN AUTO: 32.4 PG (ref 26–34)
MCHC RBC AUTO-ENTMCNC: 32.2 G/DL (ref 30–36.5)
MCHC RBC AUTO-ENTMCNC: 33.5 G/DL (ref 30–36.5)
MCV RBC AUTO: 96.6 FL (ref 80–99)
MCV RBC AUTO: 97.9 FL (ref 80–99)
MONOCYTES # BLD: 0.9 K/UL (ref 0–1)
MONOCYTES # BLD: 1.1 K/UL (ref 0–1)
MONOCYTES NFR BLD: 11 % (ref 5–13)
MONOCYTES NFR BLD: 7 % (ref 5–13)
NEUTS SEG # BLD: 10.3 K/UL (ref 1.8–8)
NEUTS SEG # BLD: 8.1 K/UL (ref 1.8–8)
NEUTS SEG NFR BLD: 77 % (ref 32–75)
NEUTS SEG NFR BLD: 84 % (ref 32–75)
NRBC # BLD: 0 K/UL (ref 0–0.01)
NRBC # BLD: 0 K/UL (ref 0–0.01)
NRBC BLD-RTO: 0 PER 100 WBC
NRBC BLD-RTO: 0 PER 100 WBC
PLATELET # BLD AUTO: 302 K/UL (ref 150–400)
PLATELET # BLD AUTO: 336 K/UL (ref 150–400)
PMV BLD AUTO: 9.7 FL (ref 8.9–12.9)
PMV BLD AUTO: 9.8 FL (ref 8.9–12.9)
RBC # BLD AUTO: 3.52 M/UL (ref 3.8–5.2)
RBC # BLD AUTO: 3.75 M/UL (ref 3.8–5.2)
SPECIMEN HOLD: NORMAL
WBC # BLD AUTO: 10.5 K/UL (ref 3.6–11)
WBC # BLD AUTO: 12.3 K/UL (ref 3.6–11)

## 2025-01-02 PROCEDURE — 2500000003 HC RX 250 WO HCPCS: Performed by: FAMILY MEDICINE

## 2025-01-02 PROCEDURE — 99231 SBSQ HOSP IP/OBS SF/LOW 25: CPT

## 2025-01-02 PROCEDURE — 6370000000 HC RX 637 (ALT 250 FOR IP): Performed by: FAMILY MEDICINE

## 2025-01-02 PROCEDURE — 83605 ASSAY OF LACTIC ACID: CPT

## 2025-01-02 PROCEDURE — 1200000000 HC SEMI PRIVATE

## 2025-01-02 PROCEDURE — 85025 COMPLETE CBC W/AUTO DIFF WBC: CPT

## 2025-01-02 PROCEDURE — 6360000002 HC RX W HCPCS: Performed by: FAMILY MEDICINE

## 2025-01-02 PROCEDURE — 6360000002 HC RX W HCPCS: Performed by: NURSE PRACTITIONER

## 2025-01-02 PROCEDURE — 36415 COLL VENOUS BLD VENIPUNCTURE: CPT

## 2025-01-02 PROCEDURE — 2580000003 HC RX 258: Performed by: SURGERY

## 2025-01-02 PROCEDURE — 2500000003 HC RX 250 WO HCPCS: Performed by: STUDENT IN AN ORGANIZED HEALTH CARE EDUCATION/TRAINING PROGRAM

## 2025-01-02 PROCEDURE — 6360000002 HC RX W HCPCS: Performed by: STUDENT IN AN ORGANIZED HEALTH CARE EDUCATION/TRAINING PROGRAM

## 2025-01-02 RX ORDER — HYDROMORPHONE HYDROCHLORIDE 1 MG/ML
1 INJECTION, SOLUTION INTRAMUSCULAR; INTRAVENOUS; SUBCUTANEOUS EVERY 4 HOURS PRN
Status: DISCONTINUED | OUTPATIENT
Start: 2025-01-02 | End: 2025-01-13 | Stop reason: HOSPADM

## 2025-01-02 RX ORDER — HYDROCHLOROTHIAZIDE 12.5 MG/1
12.5 TABLET ORAL DAILY
Qty: 90 TABLET | Refills: 0 | Status: SHIPPED | OUTPATIENT
Start: 2025-01-02

## 2025-01-02 RX ORDER — HYDROCHLOROTHIAZIDE 12.5 MG/1
12.5 TABLET ORAL DAILY
Qty: 90 TABLET | Refills: 0 | Status: SHIPPED | OUTPATIENT
Start: 2025-01-02 | End: 2025-01-02 | Stop reason: SDUPTHER

## 2025-01-02 RX ADMIN — METRONIDAZOLE 500 MG: 500 INJECTION, SOLUTION INTRAVENOUS at 18:42

## 2025-01-02 RX ADMIN — METRONIDAZOLE 500 MG: 500 INJECTION, SOLUTION INTRAVENOUS at 03:49

## 2025-01-02 RX ADMIN — SODIUM CHLORIDE: 9 INJECTION, SOLUTION INTRAVENOUS at 17:31

## 2025-01-02 RX ADMIN — HYDROMORPHONE HYDROCHLORIDE 1 MG: 1 INJECTION, SOLUTION INTRAMUSCULAR; INTRAVENOUS; SUBCUTANEOUS at 21:31

## 2025-01-02 RX ADMIN — CILOSTAZOL 100 MG: 50 TABLET ORAL at 21:24

## 2025-01-02 RX ADMIN — HYDROMORPHONE HYDROCHLORIDE 1 MG: 1 INJECTION, SOLUTION INTRAMUSCULAR; INTRAVENOUS; SUBCUTANEOUS at 10:48

## 2025-01-02 RX ADMIN — HYDROMORPHONE HYDROCHLORIDE 1 MG: 1 INJECTION, SOLUTION INTRAMUSCULAR; INTRAVENOUS; SUBCUTANEOUS at 16:20

## 2025-01-02 RX ADMIN — SODIUM CHLORIDE, PRESERVATIVE FREE 10 ML: 5 INJECTION INTRAVENOUS at 21:24

## 2025-01-02 RX ADMIN — ATENOLOL 50 MG: 25 TABLET ORAL at 21:24

## 2025-01-02 RX ADMIN — PANTOPRAZOLE SODIUM 40 MG: 40 TABLET, DELAYED RELEASE ORAL at 06:53

## 2025-01-02 RX ADMIN — SODIUM CHLORIDE, PRESERVATIVE FREE 10 ML: 5 INJECTION INTRAVENOUS at 10:14

## 2025-01-02 RX ADMIN — CILOSTAZOL 100 MG: 50 TABLET ORAL at 10:10

## 2025-01-02 RX ADMIN — ONDANSETRON 4 MG: 2 INJECTION INTRAMUSCULAR; INTRAVENOUS at 21:41

## 2025-01-02 RX ADMIN — ATORVASTATIN CALCIUM 40 MG: 40 TABLET, FILM COATED ORAL at 10:10

## 2025-01-02 RX ADMIN — METRONIDAZOLE 500 MG: 500 INJECTION, SOLUTION INTRAVENOUS at 10:51

## 2025-01-02 RX ADMIN — WATER 1000 MG: 1 INJECTION INTRAMUSCULAR; INTRAVENOUS; SUBCUTANEOUS at 03:46

## 2025-01-02 RX ADMIN — LISINOPRIL 40 MG: 10 TABLET ORAL at 10:10

## 2025-01-02 RX ADMIN — ONDANSETRON 4 MG: 2 INJECTION INTRAMUSCULAR; INTRAVENOUS at 10:07

## 2025-01-02 RX ADMIN — DULOXETINE HYDROCHLORIDE 60 MG: 60 CAPSULE, DELAYED RELEASE ORAL at 10:10

## 2025-01-02 RX ADMIN — LEVOTHYROXINE SODIUM 100 MCG: 0.1 TABLET ORAL at 10:10

## 2025-01-02 ASSESSMENT — PAIN DESCRIPTION - DESCRIPTORS
DESCRIPTORS: CRAMPING
DESCRIPTORS: CRAMPING

## 2025-01-02 ASSESSMENT — PAIN SCALES - GENERAL
PAINLEVEL_OUTOF10: 8

## 2025-01-02 ASSESSMENT — PAIN DESCRIPTION - LOCATION
LOCATION: ABDOMEN
LOCATION: ABDOMEN

## 2025-01-03 ENCOUNTER — APPOINTMENT (OUTPATIENT)
Facility: HOSPITAL | Age: 80
DRG: 336 | End: 2025-01-03
Payer: MEDICARE

## 2025-01-03 LAB
BASOPHILS # BLD: 0 K/UL (ref 0–0.1)
BASOPHILS NFR BLD: 0 % (ref 0–1)
DIFFERENTIAL METHOD BLD: ABNORMAL
EOSINOPHIL # BLD: 0 K/UL (ref 0–0.4)
EOSINOPHIL NFR BLD: 0 % (ref 0–7)
ERYTHROCYTE [DISTWIDTH] IN BLOOD BY AUTOMATED COUNT: 13.7 % (ref 11.5–14.5)
HCT VFR BLD AUTO: 34.2 % (ref 35–47)
HGB BLD-MCNC: 11 G/DL (ref 11.5–16)
IMM GRANULOCYTES # BLD AUTO: 0.1 K/UL (ref 0–0.04)
IMM GRANULOCYTES NFR BLD AUTO: 0 % (ref 0–0.5)
LYMPHOCYTES # BLD: 1.2 K/UL (ref 0.8–3.5)
LYMPHOCYTES NFR BLD: 10 % (ref 12–49)
MCH RBC QN AUTO: 32 PG (ref 26–34)
MCHC RBC AUTO-ENTMCNC: 32.2 G/DL (ref 30–36.5)
MCV RBC AUTO: 99.4 FL (ref 80–99)
MONOCYTES # BLD: 1.5 K/UL (ref 0–1)
MONOCYTES NFR BLD: 13 % (ref 5–13)
NEUTS SEG # BLD: 8.9 K/UL (ref 1.8–8)
NEUTS SEG NFR BLD: 77 % (ref 32–75)
NRBC # BLD: 0 K/UL (ref 0–0.01)
NRBC BLD-RTO: 0 PER 100 WBC
PLATELET # BLD AUTO: 338 K/UL (ref 150–400)
PMV BLD AUTO: 10.1 FL (ref 8.9–12.9)
RBC # BLD AUTO: 3.44 M/UL (ref 3.8–5.2)
WBC # BLD AUTO: 11.8 K/UL (ref 3.6–11)

## 2025-01-03 PROCEDURE — 2500000003 HC RX 250 WO HCPCS: Performed by: FAMILY MEDICINE

## 2025-01-03 PROCEDURE — 6370000000 HC RX 637 (ALT 250 FOR IP): Performed by: FAMILY MEDICINE

## 2025-01-03 PROCEDURE — 6360000004 HC RX CONTRAST MEDICATION: Performed by: RADIOLOGY

## 2025-01-03 PROCEDURE — 74177 CT ABD & PELVIS W/CONTRAST: CPT

## 2025-01-03 PROCEDURE — 6360000002 HC RX W HCPCS: Performed by: STUDENT IN AN ORGANIZED HEALTH CARE EDUCATION/TRAINING PROGRAM

## 2025-01-03 PROCEDURE — 1200000000 HC SEMI PRIVATE

## 2025-01-03 PROCEDURE — 36415 COLL VENOUS BLD VENIPUNCTURE: CPT

## 2025-01-03 PROCEDURE — 99231 SBSQ HOSP IP/OBS SF/LOW 25: CPT | Performed by: SURGERY

## 2025-01-03 PROCEDURE — 2500000003 HC RX 250 WO HCPCS: Performed by: STUDENT IN AN ORGANIZED HEALTH CARE EDUCATION/TRAINING PROGRAM

## 2025-01-03 PROCEDURE — 6360000002 HC RX W HCPCS: Performed by: NURSE PRACTITIONER

## 2025-01-03 PROCEDURE — 85025 COMPLETE CBC W/AUTO DIFF WBC: CPT

## 2025-01-03 RX ORDER — LISINOPRIL 20 MG/1
20 TABLET ORAL DAILY
Status: DISCONTINUED | OUTPATIENT
Start: 2025-01-04 | End: 2025-01-13 | Stop reason: HOSPADM

## 2025-01-03 RX ORDER — IOPAMIDOL 755 MG/ML
100 INJECTION, SOLUTION INTRAVASCULAR
Status: COMPLETED | OUTPATIENT
Start: 2025-01-03 | End: 2025-01-03

## 2025-01-03 RX ORDER — ATENOLOL 50 MG/1
TABLET ORAL
Qty: 90 TABLET | Refills: 1 | Status: SHIPPED | OUTPATIENT
Start: 2025-01-03

## 2025-01-03 RX ORDER — PROCHLORPERAZINE EDISYLATE 5 MG/ML
5 INJECTION INTRAMUSCULAR; INTRAVENOUS ONCE
Status: COMPLETED | OUTPATIENT
Start: 2025-01-03 | End: 2025-01-04

## 2025-01-03 RX ADMIN — ATENOLOL 50 MG: 25 TABLET ORAL at 21:05

## 2025-01-03 RX ADMIN — DULOXETINE HYDROCHLORIDE 60 MG: 60 CAPSULE, DELAYED RELEASE ORAL at 10:12

## 2025-01-03 RX ADMIN — HYDROMORPHONE HYDROCHLORIDE 1 MG: 1 INJECTION, SOLUTION INTRAMUSCULAR; INTRAVENOUS; SUBCUTANEOUS at 11:20

## 2025-01-03 RX ADMIN — ONDANSETRON 4 MG: 4 TABLET, ORALLY DISINTEGRATING ORAL at 18:57

## 2025-01-03 RX ADMIN — SODIUM CHLORIDE, PRESERVATIVE FREE 10 ML: 5 INJECTION INTRAVENOUS at 10:15

## 2025-01-03 RX ADMIN — HYDROMORPHONE HYDROCHLORIDE 1 MG: 1 INJECTION, SOLUTION INTRAMUSCULAR; INTRAVENOUS; SUBCUTANEOUS at 02:47

## 2025-01-03 RX ADMIN — LISINOPRIL 40 MG: 10 TABLET ORAL at 10:12

## 2025-01-03 RX ADMIN — SODIUM CHLORIDE, PRESERVATIVE FREE 10 ML: 5 INJECTION INTRAVENOUS at 21:13

## 2025-01-03 RX ADMIN — METRONIDAZOLE 500 MG: 500 INJECTION, SOLUTION INTRAVENOUS at 11:11

## 2025-01-03 RX ADMIN — CILOSTAZOL 100 MG: 50 TABLET ORAL at 10:12

## 2025-01-03 RX ADMIN — WATER 1000 MG: 1 INJECTION INTRAMUSCULAR; INTRAVENOUS; SUBCUTANEOUS at 02:37

## 2025-01-03 RX ADMIN — ATORVASTATIN CALCIUM 40 MG: 40 TABLET, FILM COATED ORAL at 10:12

## 2025-01-03 RX ADMIN — IOPAMIDOL 100 ML: 755 INJECTION, SOLUTION INTRAVENOUS at 14:03

## 2025-01-03 RX ADMIN — PANTOPRAZOLE SODIUM 40 MG: 40 TABLET, DELAYED RELEASE ORAL at 06:10

## 2025-01-03 RX ADMIN — METRONIDAZOLE 500 MG: 500 INJECTION, SOLUTION INTRAVENOUS at 19:01

## 2025-01-03 RX ADMIN — LEVOTHYROXINE SODIUM 100 MCG: 0.1 TABLET ORAL at 10:12

## 2025-01-03 RX ADMIN — METRONIDAZOLE 500 MG: 500 INJECTION, SOLUTION INTRAVENOUS at 02:42

## 2025-01-03 RX ADMIN — IOHEXOL 50 ML: 240 INJECTION, SOLUTION INTRATHECAL; INTRAVASCULAR; INTRAVENOUS; ORAL at 11:17

## 2025-01-03 RX ADMIN — ACETAMINOPHEN 650 MG: 325 TABLET ORAL at 16:39

## 2025-01-03 RX ADMIN — CILOSTAZOL 100 MG: 50 TABLET ORAL at 21:06

## 2025-01-03 ASSESSMENT — PAIN DESCRIPTION - DESCRIPTORS: DESCRIPTORS: CRAMPING

## 2025-01-03 ASSESSMENT — PAIN SCALES - GENERAL
PAINLEVEL_OUTOF10: 7
PAINLEVEL_OUTOF10: 7

## 2025-01-03 ASSESSMENT — PAIN DESCRIPTION - LOCATION
LOCATION: ABDOMEN
LOCATION: ABDOMEN

## 2025-01-03 NOTE — TELEPHONE ENCOUNTER
PCP: Endy Torres APRN - NP    Last appt: 11/18/2024    Future Appointments   Date Time Provider Department Center   4/21/2025 11:00 AM Endy Torres APRN - NP [facility] DEP       Requested Prescriptions     Pending Prescriptions Disp Refills    atenolol (TENORMIN) 50 MG tablet [Pharmacy Med Name: ATENOLOL TABS 50MG] 90 tablet 1     Sig: TAKE 1 TABLET NIGHTLY

## 2025-01-04 ENCOUNTER — APPOINTMENT (OUTPATIENT)
Facility: HOSPITAL | Age: 80
DRG: 336 | End: 2025-01-04
Attending: HOSPITALIST
Payer: MEDICARE

## 2025-01-04 ENCOUNTER — APPOINTMENT (OUTPATIENT)
Facility: HOSPITAL | Age: 80
DRG: 336 | End: 2025-01-04
Payer: MEDICARE

## 2025-01-04 LAB
ALBUMIN SERPL-MCNC: 3.2 G/DL (ref 3.5–5)
ALBUMIN/GLOB SERPL: 0.8 (ref 1.1–2.2)
ALP SERPL-CCNC: 60 U/L (ref 45–117)
ALT SERPL-CCNC: 19 U/L (ref 12–78)
ANION GAP SERPL CALC-SCNC: 10 MMOL/L (ref 2–12)
AST SERPL-CCNC: 36 U/L (ref 15–37)
BASOPHILS # BLD: 0 K/UL (ref 0–0.1)
BASOPHILS NFR BLD: 0 % (ref 0–1)
BILIRUB SERPL-MCNC: 0.5 MG/DL (ref 0.2–1)
BUN SERPL-MCNC: 21 MG/DL (ref 6–20)
BUN/CREAT SERPL: 13 (ref 12–20)
CALCIUM SERPL-MCNC: 8.3 MG/DL (ref 8.5–10.1)
CHLORIDE SERPL-SCNC: 102 MMOL/L (ref 97–108)
CO2 SERPL-SCNC: 21 MMOL/L (ref 21–32)
COMMENT:: NORMAL
CREAT SERPL-MCNC: 1.62 MG/DL (ref 0.55–1.02)
DIFFERENTIAL METHOD BLD: ABNORMAL
EOSINOPHIL # BLD: 0 K/UL (ref 0–0.4)
EOSINOPHIL NFR BLD: 0 % (ref 0–7)
ERYTHROCYTE [DISTWIDTH] IN BLOOD BY AUTOMATED COUNT: 13.6 % (ref 11.5–14.5)
GLOBULIN SER CALC-MCNC: 3.9 G/DL (ref 2–4)
GLUCOSE BLD STRIP.AUTO-MCNC: 125 MG/DL (ref 65–117)
GLUCOSE BLD STRIP.AUTO-MCNC: 77 MG/DL (ref 65–117)
GLUCOSE BLD STRIP.AUTO-MCNC: 98 MG/DL (ref 65–117)
GLUCOSE SERPL-MCNC: 84 MG/DL (ref 65–100)
HCT VFR BLD AUTO: 38.6 % (ref 35–47)
HGB BLD-MCNC: 12.5 G/DL (ref 11.5–16)
IMM GRANULOCYTES # BLD AUTO: 0.1 K/UL (ref 0–0.04)
IMM GRANULOCYTES NFR BLD AUTO: 1 % (ref 0–0.5)
LACTATE SERPL-SCNC: 1 MMOL/L (ref 0.4–2)
LACTATE SERPL-SCNC: 1.3 MMOL/L (ref 0.4–2)
LYMPHOCYTES # BLD: 1.1 K/UL (ref 0.8–3.5)
LYMPHOCYTES NFR BLD: 7 % (ref 12–49)
MAGNESIUM SERPL-MCNC: 1.7 MG/DL (ref 1.6–2.4)
MCH RBC QN AUTO: 31.8 PG (ref 26–34)
MCHC RBC AUTO-ENTMCNC: 32.4 G/DL (ref 30–36.5)
MCV RBC AUTO: 98.2 FL (ref 80–99)
MONOCYTES # BLD: 1.3 K/UL (ref 0–1)
MONOCYTES NFR BLD: 9 % (ref 5–13)
NEUTS SEG # BLD: 12.3 K/UL (ref 1.8–8)
NEUTS SEG NFR BLD: 83 % (ref 32–75)
NRBC # BLD: 0 K/UL (ref 0–0.01)
NRBC BLD-RTO: 0 PER 100 WBC
PHOSPHATE SERPL-MCNC: 3 MG/DL (ref 2.6–4.7)
PLATELET # BLD AUTO: 429 K/UL (ref 150–400)
PMV BLD AUTO: 9.7 FL (ref 8.9–12.9)
POTASSIUM SERPL-SCNC: 3.9 MMOL/L (ref 3.5–5.1)
PROT SERPL-MCNC: 7.1 G/DL (ref 6.4–8.2)
RBC # BLD AUTO: 3.93 M/UL (ref 3.8–5.2)
SERVICE CMNT-IMP: ABNORMAL
SERVICE CMNT-IMP: NORMAL
SERVICE CMNT-IMP: NORMAL
SODIUM SERPL-SCNC: 133 MMOL/L (ref 136–145)
SPECIMEN HOLD: NORMAL
WBC # BLD AUTO: 14.9 K/UL (ref 3.6–11)

## 2025-01-04 PROCEDURE — 6360000002 HC RX W HCPCS

## 2025-01-04 PROCEDURE — 76937 US GUIDE VASCULAR ACCESS: CPT

## 2025-01-04 PROCEDURE — 6360000002 HC RX W HCPCS: Performed by: NURSE PRACTITIONER

## 2025-01-04 PROCEDURE — 6370000000 HC RX 637 (ALT 250 FOR IP): Performed by: FAMILY MEDICINE

## 2025-01-04 PROCEDURE — 2580000003 HC RX 258: Performed by: NURSE PRACTITIONER

## 2025-01-04 PROCEDURE — 2500000003 HC RX 250 WO HCPCS: Performed by: STUDENT IN AN ORGANIZED HEALTH CARE EDUCATION/TRAINING PROGRAM

## 2025-01-04 PROCEDURE — 6360000002 HC RX W HCPCS: Performed by: STUDENT IN AN ORGANIZED HEALTH CARE EDUCATION/TRAINING PROGRAM

## 2025-01-04 PROCEDURE — 85025 COMPLETE CBC W/AUTO DIFF WBC: CPT

## 2025-01-04 PROCEDURE — 2500000003 HC RX 250 WO HCPCS: Performed by: FAMILY MEDICINE

## 2025-01-04 PROCEDURE — 2580000003 HC RX 258: Performed by: SURGERY

## 2025-01-04 PROCEDURE — 6370000000 HC RX 637 (ALT 250 FOR IP): Performed by: NURSE PRACTITIONER

## 2025-01-04 PROCEDURE — 83605 ASSAY OF LACTIC ACID: CPT

## 2025-01-04 PROCEDURE — 1200000000 HC SEMI PRIVATE

## 2025-01-04 PROCEDURE — 83735 ASSAY OF MAGNESIUM: CPT

## 2025-01-04 PROCEDURE — 82962 GLUCOSE BLOOD TEST: CPT

## 2025-01-04 PROCEDURE — 2500000003 HC RX 250 WO HCPCS: Performed by: NURSE PRACTITIONER

## 2025-01-04 PROCEDURE — 74018 RADEX ABDOMEN 1 VIEW: CPT

## 2025-01-04 PROCEDURE — 71045 X-RAY EXAM CHEST 1 VIEW: CPT

## 2025-01-04 PROCEDURE — 99232 SBSQ HOSP IP/OBS MODERATE 35: CPT | Performed by: SURGERY

## 2025-01-04 PROCEDURE — 84100 ASSAY OF PHOSPHORUS: CPT

## 2025-01-04 PROCEDURE — 36415 COLL VENOUS BLD VENIPUNCTURE: CPT

## 2025-01-04 PROCEDURE — 80053 COMPREHEN METABOLIC PANEL: CPT

## 2025-01-04 PROCEDURE — 0D9670Z DRAINAGE OF STOMACH WITH DRAINAGE DEVICE, VIA NATURAL OR ARTIFICIAL OPENING: ICD-10-PCS | Performed by: SURGERY

## 2025-01-04 RX ORDER — ONDANSETRON 4 MG/1
4 TABLET, ORALLY DISINTEGRATING ORAL EVERY 6 HOURS PRN
Status: DISCONTINUED | OUTPATIENT
Start: 2025-01-04 | End: 2025-01-13 | Stop reason: HOSPADM

## 2025-01-04 RX ORDER — ACETAMINOPHEN 500 MG
1000 TABLET ORAL EVERY 6 HOURS PRN
Status: DISCONTINUED | OUTPATIENT
Start: 2025-01-04 | End: 2025-01-13 | Stop reason: HOSPADM

## 2025-01-04 RX ORDER — PROCHLORPERAZINE EDISYLATE 5 MG/ML
5 INJECTION INTRAMUSCULAR; INTRAVENOUS EVERY 6 HOURS PRN
Status: DISCONTINUED | OUTPATIENT
Start: 2025-01-04 | End: 2025-01-13 | Stop reason: HOSPADM

## 2025-01-04 RX ADMIN — CILOSTAZOL 100 MG: 50 TABLET ORAL at 09:27

## 2025-01-04 RX ADMIN — CALCIUM GLUCONATE: 98 INJECTION, SOLUTION INTRAVENOUS at 19:37

## 2025-01-04 RX ADMIN — METRONIDAZOLE 500 MG: 500 INJECTION, SOLUTION INTRAVENOUS at 11:44

## 2025-01-04 RX ADMIN — METRONIDAZOLE 500 MG: 500 INJECTION, SOLUTION INTRAVENOUS at 18:53

## 2025-01-04 RX ADMIN — HYDROMORPHONE HYDROCHLORIDE 1 MG: 1 INJECTION, SOLUTION INTRAMUSCULAR; INTRAVENOUS; SUBCUTANEOUS at 19:31

## 2025-01-04 RX ADMIN — HYDROMORPHONE HYDROCHLORIDE 1 MG: 1 INJECTION, SOLUTION INTRAMUSCULAR; INTRAVENOUS; SUBCUTANEOUS at 03:25

## 2025-01-04 RX ADMIN — ATORVASTATIN CALCIUM 40 MG: 40 TABLET, FILM COATED ORAL at 09:27

## 2025-01-04 RX ADMIN — PANTOPRAZOLE SODIUM 40 MG: 40 TABLET, DELAYED RELEASE ORAL at 06:15

## 2025-01-04 RX ADMIN — CILOSTAZOL 100 MG: 50 TABLET ORAL at 20:50

## 2025-01-04 RX ADMIN — HYDROMORPHONE HYDROCHLORIDE 1 MG: 1 INJECTION, SOLUTION INTRAMUSCULAR; INTRAVENOUS; SUBCUTANEOUS at 09:30

## 2025-01-04 RX ADMIN — SODIUM CHLORIDE: 9 INJECTION, SOLUTION INTRAVENOUS at 12:43

## 2025-01-04 RX ADMIN — SODIUM CHLORIDE: 9 INJECTION, SOLUTION INTRAVENOUS at 00:00

## 2025-01-04 RX ADMIN — LEVOTHYROXINE SODIUM 100 MCG: 0.1 TABLET ORAL at 09:27

## 2025-01-04 RX ADMIN — WATER 1000 MG: 1 INJECTION INTRAMUSCULAR; INTRAVENOUS; SUBCUTANEOUS at 03:18

## 2025-01-04 RX ADMIN — SODIUM CHLORIDE, PRESERVATIVE FREE 10 ML: 5 INJECTION INTRAVENOUS at 09:30

## 2025-01-04 RX ADMIN — LISINOPRIL 20 MG: 10 TABLET ORAL at 09:27

## 2025-01-04 RX ADMIN — PROCHLORPERAZINE EDISYLATE 5 MG: 5 INJECTION INTRAMUSCULAR; INTRAVENOUS at 00:13

## 2025-01-04 RX ADMIN — SODIUM CHLORIDE: 9 INJECTION, SOLUTION INTRAVENOUS at 20:54

## 2025-01-04 RX ADMIN — SODIUM CHLORIDE, PRESERVATIVE FREE 10 ML: 5 INJECTION INTRAVENOUS at 20:50

## 2025-01-04 RX ADMIN — METRONIDAZOLE 500 MG: 500 INJECTION, SOLUTION INTRAVENOUS at 03:20

## 2025-01-04 RX ADMIN — DULOXETINE HYDROCHLORIDE 60 MG: 60 CAPSULE, DELAYED RELEASE ORAL at 09:27

## 2025-01-04 RX ADMIN — I.V. FAT EMULSION 250 ML: 20 EMULSION INTRAVENOUS at 19:41

## 2025-01-04 ASSESSMENT — PAIN DESCRIPTION - LOCATION
LOCATION: ABDOMEN
LOCATION: ABDOMEN

## 2025-01-04 ASSESSMENT — PAIN SCALES - GENERAL
PAINLEVEL_OUTOF10: 8
PAINLEVEL_OUTOF10: 7

## 2025-01-04 ASSESSMENT — PAIN SCALES - WONG BAKER: WONGBAKER_NUMERICALRESPONSE: NO HURT

## 2025-01-04 ASSESSMENT — PAIN DESCRIPTION - DESCRIPTORS: DESCRIPTORS: ACHING

## 2025-01-05 LAB
25(OH)D3 SERPL-MCNC: 38.1 NG/ML (ref 30–100)
ALBUMIN SERPL-MCNC: 2.7 G/DL (ref 3.5–5)
ALBUMIN/GLOB SERPL: 0.9 (ref 1.1–2.2)
ALP SERPL-CCNC: 49 U/L (ref 45–117)
ALT SERPL-CCNC: 20 U/L (ref 12–78)
ANION GAP SERPL CALC-SCNC: 12 MMOL/L (ref 2–12)
APPEARANCE UR: CLEAR
AST SERPL-CCNC: 42 U/L (ref 15–37)
BACTERIA URNS QL MICRO: NEGATIVE /HPF
BILIRUB DIRECT SERPL-MCNC: <0.1 MG/DL (ref 0–0.2)
BILIRUB SERPL-MCNC: 0.3 MG/DL (ref 0.2–1)
BILIRUB UR QL: NEGATIVE
BUN SERPL-MCNC: 29 MG/DL (ref 6–20)
BUN/CREAT SERPL: 13 (ref 12–20)
CALCIUM SERPL-MCNC: 7.4 MG/DL (ref 8.5–10.1)
CHLORIDE SERPL-SCNC: 105 MMOL/L (ref 97–108)
CHLORIDE UR-SCNC: 128 MMOL/L
CO2 SERPL-SCNC: 20 MMOL/L (ref 21–32)
COLOR UR: ABNORMAL
CREAT SERPL-MCNC: 2.25 MG/DL (ref 0.55–1.02)
CREAT UR-MCNC: 84 MG/DL
EOSINOPHIL #/AREA URNS HPF: NEGATIVE
EPITH CASTS URNS QL MICRO: ABNORMAL /LPF
ERYTHROCYTE [DISTWIDTH] IN BLOOD BY AUTOMATED COUNT: 14.2 % (ref 11.5–14.5)
GLOBULIN SER CALC-MCNC: 2.9 G/DL (ref 2–4)
GLUCOSE BLD STRIP.AUTO-MCNC: 120 MG/DL (ref 65–117)
GLUCOSE BLD STRIP.AUTO-MCNC: 129 MG/DL (ref 65–117)
GLUCOSE BLD STRIP.AUTO-MCNC: 141 MG/DL (ref 65–117)
GLUCOSE BLD STRIP.AUTO-MCNC: 142 MG/DL (ref 65–117)
GLUCOSE SERPL-MCNC: 121 MG/DL (ref 65–100)
GLUCOSE UR STRIP.AUTO-MCNC: 100 MG/DL
HCT VFR BLD AUTO: 30.6 % (ref 35–47)
HGB BLD-MCNC: 9.6 G/DL (ref 11.5–16)
HGB UR QL STRIP: ABNORMAL
HYALINE CASTS URNS QL MICRO: ABNORMAL /LPF (ref 0–5)
KETONES UR QL STRIP.AUTO: ABNORMAL MG/DL
LACTATE SERPL-SCNC: 1.1 MMOL/L (ref 0.4–2)
LEUKOCYTE ESTERASE UR QL STRIP.AUTO: NEGATIVE
MAGNESIUM SERPL-MCNC: 1.6 MG/DL (ref 1.6–2.4)
MCH RBC QN AUTO: 31.9 PG (ref 26–34)
MCHC RBC AUTO-ENTMCNC: 31.4 G/DL (ref 30–36.5)
MCV RBC AUTO: 101.7 FL (ref 80–99)
NITRITE UR QL STRIP.AUTO: NEGATIVE
NRBC # BLD: 0 K/UL (ref 0–0.01)
NRBC BLD-RTO: 0 PER 100 WBC
PH UR STRIP: 5.5 (ref 5–8)
PHOSPHATE SERPL-MCNC: 2.6 MG/DL (ref 2.6–4.7)
PLATELET # BLD AUTO: 361 K/UL (ref 150–400)
PMV BLD AUTO: 9.8 FL (ref 8.9–12.9)
POTASSIUM SERPL-SCNC: 3.6 MMOL/L (ref 3.5–5.1)
PROT SERPL-MCNC: 5.6 G/DL (ref 6.4–8.2)
PROT UR STRIP-MCNC: 30 MG/DL
PROT UR-MCNC: 65 MG/DL (ref 0–11.9)
RBC # BLD AUTO: 3.01 M/UL (ref 3.8–5.2)
RBC #/AREA URNS HPF: ABNORMAL /HPF (ref 0–5)
SERVICE CMNT-IMP: ABNORMAL
SODIUM SERPL-SCNC: 137 MMOL/L (ref 136–145)
SODIUM UR-SCNC: 89 MMOL/L
SP GR UR REFRACTOMETRY: 1.02 (ref 1–1.03)
SPECIMEN HOLD: NORMAL
TRIGL SERPL-MCNC: 229 MG/DL
UROBILINOGEN UR QL STRIP.AUTO: 0.2 EU/DL (ref 0.2–1)
WBC # BLD AUTO: 16 K/UL (ref 3.6–11)
WBC URNS QL MICRO: ABNORMAL /HPF (ref 0–4)

## 2025-01-05 PROCEDURE — 6370000000 HC RX 637 (ALT 250 FOR IP): Performed by: NURSE PRACTITIONER

## 2025-01-05 PROCEDURE — 6360000002 HC RX W HCPCS: Performed by: STUDENT IN AN ORGANIZED HEALTH CARE EDUCATION/TRAINING PROGRAM

## 2025-01-05 PROCEDURE — 2500000003 HC RX 250 WO HCPCS: Performed by: NURSE PRACTITIONER

## 2025-01-05 PROCEDURE — 86160 COMPLEMENT ANTIGEN: CPT

## 2025-01-05 PROCEDURE — 6370000000 HC RX 637 (ALT 250 FOR IP): Performed by: FAMILY MEDICINE

## 2025-01-05 PROCEDURE — 82570 ASSAY OF URINE CREATININE: CPT

## 2025-01-05 PROCEDURE — 85027 COMPLETE CBC AUTOMATED: CPT

## 2025-01-05 PROCEDURE — 84100 ASSAY OF PHOSPHORUS: CPT

## 2025-01-05 PROCEDURE — 2500000003 HC RX 250 WO HCPCS: Performed by: STUDENT IN AN ORGANIZED HEALTH CARE EDUCATION/TRAINING PROGRAM

## 2025-01-05 PROCEDURE — 6370000000 HC RX 637 (ALT 250 FOR IP): Performed by: SURGERY

## 2025-01-05 PROCEDURE — 84300 ASSAY OF URINE SODIUM: CPT

## 2025-01-05 PROCEDURE — 84478 ASSAY OF TRIGLYCERIDES: CPT

## 2025-01-05 PROCEDURE — 83605 ASSAY OF LACTIC ACID: CPT

## 2025-01-05 PROCEDURE — 82436 ASSAY OF URINE CHLORIDE: CPT

## 2025-01-05 PROCEDURE — 76937 US GUIDE VASCULAR ACCESS: CPT

## 2025-01-05 PROCEDURE — 80048 BASIC METABOLIC PNL TOTAL CA: CPT

## 2025-01-05 PROCEDURE — 82962 GLUCOSE BLOOD TEST: CPT

## 2025-01-05 PROCEDURE — 83735 ASSAY OF MAGNESIUM: CPT

## 2025-01-05 PROCEDURE — 84156 ASSAY OF PROTEIN URINE: CPT

## 2025-01-05 PROCEDURE — 2580000003 HC RX 258: Performed by: NURSE PRACTITIONER

## 2025-01-05 PROCEDURE — 2500000003 HC RX 250 WO HCPCS: Performed by: FAMILY MEDICINE

## 2025-01-05 PROCEDURE — 81001 URINALYSIS AUTO W/SCOPE: CPT

## 2025-01-05 PROCEDURE — 2580000003 HC RX 258: Performed by: SURGERY

## 2025-01-05 PROCEDURE — 82306 VITAMIN D 25 HYDROXY: CPT

## 2025-01-05 PROCEDURE — 6360000002 HC RX W HCPCS: Performed by: NURSE PRACTITIONER

## 2025-01-05 PROCEDURE — 87205 SMEAR GRAM STAIN: CPT

## 2025-01-05 PROCEDURE — 1200000000 HC SEMI PRIVATE

## 2025-01-05 PROCEDURE — 80076 HEPATIC FUNCTION PANEL: CPT

## 2025-01-05 RX ORDER — CALCIUM GLUCONATE 94 MG/ML
1000 INJECTION, SOLUTION INTRAVENOUS ONCE
Status: DISCONTINUED | OUTPATIENT
Start: 2025-01-05 | End: 2025-01-05

## 2025-01-05 RX ORDER — HYDRALAZINE HYDROCHLORIDE 20 MG/ML
5 INJECTION INTRAMUSCULAR; INTRAVENOUS EVERY 6 HOURS PRN
Status: DISCONTINUED | OUTPATIENT
Start: 2025-01-05 | End: 2025-01-13 | Stop reason: HOSPADM

## 2025-01-05 RX ORDER — CALCIUM GLUCONATE 20 MG/ML
1000 INJECTION, SOLUTION INTRAVENOUS ONCE
Status: COMPLETED | OUTPATIENT
Start: 2025-01-05 | End: 2025-01-05

## 2025-01-05 RX ADMIN — CILOSTAZOL 100 MG: 50 TABLET ORAL at 09:44

## 2025-01-05 RX ADMIN — DULOXETINE HYDROCHLORIDE 60 MG: 60 CAPSULE, DELAYED RELEASE ORAL at 09:43

## 2025-01-05 RX ADMIN — CILOSTAZOL 100 MG: 50 TABLET ORAL at 20:48

## 2025-01-05 RX ADMIN — I.V. FAT EMULSION 250 ML: 20 EMULSION INTRAVENOUS at 19:07

## 2025-01-05 RX ADMIN — PIPERACILLIN AND TAZOBACTAM 3375 MG: 3; .375 INJECTION, POWDER, LYOPHILIZED, FOR SOLUTION INTRAVENOUS at 20:52

## 2025-01-05 RX ADMIN — SODIUM CHLORIDE, PRESERVATIVE FREE 10 ML: 5 INJECTION INTRAVENOUS at 09:43

## 2025-01-05 RX ADMIN — LISINOPRIL 20 MG: 10 TABLET ORAL at 09:43

## 2025-01-05 RX ADMIN — LEVOTHYROXINE SODIUM 100 MCG: 0.1 TABLET ORAL at 09:44

## 2025-01-05 RX ADMIN — PIPERACILLIN AND TAZOBACTAM 4500 MG: 4; .5 INJECTION, POWDER, LYOPHILIZED, FOR SOLUTION INTRAVENOUS; PARENTERAL at 13:25

## 2025-01-05 RX ADMIN — PANTOPRAZOLE SODIUM 40 MG: 40 TABLET, DELAYED RELEASE ORAL at 06:05

## 2025-01-05 RX ADMIN — METRONIDAZOLE 500 MG: 500 INJECTION, SOLUTION INTRAVENOUS at 11:40

## 2025-01-05 RX ADMIN — CALCIUM GLUCONATE 1000 MG: 20 INJECTION, SOLUTION INTRAVENOUS at 10:25

## 2025-01-05 RX ADMIN — ATENOLOL 50 MG: 25 TABLET ORAL at 20:48

## 2025-01-05 RX ADMIN — CALCIUM GLUCONATE: 98 INJECTION, SOLUTION INTRAVENOUS at 19:04

## 2025-01-05 RX ADMIN — SODIUM CHLORIDE: 9 INJECTION, SOLUTION INTRAVENOUS at 13:23

## 2025-01-05 RX ADMIN — PHENOL 1 SPRAY: 1.4 SPRAY ORAL at 17:09

## 2025-01-05 RX ADMIN — SODIUM CHLORIDE, PRESERVATIVE FREE 10 ML: 5 INJECTION INTRAVENOUS at 20:53

## 2025-01-05 RX ADMIN — ATORVASTATIN CALCIUM 40 MG: 40 TABLET, FILM COATED ORAL at 09:44

## 2025-01-05 RX ADMIN — WATER 1000 MG: 1 INJECTION INTRAMUSCULAR; INTRAVENOUS; SUBCUTANEOUS at 02:28

## 2025-01-05 RX ADMIN — METRONIDAZOLE 500 MG: 500 INJECTION, SOLUTION INTRAVENOUS at 02:29

## 2025-01-05 ASSESSMENT — PAIN SCALES - GENERAL: PAINLEVEL_OUTOF10: 0

## 2025-01-05 NOTE — PROCEDURES
Vascular Access Team - peripheral IV catheter insertion note     A 22 gauge, 25 mm (1 inch) PIV was inserted into the right ventral forearm using ultrasound guidance. The IV flushed easily and had brisk blood return. The patient tolerated the procedure well.   Juan Antonio French RN

## 2025-01-05 NOTE — PROCEDURES
Vascular Access Team - peripheral IV catheter insertion note     A 20 gauge, 25 mm (1 inch) PIV was inserted into the right AC  using ultrasound guidance. The IV flushed easily and had brisk blood return. The patient tolerated the procedure well.   LINDY HOPE RN

## 2025-01-06 ENCOUNTER — APPOINTMENT (OUTPATIENT)
Facility: HOSPITAL | Age: 80
DRG: 336 | End: 2025-01-06
Payer: MEDICARE

## 2025-01-06 LAB
ALBUMIN SERPL-MCNC: 3.1 G/DL (ref 3.5–5)
ALBUMIN/GLOB SERPL: 0.9 (ref 1.1–2.2)
ALP SERPL-CCNC: 56 U/L (ref 45–117)
ALT SERPL-CCNC: 25 U/L (ref 12–78)
ANION GAP SERPL CALC-SCNC: 11 MMOL/L (ref 2–12)
AST SERPL-CCNC: 39 U/L (ref 15–37)
BACTERIA SPEC CULT: NORMAL
BACTERIA SPEC CULT: NORMAL
BASOPHILS # BLD: 0 K/UL (ref 0–0.1)
BASOPHILS NFR BLD: 0 % (ref 0–1)
BILIRUB DIRECT SERPL-MCNC: 0.2 MG/DL (ref 0–0.2)
BILIRUB SERPL-MCNC: 0.4 MG/DL (ref 0.2–1)
BUN SERPL-MCNC: 19 MG/DL (ref 6–20)
BUN/CREAT SERPL: 20 (ref 12–20)
CALCIUM SERPL-MCNC: 9 MG/DL (ref 8.5–10.1)
CHLORIDE SERPL-SCNC: 110 MMOL/L (ref 97–108)
CO2 SERPL-SCNC: 18 MMOL/L (ref 21–32)
CREAT SERPL-MCNC: 0.96 MG/DL (ref 0.55–1.02)
DIFFERENTIAL METHOD BLD: ABNORMAL
EOSINOPHIL # BLD: 0.2 K/UL (ref 0–0.4)
EOSINOPHIL NFR BLD: 1 % (ref 0–7)
ERYTHROCYTE [DISTWIDTH] IN BLOOD BY AUTOMATED COUNT: 14 % (ref 11.5–14.5)
GLOBULIN SER CALC-MCNC: 3.4 G/DL (ref 2–4)
GLUCOSE BLD STRIP.AUTO-MCNC: 156 MG/DL (ref 65–117)
GLUCOSE BLD STRIP.AUTO-MCNC: 162 MG/DL (ref 65–117)
GLUCOSE BLD STRIP.AUTO-MCNC: 166 MG/DL (ref 65–117)
GLUCOSE SERPL-MCNC: 148 MG/DL (ref 65–100)
HCT VFR BLD AUTO: 34.7 % (ref 35–47)
HGB BLD-MCNC: 11.5 G/DL (ref 11.5–16)
IMM GRANULOCYTES # BLD AUTO: 0.2 K/UL (ref 0–0.04)
IMM GRANULOCYTES NFR BLD AUTO: 1 % (ref 0–0.5)
LACTATE SERPL-SCNC: 1.2 MMOL/L (ref 0.4–2)
LYMPHOCYTES # BLD: 1 K/UL (ref 0.8–3.5)
LYMPHOCYTES NFR BLD: 6 % (ref 12–49)
MAGNESIUM SERPL-MCNC: 1.7 MG/DL (ref 1.6–2.4)
MCH RBC QN AUTO: 31.6 PG (ref 26–34)
MCHC RBC AUTO-ENTMCNC: 33.1 G/DL (ref 30–36.5)
MCV RBC AUTO: 95.3 FL (ref 80–99)
MONOCYTES # BLD: 1.8 K/UL (ref 0–1)
MONOCYTES NFR BLD: 11 % (ref 5–13)
NEUTS SEG # BLD: 12.9 K/UL (ref 1.8–8)
NEUTS SEG NFR BLD: 81 % (ref 32–75)
NRBC # BLD: 0 K/UL (ref 0–0.01)
NRBC BLD-RTO: 0 PER 100 WBC
PHOSPHATE SERPL-MCNC: 1.6 MG/DL (ref 2.6–4.7)
PLATELET # BLD AUTO: 439 K/UL (ref 150–400)
PMV BLD AUTO: 10.1 FL (ref 8.9–12.9)
POTASSIUM SERPL-SCNC: 3.5 MMOL/L (ref 3.5–5.1)
PROT SERPL-MCNC: 6.5 G/DL (ref 6.4–8.2)
RBC # BLD AUTO: 3.64 M/UL (ref 3.8–5.2)
RBC MORPH BLD: ABNORMAL
SERVICE CMNT-IMP: ABNORMAL
SERVICE CMNT-IMP: NORMAL
SERVICE CMNT-IMP: NORMAL
SODIUM SERPL-SCNC: 139 MMOL/L (ref 136–145)
WBC # BLD AUTO: 16.1 K/UL (ref 3.6–11)

## 2025-01-06 PROCEDURE — 74018 RADEX ABDOMEN 1 VIEW: CPT

## 2025-01-06 PROCEDURE — 6360000002 HC RX W HCPCS

## 2025-01-06 PROCEDURE — 82962 GLUCOSE BLOOD TEST: CPT

## 2025-01-06 PROCEDURE — 80076 HEPATIC FUNCTION PANEL: CPT

## 2025-01-06 PROCEDURE — 94760 N-INVAS EAR/PLS OXIMETRY 1: CPT

## 2025-01-06 PROCEDURE — 85025 COMPLETE CBC W/AUTO DIFF WBC: CPT

## 2025-01-06 PROCEDURE — 1200000000 HC SEMI PRIVATE

## 2025-01-06 PROCEDURE — 2500000003 HC RX 250 WO HCPCS: Performed by: FAMILY MEDICINE

## 2025-01-06 PROCEDURE — 2500000003 HC RX 250 WO HCPCS

## 2025-01-06 PROCEDURE — 84100 ASSAY OF PHOSPHORUS: CPT

## 2025-01-06 PROCEDURE — 6370000000 HC RX 637 (ALT 250 FOR IP): Performed by: FAMILY MEDICINE

## 2025-01-06 PROCEDURE — 80048 BASIC METABOLIC PNL TOTAL CA: CPT

## 2025-01-06 PROCEDURE — 6370000000 HC RX 637 (ALT 250 FOR IP)

## 2025-01-06 PROCEDURE — 83735 ASSAY OF MAGNESIUM: CPT

## 2025-01-06 PROCEDURE — 83605 ASSAY OF LACTIC ACID: CPT

## 2025-01-06 PROCEDURE — 2500000003 HC RX 250 WO HCPCS: Performed by: NURSE PRACTITIONER

## 2025-01-06 PROCEDURE — 2700000000 HC OXYGEN THERAPY PER DAY

## 2025-01-06 PROCEDURE — 2580000003 HC RX 258: Performed by: SURGERY

## 2025-01-06 PROCEDURE — 6360000002 HC RX W HCPCS: Performed by: NURSE PRACTITIONER

## 2025-01-06 PROCEDURE — 2580000003 HC RX 258: Performed by: NURSE PRACTITIONER

## 2025-01-06 PROCEDURE — 99231 SBSQ HOSP IP/OBS SF/LOW 25: CPT | Performed by: NURSE PRACTITIONER

## 2025-01-06 PROCEDURE — 2580000003 HC RX 258

## 2025-01-06 RX ORDER — TRAZODONE HYDROCHLORIDE 50 MG/1
25 TABLET, FILM COATED ORAL NIGHTLY PRN
Status: DISCONTINUED | OUTPATIENT
Start: 2025-01-06 | End: 2025-01-07

## 2025-01-06 RX ORDER — ONDANSETRON 2 MG/ML
4 INJECTION INTRAMUSCULAR; INTRAVENOUS EVERY 6 HOURS PRN
Status: DISCONTINUED | OUTPATIENT
Start: 2025-01-06 | End: 2025-01-13 | Stop reason: HOSPADM

## 2025-01-06 RX ORDER — LIDOCAINE 4 G/G
1 PATCH TOPICAL DAILY
Status: DISCONTINUED | OUTPATIENT
Start: 2025-01-06 | End: 2025-01-13 | Stop reason: HOSPADM

## 2025-01-06 RX ADMIN — CILOSTAZOL 100 MG: 50 TABLET ORAL at 22:52

## 2025-01-06 RX ADMIN — PANTOPRAZOLE SODIUM 40 MG: 40 INJECTION, POWDER, FOR SOLUTION INTRAVENOUS at 09:46

## 2025-01-06 RX ADMIN — Medication 3 MG: at 22:52

## 2025-01-06 RX ADMIN — CALCIUM GLUCONATE: 98 INJECTION, SOLUTION INTRAVENOUS at 19:00

## 2025-01-06 RX ADMIN — SODIUM CHLORIDE, PRESERVATIVE FREE 10 ML: 5 INJECTION INTRAVENOUS at 09:47

## 2025-01-06 RX ADMIN — SODIUM CHLORIDE, PRESERVATIVE FREE 10 ML: 5 INJECTION INTRAVENOUS at 22:51

## 2025-01-06 RX ADMIN — DULOXETINE HYDROCHLORIDE 60 MG: 60 CAPSULE, DELAYED RELEASE ORAL at 09:46

## 2025-01-06 RX ADMIN — I.V. FAT EMULSION 250 ML: 20 EMULSION INTRAVENOUS at 19:04

## 2025-01-06 RX ADMIN — LEVOTHYROXINE SODIUM 100 MCG: 0.1 TABLET ORAL at 09:46

## 2025-01-06 RX ADMIN — ATORVASTATIN CALCIUM 40 MG: 40 TABLET, FILM COATED ORAL at 09:46

## 2025-01-06 RX ADMIN — CILOSTAZOL 100 MG: 50 TABLET ORAL at 09:46

## 2025-01-06 RX ADMIN — ATENOLOL 50 MG: 25 TABLET ORAL at 23:03

## 2025-01-06 RX ADMIN — SODIUM CHLORIDE: 9 INJECTION, SOLUTION INTRAVENOUS at 09:44

## 2025-01-06 RX ADMIN — PIPERACILLIN AND TAZOBACTAM 3375 MG: 3; .375 INJECTION, POWDER, LYOPHILIZED, FOR SOLUTION INTRAVENOUS at 09:45

## 2025-01-06 RX ADMIN — PIPERACILLIN AND TAZOBACTAM 3375 MG: 3; .375 INJECTION, POWDER, LYOPHILIZED, FOR SOLUTION INTRAVENOUS at 23:01

## 2025-01-07 ENCOUNTER — APPOINTMENT (OUTPATIENT)
Facility: HOSPITAL | Age: 80
DRG: 336 | End: 2025-01-07
Payer: MEDICARE

## 2025-01-07 LAB
ALBUMIN SERPL-MCNC: 2.8 G/DL (ref 3.5–5)
ANION GAP SERPL CALC-SCNC: 9 MMOL/L (ref 2–12)
BASOPHILS # BLD: 0.04 K/UL (ref 0–0.1)
BASOPHILS NFR BLD: 0.3 % (ref 0–1)
BUN SERPL-MCNC: 15 MG/DL (ref 6–20)
BUN/CREAT SERPL: 21 (ref 12–20)
C3 SERPL-MCNC: 97 MG/DL (ref 82–167)
C4 SERPL-MCNC: 16 MG/DL (ref 12–38)
CALCIUM SERPL-MCNC: 8.7 MG/DL (ref 8.5–10.1)
CHLORIDE SERPL-SCNC: 110 MMOL/L (ref 97–108)
CO2 SERPL-SCNC: 18 MMOL/L (ref 21–32)
COMMENT:: NORMAL
CREAT SERPL-MCNC: 0.72 MG/DL (ref 0.55–1.02)
DIFFERENTIAL METHOD BLD: ABNORMAL
EOSINOPHIL # BLD: 0.25 K/UL (ref 0–0.4)
EOSINOPHIL NFR BLD: 2 % (ref 0–7)
ERYTHROCYTE [DISTWIDTH] IN BLOOD BY AUTOMATED COUNT: 14 % (ref 11.5–14.5)
GLUCOSE BLD STRIP.AUTO-MCNC: 117 MG/DL (ref 65–117)
GLUCOSE BLD STRIP.AUTO-MCNC: 120 MG/DL (ref 65–117)
GLUCOSE BLD STRIP.AUTO-MCNC: 157 MG/DL (ref 65–117)
GLUCOSE BLD STRIP.AUTO-MCNC: 159 MG/DL (ref 65–117)
GLUCOSE SERPL-MCNC: 165 MG/DL (ref 65–100)
HCT VFR BLD AUTO: 30.9 % (ref 35–47)
HGB BLD-MCNC: 10.6 G/DL (ref 11.5–16)
IMM GRANULOCYTES # BLD AUTO: 0.07 K/UL (ref 0–0.04)
IMM GRANULOCYTES NFR BLD AUTO: 0.6 % (ref 0–0.5)
LYMPHOCYTES # BLD: 1.27 K/UL (ref 0.8–3.5)
LYMPHOCYTES NFR BLD: 10.1 % (ref 12–49)
MCH RBC QN AUTO: 32 PG (ref 26–34)
MCHC RBC AUTO-ENTMCNC: 34.3 G/DL (ref 30–36.5)
MCV RBC AUTO: 93.4 FL (ref 80–99)
MONOCYTES # BLD: 1.86 K/UL (ref 0–1)
MONOCYTES NFR BLD: 14.8 % (ref 5–13)
NEUTS SEG # BLD: 9.1 K/UL (ref 1.8–8)
NEUTS SEG NFR BLD: 72.2 % (ref 32–75)
NRBC # BLD: 0 K/UL (ref 0–0.01)
NRBC BLD-RTO: 0 PER 100 WBC
PHOSPHATE SERPL-MCNC: 2.2 MG/DL (ref 2.6–4.7)
PLATELET # BLD AUTO: 385 K/UL (ref 150–400)
PMV BLD AUTO: 9.9 FL (ref 8.9–12.9)
POTASSIUM SERPL-SCNC: 3.2 MMOL/L (ref 3.5–5.1)
RBC # BLD AUTO: 3.31 M/UL (ref 3.8–5.2)
SERVICE CMNT-IMP: ABNORMAL
SERVICE CMNT-IMP: NORMAL
SODIUM SERPL-SCNC: 137 MMOL/L (ref 136–145)
SPECIMEN HOLD: NORMAL
WBC # BLD AUTO: 12.6 K/UL (ref 3.6–11)

## 2025-01-07 PROCEDURE — 74018 RADEX ABDOMEN 1 VIEW: CPT

## 2025-01-07 PROCEDURE — 2500000003 HC RX 250 WO HCPCS

## 2025-01-07 PROCEDURE — 1200000000 HC SEMI PRIVATE

## 2025-01-07 PROCEDURE — 80069 RENAL FUNCTION PANEL: CPT

## 2025-01-07 PROCEDURE — 6370000000 HC RX 637 (ALT 250 FOR IP): Performed by: FAMILY MEDICINE

## 2025-01-07 PROCEDURE — 6370000000 HC RX 637 (ALT 250 FOR IP)

## 2025-01-07 PROCEDURE — 6370000000 HC RX 637 (ALT 250 FOR IP): Performed by: INTERNAL MEDICINE

## 2025-01-07 PROCEDURE — 6360000002 HC RX W HCPCS: Performed by: NURSE PRACTITIONER

## 2025-01-07 PROCEDURE — 6360000002 HC RX W HCPCS: Performed by: INTERNAL MEDICINE

## 2025-01-07 PROCEDURE — 2500000003 HC RX 250 WO HCPCS: Performed by: NURSE PRACTITIONER

## 2025-01-07 PROCEDURE — 85025 COMPLETE CBC W/AUTO DIFF WBC: CPT

## 2025-01-07 PROCEDURE — 2500000003 HC RX 250 WO HCPCS: Performed by: FAMILY MEDICINE

## 2025-01-07 PROCEDURE — 82962 GLUCOSE BLOOD TEST: CPT

## 2025-01-07 PROCEDURE — 0D9670Z DRAINAGE OF STOMACH WITH DRAINAGE DEVICE, VIA NATURAL OR ARTIFICIAL OPENING: ICD-10-PCS | Performed by: SURGERY

## 2025-01-07 PROCEDURE — 2580000003 HC RX 258: Performed by: NURSE PRACTITIONER

## 2025-01-07 PROCEDURE — 99232 SBSQ HOSP IP/OBS MODERATE 35: CPT | Performed by: SURGERY

## 2025-01-07 PROCEDURE — 2580000003 HC RX 258

## 2025-01-07 PROCEDURE — 6360000002 HC RX W HCPCS

## 2025-01-07 RX ORDER — TRAZODONE HYDROCHLORIDE 50 MG/1
50 TABLET, FILM COATED ORAL NIGHTLY
Status: DISCONTINUED | OUTPATIENT
Start: 2025-01-07 | End: 2025-01-13 | Stop reason: HOSPADM

## 2025-01-07 RX ORDER — MAGNESIUM SULFATE IN WATER 40 MG/ML
2000 INJECTION, SOLUTION INTRAVENOUS ONCE
Status: COMPLETED | OUTPATIENT
Start: 2025-01-07 | End: 2025-01-07

## 2025-01-07 RX ORDER — POTASSIUM CHLORIDE 7.45 MG/ML
10 INJECTION INTRAVENOUS
Status: COMPLETED | OUTPATIENT
Start: 2025-01-07 | End: 2025-01-07

## 2025-01-07 RX ADMIN — TRAZODONE HYDROCHLORIDE 25 MG: 50 TABLET ORAL at 01:21

## 2025-01-07 RX ADMIN — POTASSIUM & SODIUM PHOSPHATES POWDER PACK 280-160-250 MG 250 MG: 280-160-250 PACK at 21:25

## 2025-01-07 RX ADMIN — PIPERACILLIN AND TAZOBACTAM 3375 MG: 3; .375 INJECTION, POWDER, LYOPHILIZED, FOR SOLUTION INTRAVENOUS at 18:53

## 2025-01-07 RX ADMIN — SODIUM CHLORIDE, PRESERVATIVE FREE 10 ML: 5 INJECTION INTRAVENOUS at 09:21

## 2025-01-07 RX ADMIN — POTASSIUM CHLORIDE 10 MEQ: 10 INJECTION, SOLUTION INTRAVENOUS at 12:45

## 2025-01-07 RX ADMIN — CILOSTAZOL 100 MG: 50 TABLET ORAL at 21:25

## 2025-01-07 RX ADMIN — SODIUM CHLORIDE, PRESERVATIVE FREE 10 ML: 5 INJECTION INTRAVENOUS at 21:26

## 2025-01-07 RX ADMIN — POTASSIUM CHLORIDE 10 MEQ: 10 INJECTION, SOLUTION INTRAVENOUS at 13:15

## 2025-01-07 RX ADMIN — PIPERACILLIN AND TAZOBACTAM 3375 MG: 3; .375 INJECTION, POWDER, LYOPHILIZED, FOR SOLUTION INTRAVENOUS at 05:25

## 2025-01-07 RX ADMIN — ATENOLOL 50 MG: 25 TABLET ORAL at 21:25

## 2025-01-07 RX ADMIN — DULOXETINE HYDROCHLORIDE 60 MG: 60 CAPSULE, DELAYED RELEASE ORAL at 09:22

## 2025-01-07 RX ADMIN — POTASSIUM CHLORIDE 10 MEQ: 10 INJECTION, SOLUTION INTRAVENOUS at 15:15

## 2025-01-07 RX ADMIN — I.V. FAT EMULSION 250 ML: 20 EMULSION INTRAVENOUS at 18:51

## 2025-01-07 RX ADMIN — POTASSIUM CHLORIDE 10 MEQ: 10 INJECTION, SOLUTION INTRAVENOUS at 14:15

## 2025-01-07 RX ADMIN — MAGNESIUM SULFATE HEPTAHYDRATE 2000 MG: 40 INJECTION, SOLUTION INTRAVENOUS at 10:37

## 2025-01-07 RX ADMIN — PANTOPRAZOLE SODIUM 40 MG: 40 INJECTION, POWDER, FOR SOLUTION INTRAVENOUS at 09:20

## 2025-01-07 RX ADMIN — CALCIUM GLUCONATE: 98 INJECTION, SOLUTION INTRAVENOUS at 18:42

## 2025-01-07 RX ADMIN — TRAZODONE HYDROCHLORIDE 50 MG: 50 TABLET ORAL at 21:26

## 2025-01-08 ENCOUNTER — ANESTHESIA (OUTPATIENT)
Facility: HOSPITAL | Age: 80
End: 2025-01-08
Payer: MEDICARE

## 2025-01-08 ENCOUNTER — ANESTHESIA EVENT (OUTPATIENT)
Facility: HOSPITAL | Age: 80
End: 2025-01-08
Payer: MEDICARE

## 2025-01-08 LAB
ABO + RH BLD: NORMAL
ALBUMIN SERPL-MCNC: 2.8 G/DL (ref 3.5–5)
ALBUMIN/GLOB SERPL: 0.9 (ref 1.1–2.2)
ALP SERPL-CCNC: 48 U/L (ref 45–117)
ALT SERPL-CCNC: 26 U/L (ref 12–78)
ANION GAP SERPL CALC-SCNC: 4 MMOL/L (ref 2–12)
AST SERPL-CCNC: 32 U/L (ref 15–37)
BASOPHILS # BLD: 0.03 K/UL (ref 0–0.1)
BASOPHILS NFR BLD: 0.3 % (ref 0–1)
BILIRUB DIRECT SERPL-MCNC: 0.1 MG/DL (ref 0–0.2)
BILIRUB SERPL-MCNC: 0.3 MG/DL (ref 0.2–1)
BLOOD GROUP ANTIBODIES SERPL: NORMAL
BUN SERPL-MCNC: 12 MG/DL (ref 6–20)
BUN/CREAT SERPL: 17 (ref 12–20)
CALCIUM SERPL-MCNC: 8.4 MG/DL (ref 8.5–10.1)
CHLORIDE SERPL-SCNC: 114 MMOL/L (ref 97–108)
CO2 SERPL-SCNC: 21 MMOL/L (ref 21–32)
CREAT SERPL-MCNC: 0.7 MG/DL (ref 0.55–1.02)
DIFFERENTIAL METHOD BLD: ABNORMAL
EOSINOPHIL # BLD: 0.3 K/UL (ref 0–0.4)
EOSINOPHIL NFR BLD: 3.2 % (ref 0–7)
ERYTHROCYTE [DISTWIDTH] IN BLOOD BY AUTOMATED COUNT: 14.2 % (ref 11.5–14.5)
GLOBULIN SER CALC-MCNC: 3 G/DL (ref 2–4)
GLUCOSE BLD STRIP.AUTO-MCNC: 101 MG/DL (ref 65–117)
GLUCOSE BLD STRIP.AUTO-MCNC: 117 MG/DL (ref 65–117)
GLUCOSE SERPL-MCNC: 117 MG/DL (ref 65–100)
HCT VFR BLD AUTO: 32.4 % (ref 35–47)
HGB BLD-MCNC: 10.7 G/DL (ref 11.5–16)
IMM GRANULOCYTES # BLD AUTO: 0.06 K/UL (ref 0–0.04)
IMM GRANULOCYTES NFR BLD AUTO: 0.6 % (ref 0–0.5)
LYMPHOCYTES # BLD: 1.34 K/UL (ref 0.8–3.5)
LYMPHOCYTES NFR BLD: 14.2 % (ref 12–49)
MAGNESIUM SERPL-MCNC: 1.9 MG/DL (ref 1.6–2.4)
MCH RBC QN AUTO: 31.7 PG (ref 26–34)
MCHC RBC AUTO-ENTMCNC: 33 G/DL (ref 30–36.5)
MCV RBC AUTO: 95.9 FL (ref 80–99)
MONOCYTES # BLD: 1.43 K/UL (ref 0–1)
MONOCYTES NFR BLD: 15.1 % (ref 5–13)
NEUTS SEG # BLD: 6.29 K/UL (ref 1.8–8)
NEUTS SEG NFR BLD: 66.6 % (ref 32–75)
NRBC # BLD: 0 K/UL (ref 0–0.01)
NRBC BLD-RTO: 0 PER 100 WBC
PHOSPHATE SERPL-MCNC: 3.2 MG/DL (ref 2.6–4.7)
PLATELET # BLD AUTO: 359 K/UL (ref 150–400)
PMV BLD AUTO: 9.7 FL (ref 8.9–12.9)
POTASSIUM SERPL-SCNC: 3.8 MMOL/L (ref 3.5–5.1)
PROT SERPL-MCNC: 5.8 G/DL (ref 6.4–8.2)
RBC # BLD AUTO: 3.38 M/UL (ref 3.8–5.2)
SERVICE CMNT-IMP: NORMAL
SERVICE CMNT-IMP: NORMAL
SODIUM SERPL-SCNC: 139 MMOL/L (ref 136–145)
SPECIMEN EXP DATE BLD: NORMAL
WBC # BLD AUTO: 9.5 K/UL (ref 3.6–11)

## 2025-01-08 PROCEDURE — 85025 COMPLETE CBC W/AUTO DIFF WBC: CPT

## 2025-01-08 PROCEDURE — 2500000003 HC RX 250 WO HCPCS: Performed by: SURGERY

## 2025-01-08 PROCEDURE — 3700000001 HC ADD 15 MINUTES (ANESTHESIA): Performed by: SURGERY

## 2025-01-08 PROCEDURE — 2580000003 HC RX 258: Performed by: FAMILY MEDICINE

## 2025-01-08 PROCEDURE — 3700000000 HC ANESTHESIA ATTENDED CARE: Performed by: SURGERY

## 2025-01-08 PROCEDURE — 6370000000 HC RX 637 (ALT 250 FOR IP)

## 2025-01-08 PROCEDURE — 6370000000 HC RX 637 (ALT 250 FOR IP): Performed by: INTERNAL MEDICINE

## 2025-01-08 PROCEDURE — 2709999900 HC NON-CHARGEABLE SUPPLY: Performed by: SURGERY

## 2025-01-08 PROCEDURE — 7100000000 HC PACU RECOVERY - FIRST 15 MIN: Performed by: SURGERY

## 2025-01-08 PROCEDURE — 2580000003 HC RX 258

## 2025-01-08 PROCEDURE — 3600000014 HC SURGERY LEVEL 4 ADDTL 15MIN: Performed by: SURGERY

## 2025-01-08 PROCEDURE — 6370000000 HC RX 637 (ALT 250 FOR IP): Performed by: SURGERY

## 2025-01-08 PROCEDURE — 6360000002 HC RX W HCPCS: Performed by: NURSE PRACTITIONER

## 2025-01-08 PROCEDURE — 80048 BASIC METABOLIC PNL TOTAL CA: CPT

## 2025-01-08 PROCEDURE — 2500000003 HC RX 250 WO HCPCS

## 2025-01-08 PROCEDURE — 7100000001 HC PACU RECOVERY - ADDTL 15 MIN: Performed by: SURGERY

## 2025-01-08 PROCEDURE — 6370000000 HC RX 637 (ALT 250 FOR IP): Performed by: FAMILY MEDICINE

## 2025-01-08 PROCEDURE — 86900 BLOOD TYPING SEROLOGIC ABO: CPT

## 2025-01-08 PROCEDURE — 2500000003 HC RX 250 WO HCPCS: Performed by: FAMILY MEDICINE

## 2025-01-08 PROCEDURE — 0DNU4ZZ RELEASE OMENTUM, PERCUTANEOUS ENDOSCOPIC APPROACH: ICD-10-PCS | Performed by: SURGERY

## 2025-01-08 PROCEDURE — 1100000000 HC RM PRIVATE

## 2025-01-08 PROCEDURE — 2500000003 HC RX 250 WO HCPCS: Performed by: NURSE PRACTITIONER

## 2025-01-08 PROCEDURE — 80076 HEPATIC FUNCTION PANEL: CPT

## 2025-01-08 PROCEDURE — 84100 ASSAY OF PHOSPHORUS: CPT

## 2025-01-08 PROCEDURE — 44180 LAP ENTEROLYSIS: CPT | Performed by: SURGERY

## 2025-01-08 PROCEDURE — 82962 GLUCOSE BLOOD TEST: CPT

## 2025-01-08 PROCEDURE — 6360000002 HC RX W HCPCS

## 2025-01-08 PROCEDURE — 2580000003 HC RX 258: Performed by: NURSE PRACTITIONER

## 2025-01-08 PROCEDURE — 86901 BLOOD TYPING SEROLOGIC RH(D): CPT

## 2025-01-08 PROCEDURE — 3600000004 HC SURGERY LEVEL 4 BASE: Performed by: SURGERY

## 2025-01-08 PROCEDURE — 36415 COLL VENOUS BLD VENIPUNCTURE: CPT

## 2025-01-08 PROCEDURE — 86850 RBC ANTIBODY SCREEN: CPT

## 2025-01-08 PROCEDURE — 83735 ASSAY OF MAGNESIUM: CPT

## 2025-01-08 RX ORDER — LIDOCAINE HYDROCHLORIDE 10 MG/ML
1 INJECTION, SOLUTION EPIDURAL; INFILTRATION; INTRACAUDAL; PERINEURAL
Status: DISCONTINUED | OUTPATIENT
Start: 2025-01-08 | End: 2025-01-08 | Stop reason: HOSPADM

## 2025-01-08 RX ORDER — FENTANYL CITRATE 50 UG/ML
100 INJECTION, SOLUTION INTRAMUSCULAR; INTRAVENOUS
Status: DISCONTINUED | OUTPATIENT
Start: 2025-01-08 | End: 2025-01-08 | Stop reason: HOSPADM

## 2025-01-08 RX ORDER — SODIUM CHLORIDE 0.9 % (FLUSH) 0.9 %
5-40 SYRINGE (ML) INJECTION EVERY 12 HOURS SCHEDULED
Status: DISCONTINUED | OUTPATIENT
Start: 2025-01-08 | End: 2025-01-08 | Stop reason: HOSPADM

## 2025-01-08 RX ORDER — SODIUM CHLORIDE 0.9 % (FLUSH) 0.9 %
5-40 SYRINGE (ML) INJECTION PRN
Status: DISCONTINUED | OUTPATIENT
Start: 2025-01-08 | End: 2025-01-08 | Stop reason: HOSPADM

## 2025-01-08 RX ORDER — NALOXONE HYDROCHLORIDE 0.4 MG/ML
INJECTION, SOLUTION INTRAMUSCULAR; INTRAVENOUS; SUBCUTANEOUS PRN
Status: DISCONTINUED | OUTPATIENT
Start: 2025-01-08 | End: 2025-01-08 | Stop reason: HOSPADM

## 2025-01-08 RX ORDER — DIPHENHYDRAMINE HYDROCHLORIDE 50 MG/ML
12.5 INJECTION INTRAMUSCULAR; INTRAVENOUS EVERY 6 HOURS PRN
Status: DISCONTINUED | OUTPATIENT
Start: 2025-01-08 | End: 2025-01-13 | Stop reason: HOSPADM

## 2025-01-08 RX ORDER — FENTANYL CITRATE 50 UG/ML
25 INJECTION, SOLUTION INTRAMUSCULAR; INTRAVENOUS EVERY 5 MIN PRN
Status: DISCONTINUED | OUTPATIENT
Start: 2025-01-08 | End: 2025-01-08 | Stop reason: HOSPADM

## 2025-01-08 RX ORDER — ACETAMINOPHEN 500 MG
1000 TABLET ORAL ONCE
Status: DISCONTINUED | OUTPATIENT
Start: 2025-01-08 | End: 2025-01-08 | Stop reason: HOSPADM

## 2025-01-08 RX ORDER — HYDRALAZINE HYDROCHLORIDE 20 MG/ML
10 INJECTION INTRAMUSCULAR; INTRAVENOUS
Status: DISCONTINUED | OUTPATIENT
Start: 2025-01-08 | End: 2025-01-08 | Stop reason: HOSPADM

## 2025-01-08 RX ORDER — MIDAZOLAM HYDROCHLORIDE 2 MG/2ML
2 INJECTION, SOLUTION INTRAMUSCULAR; INTRAVENOUS PRN
Status: DISCONTINUED | OUTPATIENT
Start: 2025-01-08 | End: 2025-01-08 | Stop reason: HOSPADM

## 2025-01-08 RX ORDER — SODIUM CHLORIDE 9 MG/ML
INJECTION, SOLUTION INTRAVENOUS
Status: DISCONTINUED | OUTPATIENT
Start: 2025-01-08 | End: 2025-01-08 | Stop reason: SDUPTHER

## 2025-01-08 RX ORDER — GLYCOPYRROLATE 0.2 MG/ML
INJECTION, SOLUTION INTRAMUSCULAR; INTRAVENOUS
Status: DISCONTINUED | OUTPATIENT
Start: 2025-01-08 | End: 2025-01-08 | Stop reason: SDUPTHER

## 2025-01-08 RX ORDER — ONDANSETRON 2 MG/ML
4 INJECTION INTRAMUSCULAR; INTRAVENOUS
Status: DISCONTINUED | OUTPATIENT
Start: 2025-01-08 | End: 2025-01-08 | Stop reason: HOSPADM

## 2025-01-08 RX ORDER — PROPOFOL 10 MG/ML
INJECTION, EMULSION INTRAVENOUS
Status: DISCONTINUED | OUTPATIENT
Start: 2025-01-08 | End: 2025-01-08 | Stop reason: SDUPTHER

## 2025-01-08 RX ORDER — PROCHLORPERAZINE EDISYLATE 5 MG/ML
5 INJECTION INTRAMUSCULAR; INTRAVENOUS
Status: DISCONTINUED | OUTPATIENT
Start: 2025-01-08 | End: 2025-01-08 | Stop reason: HOSPADM

## 2025-01-08 RX ORDER — OXYCODONE HYDROCHLORIDE 5 MG/1
5 TABLET ORAL
Status: DISCONTINUED | OUTPATIENT
Start: 2025-01-08 | End: 2025-01-08 | Stop reason: HOSPADM

## 2025-01-08 RX ORDER — BUPIVACAINE HYDROCHLORIDE AND EPINEPHRINE 2.5; 5 MG/ML; UG/ML
INJECTION, SOLUTION EPIDURAL; INFILTRATION; INTRACAUDAL; PERINEURAL PRN
Status: DISCONTINUED | OUTPATIENT
Start: 2025-01-08 | End: 2025-01-08 | Stop reason: HOSPADM

## 2025-01-08 RX ORDER — SUCCINYLCHOLINE/SOD CL,ISO/PF 200MG/10ML
SYRINGE (ML) INTRAVENOUS
Status: DISCONTINUED | OUTPATIENT
Start: 2025-01-08 | End: 2025-01-08 | Stop reason: SDUPTHER

## 2025-01-08 RX ORDER — FENTANYL CITRATE 50 UG/ML
INJECTION, SOLUTION INTRAMUSCULAR; INTRAVENOUS
Status: DISCONTINUED | OUTPATIENT
Start: 2025-01-08 | End: 2025-01-08 | Stop reason: SDUPTHER

## 2025-01-08 RX ORDER — SODIUM CHLORIDE, SODIUM LACTATE, POTASSIUM CHLORIDE, CALCIUM CHLORIDE 600; 310; 30; 20 MG/100ML; MG/100ML; MG/100ML; MG/100ML
INJECTION, SOLUTION INTRAVENOUS CONTINUOUS
Status: DISCONTINUED | OUTPATIENT
Start: 2025-01-08 | End: 2025-01-08 | Stop reason: HOSPADM

## 2025-01-08 RX ORDER — DEXAMETHASONE SODIUM PHOSPHATE 4 MG/ML
INJECTION, SOLUTION INTRA-ARTICULAR; INTRALESIONAL; INTRAMUSCULAR; INTRAVENOUS; SOFT TISSUE
Status: DISCONTINUED | OUTPATIENT
Start: 2025-01-08 | End: 2025-01-08 | Stop reason: SDUPTHER

## 2025-01-08 RX ORDER — HYDROMORPHONE HYDROCHLORIDE 1 MG/ML
0.5 INJECTION, SOLUTION INTRAMUSCULAR; INTRAVENOUS; SUBCUTANEOUS EVERY 5 MIN PRN
Status: DISCONTINUED | OUTPATIENT
Start: 2025-01-08 | End: 2025-01-08 | Stop reason: HOSPADM

## 2025-01-08 RX ORDER — SODIUM CHLORIDE 9 MG/ML
INJECTION, SOLUTION INTRAVENOUS PRN
Status: DISCONTINUED | OUTPATIENT
Start: 2025-01-08 | End: 2025-01-08 | Stop reason: HOSPADM

## 2025-01-08 RX ORDER — ONDANSETRON 2 MG/ML
INJECTION INTRAMUSCULAR; INTRAVENOUS
Status: DISCONTINUED | OUTPATIENT
Start: 2025-01-08 | End: 2025-01-08 | Stop reason: SDUPTHER

## 2025-01-08 RX ORDER — LIDOCAINE HYDROCHLORIDE 20 MG/ML
INJECTION, SOLUTION EPIDURAL; INFILTRATION; INTRACAUDAL; PERINEURAL
Status: DISCONTINUED | OUTPATIENT
Start: 2025-01-08 | End: 2025-01-08 | Stop reason: SDUPTHER

## 2025-01-08 RX ORDER — PHENYLEPHRINE HCL IN 0.9% NACL 0.4MG/10ML
SYRINGE (ML) INTRAVENOUS
Status: DISCONTINUED | OUTPATIENT
Start: 2025-01-08 | End: 2025-01-08 | Stop reason: SDUPTHER

## 2025-01-08 RX ORDER — ROCURONIUM BROMIDE 10 MG/ML
INJECTION, SOLUTION INTRAVENOUS
Status: DISCONTINUED | OUTPATIENT
Start: 2025-01-08 | End: 2025-01-08 | Stop reason: SDUPTHER

## 2025-01-08 RX ADMIN — Medication 3 MG: at 21:57

## 2025-01-08 RX ADMIN — Medication 200 MG: at 11:40

## 2025-01-08 RX ADMIN — DEXAMETHASONE SODIUM PHOSPHATE 4 MG: 4 INJECTION INTRA-ARTICULAR; INTRALESIONAL; INTRAMUSCULAR; INTRAVENOUS; SOFT TISSUE at 12:04

## 2025-01-08 RX ADMIN — SODIUM CHLORIDE: 9 INJECTION, SOLUTION INTRAVENOUS at 11:33

## 2025-01-08 RX ADMIN — LEVOTHYROXINE SODIUM 100 MCG: 0.1 TABLET ORAL at 10:05

## 2025-01-08 RX ADMIN — CILOSTAZOL 100 MG: 50 TABLET ORAL at 21:51

## 2025-01-08 RX ADMIN — PHENYLEPHRINE HYDROCHLORIDE 40 MCG/MIN: 10 INJECTION INTRAVENOUS at 11:40

## 2025-01-08 RX ADMIN — FENTANYL CITRATE 50 MCG: 50 INJECTION INTRAMUSCULAR; INTRAVENOUS at 11:40

## 2025-01-08 RX ADMIN — SODIUM CHLORIDE, PRESERVATIVE FREE 10 ML: 5 INJECTION INTRAVENOUS at 21:51

## 2025-01-08 RX ADMIN — Medication 200 MCG: at 11:53

## 2025-01-08 RX ADMIN — ACETAMINOPHEN 1000 MG: 500 TABLET ORAL at 18:35

## 2025-01-08 RX ADMIN — TRAZODONE HYDROCHLORIDE 50 MG: 50 TABLET ORAL at 21:51

## 2025-01-08 RX ADMIN — SODIUM CHLORIDE 25 ML: 9 INJECTION, SOLUTION INTRAVENOUS at 19:37

## 2025-01-08 RX ADMIN — CALCIUM GLUCONATE: 98 INJECTION, SOLUTION INTRAVENOUS at 19:16

## 2025-01-08 RX ADMIN — Medication 120 MCG: at 12:12

## 2025-01-08 RX ADMIN — GLYCOPYRROLATE 0.2 MG: 0.2 INJECTION, SOLUTION INTRAMUSCULAR; INTRAVENOUS at 11:54

## 2025-01-08 RX ADMIN — SODIUM CHLORIDE, PRESERVATIVE FREE 10 ML: 5 INJECTION INTRAVENOUS at 10:04

## 2025-01-08 RX ADMIN — PANTOPRAZOLE SODIUM 40 MG: 40 INJECTION, POWDER, FOR SOLUTION INTRAVENOUS at 09:59

## 2025-01-08 RX ADMIN — LIDOCAINE HYDROCHLORIDE 100 MG: 20 INJECTION, SOLUTION EPIDURAL; INFILTRATION; INTRACAUDAL; PERINEURAL at 11:40

## 2025-01-08 RX ADMIN — I.V. FAT EMULSION 250 ML: 20 EMULSION INTRAVENOUS at 19:32

## 2025-01-08 RX ADMIN — PROPOFOL 150 MG: 10 INJECTION, EMULSION INTRAVENOUS at 11:40

## 2025-01-08 RX ADMIN — ROCURONIUM BROMIDE 50 MG: 10 INJECTION, SOLUTION INTRAVENOUS at 11:40

## 2025-01-08 RX ADMIN — PIPERACILLIN AND TAZOBACTAM 3375 MG: 3; .375 INJECTION, POWDER, LYOPHILIZED, FOR SOLUTION INTRAVENOUS at 03:33

## 2025-01-08 RX ADMIN — POTASSIUM & SODIUM PHOSPHATES POWDER PACK 280-160-250 MG 250 MG: 280-160-250 PACK at 21:51

## 2025-01-08 RX ADMIN — FENTANYL CITRATE 50 MCG: 50 INJECTION INTRAMUSCULAR; INTRAVENOUS at 12:09

## 2025-01-08 RX ADMIN — ATENOLOL 50 MG: 25 TABLET ORAL at 21:51

## 2025-01-08 RX ADMIN — PIPERACILLIN AND TAZOBACTAM 3375 MG: 3; .375 INJECTION, POWDER, LYOPHILIZED, FOR SOLUTION INTRAVENOUS at 19:40

## 2025-01-08 RX ADMIN — ONDANSETRON 4 MG: 2 INJECTION INTRAMUSCULAR; INTRAVENOUS at 12:04

## 2025-01-08 RX ADMIN — PIPERACILLIN AND TAZOBACTAM 3375 MG: 3; .375 INJECTION, POWDER, LYOPHILIZED, FOR SOLUTION INTRAVENOUS at 12:00

## 2025-01-08 RX ADMIN — SUGAMMADEX 124 MG: 100 INJECTION, SOLUTION INTRAVENOUS at 13:00

## 2025-01-08 ASSESSMENT — PAIN DESCRIPTION - DESCRIPTORS: DESCRIPTORS: SORE;ACHING

## 2025-01-08 ASSESSMENT — PAIN DESCRIPTION - LOCATION: LOCATION: ABDOMEN;BACK

## 2025-01-08 NOTE — FLOWSHEET NOTE
01/08/25 1216   Urinary Catheter 01/08/25 2 Way;Nieto   Placement Date/Time: 01/08/25 1155   Present on Admission/Arrival: No  Inserted by: SAMAN Baca RN  Insertion attempts: 1  Catheter Type: 2 Way;N[place]o  Catheter Size: 16 FR  Catheter Balloon Size: 10 mL  Insertion Procedure Practices: Insertion date alek...   $ Urethral catheter insertion Inserted for procedure   Catheter Indications Perioperative use for selected surgical procedures   Urine Color Yellow   Urine Appearance Clear   Collection Container Standard   Securement Method Securing device (Describe)   Catheter Best Practices  Drainage tube clipped to bed;Drainage bag less than half full;Catheter secured to thigh;Tamper seal intact;Bag below bladder;Bag not on floor;Lack of dependent loop in tubing   Status Draining

## 2025-01-08 NOTE — ANESTHESIA PRE PROCEDURE
OTHER SURGICAL HISTORY Right     excision melanoma right arm   • [place] UNLISTED PROCEDURE CARDIAC SURGERY      stents x2   • VASCULAR SURGERY      X5 right femoral bypass   • VASCULAR SURGERY      femoral stent - left       Social History:    Social History     Tobacco Use   • Smoking status: Former     Current packs/day: 0.00     Average packs/day: 1 pack/day for 25.0 years (25.0 ttl pk-yrs)     Types: Cigarettes     Start date: 1983     Quit date: 2008     Years since quittin.9     Passive exposure: Past   • Smokeless tobacco: Never   Substance Use Topics   • Alcohol use: Yes     Alcohol/week: 6.0 standard drinks of alcohol                                Counseling given: Not Answered      Vital Signs (Current):   Vitals:    25 0351 25 0553 25 0615 25 1000   BP:   124/72    Pulse: 83 85 84 86   Resp:   16    Temp:   98.4 °F (36.9 °C)    TempSrc:   Oral    SpO2:   94%    Weight:       Height:                                                  BP Readings from Last 3 Encounters:   25 124/72   24 126/72   10/07/24 (!) 164/92       NPO Status:                                                                                 BMI:   Wt Readings from Last 3 Encounters:   25 62 kg (136 lb 9.6 oz)   24 58.4 kg (128 lb 12.8 oz)   10/07/24 58 kg (127 lb 12.8 oz)     Body mass index is 24.98 kg/m².    CBC:   Lab Results   Component Value Date/Time    WBC 9.5 2025 05:33 AM    RBC 3.38 2025 05:33 AM    HGB 10.7 2025 05:33 AM    HCT 32.4 2025 05:33 AM    MCV 95.9 2025 05:33 AM    RDW 14.2 2025 05:33 AM     2025 05:33 AM       CMP:   Lab Results   Component Value Date/Time     2025 05:33 AM    K 3.8 2025 05:33 AM     2025 05:33 AM    CO2 21 2025 05:33 AM    BUN 12 2025 05:33 AM    CREATININE 0.70 2025 05:33 AM    GFRAA 58 2022 12:09 PM    AGRATIO 1.7 2023 03:36 PM

## 2025-01-08 NOTE — OP NOTE
Operative Note      Patient: Robyn Valenzuela  YOB: 1945  MRN: [id number]    Date of Procedure: 1/8/2025    Pre-Op Diagnosis Codes:      * Small bowel obstruction, partial (HCC) [K56.600]    Post-Op Diagnosis: Same        Procedure(s):  DIAGNOSTIC LAPAROSCOPIC LYSIS OF ADHESIONS    Surgeon(s):  Stephan De La Fuente MD    Assistant:   Surgical Assistant: Beronica Ward    Anesthesia: General    Estimated Blood Loss (mL): less than 50     Complications: None    Specimens:   * No specimens in log *    Implants:  * No implants in log *      Drains:   NG/OG/NJ/NE Tube Nasogastric 14 fr Left nostril (Active)   Surrounding Skin Clean, dry & intact 01/07/25 2050   Securement device Adhesive based jolley 01/07/25 2050   Status Suction-low intermittent 01/07/25 2050   Placement Verified X-Ray (Initial) 01/07/25 2050   NG/OG/NJ/NE External Measurement (cm) 55 cm 01/07/25 2050   Drainage Appearance Green 01/07/25 2050   Output (mL) 1000 ml 01/07/25 1535   Action Taken Tubing changed 01/07/25 1535       [REMOVED] NG/OG/NJ/NE Tube Nasogastric Right nostril (Removed)   Surrounding Skin Clean, dry & intact 01/05/25 2346   Securement device Adhesive based jolley 01/05/25 2346   Status Suction-low intermittent 01/05/25 2346   Placement Verified X-Ray (Initial) 01/04/25 1437   NG/OG/NJ/NE External Measurement (cm) 65 cm 01/04/25 1437   Drainage Appearance Brown 01/05/25 0945   Output (mL) 650 ml 01/05/25 0609   Action Taken Placement verified (comment) 01/04/25 1437       [REMOVED] Urinary Catheter 01/08/25 2 Way;Nieto (Removed)   $ Urethral catheter insertion Inserted for procedure 01/08/25 1216   Catheter Indications Perioperative use for selected surgical procedures 01/08/25 1216   Urine Color Yellow 01/08/25 1216   Urine Appearance Clear 01/08/25 1216   Collection Container Standard 01/08/25 1216   Securement Method Securing device (Describe) 01/08/25 1216   Catheter Best Practices  Drainage tube clipped to bed;Drainage

## 2025-01-08 NOTE — ANESTHESIA POSTPROCEDURE EVALUATION
Department of Anesthesiology  Postprocedure Note    Patient: Robyn Valenzuela  MRN: [id number]  YOB: 1945  Date of evaluation: 1/8/2025    Procedure Summary       Date: 01/08/25 Room / Location: [facility] MAIN OR 01 / [facility] MAIN OR    Anesthesia Start: 1133 Anesthesia Stop: 1328    Procedure: DIAGNOSTIC LAPAROSCOPIC LYSIS OF ADHESIONS (Abdomen) Diagnosis:       Small bowel obstruction, partial (HCC)      (Small bowel obstruction, partial (HCC) [K56.600])    Providers: Stephan De La Fuente MD Responsible Provider: Vidhi Farrell DO    Anesthesia Type: General ASA Status: 3            Anesthesia Type: General    Phyllis Phase I: Phyllis Score: 9    Phyllis Phase II:      Anesthesia Post Evaluation    Patient location during evaluation: PACU  Patient participation: complete - patient participated  Level of consciousness: awake and alert  Pain score: 0  Airway patency: patent  Nausea & Vomiting: no nausea and no vomiting  Cardiovascular status: blood pressure returned to baseline and hemodynamically stable  Respiratory status: acceptable and room air  Hydration status: euvolemic  Multimodal analgesia pain management approach  Pain management: adequate and satisfactory to patient    No notable events documented.  
Vaccine status unknown

## 2025-01-09 LAB
ALBUMIN SERPL-MCNC: 2.6 G/DL (ref 3.5–5)
ANION GAP SERPL CALC-SCNC: 6 MMOL/L (ref 2–12)
ANION GAP SERPL CALC-SCNC: 6 MMOL/L (ref 2–12)
BASOPHILS # BLD: 0.03 K/UL (ref 0–0.1)
BASOPHILS NFR BLD: 0.2 % (ref 0–1)
BUN SERPL-MCNC: 13 MG/DL (ref 6–20)
BUN SERPL-MCNC: 14 MG/DL (ref 6–20)
BUN/CREAT SERPL: 17 (ref 12–20)
BUN/CREAT SERPL: 18 (ref 12–20)
CALCIUM SERPL-MCNC: 7.9 MG/DL (ref 8.5–10.1)
CALCIUM SERPL-MCNC: 7.9 MG/DL (ref 8.5–10.1)
CHLORIDE SERPL-SCNC: 113 MMOL/L (ref 97–108)
CHLORIDE SERPL-SCNC: 114 MMOL/L (ref 97–108)
CO2 SERPL-SCNC: 19 MMOL/L (ref 21–32)
CO2 SERPL-SCNC: 19 MMOL/L (ref 21–32)
CREAT SERPL-MCNC: 0.75 MG/DL (ref 0.55–1.02)
CREAT SERPL-MCNC: 0.78 MG/DL (ref 0.55–1.02)
DIFFERENTIAL METHOD BLD: ABNORMAL
EOSINOPHIL # BLD: 0.02 K/UL (ref 0–0.4)
EOSINOPHIL NFR BLD: 0.2 % (ref 0–7)
ERYTHROCYTE [DISTWIDTH] IN BLOOD BY AUTOMATED COUNT: 14.2 % (ref 11.5–14.5)
GLUCOSE BLD STRIP.AUTO-MCNC: 129 MG/DL (ref 65–117)
GLUCOSE BLD STRIP.AUTO-MCNC: 153 MG/DL (ref 65–117)
GLUCOSE BLD STRIP.AUTO-MCNC: 159 MG/DL (ref 65–117)
GLUCOSE BLD STRIP.AUTO-MCNC: 167 MG/DL (ref 65–117)
GLUCOSE SERPL-MCNC: 172 MG/DL (ref 65–100)
GLUCOSE SERPL-MCNC: 174 MG/DL (ref 65–100)
HCT VFR BLD AUTO: 30.7 % (ref 35–47)
HGB BLD-MCNC: 10.1 G/DL (ref 11.5–16)
IMM GRANULOCYTES # BLD AUTO: 0.12 K/UL (ref 0–0.04)
IMM GRANULOCYTES NFR BLD AUTO: 1 % (ref 0–0.5)
LYMPHOCYTES # BLD: 0.92 K/UL (ref 0.8–3.5)
LYMPHOCYTES NFR BLD: 7.4 % (ref 12–49)
MAGNESIUM SERPL-MCNC: 1.6 MG/DL (ref 1.6–2.4)
MCH RBC QN AUTO: 31.6 PG (ref 26–34)
MCHC RBC AUTO-ENTMCNC: 32.9 G/DL (ref 30–36.5)
MCV RBC AUTO: 95.9 FL (ref 80–99)
MONOCYTES # BLD: 1.32 K/UL (ref 0–1)
MONOCYTES NFR BLD: 10.6 % (ref 5–13)
NEUTS SEG # BLD: 10.05 K/UL (ref 1.8–8)
NEUTS SEG NFR BLD: 80.6 % (ref 32–75)
NRBC # BLD: 0 K/UL (ref 0–0.01)
NRBC BLD-RTO: 0 PER 100 WBC
PHOSPHATE SERPL-MCNC: 3.6 MG/DL (ref 2.6–4.7)
PHOSPHATE SERPL-MCNC: 3.6 MG/DL (ref 2.6–4.7)
PLATELET # BLD AUTO: 340 K/UL (ref 150–400)
PMV BLD AUTO: 9.7 FL (ref 8.9–12.9)
POTASSIUM SERPL-SCNC: 4.4 MMOL/L (ref 3.5–5.1)
POTASSIUM SERPL-SCNC: 4.4 MMOL/L (ref 3.5–5.1)
RBC # BLD AUTO: 3.2 M/UL (ref 3.8–5.2)
SERVICE CMNT-IMP: ABNORMAL
SODIUM SERPL-SCNC: 138 MMOL/L (ref 136–145)
SODIUM SERPL-SCNC: 139 MMOL/L (ref 136–145)
WBC # BLD AUTO: 12.5 K/UL (ref 3.6–11)

## 2025-01-09 PROCEDURE — 97530 THERAPEUTIC ACTIVITIES: CPT

## 2025-01-09 PROCEDURE — 85025 COMPLETE CBC W/AUTO DIFF WBC: CPT

## 2025-01-09 PROCEDURE — 6360000002 HC RX W HCPCS: Performed by: INTERNAL MEDICINE

## 2025-01-09 PROCEDURE — 6360000002 HC RX W HCPCS: Performed by: NURSE PRACTITIONER

## 2025-01-09 PROCEDURE — 6370000000 HC RX 637 (ALT 250 FOR IP)

## 2025-01-09 PROCEDURE — 97161 PT EVAL LOW COMPLEX 20 MIN: CPT

## 2025-01-09 PROCEDURE — 6370000000 HC RX 637 (ALT 250 FOR IP): Performed by: INTERNAL MEDICINE

## 2025-01-09 PROCEDURE — 6360000002 HC RX W HCPCS: Performed by: SURGERY

## 2025-01-09 PROCEDURE — 80048 BASIC METABOLIC PNL TOTAL CA: CPT

## 2025-01-09 PROCEDURE — 83735 ASSAY OF MAGNESIUM: CPT

## 2025-01-09 PROCEDURE — 99024 POSTOP FOLLOW-UP VISIT: CPT

## 2025-01-09 PROCEDURE — 6360000002 HC RX W HCPCS

## 2025-01-09 PROCEDURE — 2500000003 HC RX 250 WO HCPCS: Performed by: NURSE PRACTITIONER

## 2025-01-09 PROCEDURE — 6370000000 HC RX 637 (ALT 250 FOR IP): Performed by: FAMILY MEDICINE

## 2025-01-09 PROCEDURE — 36415 COLL VENOUS BLD VENIPUNCTURE: CPT

## 2025-01-09 PROCEDURE — 2580000003 HC RX 258: Performed by: NURSE PRACTITIONER

## 2025-01-09 PROCEDURE — 84100 ASSAY OF PHOSPHORUS: CPT

## 2025-01-09 PROCEDURE — 2580000003 HC RX 258

## 2025-01-09 PROCEDURE — 82962 GLUCOSE BLOOD TEST: CPT

## 2025-01-09 PROCEDURE — 6370000000 HC RX 637 (ALT 250 FOR IP): Performed by: SURGERY

## 2025-01-09 PROCEDURE — 1100000000 HC RM PRIVATE

## 2025-01-09 PROCEDURE — 2580000003 HC RX 258: Performed by: INTERNAL MEDICINE

## 2025-01-09 PROCEDURE — 2500000003 HC RX 250 WO HCPCS: Performed by: FAMILY MEDICINE

## 2025-01-09 PROCEDURE — 80069 RENAL FUNCTION PANEL: CPT

## 2025-01-09 PROCEDURE — 2500000003 HC RX 250 WO HCPCS: Performed by: INTERNAL MEDICINE

## 2025-01-09 RX ORDER — ENOXAPARIN SODIUM 100 MG/ML
40 INJECTION SUBCUTANEOUS DAILY
Status: DISCONTINUED | OUTPATIENT
Start: 2025-01-09 | End: 2025-01-13 | Stop reason: HOSPADM

## 2025-01-09 RX ADMIN — CILOSTAZOL 100 MG: 50 TABLET ORAL at 08:57

## 2025-01-09 RX ADMIN — SODIUM CHLORIDE, PRESERVATIVE FREE 10 ML: 5 INJECTION INTRAVENOUS at 21:16

## 2025-01-09 RX ADMIN — LISINOPRIL 20 MG: 10 TABLET ORAL at 08:57

## 2025-01-09 RX ADMIN — Medication 3 MG: at 21:15

## 2025-01-09 RX ADMIN — CALCIUM GLUCONATE: 98 INJECTION, SOLUTION INTRAVENOUS at 21:31

## 2025-01-09 RX ADMIN — ATENOLOL 50 MG: 25 TABLET ORAL at 21:16

## 2025-01-09 RX ADMIN — I.V. FAT EMULSION 250 ML: 20 EMULSION INTRAVENOUS at 21:41

## 2025-01-09 RX ADMIN — PIPERACILLIN AND TAZOBACTAM 3375 MG: 3; .375 INJECTION, POWDER, LYOPHILIZED, FOR SOLUTION INTRAVENOUS at 03:05

## 2025-01-09 RX ADMIN — DULOXETINE HYDROCHLORIDE 60 MG: 60 CAPSULE, DELAYED RELEASE ORAL at 08:57

## 2025-01-09 RX ADMIN — ATORVASTATIN CALCIUM 40 MG: 40 TABLET, FILM COATED ORAL at 08:57

## 2025-01-09 RX ADMIN — PIPERACILLIN AND TAZOBACTAM 3375 MG: 3; .375 INJECTION, POWDER, LYOPHILIZED, FOR SOLUTION INTRAVENOUS at 21:43

## 2025-01-09 RX ADMIN — LEVOTHYROXINE SODIUM 100 MCG: 0.1 TABLET ORAL at 08:57

## 2025-01-09 RX ADMIN — PANTOPRAZOLE SODIUM 40 MG: 40 INJECTION, POWDER, FOR SOLUTION INTRAVENOUS at 08:56

## 2025-01-09 RX ADMIN — TRAZODONE HYDROCHLORIDE 50 MG: 50 TABLET ORAL at 21:15

## 2025-01-09 RX ADMIN — SODIUM CHLORIDE, PRESERVATIVE FREE 10 ML: 5 INJECTION INTRAVENOUS at 08:52

## 2025-01-09 RX ADMIN — CILOSTAZOL 100 MG: 50 TABLET ORAL at 21:15

## 2025-01-09 RX ADMIN — PIPERACILLIN AND TAZOBACTAM 3375 MG: 3; .375 INJECTION, POWDER, LYOPHILIZED, FOR SOLUTION INTRAVENOUS at 11:20

## 2025-01-09 RX ADMIN — ENOXAPARIN SODIUM 40 MG: 100 INJECTION SUBCUTANEOUS at 15:25

## 2025-01-09 NOTE — DISCHARGE INSTRUCTIONS
Discharge Instructions       PATIENT ID: Robyn Valenzuela  MRN: [id number]   YOB: 1945    DATE OF ADMISSION: 1/1/2025   DATE OF DISCHARGE: 1/13/2025    PRIMARY CARE PROVIDER: Endy Torres     ATTENDING PHYSICIAN: Sav Portillo MD   DISCHARGING PROVIDER: Sav Portillo MD    To contact this individual call [phone] and ask the  to page.   If unavailable ask to be transferred the Adult Hospitalist Department.    DISCHARGE DIAGNOSES   Small Bowel Obstruction, s/p LAPAROSCOPIC LYSIS OF ADHESIONS    CONSULTATIONS:   General Surgery     PROCEDURES/SURGERIES: Procedure(s):  DIAGNOSTIC LAPAROSCOPIC LYSIS OF ADHESIONS    PENDING TEST RESULTS:   At the time of discharge the following test results are still pending: None    FOLLOW UP APPOINTMENTS:   PCP in 1-2 weeks for general medical follow-up and medications refills;  Surgery in 1 week or as scheduled;     ADDITIONAL CARE RECOMMENDATIONS:   Per Surgery:     No heavy lifting more than 10-15 lbs  May remove outer dressings in 3 days  Leave the steri-strips in place  May shower in 2 days  No tub baths or swimming.   May walk and climb stairs.     DIET: cardiac diet and encourage fluids  Ensure or Boost: 1 bottle daily;     ACTIVITY: activity as tolerated    WOUND CARE: See above instructions from Surgery;    EQUIPMENT needed: Per Home Health;      DISCHARGE MEDICATIONS:   See Medication Reconciliation Form    It is important that you take the medication exactly as they are prescribed.   Keep your medication in the bottles provided by the pharmacist and keep a list of the medication names, dosages, and times to be taken in your wallet.   Do not take other medications without consulting your doctor.       NOTIFY YOUR PHYSICIAN FOR ANY OF THE FOLLOWING:   Fever over 101 degrees for 24 hours.   Chest pain, shortness of breath, fever, chills, nausea, vomiting, diarrhea, change in mentation, falling, weakness, bleeding. Severe pain or pain

## 2025-01-10 LAB
ALBUMIN SERPL-MCNC: 2.6 G/DL (ref 3.5–5)
ALBUMIN/GLOB SERPL: 0.8 (ref 1.1–2.2)
ALP SERPL-CCNC: 46 U/L (ref 45–117)
ALT SERPL-CCNC: 26 U/L (ref 12–78)
ANION GAP SERPL CALC-SCNC: 7 MMOL/L (ref 2–12)
AST SERPL-CCNC: 22 U/L (ref 15–37)
BILIRUB DIRECT SERPL-MCNC: 0.1 MG/DL (ref 0–0.2)
BILIRUB SERPL-MCNC: 0.3 MG/DL (ref 0.2–1)
BUN SERPL-MCNC: 15 MG/DL (ref 6–20)
BUN/CREAT SERPL: 26 (ref 12–20)
CALCIUM SERPL-MCNC: 8.3 MG/DL (ref 8.5–10.1)
CHLORIDE SERPL-SCNC: 112 MMOL/L (ref 97–108)
CO2 SERPL-SCNC: 22 MMOL/L (ref 21–32)
CREAT SERPL-MCNC: 0.57 MG/DL (ref 0.55–1.02)
GLOBULIN SER CALC-MCNC: 3.1 G/DL (ref 2–4)
GLUCOSE BLD STRIP.AUTO-MCNC: 101 MG/DL (ref 65–117)
GLUCOSE BLD STRIP.AUTO-MCNC: 132 MG/DL (ref 65–117)
GLUCOSE BLD STRIP.AUTO-MCNC: 140 MG/DL (ref 65–117)
GLUCOSE BLD STRIP.AUTO-MCNC: 140 MG/DL (ref 65–117)
GLUCOSE BLD STRIP.AUTO-MCNC: 144 MG/DL (ref 65–117)
GLUCOSE SERPL-MCNC: 136 MG/DL (ref 65–100)
MAGNESIUM SERPL-MCNC: 1.6 MG/DL (ref 1.6–2.4)
PHOSPHATE SERPL-MCNC: 2.5 MG/DL (ref 2.6–4.7)
POTASSIUM SERPL-SCNC: 3.8 MMOL/L (ref 3.5–5.1)
PROT SERPL-MCNC: 5.7 G/DL (ref 6.4–8.2)
SERVICE CMNT-IMP: ABNORMAL
SERVICE CMNT-IMP: NORMAL
SODIUM SERPL-SCNC: 141 MMOL/L (ref 136–145)

## 2025-01-10 PROCEDURE — 1100000000 HC RM PRIVATE

## 2025-01-10 PROCEDURE — 6360000002 HC RX W HCPCS: Performed by: NURSE PRACTITIONER

## 2025-01-10 PROCEDURE — 83735 ASSAY OF MAGNESIUM: CPT

## 2025-01-10 PROCEDURE — 2500000003 HC RX 250 WO HCPCS: Performed by: NURSE PRACTITIONER

## 2025-01-10 PROCEDURE — 82962 GLUCOSE BLOOD TEST: CPT

## 2025-01-10 PROCEDURE — 6370000000 HC RX 637 (ALT 250 FOR IP)

## 2025-01-10 PROCEDURE — 6360000002 HC RX W HCPCS: Performed by: SURGERY

## 2025-01-10 PROCEDURE — 80048 BASIC METABOLIC PNL TOTAL CA: CPT

## 2025-01-10 PROCEDURE — 2500000003 HC RX 250 WO HCPCS: Performed by: FAMILY MEDICINE

## 2025-01-10 PROCEDURE — 2580000003 HC RX 258: Performed by: NURSE PRACTITIONER

## 2025-01-10 PROCEDURE — 6370000000 HC RX 637 (ALT 250 FOR IP): Performed by: FAMILY MEDICINE

## 2025-01-10 PROCEDURE — 97535 SELF CARE MNGMENT TRAINING: CPT

## 2025-01-10 PROCEDURE — 6370000000 HC RX 637 (ALT 250 FOR IP): Performed by: INTERNAL MEDICINE

## 2025-01-10 PROCEDURE — 85025 COMPLETE CBC W/AUTO DIFF WBC: CPT

## 2025-01-10 PROCEDURE — 97116 GAIT TRAINING THERAPY: CPT

## 2025-01-10 PROCEDURE — 2580000003 HC RX 258: Performed by: PHYSICIAN ASSISTANT

## 2025-01-10 PROCEDURE — 97530 THERAPEUTIC ACTIVITIES: CPT

## 2025-01-10 PROCEDURE — 2580000003 HC RX 258

## 2025-01-10 PROCEDURE — 6360000002 HC RX W HCPCS: Performed by: INTERNAL MEDICINE

## 2025-01-10 PROCEDURE — 99024 POSTOP FOLLOW-UP VISIT: CPT | Performed by: PHYSICIAN ASSISTANT

## 2025-01-10 PROCEDURE — 84100 ASSAY OF PHOSPHORUS: CPT

## 2025-01-10 PROCEDURE — 97165 OT EVAL LOW COMPLEX 30 MIN: CPT

## 2025-01-10 PROCEDURE — 2500000003 HC RX 250 WO HCPCS: Performed by: PHYSICIAN ASSISTANT

## 2025-01-10 PROCEDURE — 6360000002 HC RX W HCPCS

## 2025-01-10 PROCEDURE — 2500000003 HC RX 250 WO HCPCS: Performed by: INTERNAL MEDICINE

## 2025-01-10 PROCEDURE — 80076 HEPATIC FUNCTION PANEL: CPT

## 2025-01-10 PROCEDURE — 6360000002 HC RX W HCPCS: Performed by: PHYSICIAN ASSISTANT

## 2025-01-10 RX ORDER — METRONIDAZOLE 500 MG/100ML
500 INJECTION, SOLUTION INTRAVENOUS EVERY 8 HOURS
Status: DISCONTINUED | OUTPATIENT
Start: 2025-01-10 | End: 2025-01-13 | Stop reason: HOSPADM

## 2025-01-10 RX ADMIN — LEVOTHYROXINE SODIUM 100 MCG: 0.1 TABLET ORAL at 10:55

## 2025-01-10 RX ADMIN — PIPERACILLIN AND TAZOBACTAM 3375 MG: 3; .375 INJECTION, POWDER, LYOPHILIZED, FOR SOLUTION INTRAVENOUS at 04:12

## 2025-01-10 RX ADMIN — TRAZODONE HYDROCHLORIDE 50 MG: 50 TABLET ORAL at 22:45

## 2025-01-10 RX ADMIN — SODIUM CHLORIDE, PRESERVATIVE FREE 10 ML: 5 INJECTION INTRAVENOUS at 22:46

## 2025-01-10 RX ADMIN — ATORVASTATIN CALCIUM 40 MG: 40 TABLET, FILM COATED ORAL at 10:55

## 2025-01-10 RX ADMIN — LISINOPRIL 20 MG: 10 TABLET ORAL at 11:30

## 2025-01-10 RX ADMIN — DULOXETINE HYDROCHLORIDE 60 MG: 60 CAPSULE, DELAYED RELEASE ORAL at 11:30

## 2025-01-10 RX ADMIN — METRONIDAZOLE 500 MG: 500 INJECTION, SOLUTION INTRAVENOUS at 16:20

## 2025-01-10 RX ADMIN — METRONIDAZOLE 500 MG: 500 INJECTION, SOLUTION INTRAVENOUS at 23:55

## 2025-01-10 RX ADMIN — PANTOPRAZOLE SODIUM 40 MG: 40 INJECTION, POWDER, FOR SOLUTION INTRAVENOUS at 10:55

## 2025-01-10 RX ADMIN — SODIUM CHLORIDE, PRESERVATIVE FREE 10 ML: 5 INJECTION INTRAVENOUS at 10:59

## 2025-01-10 RX ADMIN — ATENOLOL 50 MG: 25 TABLET ORAL at 22:45

## 2025-01-10 RX ADMIN — PIPERACILLIN AND TAZOBACTAM 3375 MG: 3; .375 INJECTION, POWDER, LYOPHILIZED, FOR SOLUTION INTRAVENOUS at 11:04

## 2025-01-10 RX ADMIN — I.V. FAT EMULSION 250 ML: 20 EMULSION INTRAVENOUS at 20:08

## 2025-01-10 RX ADMIN — WATER 1000 MG: 1 INJECTION INTRAMUSCULAR; INTRAVENOUS; SUBCUTANEOUS at 20:06

## 2025-01-10 RX ADMIN — CALCIUM GLUCONATE: 98 INJECTION, SOLUTION INTRAVENOUS at 20:07

## 2025-01-10 RX ADMIN — ENOXAPARIN SODIUM 40 MG: 100 INJECTION SUBCUTANEOUS at 16:11

## 2025-01-10 RX ADMIN — CILOSTAZOL 100 MG: 50 TABLET ORAL at 10:55

## 2025-01-10 RX ADMIN — CILOSTAZOL 100 MG: 50 TABLET ORAL at 22:45

## 2025-01-10 ASSESSMENT — PAIN SCALES - GENERAL: PAINLEVEL_OUTOF10: 0

## 2025-01-10 NOTE — CARE COORDINATION
Signed         Transitions of Care:   RUR: 16%  Readmission? No  1st IM letter given? Yes - 1/1/25  second will be provided prior to discharge  1st  letter given: No  Patient has Medicare and BCBS insurance  Admission  Ischemic colitis, celiac artery stenosis HTN  abdominal pain  Hx  Peripheral artery diease, HTN, CAD  Disposition   home with daughterShanna,  peggy-- daughter will be in [place] at patient's home to assist after discharge  No hx of Rehab/snf-- if recommended- no authorization required  Transportation  Daughter   Home Health -Pt chose [facility]  DME-A RW was ordered.   Medical follow up  PCP and specialist     Contact  Daughter  Shanna Valenzuela  [phone]               Pt discussed in rounds. Therapy is recommending home health for her as well as a RW. CM met with pt to discuss and to offer freedom of choice. She would like to use [facility] for home health and [facility] for her RW. CM sent a referral to [facility] for the RW via [place].   CM sent a home health referral [facility] and they have accepted this pt. AVS updated.Will follow. Emilie Mo,DEBBYW,ACM-SW  
Transitions of Care:   RUR: 16%  Readmission? No  1st IM letter given? Yes - 1/1/25  second will be provided prior to discharge  1st  letter given: No  Patient has Medicare and BCBS insurance  Admission  Ischemic colitis, celiac artery stenosis HTN  abdominal pain  Hx  Peripheral artery diease, HTN, CAD  Disposition   home with daughterShanna,  assistance-- daughter will be in [place] at patient's home to assist after discharge  No hx of Rehab/snf-- if recommended- no authorization required  Transportation  Daughter   Home Health  No hx-- will arrange if ordered  DME  will arrange if ordered   Medical follow up  PCP and specialist     Contact  Michael Valenzuela  [phone]       CM will follow and assist with any transition of care needs that arise.  Patient has NG tube / NPO     CM following for discharge needs, currently on PPN but MD said patient will not need at home at this time.       DEBBY DwyerM  Care Management Department  Ph:[phone]    
basic dialogue that supports the patient's individualized plan of care/goals, treatment preferences, and shares the quality data associated with the providers?  Yes     DEBBY VILLELAW

## 2025-01-11 LAB
BASOPHILS # BLD: 0.06 K/UL (ref 0–0.1)
BASOPHILS NFR BLD: 0.5 % (ref 0–1)
DIFFERENTIAL METHOD BLD: ABNORMAL
EOSINOPHIL # BLD: 0.3 K/UL (ref 0–0.4)
EOSINOPHIL NFR BLD: 2.7 % (ref 0–7)
ERYTHROCYTE [DISTWIDTH] IN BLOOD BY AUTOMATED COUNT: 14.2 % (ref 11.5–14.5)
GLUCOSE BLD STRIP.AUTO-MCNC: 120 MG/DL (ref 65–117)
GLUCOSE BLD STRIP.AUTO-MCNC: 138 MG/DL (ref 65–117)
GLUCOSE BLD STRIP.AUTO-MCNC: 157 MG/DL (ref 65–117)
GLUCOSE BLD STRIP.AUTO-MCNC: 185 MG/DL (ref 65–117)
HCT VFR BLD AUTO: 31.2 % (ref 35–47)
HGB BLD-MCNC: 10.3 G/DL (ref 11.5–16)
IMM GRANULOCYTES # BLD AUTO: 0.1 K/UL (ref 0–0.04)
IMM GRANULOCYTES NFR BLD AUTO: 0.9 % (ref 0–0.5)
LYMPHOCYTES # BLD: 1.75 K/UL (ref 0.8–3.5)
LYMPHOCYTES NFR BLD: 15.5 % (ref 12–49)
MCH RBC QN AUTO: 32 PG (ref 26–34)
MCHC RBC AUTO-ENTMCNC: 33 G/DL (ref 30–36.5)
MCV RBC AUTO: 96.9 FL (ref 80–99)
MONOCYTES # BLD: 1.55 K/UL (ref 0–1)
MONOCYTES NFR BLD: 13.8 % (ref 5–13)
NEUTS SEG # BLD: 7.5 K/UL (ref 1.8–8)
NEUTS SEG NFR BLD: 66.6 % (ref 32–75)
NRBC # BLD: 0 K/UL (ref 0–0.01)
NRBC BLD-RTO: 0 PER 100 WBC
PLATELET # BLD AUTO: 355 K/UL (ref 150–400)
PMV BLD AUTO: 10.3 FL (ref 8.9–12.9)
RBC # BLD AUTO: 3.22 M/UL (ref 3.8–5.2)
SERVICE CMNT-IMP: ABNORMAL
WBC # BLD AUTO: 11.3 K/UL (ref 3.6–11)

## 2025-01-11 PROCEDURE — 2500000003 HC RX 250 WO HCPCS: Performed by: NURSE PRACTITIONER

## 2025-01-11 PROCEDURE — 2580000003 HC RX 258

## 2025-01-11 PROCEDURE — 2500000003 HC RX 250 WO HCPCS: Performed by: FAMILY MEDICINE

## 2025-01-11 PROCEDURE — 6360000002 HC RX W HCPCS: Performed by: SURGERY

## 2025-01-11 PROCEDURE — 6370000000 HC RX 637 (ALT 250 FOR IP): Performed by: FAMILY MEDICINE

## 2025-01-11 PROCEDURE — 6370000000 HC RX 637 (ALT 250 FOR IP): Performed by: INTERNAL MEDICINE

## 2025-01-11 PROCEDURE — 6370000000 HC RX 637 (ALT 250 FOR IP)

## 2025-01-11 PROCEDURE — 2580000003 HC RX 258: Performed by: INTERNAL MEDICINE

## 2025-01-11 PROCEDURE — 1100000000 HC RM PRIVATE

## 2025-01-11 PROCEDURE — 6360000002 HC RX W HCPCS: Performed by: INTERNAL MEDICINE

## 2025-01-11 PROCEDURE — 6360000002 HC RX W HCPCS

## 2025-01-11 PROCEDURE — 2500000003 HC RX 250 WO HCPCS: Performed by: INTERNAL MEDICINE

## 2025-01-11 RX ADMIN — CALCIUM GLUCONATE: 98 INJECTION, SOLUTION INTRAVENOUS at 19:23

## 2025-01-11 RX ADMIN — LEVOTHYROXINE SODIUM 100 MCG: 0.1 TABLET ORAL at 09:26

## 2025-01-11 RX ADMIN — METRONIDAZOLE 500 MG: 500 INJECTION, SOLUTION INTRAVENOUS at 16:01

## 2025-01-11 RX ADMIN — SODIUM CHLORIDE, PRESERVATIVE FREE 10 ML: 5 INJECTION INTRAVENOUS at 21:48

## 2025-01-11 RX ADMIN — ENOXAPARIN SODIUM 40 MG: 100 INJECTION SUBCUTANEOUS at 14:57

## 2025-01-11 RX ADMIN — CILOSTAZOL 100 MG: 50 TABLET ORAL at 21:47

## 2025-01-11 RX ADMIN — METRONIDAZOLE 500 MG: 500 INJECTION, SOLUTION INTRAVENOUS at 09:25

## 2025-01-11 RX ADMIN — CILOSTAZOL 100 MG: 50 TABLET ORAL at 09:26

## 2025-01-11 RX ADMIN — SODIUM CHLORIDE, PRESERVATIVE FREE 10 ML: 5 INJECTION INTRAVENOUS at 09:27

## 2025-01-11 RX ADMIN — METRONIDAZOLE 500 MG: 500 INJECTION, SOLUTION INTRAVENOUS at 23:52

## 2025-01-11 RX ADMIN — LISINOPRIL 20 MG: 10 TABLET ORAL at 09:26

## 2025-01-11 RX ADMIN — I.V. FAT EMULSION 250 ML: 20 EMULSION INTRAVENOUS at 19:27

## 2025-01-11 RX ADMIN — ATENOLOL 50 MG: 25 TABLET ORAL at 21:47

## 2025-01-11 RX ADMIN — ATORVASTATIN CALCIUM 40 MG: 40 TABLET, FILM COATED ORAL at 09:26

## 2025-01-11 RX ADMIN — TRAZODONE HYDROCHLORIDE 50 MG: 50 TABLET ORAL at 21:47

## 2025-01-11 RX ADMIN — WATER 1000 MG: 1 INJECTION INTRAMUSCULAR; INTRAVENOUS; SUBCUTANEOUS at 18:30

## 2025-01-11 RX ADMIN — PANTOPRAZOLE SODIUM 40 MG: 40 INJECTION, POWDER, FOR SOLUTION INTRAVENOUS at 09:26

## 2025-01-11 ASSESSMENT — PAIN SCALES - GENERAL: PAINLEVEL_OUTOF10: 0

## 2025-01-12 LAB
ANION GAP SERPL CALC-SCNC: 10 MMOL/L (ref 2–12)
BUN SERPL-MCNC: 21 MG/DL (ref 6–20)
BUN/CREAT SERPL: 34 (ref 12–20)
CALCIUM SERPL-MCNC: 8.6 MG/DL (ref 8.5–10.1)
CHLORIDE SERPL-SCNC: 110 MMOL/L (ref 97–108)
CO2 SERPL-SCNC: 17 MMOL/L (ref 21–32)
CREAT SERPL-MCNC: 0.62 MG/DL (ref 0.55–1.02)
ERYTHROCYTE [DISTWIDTH] IN BLOOD BY AUTOMATED COUNT: 13.9 % (ref 11.5–14.5)
GLUCOSE BLD STRIP.AUTO-MCNC: 123 MG/DL (ref 65–117)
GLUCOSE BLD STRIP.AUTO-MCNC: 137 MG/DL (ref 65–117)
GLUCOSE BLD STRIP.AUTO-MCNC: 155 MG/DL (ref 65–117)
GLUCOSE SERPL-MCNC: 136 MG/DL (ref 65–100)
HCT VFR BLD AUTO: 30.8 % (ref 35–47)
HGB BLD-MCNC: 10.1 G/DL (ref 11.5–16)
MAGNESIUM SERPL-MCNC: 1.5 MG/DL (ref 1.6–2.4)
MCH RBC QN AUTO: 31.6 PG (ref 26–34)
MCHC RBC AUTO-ENTMCNC: 32.8 G/DL (ref 30–36.5)
MCV RBC AUTO: 96.3 FL (ref 80–99)
NRBC # BLD: 0 K/UL (ref 0–0.01)
NRBC BLD-RTO: 0 PER 100 WBC
PHOSPHATE SERPL-MCNC: 3.1 MG/DL (ref 2.6–4.7)
PLATELET # BLD AUTO: 330 K/UL (ref 150–400)
PMV BLD AUTO: 10.5 FL (ref 8.9–12.9)
POTASSIUM SERPL-SCNC: 4.2 MMOL/L (ref 3.5–5.1)
RBC # BLD AUTO: 3.2 M/UL (ref 3.8–5.2)
SERVICE CMNT-IMP: ABNORMAL
SODIUM SERPL-SCNC: 137 MMOL/L (ref 136–145)
WBC # BLD AUTO: 10.3 K/UL (ref 3.6–11)

## 2025-01-12 PROCEDURE — 6370000000 HC RX 637 (ALT 250 FOR IP): Performed by: INTERNAL MEDICINE

## 2025-01-12 PROCEDURE — 80048 BASIC METABOLIC PNL TOTAL CA: CPT

## 2025-01-12 PROCEDURE — 2580000003 HC RX 258: Performed by: FAMILY MEDICINE

## 2025-01-12 PROCEDURE — 2500000003 HC RX 250 WO HCPCS: Performed by: FAMILY MEDICINE

## 2025-01-12 PROCEDURE — 82962 GLUCOSE BLOOD TEST: CPT

## 2025-01-12 PROCEDURE — 6370000000 HC RX 637 (ALT 250 FOR IP)

## 2025-01-12 PROCEDURE — 2500000003 HC RX 250 WO HCPCS: Performed by: INTERNAL MEDICINE

## 2025-01-12 PROCEDURE — 6370000000 HC RX 637 (ALT 250 FOR IP): Performed by: SURGERY

## 2025-01-12 PROCEDURE — 6370000000 HC RX 637 (ALT 250 FOR IP): Performed by: FAMILY MEDICINE

## 2025-01-12 PROCEDURE — 6360000002 HC RX W HCPCS: Performed by: SURGERY

## 2025-01-12 PROCEDURE — 1100000000 HC RM PRIVATE

## 2025-01-12 PROCEDURE — 83735 ASSAY OF MAGNESIUM: CPT

## 2025-01-12 PROCEDURE — 2580000003 HC RX 258

## 2025-01-12 PROCEDURE — 6360000002 HC RX W HCPCS: Performed by: INTERNAL MEDICINE

## 2025-01-12 PROCEDURE — 85027 COMPLETE CBC AUTOMATED: CPT

## 2025-01-12 PROCEDURE — 84100 ASSAY OF PHOSPHORUS: CPT

## 2025-01-12 PROCEDURE — 6360000002 HC RX W HCPCS

## 2025-01-12 RX ORDER — OXYCODONE AND ACETAMINOPHEN 5; 325 MG/1; MG/1
1 TABLET ORAL EVERY 4 HOURS PRN
Status: DISCONTINUED | OUTPATIENT
Start: 2025-01-12 | End: 2025-01-13 | Stop reason: HOSPADM

## 2025-01-12 RX ORDER — IBUPROFEN 400 MG/1
600 TABLET, FILM COATED ORAL ONCE
Status: COMPLETED | OUTPATIENT
Start: 2025-01-12 | End: 2025-01-12

## 2025-01-12 RX ADMIN — SODIUM CHLORIDE, PRESERVATIVE FREE 10 ML: 5 INJECTION INTRAVENOUS at 18:09

## 2025-01-12 RX ADMIN — IBUPROFEN 600 MG: 400 TABLET, FILM COATED ORAL at 11:46

## 2025-01-12 RX ADMIN — ATENOLOL 50 MG: 25 TABLET ORAL at 22:01

## 2025-01-12 RX ADMIN — METRONIDAZOLE 500 MG: 500 INJECTION, SOLUTION INTRAVENOUS at 16:54

## 2025-01-12 RX ADMIN — METRONIDAZOLE 500 MG: 500 INJECTION, SOLUTION INTRAVENOUS at 08:44

## 2025-01-12 RX ADMIN — CILOSTAZOL 100 MG: 50 TABLET ORAL at 08:29

## 2025-01-12 RX ADMIN — CILOSTAZOL 100 MG: 50 TABLET ORAL at 22:01

## 2025-01-12 RX ADMIN — ACETAMINOPHEN 1000 MG: 500 TABLET ORAL at 02:28

## 2025-01-12 RX ADMIN — SODIUM CHLORIDE: 9 INJECTION, SOLUTION INTRAVENOUS at 08:36

## 2025-01-12 RX ADMIN — WATER 1000 MG: 1 INJECTION INTRAMUSCULAR; INTRAVENOUS; SUBCUTANEOUS at 18:10

## 2025-01-12 RX ADMIN — LEVOTHYROXINE SODIUM 100 MCG: 0.1 TABLET ORAL at 08:29

## 2025-01-12 RX ADMIN — TRAZODONE HYDROCHLORIDE 50 MG: 50 TABLET ORAL at 22:01

## 2025-01-12 RX ADMIN — PANTOPRAZOLE SODIUM 40 MG: 40 INJECTION, POWDER, FOR SOLUTION INTRAVENOUS at 08:31

## 2025-01-12 RX ADMIN — ACETAMINOPHEN 1000 MG: 500 TABLET ORAL at 08:28

## 2025-01-12 RX ADMIN — SODIUM CHLORIDE, PRESERVATIVE FREE 10 ML: 5 INJECTION INTRAVENOUS at 22:02

## 2025-01-12 RX ADMIN — ATORVASTATIN CALCIUM 40 MG: 40 TABLET, FILM COATED ORAL at 08:29

## 2025-01-12 RX ADMIN — ENOXAPARIN SODIUM 40 MG: 100 INJECTION SUBCUTANEOUS at 14:29

## 2025-01-12 RX ADMIN — LISINOPRIL 20 MG: 10 TABLET ORAL at 11:46

## 2025-01-12 RX ADMIN — DULOXETINE HYDROCHLORIDE 60 MG: 60 CAPSULE, DELAYED RELEASE ORAL at 14:29

## 2025-01-12 ASSESSMENT — PAIN DESCRIPTION - LOCATION
LOCATION: FOOT

## 2025-01-12 ASSESSMENT — PAIN - FUNCTIONAL ASSESSMENT
PAIN_FUNCTIONAL_ASSESSMENT: ACTIVITIES ARE NOT PREVENTED

## 2025-01-12 ASSESSMENT — PAIN SCALES - GENERAL
PAINLEVEL_OUTOF10: 7
PAINLEVEL_OUTOF10: 7
PAINLEVEL_OUTOF10: 0
PAINLEVEL_OUTOF10: 3

## 2025-01-12 ASSESSMENT — PAIN DESCRIPTION - DESCRIPTORS
DESCRIPTORS: ACHING;DISCOMFORT
DESCRIPTORS: ACHING
DESCRIPTORS: ACHING

## 2025-01-12 ASSESSMENT — PAIN DESCRIPTION - ORIENTATION
ORIENTATION: RIGHT
ORIENTATION: RIGHT
ORIENTATION: LEFT;RIGHT

## 2025-01-13 VITALS
RESPIRATION RATE: 18 BRPM | HEIGHT: 62 IN | SYSTOLIC BLOOD PRESSURE: 115 MMHG | DIASTOLIC BLOOD PRESSURE: 65 MMHG | WEIGHT: 142.2 LBS | HEART RATE: 78 BPM | BODY MASS INDEX: 26.17 KG/M2 | TEMPERATURE: 98.2 F | OXYGEN SATURATION: 93 %

## 2025-01-13 LAB
ALBUMIN SERPL-MCNC: 2.6 G/DL (ref 3.5–5)
ALBUMIN/GLOB SERPL: 0.9 (ref 1.1–2.2)
ALP SERPL-CCNC: 50 U/L (ref 45–117)
ALT SERPL-CCNC: 25 U/L (ref 12–78)
ANION GAP SERPL CALC-SCNC: 8 MMOL/L (ref 2–12)
AST SERPL-CCNC: 15 U/L (ref 15–37)
BILIRUB DIRECT SERPL-MCNC: <0.1 MG/DL (ref 0–0.2)
BILIRUB SERPL-MCNC: 0.3 MG/DL (ref 0.2–1)
BUN SERPL-MCNC: 25 MG/DL (ref 6–20)
BUN/CREAT SERPL: 27 (ref 12–20)
CALCIUM SERPL-MCNC: 8.7 MG/DL (ref 8.5–10.1)
CHLORIDE SERPL-SCNC: 112 MMOL/L (ref 97–108)
CO2 SERPL-SCNC: 18 MMOL/L (ref 21–32)
CREAT SERPL-MCNC: 0.91 MG/DL (ref 0.55–1.02)
ERYTHROCYTE [DISTWIDTH] IN BLOOD BY AUTOMATED COUNT: 14.2 % (ref 11.5–14.5)
GLOBULIN SER CALC-MCNC: 3 G/DL (ref 2–4)
GLUCOSE BLD STRIP.AUTO-MCNC: 104 MG/DL (ref 65–117)
GLUCOSE SERPL-MCNC: 111 MG/DL (ref 65–100)
HCT VFR BLD AUTO: 29.3 % (ref 35–47)
HGB BLD-MCNC: 9.7 G/DL (ref 11.5–16)
MAGNESIUM SERPL-MCNC: 1.3 MG/DL (ref 1.6–2.4)
MCH RBC QN AUTO: 32.3 PG (ref 26–34)
MCHC RBC AUTO-ENTMCNC: 33.1 G/DL (ref 30–36.5)
MCV RBC AUTO: 97.7 FL (ref 80–99)
NRBC # BLD: 0 K/UL (ref 0–0.01)
NRBC BLD-RTO: 0 PER 100 WBC
PHOSPHATE SERPL-MCNC: 3.7 MG/DL (ref 2.6–4.7)
PLATELET # BLD AUTO: 291 K/UL (ref 150–400)
PMV BLD AUTO: 10.2 FL (ref 8.9–12.9)
POTASSIUM SERPL-SCNC: 4.4 MMOL/L (ref 3.5–5.1)
PROT SERPL-MCNC: 5.6 G/DL (ref 6.4–8.2)
RBC # BLD AUTO: 3 M/UL (ref 3.8–5.2)
SERVICE CMNT-IMP: NORMAL
SODIUM SERPL-SCNC: 138 MMOL/L (ref 136–145)
WBC # BLD AUTO: 8.2 K/UL (ref 3.6–11)

## 2025-01-13 PROCEDURE — 6370000000 HC RX 637 (ALT 250 FOR IP): Performed by: FAMILY MEDICINE

## 2025-01-13 PROCEDURE — 6370000000 HC RX 637 (ALT 250 FOR IP): Performed by: INTERNAL MEDICINE

## 2025-01-13 PROCEDURE — 82962 GLUCOSE BLOOD TEST: CPT

## 2025-01-13 PROCEDURE — 6360000002 HC RX W HCPCS: Performed by: INTERNAL MEDICINE

## 2025-01-13 PROCEDURE — 83735 ASSAY OF MAGNESIUM: CPT

## 2025-01-13 PROCEDURE — 84100 ASSAY OF PHOSPHORUS: CPT

## 2025-01-13 PROCEDURE — 80048 BASIC METABOLIC PNL TOTAL CA: CPT

## 2025-01-13 PROCEDURE — 97116 GAIT TRAINING THERAPY: CPT

## 2025-01-13 PROCEDURE — 85027 COMPLETE CBC AUTOMATED: CPT

## 2025-01-13 PROCEDURE — 6360000002 HC RX W HCPCS

## 2025-01-13 PROCEDURE — 99024 POSTOP FOLLOW-UP VISIT: CPT | Performed by: NURSE PRACTITIONER

## 2025-01-13 PROCEDURE — 97110 THERAPEUTIC EXERCISES: CPT

## 2025-01-13 PROCEDURE — 2500000003 HC RX 250 WO HCPCS: Performed by: FAMILY MEDICINE

## 2025-01-13 PROCEDURE — 97530 THERAPEUTIC ACTIVITIES: CPT

## 2025-01-13 PROCEDURE — 2580000003 HC RX 258

## 2025-01-13 PROCEDURE — 80076 HEPATIC FUNCTION PANEL: CPT

## 2025-01-13 RX ORDER — LISINOPRIL 40 MG/1
20 TABLET ORAL DAILY
Qty: 90 TABLET | Refills: 1 | Status: SHIPPED
Start: 2025-01-13

## 2025-01-13 RX ORDER — OXYCODONE AND ACETAMINOPHEN 5; 325 MG/1; MG/1
1 TABLET ORAL EVERY 6 HOURS PRN
Qty: 20 TABLET | Refills: 0 | Status: SHIPPED | OUTPATIENT
Start: 2025-01-13 | End: 2025-01-20

## 2025-01-13 RX ORDER — PANTOPRAZOLE SODIUM 40 MG/1
40 TABLET, DELAYED RELEASE ORAL
Qty: 30 TABLET | Refills: 0 | Status: SHIPPED | OUTPATIENT
Start: 2025-01-13 | End: 2025-02-12

## 2025-01-13 RX ADMIN — PANTOPRAZOLE SODIUM 40 MG: 40 INJECTION, POWDER, FOR SOLUTION INTRAVENOUS at 09:23

## 2025-01-13 RX ADMIN — LEVOTHYROXINE SODIUM 100 MCG: 0.1 TABLET ORAL at 09:19

## 2025-01-13 RX ADMIN — DULOXETINE HYDROCHLORIDE 60 MG: 60 CAPSULE, DELAYED RELEASE ORAL at 09:19

## 2025-01-13 RX ADMIN — LISINOPRIL 20 MG: 10 TABLET ORAL at 09:30

## 2025-01-13 RX ADMIN — ATORVASTATIN CALCIUM 40 MG: 40 TABLET, FILM COATED ORAL at 09:19

## 2025-01-13 RX ADMIN — METRONIDAZOLE 500 MG: 500 INJECTION, SOLUTION INTRAVENOUS at 00:21

## 2025-01-13 RX ADMIN — CILOSTAZOL 100 MG: 50 TABLET ORAL at 09:19

## 2025-01-13 RX ADMIN — SODIUM CHLORIDE, PRESERVATIVE FREE 10 ML: 5 INJECTION INTRAVENOUS at 09:24

## 2025-01-13 RX ADMIN — METRONIDAZOLE 500 MG: 500 INJECTION, SOLUTION INTRAVENOUS at 08:17

## 2025-01-13 NOTE — FLOWSHEET NOTE
01/13/25 1221   AVS Reviewed   AVS & discharge instructions reviewed with patient and/or representative? Yes   Reviewed instructions with Patient   Level of Understanding Questions answered;Verbalized understanding

## 2025-01-13 NOTE — PROGRESS NOTES
[facility]                                                                                          Hospitalist Progress Note  KAMI Moreira NP  Answering service: [phone] OR [phone] from in house phone        Date of Service:  2025  NAME:  Robyn Valenzuela  :  1945  MRN:  [id number]       Admission Summary:   \"Robyn Valenzuela is a 79 y.o. female with a pmhx PMR, CAD, TIA, CAD, nicotine dependence, HTN, and dyslipidemia who presents with abdominal cramping, nausea and for acute colitis.  She states that she has had intermittent episodes of diarrhea for the past several months.  She has had recent travel to [place].  Yesterday she developed severe abdominal cramping so presented to the ED for further evaluation.  Of note, she did have to have urgent revascularization of her bilateral lower extremities due to severe peripheral artery disease.     In the ED, max , and phosphate 80% on room air with improvement to 99% on 2 L nasal cannula.  Labs show sodium 132, creatinine elevated to 1.06, lactic 2.8> 1. CT abdomen/pelvis with approximately 40% saline at 100, 50% superior mesenteric artery origin stenosis.  There was marked segmental inflammatory change of the distal ileum suspicious for infectious or inflammatory enteritis but could also be ischemic.  There was an incidental finding of cholecystolithiasis, and colonic diverticulosis.     In the ED, she received flagyl, ceftriaxone, zofran and 1L normal saline\"     Interval history / Subjective:   Patient seen and examined, lying in bed with daughter at bedside. NG tube came out this morning. Patient still with some nausea. Having liquid BMs. Mainly concerned about not being able to sleep while in hospital - start nightly melatonin and add PRN trazodone for case of melatonin not helping.    Notified by nursing this evening that patient had 3 episodes of vomiting (~300 ml total), now feeling 
        [facility]                                                                                          Hospitalist Progress Note  KAMI Moreira NP  Answering service: [phone] OR [phone] from in house phone        Date of Service:  2025  NAME:  Robyn Valenzuela  :  1945  MRN:  [id number]       Admission Summary:   \"Robyn Valenzuela is a 79 y.o. female with a pmhx PMR, CAD, TIA, CAD, nicotine dependence, HTN, and dyslipidemia who presents with abdominal cramping, nausea and for acute colitis.  She states that she has had intermittent episodes of diarrhea for the past several months.  She has had recent travel to [place].  Yesterday she developed severe abdominal cramping so presented to the ED for further evaluation.  Of note, she did have to have urgent revascularization of her bilateral lower extremities due to severe peripheral artery disease.     In the ED, max , and phosphate 80% on room air with improvement to 99% on 2 L nasal cannula.  Labs show sodium 132, creatinine elevated to 1.06, lactic 2.8> 1. CT abdomen/pelvis with approximately 40% saline at 100, 50% superior mesenteric artery origin stenosis.  There was marked segmental inflammatory change of the distal ileum suspicious for infectious or inflammatory enteritis but could also be ischemic.  There was an incidental finding of cholecystolithiasis, and colonic diverticulosis.     In the ED, she received flagyl, ceftriaxone, zofran and 1L normal saline\"     Interval history / Subjective:   Patient seen and examined, lying in bed. Not feeling well today and was unable to sleep again last night. Had NG tube replaced this morning for decompression.    Plans for OR tomorrow for diagnostic laparoscopy with possible need for open laparotomy.     Assessment & Plan:     Colitis  SBO  - afebrile; WBC improved to 12.6  - Blood cx () no growth so far  - Lactic acid 1.0  - CT of abd/pel () 
        [facility]                                                                                          Hospitalist Progress Note  KAMI Curran NP  Answering service: [phone] OR [phone] from in house phone        Date of Service:  1/3/2025  NAME:  Robyn Valenzuela  :  1945  MRN:  [id number]       Admission Summary:   \"Robyn Valenzuela is a 79 y.o. female with a pmhx PMR, CAD, TIA, CAD, nicotine dependence, HTN, and dyslipidemia who presents with abdominal cramping, nausea and for acute colitis.  She states that she has had intermittent episodes of diarrhea for the past several months.  She has had recent travel to [place].  Yesterday she developed severe abdominal cramping so presented to the ED for further evaluation.  Of note, she did have to have urgent revascularization of her bilateral lower extremities due to severe peripheral artery disease.     In the ED, max , and phosphate 80% on room air with improvement to 99% on 2 L nasal cannula.  Labs show sodium 132, creatinine elevated to 1.06, lactic 2.8> 1. CT abdomen/pelvis with approximately 40% saline at 100, 50% superior mesenteric artery origin stenosis.  There was marked segmental inflammatory change of the distal ileum suspicious for infectious or inflammatory enteritis but could also be ischemic.  There was an incidental finding of cholecystolithiasis, and colonic diverticulosis.     In the ED, she received flagyl, ceftriaxone, zofran and 1L normal saline\"       Interval history / Subjective:   C/o nausea with pain medication, feeling full, distended abdomen, and constipation   CT of abd/pel result pending  Assessment & Plan:     Colitis  - afebrile, wbc 11.8    Blood cx () no growth so far    CT of abd/pel () Atherosclerotic calcification without aneurysm with approximately 70% celiac origin and 50% superior mesenteric artery origin stenoses. Marked segmental inflammatory change of 
        [facility]                                                                                          Hospitalist Progress Note  Sav Portillo MD  Answering service: [phone]        Date of Service:  2025  NAME:  Robyn Valenzuela  :  1945  MRN:  [id number]       Admission Summary:   \"Robyn Valenzuela is a 79 y.o. female with a pmhx PMR, CAD, TIA, CAD, nicotine dependence, HTN, and dyslipidemia who presents with abdominal cramping, nausea and for acute colitis.  She states that she has had intermittent episodes of diarrhea for the past several months.  She has had recent travel to [place].  Yesterday she developed severe abdominal cramping so presented to the ED for further evaluation.  Of note, she did have to have urgent revascularization of her bilateral lower extremities due to severe peripheral artery disease.     In the ED, max , and phosphate 80% on room air with improvement to 99% on 2 L nasal cannula.  Labs show sodium 132, creatinine elevated to 1.06, lactic 2.8> 1. CT abdomen/pelvis with approximately 40% saline at 100, 50% superior mesenteric artery origin stenosis.  There was marked segmental inflammatory change of the distal ileum suspicious for infectious or inflammatory enteritis but could also be ischemic.  There was an incidental finding of cholecystolithiasis, and colonic diverticulosis.     In the ED, she received flagyl, ceftriaxone, zofran and 1L normal saline\"     Interval history / Subjective:   Patient is doing well this afternoon.  She still has some abdominal distention, passing gas, had a very small bowel movement earlier today.  Diet advanced to regular, I asked the patient to eat slowly, chew thoroughly, alternate solids and liquids.         Assessment & Plan:     Colitis  SBO  - afebrile; WBC improved to 12.6  - Blood cx () no growth so far  - Lactic acid 1.0  - CT of abd/pel () Atherosclerotic calcification 
        [facility]                                                                                          Hospitalist Progress Note  Belinda Bloom MD  Answering service: [phone] OR [phone] from in house phone        Date of Service:  2025  NAME:  Robyn Valenzuela  :  1945  MRN:  [id number]       Admission Summary:   \"Robyn Valenzuela is a 79 y.o. female with a pmhx PMR, CAD, TIA, CAD, nicotine dependence, HTN, and dyslipidemia who presents with abdominal cramping, nausea and for acute colitis.  She states that she has had intermittent episodes of diarrhea for the past several months.  She has had recent travel to [place].  Yesterday she developed severe abdominal cramping so presented to the ED for further evaluation.  Of note, she did have to have urgent revascularization of her bilateral lower extremities due to severe peripheral artery disease.     In the ED, max , and phosphate 80% on room air with improvement to 99% on 2 L nasal cannula.  Labs show sodium 132, creatinine elevated to 1.06, lactic 2.8> 1. CT abdomen/pelvis with approximately 40% saline at 100, 50% superior mesenteric artery origin stenosis.  There was marked segmental inflammatory change of the distal ileum suspicious for infectious or inflammatory enteritis but could also be ischemic.  There was an incidental finding of cholecystolithiasis, and colonic diverticulosis.     In the ED, she received flagyl, ceftriaxone, zofran and 1L normal saline\"     Interval history / Subjective:   Patient seen this afternoon, she returned from the OR status post diagnostic laparoscopic lysis of adhesions.  Resting comfortably, no nausea, vomiting or pain.  Family at the bedside     Assessment & Plan:     Colitis  SBO  - afebrile; WBC improved to 12.6  - Blood cx () no growth so far  - Lactic acid 1.0  - CT of abd/pel () Atherosclerotic calcification without aneurysm with approximately 70% 
        [facility]                                                                                          Hospitalist Progress Note  Moshe Jewell, KAMI - CNP  Answering service: [phone] OR [phone] from in house phone        Date of Service:  2025  NAME:  Robyn Valenzuela  :  1945  MRN:  [id number]       Admission Summary:   Robyn Valenzuela is a 79 y.o. female with a pmhx PMR, CAD, TIA, CAD, nicotine dependence, HTN, and dyslipidemia who presents with abdominal cramping, nausea and for acute colitis.  She states that she has had intermittent episodes of diarrhea for the past several months.  She has had recent travel to [place].  Yesterday she developed severe abdominal cramping so presented to the ED for further evaluation.  Of note, she did have to have urgent revascularization of her bilateral lower extremities due to severe peripheral artery disease.     In the ED, max , and phosphate 80% on room air with improvement to 99% on 2 L nasal cannula.  Labs show sodium 132, creatinine elevated to 1.06, lactic 2.8> 1. CT abdomen/pelvis with approximately 40% saline at 100, 50% superior mesenteric artery origin stenosis.  There was marked segmental inflammatory change of the distal ileum suspicious for infectious or inflammatory enteritis but could also be ischemic.  There was an incidental finding of cholecystolithiasis, and colonic diverticulosis.     In the ED, she received flagyl, ceftriaxone, zofran and 1L normal saline       Interval history / Subjective:    continues with abdominal discomfort     Assessment & Plan:      Colitis  GERD  -ct abdomen showed approximately 40% saline at 100, 50% superior mesenteric artery origin stenosis. There was marked segmental inflammatory change of the distal ileum suspicious for infectious or inflammatory enteritis but could also be ischemic. There was an incidental finding of cholecystolithiasis, and 
        [facility]                                                                                          Hospitalist Progress Note  Moshe Jewell, KAMI - CNP  Answering service: [phone] OR [phone] from in house phone        Date of Service:  2025  NAME:  Robyn Valenzuela  :  1945  MRN:  [id number]       Admission Summary:   Robyn Valenzuela is a 79 y.o. female with a pmhx PMR, CAD, TIA, CAD, nicotine dependence, HTN, and dyslipidemia who presents with abdominal cramping, nausea and for acute colitis.  She states that she has had intermittent episodes of diarrhea for the past several months.  She has had recent travel to [place].  Yesterday she developed severe abdominal cramping so presented to the ED for further evaluation.  Of note, she did have to have urgent revascularization of her bilateral lower extremities due to severe peripheral artery disease.     In the ED, max , and phosphate 80% on room air with improvement to 99% on 2 L nasal cannula.  Labs show sodium 132, creatinine elevated to 1.06, lactic 2.8> 1. CT abdomen/pelvis with approximately 40% saline at 100, 50% superior mesenteric artery origin stenosis.  There was marked segmental inflammatory change of the distal ileum suspicious for infectious or inflammatory enteritis but could also be ischemic.  There was an incidental finding of cholecystolithiasis, and colonic diverticulosis.     In the ED, she received flagyl, ceftriaxone, zofran and 1L normal saline       Interval history / Subjective:    continues with abdominal discomfort  1/2  feeling worse abdominal pain today, wbc stable at 10k      Assessment & Plan:      Colitis  GERD  -ct abdomen showed approximately 40% saline at 100, 50% superior mesenteric artery origin stenosis. There was marked segmental inflammatory change of the distal ileum suspicious for infectious or inflammatory enteritis but could also be ischemic. 
        [facility]                                                                                          Hospitalist Progress Note  Sav Portillo MD  Answering service: [phone]        Date of Service:  2025  NAME:  Robyn Valenzuela  :  1945  MRN:  [id number]       Admission Summary:   \"Robyn Valenzuela is a 79 y.o. female with a pmhx PMR, CAD, TIA, CAD, nicotine dependence, HTN, and dyslipidemia who presents with abdominal cramping, nausea and for acute colitis.  She states that she has had intermittent episodes of diarrhea for the past several months.  She has had recent travel to [place].  Yesterday she developed severe abdominal cramping so presented to the ED for further evaluation.  Of note, she did have to have urgent revascularization of her bilateral lower extremities due to severe peripheral artery disease.     In the ED, max , and phosphate 80% on room air with improvement to 99% on 2 L nasal cannula.  Labs show sodium 132, creatinine elevated to 1.06, lactic 2.8> 1. CT abdomen/pelvis with approximately 40% saline at 100, 50% superior mesenteric artery origin stenosis.  There was marked segmental inflammatory change of the distal ileum suspicious for infectious or inflammatory enteritis but could also be ischemic.  There was an incidental finding of cholecystolithiasis, and colonic diverticulosis.     In the ED, she received flagyl, ceftriaxone, zofran and 1L normal saline\"     Interval history / Subjective:   No overnight events noted.  Abdomen is soft, no pain.  Had little solid food for dinner last night, because she did not like it, and the soup was cold.  Complains of right foot pain due to plantar fasciitis.     Assessment & Plan:     Colitis  SBO  - afebrile; WBC improved to 12.6  - Blood cx () no growth so far  - Lactic acid 1.0  - CT of abd/pel () Atherosclerotic calcification without aneurysm with approximately 70% celiac 
        [facility]                                                                                          Hospitalist Progress Note  KAMI Curran NP  Answering service: [phone] OR [phone] from in house phone        Date of Service:  2025  NAME:  Robyn Valenzuela  :  1945  MRN:  [id number]       Admission Summary:   \"Robyn Valenzuela is a 79 y.o. female with a pmhx PMR, CAD, TIA, CAD, nicotine dependence, HTN, and dyslipidemia who presents with abdominal cramping, nausea and for acute colitis.  She states that she has had intermittent episodes of diarrhea for the past several months.  She has had recent travel to [place].  Yesterday she developed severe abdominal cramping so presented to the ED for further evaluation.  Of note, she did have to have urgent revascularization of her bilateral lower extremities due to severe peripheral artery disease.     In the ED, max , and phosphate 80% on room air with improvement to 99% on 2 L nasal cannula.  Labs show sodium 132, creatinine elevated to 1.06, lactic 2.8> 1. CT abdomen/pelvis with approximately 40% saline at 100, 50% superior mesenteric artery origin stenosis.  There was marked segmental inflammatory change of the distal ileum suspicious for infectious or inflammatory enteritis but could also be ischemic.  There was an incidental finding of cholecystolithiasis, and colonic diverticulosis.     In the ED, she received flagyl, ceftriaxone, zofran and 1L normal saline\"       Interval history / Subjective:   C/o nausea, worsening sensation of lump in her back of throat, discomfort from NGT  Pt and pt's daughter are requesting to speak with surgery team to discuss past abd surgery and current abdominal issue; nursing staff will contact the surgeon     Assessment & Plan:     Colitis  - afebrile, wbc 11.8    Blood cx () no growth so far    Lactic acid 1.0    CT of abd/pel () Atherosclerotic 
  [facility]      Subjective     NPO, NGT, no flatus or BM, no abdominal pain this am, says she feels a little better today    Objective     Patient Vitals for the past 24 hrs:   Temp Pulse Resp BP SpO2   01/05/25 0600 -- 90 -- -- --   01/05/25 0545 97.8 °F (36.6 °C) 91 16 (!) 155/70 94 %   01/05/25 0400 -- 87 -- -- --   01/05/25 0205 97.9 °F (36.6 °C) 92 16 (!) 120/59 91 %   01/05/25 0200 -- 85 -- -- --   01/04/25 2045 -- 89 -- (!) 98/49 --   01/04/25 2001 -- -- 16 -- --   01/04/25 2000 -- (!) 104 -- -- --   01/04/25 1836 98.4 °F (36.9 °C) 91 -- 115/63 --   01/04/25 1800 -- 84 -- -- --   01/04/25 1437 -- 83 -- 108/65 95 %   01/04/25 1402 97.5 °F (36.4 °C) 82 18 (!) 106/44 --   01/04/25 1401 -- 82 -- -- --   01/04/25 1209 -- 83 -- -- --   01/04/25 1020 -- 81 -- -- --   01/04/25 1000 -- -- 16 -- --   01/04/25 0930 -- -- 16 -- --         Intake/Output Summary (Last 24 hours) at 1/5/2025 0827  Last data filed at 1/5/2025 0609  Gross per 24 hour   Intake 3010.62 ml   Output 900 ml   Net 2110.62 ml        PE  Pulm - CTAB  CV - RRR  Abd - soft, distended, BS hypo, NTTP    Labs  Lab Results   Component Value Date/Time     01/05/2025 03:28 AM    K 3.6 01/05/2025 03:28 AM     01/05/2025 03:28 AM    CO2 20 01/05/2025 03:28 AM    BUN 29 01/05/2025 03:28 AM    CREATININE 2.25 01/05/2025 03:28 AM    GLUCOSE 121 01/05/2025 03:28 AM    GLUCOSE 103 06/09/2023 03:36 PM    CALCIUM 7.4 01/05/2025 03:28 AM    LABGLOM 22 01/05/2025 03:28 AM    LABGLOM >60 01/31/2024 11:02 AM    LABGLOM 88 06/09/2023 03:36 PM      Lab Results   Component Value Date    WBC 16.0 (H) 01/05/2025    HGB 9.6 (L) 01/05/2025    HCT 30.6 (L) 01/05/2025    .7 (H) 01/05/2025     01/05/2025         Assessment     Robyn Valenzuela is a 79 y.o.yr old female with SBO, possible recent bowel ischemia?    Plan     For now continue NGT, NPO  WBC up a little from yesterday  Will recheck a LA today, if 
  Physician Progress Note      PATIENT:               LAW WISE  [facility] #:                  [id number]  :                       1945  ADMIT DATE:       2025 12:51 AM  DISCH DATE:  RESPONDING  PROVIDER #:        Danielle Villafana          QUERY TEXT:    Pt admitted with Colitis. Pt noted to have DELANEY documented. If possible, please   document in the progress notes and discharge summary if you are evaluating   and/or treating any of the following:    The medical record reflects the following:  Risk Factors: DELANEY    Clinical Indicators: CREATININE 1.62*-->2.25*-->0.96    Treatment:  IV hydration  - Nephrology following  - Hold ACEI and lisinopril  - Avoid nephrotoxic agents    Defined by Kidney Disease Improving Global Outcomes (KDIGO) clinical practice   guideline for acute kidney injury:  -Increase in SCr by greater than or equal to 0.3 mg/dl within 48 hours; or  -Increase or decrease in SCr to greater than or equal to 1.5 times baseline,   which is known or presumed to have occurred within the prior 7 days; or  -Urine volume < 0.5ml/kg/h for 6 hours    Please call if you have any questions or need assistance. I can also be   reached via [employer] or [employer] # [phone].  Thank you,  Coni Smith RN/CDI  Options provided:  -- Acute kidney failure  -- Acute kidney failure with acute tubular necrosis  -- Acute kidney injury  -- Other - I will add my own diagnosis  -- Disagree - Not applicable / Not valid  -- Disagree - Clinically unable to determine / Unknown  -- Refer to Clinical Documentation Reviewer    PROVIDER RESPONSE TEXT:    This patient has an Acute kidney injury.    Query created by: Coni Smith on 2025 9:46 AM      Electronically signed by:  Danielle Villafana 2025 9:58 AM          
  [facility]  Sandra Leal MD  [phone]            Renal Progress Note    NAME:  Robyn Valenzuela   :   1945   MRN:   [id number]     Date/Time:  2025 9:50 AM            DISCUSSION / PLAN :        DELANEY-possibly JEANA and volume depletion-resolved  -no pyuria, low grade proteinuria  Hypokalemia    Colitis  CAD  PVD        Plan:  Off  IVF  C/w TPN/PPN-adjust K and phos in TPN  Continue to hold HCTZ   Restart Lisinopril  KCL 40 meq IV x1  Magnesium sulfate IV 2gr x1  Neutraphos po  Is and Os  Avoid MSAIDs  Avoid IV contrast as much as possible  F/u RFT      Will follow PRN         ___________________________________________________  Subjective:       -feels ok, no abdominal pain. Had BM. Renal function improved, cr down to 0.9-NGT has been removed     -had BM, no other complaints.       Medications reviewed:  Current Facility-Administered Medications   Medication Dose Route Frequency    potassium chloride 10 mEq/100 mL IVPB (Peripheral Line)  10 mEq IntraVENous Q1H    magnesium sulfate 2000 mg in 50 mL IVPB premix  2,000 mg IntraVENous Once    potassium & sodium phosphates (PHOS-NAK) 280-160-250 MG packet 250 mg  1 packet Oral BID    pantoprazole (PROTONIX) 40 mg in sodium chloride (PF) 0.9 % 10 mL injection  40 mg IntraVENous Daily    PN-Adult ( - ) Peripheral Line (Custom)   IntraVENous Continuous TPN    melatonin tablet 3 mg  3 mg Oral Nightly    traZODone (DESYREL) tablet 25 mg  25 mg Oral Nightly PRN    piperacillin-tazobactam (ZOSYN) 3,375 mg in sodium chloride 0.9 % 50 mL IVPB (mini-bag)  3,375 mg IntraVENous Q8H    lidocaine 4 % external patch 1 patch  1 patch TransDERmal Daily    ondansetron (ZOFRAN) injection 4 mg  4 mg IntraVENous Q6H PRN    Benzocaine-Menthol (CEPACOL) 1 lozenge  1 lozenge Oral Q2H PRN    phenol 1.4 % mouth spray 1 spray  1 spray Mouth/Throat Q2H PRN    hydrALAZINE (APRESOLINE) injection 5 mg  5 mg IntraVENous Q6H PRN    acetaminophen (TYLENOL) tablet 
 TRANSFER - OUT REPORT:    Verbal report given to SARA Regan(name) on Robyn Valenzuela  being transferred to (unit) for routine transfer of care.       Report consisted of patient’s Situation, Background, Assessment and   Recommendations(SBAR).     Time Pre op antibiotic given:scheduled  Anesthesia Stop time: 1327    Information from the following report(s) SBAR, medications given, etc was reviewed with the receiving nurse.    Opportunity for questions and clarification was provided.     Is the patient on 02? yes       L/Min 2       Other     Is the patient on a monitor? no    Is the nurse transporting with the patient? no    Surgical Waiting Area notified of patient's transfer from PACU? yes    
0800: Unable to feel pedal pulses or hear with doppler. R foot cap refill >3seconds and bother feet cool. Pt stated numbness in feet and stated had recent vascular surgery was supposed to follow up Tuesday. Notified IVETT Perry.   1400 Vascular MD Yo notified at nurses station and assessed patient at bedside. No new orders received.   
0907: [employer] attending physician, MD Bandar with the following message:  PT removed NGT. PT started passing gas yesterday. PT currently working with PT.   Response:  
1/5/025@ 12:55pm Consult to Nephrology regarding elevated creat. While receiving iv hydration. \"Patient admitted for bowel obstruction\".  Consult was called to [facility]'s answering service.  I spoke to Ila.  Dr. Viera is on call.   
19:16 RN- Hi, pt really would like to eat food, but is on bowel rest. I have tried to explain but she's would like some more information    19:17 Janice Nunes NP-Ok why is she bowel rest?     19:23 RN- probably bc of the ct of the abd, it showed marked segmental inflammatory change of the distal ileum suspicious for infectious or inflammatory enteritis but could also be ischemic. There was an also an incidental finding of cholecystolithiasis, and colonic diverticulosis.    19:29 Janice Nunes NP- Yeah, looks like they’re trying to avoid surgery by trying bowel rest, small sips, and ice chips for comfort with IV hydration, PPI and antibiotic’s. Pain meds, and nausea meds as needed.     19:44 RN- please come explain, i have tried my best. But she wants more indepth information about what is wrong with her.    19:45 Janice Nunes NP- Day team will take care of routine issues since this is not a change or emergent issue     19:45 RN- okay      
Comprehensive Nutrition Assessment    Type and Reason for Visit: Reassess    Nutrition Recommendations/Plan:   Continue regular, low fiber diet as pt tolerated  Replete magnesium, continue to monitor/replete prn   Ensure compact BID  Please document % intake of meals in flowsheets        Malnutrition Assessment:  Malnutrition Status:  At risk for malnutrition (01/09/25 1038)    Context:  Acute Illness     Findings of the 6 clinical characteristics of malnutrition:  Energy Intake:  50% or less of estimated energy requirements for 5 or more days  Weight Loss:  No weight loss     Body Fat Loss:  No body fat loss     Muscle Mass Loss:  No muscle mass loss (mild temporal wasting likely age related)    Fluid Accumulation:  No fluid accumulation     Strength:  Not Performed       Nutrition Assessment:    PMH of PMR, CAD, TIA, nicotine dependence, HTN, dyslipidemia, anemia, arthritis, ASHD, CVD, depression, GERD, gout, hypothyroidism, neuropathy d/t peripheral vascular disease, osteopenia, osteoporosis, TIA, NKFA.     Presents with abd cramping, nausea; admitted for ischemic colitis. Noted incidental finding of cholecystolithiasis and colonic diverticulosis via abd/pelvis CT. General surgery consulted, no role for surgery at this time per chart review. Repeat abd/pelvis CT 1/3  revealing \"mild diffuse small bowel distention suggests small bowel obstruction, likely related to internal hernia...\". NGT in place, PPN started 1/4. Nephrology following, +volume depletion likely 2/2 decreased urine OP and diarrhea.     (1/13) Follow-up. Diet advanced to clears (1/10), fulls (1/11), and now regular, low fiber (1/12). PPN discontinued 1/11. RD visited pt at bedside, s/p low fiber diet edu (see education brief note). Pt consumed 1 pancake and a few bites of eggs this morning for breakfast, tolerated well. Pt amenable to try Ensure to better meet estimated needs, will add to diet order. RD encouraged pt to consume smaller, more 
General Surgery Daily Progress Note    Admit Date: 2025  Post-Operative Day: * No surgery found * from * No surgery found *     Subjective:     No acute events.  She is still having pain but feels better than over the last couple of days.  No BM or flatus since admission.  No n/v.  + belching.  CT results pending.       Objective:     Blood pressure 115/63, pulse 79, temperature 97.5 °F (36.4 °C), temperature source Oral, resp. rate 16, height 1.575 m (5' 2\"), weight 59.3 kg (130 lb 11.7 oz), SpO2 96%.  Temp (24hrs), Av.4 °F (36.3 °C), Min:97.3 °F (36.3 °C), Max:97.5 °F (36.4 °C)      _____________________  Physical Exam:     Alert and Oriented x 3, in no acute distress.  Cardiovascular: RRR, S1S2  Lungs:unlabored  Abdomen: Soft, moderate distention, mild diffuse tenderness to palpation without guarding or rebound.  Ecchymosis to suprapubic and left groin area     Data Review:    Recent Labs     25  0353 25  1328 25  0408   WBC 10.5 12.3* 11.8*   HGB 11.4* 11.8 11.0*   HCT 34.0* 36.7 34.2*    336 338     Recent Labs     25  0155 25  1000   * 132*   K 4.8 4.8    104   CO2 18* 25   BUN 26* 19   ALT 14  --      Invalid input(s): \"AML\", \"LPSE\"        ______________________  Medications:    Current Facility-Administered Medications   Medication Dose Route Frequency    [START ON 2025] lisinopril (PRINIVIL;ZESTRIL) tablet 20 mg  20 mg Oral Daily    HYDROmorphone HCl PF (DILAUDID) injection 1 mg  1 mg IntraVENous Q4H PRN    cefTRIAXone (ROCEPHIN) 1,000 mg in sterile water 10 mL IV syringe  1,000 mg IntraVENous Q24H    metroNIDAZOLE (FLAGYL) 500 mg in 0.9% NaCl 100 mL IVPB premix  500 mg IntraVENous Q8H    sodium chloride flush 0.9 % injection 5-40 mL  5-40 mL IntraVENous 2 times per day    sodium chloride flush 0.9 % injection 5-40 mL  5-40 mL IntraVENous PRN    0.9 % sodium chloride infusion   IntraVENous PRN    magnesium sulfate 2000 mg in 50 mL IVPB premix  
General Surgery Progress Note    3 Days Post-Op   DIAGNOSTIC LAPAROSCOPIC LYSIS OF ADHESIONS (Abdomen)   Date:2025       Room:[facility]  Patient Name:Robyn Valenzuela     YOB: 1945     Age:79 y.o.    Subjective     No acute surgical issues.  PT is overall doing well.  Tolerating clear liquids but appetite is diminished.  She is having liquid BM's.  Pain is under control.     Medications   Scheduled Meds:    cefTRIAXone (ROCEPHIN) IV  1,000 mg IntraVENous Q24H    metroNIDAZOLE  500 mg IntraVENous Q8H    enoxaparin  40 mg SubCUTAneous Daily    traZODone  50 mg Oral Nightly    pantoprazole (PROTONIX) 40 mg in sodium chloride (PF) 0.9 % 10 mL injection  40 mg IntraVENous Daily    lidocaine  1 patch TransDERmal Daily    fat emulsion  250 mL IntraVENous Daily    lisinopril  20 mg Oral Daily    sodium chloride flush  5-40 mL IntraVENous 2 times per day    atenolol  50 mg Oral Nightly    atorvastatin  40 mg Oral Daily    cilostazol  100 mg Oral BID    DULoxetine  60 mg Oral Daily    [Held by provider] hydroCHLOROthiazide  12.5 mg Oral Daily    levothyroxine  100 mcg Oral Daily     Continuous Infusions:    TPN-Adult 2-in-1 (-) - Peripheral Line (Custom)      PN-Adult (1/10 - ) Peripheral Line (Custom) 83 mL/hr at 01/10/25 2007    sodium chloride Stopped (25)     PRN Meds: diphenhydrAMINE, melatonin, ondansetron, Benzocaine-Menthol, phenol, hydrALAZINE, acetaminophen **OR** [DISCONTINUED] acetaminophen, ondansetron **OR** [DISCONTINUED] ondansetron, prochlorperazine, HYDROmorphone, sodium chloride flush, sodium chloride, magnesium sulfate, polyethylene glycol        Physical Examination      Vitals:  /71   Pulse 76   Temp 97.8 °F (36.6 °C) (Oral)   Resp 20   Ht 1.575 m (5' 2.01\")   Wt 62.9 kg (138 lb 9.6 oz)   SpO2 94%   PF (!) 2 L/min   BMI 25.34 kg/m²   Temp (24hrs), Av.2 °F (36.8 °C), Min:97.8 °F (36.6 °C), Max:98.6 °F (37 °C)      Physical Exam:    Gen:  No 
General Surgery Progress Note    4 Days Post-Op   DIAGNOSTIC LAPAROSCOPIC LYSIS OF ADHESIONS (Abdomen)   Date:2025       Room:[facility]  Patient Name:Robyn Valenzuela     YOB: 1945     Age:79 y.o.    Subjective     No acute surgical issues.  Pt is continuing to do well.  Tolerating full liquids.  She is having liquid BM's.  Pain is under control.     Medications   Scheduled Meds:    cefTRIAXone (ROCEPHIN) IV  1,000 mg IntraVENous Q24H    metroNIDAZOLE  500 mg IntraVENous Q8H    enoxaparin  40 mg SubCUTAneous Daily    traZODone  50 mg Oral Nightly    pantoprazole (PROTONIX) 40 mg in sodium chloride (PF) 0.9 % 10 mL injection  40 mg IntraVENous Daily    lidocaine  1 patch TransDERmal Daily    fat emulsion  250 mL IntraVENous Daily    lisinopril  20 mg Oral Daily    sodium chloride flush  5-40 mL IntraVENous 2 times per day    atenolol  50 mg Oral Nightly    atorvastatin  40 mg Oral Daily    cilostazol  100 mg Oral BID    DULoxetine  60 mg Oral Daily    [Held by provider] hydroCHLOROthiazide  12.5 mg Oral Daily    levothyroxine  100 mcg Oral Daily     Continuous Infusions:    TPN-Adult 2-in-1 (-) - Peripheral Line (Custom) Stopped (25 1615)    sodium chloride Stopped (25 1138)     PRN Meds: diphenhydrAMINE, melatonin, ondansetron, Benzocaine-Menthol, phenol, hydrALAZINE, acetaminophen **OR** [DISCONTINUED] acetaminophen, ondansetron **OR** [DISCONTINUED] ondansetron, prochlorperazine, HYDROmorphone, sodium chloride flush, sodium chloride, magnesium sulfate, polyethylene glycol        Physical Examination      Vitals:  /71   Pulse 76   Temp 98.4 °F (36.9 °C) (Oral)   Resp 16   Ht 1.575 m (5' 2.01\")   Wt 62.9 kg (138 lb 9.6 oz)   SpO2 97%   PF (!) 2 L/min   BMI 25.34 kg/m²   Temp (24hrs), Av.1 °F (36.7 °C), Min:97.7 °F (36.5 °C), Max:98.4 °F (36.9 °C)      Physical Exam:    Gen:  No apparent distress  Neuro:  Alert and oriented x4  Pulm:  Unlabored  Abd:  
General Surgery Progress Note    Abdominal pain and diarrhea with possible ischemic enteritis  Date:2025       Room:[facility]  Patient Name:Robyn Valenzuela     YOB: 1945     Age:79 y.o.    Subjective     No acute surgical issues.  Pt is reporting intermittent abdominal pain when pain medication wears off.  No nausea or vomiting    Medications   Scheduled Meds:    cefTRIAXone (ROCEPHIN) IV  1,000 mg IntraVENous Q24H    metroNIDAZOLE  500 mg IntraVENous Q8H    sodium chloride flush  5-40 mL IntraVENous 2 times per day    atenolol  50 mg Oral Nightly    atorvastatin  40 mg Oral Daily    cilostazol  100 mg Oral BID    DULoxetine  60 mg Oral Daily    [Held by provider] hydroCHLOROthiazide  12.5 mg Oral Daily    levothyroxine  100 mcg Oral Daily    lisinopril  40 mg Oral Daily    pantoprazole  40 mg Oral QAM AC     Continuous Infusions:    sodium chloride      sodium chloride 100 mL/hr at 25 0540     PRN Meds: sodium chloride flush, sodium chloride, potassium chloride **OR** potassium alternative oral replacement **OR** potassium chloride, magnesium sulfate, ondansetron **OR** ondansetron, polyethylene glycol, acetaminophen **OR** acetaminophen, morphine        Physical Examination      Vitals:  BP (!) 145/59   Pulse 76   Temp 97.8 °F (36.6 °C) (Oral)   Resp 16   Ht 1.575 m (5' 2\")   Wt 59.3 kg (130 lb 11.7 oz)   SpO2 98%   BMI 23.91 kg/m²   Temp (24hrs), Av.8 °F (36.6 °C), Min:97.8 °F (36.6 °C), Max:97.9 °F (36.6 °C)      Physical Exam:    Gen:  No apparent distress  Neuro:  Alert and oriented x4  Pulm:  Unlabored  Abd:  Soft/mildly distended/mild diffuse tenderness to palpation without guarding or rebound  Ext:  No cyanosis, clubbing or edema  Wound:  C/D/I    I/O (24Hr):  No intake or output data in the 24 hours ending 25 1439      Labs/Imaging/Diagnostics   Labs:  CBC:  Recent Labs     25  0155   WBC 9.5   RBC 3.50*   HGB 11.1*   HCT 33.7*   MCV 96.3   RDW 13.2    
Lactic acid is normal and a little lower today.  I do not see evidence of bowel ischemia.  No scheduled surgery today but may need exploration soon if she does not open up or has any major changes.    Kvng Ozuna  
Ms. Valenzuela reports abdominal pain this AM. Also had problems with nausea and vomiting.    Tm 99 Tc 97. HR: 85 BP: 110/61 Resp Rate: 17 95% sat on room air.   No intake or output data in the 24 hours ending 01/04/25 0746   Exam: Cor: RRR.              Lungs: Bilateral breath sounds.               Abd: Soft. Tender.             No guarding or rebound.             Distended.   Labs:   Recent Results (from the past 12 hour(s))   CBC with Auto Differential    Collection Time: 01/04/25  2:18 AM   Result Value Ref Range    WBC 14.9 (H) 3.6 - 11.0 K/uL    RBC 3.93 3.80 - 5.20 M/uL    Hemoglobin 12.5 11.5 - 16.0 g/dL    Hematocrit 38.6 35.0 - 47.0 %    MCV 98.2 80.0 - 99.0 FL    MCH 31.8 26.0 - 34.0 PG    MCHC 32.4 30.0 - 36.5 g/dL    RDW 13.6 11.5 - 14.5 %    Platelets 429 (H) 150 - 400 K/uL    MPV 9.7 8.9 - 12.9 FL    Nucleated RBCs 0.0 0  WBC    nRBC 0.00 0.00 - 0.01 K/uL    Neutrophils % 83 (H) 32 - 75 %    Lymphocytes % 7 (L) 12 - 49 %    Monocytes % 9 5 - 13 %    Eosinophils % 0 0 - 7 %    Basophils % 0 0 - 1 %    Immature Granulocytes % 1 (H) 0.0 - 0.5 %    Neutrophils Absolute 12.3 (H) 1.8 - 8.0 K/UL    Lymphocytes Absolute 1.1 0.8 - 3.5 K/UL    Monocytes Absolute 1.3 (H) 0.0 - 1.0 K/UL    Eosinophils Absolute 0.0 0.0 - 0.4 K/UL    Basophils Absolute 0.0 0.0 - 0.1 K/UL    Immature Granulocytes Absolute 0.1 (H) 0.00 - 0.04 K/UL    Differential Type AUTOMATED     Extra Tubes Hold    Collection Time: 01/04/25  2:18 AM   Result Value Ref Range    Specimen HOld 1PST     Comment:        Add-on orders for these samples will be processed based on acceptable specimen integrity and analyte stability, which may vary by analyte.   Lactic Acid    Collection Time: 01/04/25  3:00 AM   Result Value Ref Range    Lactic Acid, Plasma 1.0 0.4 - 2.0 MMOL/L   Reviewed CT scan.   Will place NG tube.   NPO except ice chips and meds.   Continue IV hydration.   Would start PPN.   Pain medication and anti-emetics as needed.   Plans 
Ms. Valenzuela reports discomfort from NG tube.  Tm 97.9 Tc 97.7 HR: 83 BP: 110/61 Resp Rate: 16 95% sat on room air.   No intake or output data in the 24 hours ending 01/04/25 1222   Exam: Cor: RRR.              Lungs: Bilateral breath sounds.               Abd: Soft. Somewhat less distended.             Tender.              No guarding or rebound.  Labs:   Recent Results (from the past 12 hour(s))   CBC with Auto Differential    Collection Time: 01/04/25  2:18 AM   Result Value Ref Range    WBC 14.9 (H) 3.6 - 11.0 K/uL    RBC 3.93 3.80 - 5.20 M/uL    Hemoglobin 12.5 11.5 - 16.0 g/dL    Hematocrit 38.6 35.0 - 47.0 %    MCV 98.2 80.0 - 99.0 FL    MCH 31.8 26.0 - 34.0 PG    MCHC 32.4 30.0 - 36.5 g/dL    RDW 13.6 11.5 - 14.5 %    Platelets 429 (H) 150 - 400 K/uL    MPV 9.7 8.9 - 12.9 FL    Nucleated RBCs 0.0 0  WBC    nRBC 0.00 0.00 - 0.01 K/uL    Neutrophils % 83 (H) 32 - 75 %    Lymphocytes % 7 (L) 12 - 49 %    Monocytes % 9 5 - 13 %    Eosinophils % 0 0 - 7 %    Basophils % 0 0 - 1 %    Immature Granulocytes % 1 (H) 0.0 - 0.5 %    Neutrophils Absolute 12.3 (H) 1.8 - 8.0 K/UL    Lymphocytes Absolute 1.1 0.8 - 3.5 K/UL    Monocytes Absolute 1.3 (H) 0.0 - 1.0 K/UL    Eosinophils Absolute 0.0 0.0 - 0.4 K/UL    Basophils Absolute 0.0 0.0 - 0.1 K/UL    Immature Granulocytes Absolute 0.1 (H) 0.00 - 0.04 K/UL    Differential Type AUTOMATED     Extra Tubes Hold    Collection Time: 01/04/25  2:18 AM   Result Value Ref Range    Specimen HOld 1PST     Comment:        Add-on orders for these samples will be processed based on acceptable specimen integrity and analyte stability, which may vary by analyte.   Comprehensive Metabolic Panel    Collection Time: 01/04/25  2:18 AM   Result Value Ref Range    Sodium 133 (L) 136 - 145 mmol/L    Potassium 3.9 3.5 - 5.1 mmol/L    Chloride 102 97 - 108 mmol/L    CO2 21 21 - 32 mmol/L    Anion Gap 10 2 - 12 mmol/L    Glucose 84 65 - 100 mg/dL    BUN 21 (H) 6 - 20 MG/DL    Creatinine 
NUTRITION     Nutrition screening referral was triggered based on results obtained during nursing admission assessment for wt loss.    The patient's chart was reviewed and nutrition assessment is not indicated at this time.  Plan to see patient for rescreen as indicated.  Thank you.     Wt Readings from Last 6 Encounters:   01/01/25 59.3 kg (130 lb 11.7 oz)   11/18/24 58.4 kg (128 lb 12.8 oz)   10/07/24 58 kg (127 lb 12.8 oz)   06/04/24 59.1 kg (130 lb 3.2 oz)   01/31/24 59 kg (130 lb)   10/06/23 58.1 kg (128 lb)     Kaylen Isaac RD      
Patient feels much better with NG tube in place.  Had a greater than 1000 out.  Still seems to be obstructed.  NG tube bilious  Will proceed with OR today.  Risks and benefits discussed with patient and daughter at length and she agrees to proceed  
Pharmacy renal dose protocol: Zosyn  Indication:  leukocytosis in presence of bowel ischemia   Current regimen:  3.375 g IV q8h    Recent Labs     25  0408 25  0218 25  0328   WBC 11.8* 14.9* 16.0*   CREATININE  --  1.62* 2.25*   BUN  --  21* 29*     Est CrCl: 17 ml/min; UO: n/a ml/kg/hr  Temp (24hrs), Av.9 °F (36.6 °C), Min:97.5 °F (36.4 °C), Max:98.4 °F (36.9 °C)    Plan: Change to 4.5 g x1 followed by 3.375 g IV q12h extended infusion for weight 62 kg and CrCl 17 ml/min          
Progress Note  Date:2025       Room:[facility]  Patient Name:Robyn Valenzuela     YOB: 1945     Age:79 y.o.        Subjective    Subjective   Review of Systems  Patient had recurrent vomiting last night.  Not really passing gas.  Still having a lot of belching.  Objective         Vitals Last 24 Hours:  TEMPERATURE:  Temp  Av.4 °F (36.9 °C)  Min: 97.9 °F (36.6 °C)  Max: 99 °F (37.2 °C)  RESPIRATIONS RANGE: Resp  Av  Min: 16  Max: 18  PULSE OXIMETRY RANGE: SpO2  Av.5 %  Min: 90 %  Max: 94 %  PULSE RANGE: Pulse  Av.1  Min: 83  Max: 109  BLOOD PRESSURE RANGE: Systolic (24hrs), Av , Min:131 , Max:153   ; Diastolic (24hrs), Av, Min:66, Max:88    I/O (24Hr):  No intake or output data in the 24 hours ending 25 1155  Objective:  Vital signs: (most recent): Blood pressure 135/72, pulse 88, temperature 98.4 °F (36.9 °C), temperature source Oral, resp. rate 18, height 1.575 m (5' 2.01\"), weight 62 kg (136 lb 9.6 oz), SpO2 92%.    General alert no acute distress  Lungs clear  Heart regular rate and rhythm  Abdomen nontender distended  Labs/Imaging/Diagnostics    Labs:  CBC:  Recent Labs     25  0647 25  0611   WBC 16.0* 16.1* 12.6*   RBC 3.01* 3.64* 3.31*   HGB 9.6* 11.5 10.6*   HCT 30.6* 34.7* 30.9*   .7* 95.3 93.4   RDW 14.2 14.0 14.0    439* 385     CHEMISTRIES:  Recent Labs     25  0647 25  0611    139 137   K 3.6 3.5 3.2*    110* 110*   CO2 20* 18* 18*   BUN 29* 19 15   CREATININE 2.25* 0.96 0.72   GLUCOSE 121* 148* 165*   PHOS 2.6 1.6* 2.2*   MG 1.6 1.7  --      PT/INR:No results for input(s): \"PROTIME\", \"INR\" in the last 72 hours.  APTT:No results for input(s): \"APTT\" in the last 72 hours.  LIVER PROFILE:  Recent Labs     25  0328 25  0647   AST 42* 39*   ALT 20 25   BILIDIR <0.1 0.2   BILITOT 0.3 0.4   ALKPHOS 49 56       Imaging Last 24 Hours:  XR ABDOMEN (KUB) (SINGLE AP 
Pt well on PM rounds, had a loose BM, no pain, no pain.      Kvng Ozuna  
Report given to SARA Yang RN signing off.   
Report received from SARA Thapa    Assumed pt care at this time.    Dulce Conway RN   
Surgery Progress Note    Admit Date: 1/1/2025      Subjective:      Pt complains of  ST due to NGT ,this morning when she moved to chair the NGT fell out, pt is very anxious about having it put back in but she has been passing gas and having some loose stools.    Pts pain present - adequately treated.  No SOB. No CP.  Pt is OOB to chair with assistance.    Patient 's current diet is NPO, sips for comfort while NGT was in  .. Pt reports  no nausea and no vomiting. Pt reports no fever or chills, voiding well.      Bowel Movements: Flatus and some loose stools yesterday and this AM.          Objective:     Patient Vitals for the past 8 hrs:   BP Temp Temp src Pulse Resp SpO2 Weight   01/10/25 0603 (!) 153/71 98.4 °F (36.9 °C) Oral 84 20 92 % 64.7 kg (142 lb 11.2 oz)   01/10/25 0551 -- -- -- 87 -- -- --   01/10/25 0358 -- -- -- 80 -- -- --   01/10/25 0319 (!) 147/60 98.2 °F (36.8 °C) Oral 85 20 95 % --   01/10/25 0152 -- -- -- 89 -- -- --     No intake/output data recorded.  01/08 1901 - 01/10 0700  In: 4809.8 [P.O.:30; I.V.:14.5]  Out: 400 [Urine:400]ip  Physical Exam:    General: alert, cooperative, no distress, OOB in chair, NGT is out   Cardiac: normal S1 and S2  Lungs: Normal chest wall and respirations. Clear to auscultation.  Abdomen: soft, protuberant, nondistended, normal bowel sounds, tenderness mild in the lower abdomen, without guarding, and without rebound  Wounds: clean dry surgical dressings x 3 in place   Neuro: alert, oriented x 3, no defects noted in general exam.  Extremities: no edema, redness or tenderness in the calves or thighs      CBC:   Lab Results   Component Value Date/Time    WBC 12.5 01/09/2025 02:28 AM    RBC 3.20 01/09/2025 02:28 AM    HGB 10.1 01/09/2025 02:28 AM    HCT 30.7 01/09/2025 02:28 AM     01/09/2025 02:28 AM     BMP:   Lab Results   Component Value Date/Time     01/10/2025 04:46 AM    K 3.8 01/10/2025 04:46 AM     01/10/2025 04:46 AM    CO2 22 01/10/2025 
[facility]   Daily Progress Note    Admit Date: 2025  Post-Operative Day: * No surgery found * from * No surgery found *     Subjective:     Last 24 hrs: pt lost ngt by accident; feels bloated; denies n/v today; had liquid BM, has sore throat       Objective:     Blood pressure 108/80, pulse 92, temperature 98 °F (36.7 °C), temperature source Oral, resp. rate 16, height 1.575 m (5' 2.01\"), weight 62 kg (136 lb 9.6 oz), SpO2 98%.  Temp (24hrs), Av.3 °F (36.8 °C), Min:98 °F (36.7 °C), Max:99 °F (37.2 °C)      _____________________  Physical Exam:     Alert and Oriented, x3, in no acute distress.  Cardiovascular: RRR, no peripheral edema  Abdomen: soft w/ distention, some BS heard, NT      Assessment:   Principal Problem:    Ischemic colitis (HCC)  Resolved Problems:    * No resolved hospital problems. *          Plan:     Keep ng out - if vomits needs reinsertion  Cont sips and chips  Cont ppn and lipids  PPI  Monitor labs    Data Review:    Recent Labs     25  02125  0328 25  0647   WBC 14.9* 16.0* 16.1*   HGB 12.5 9.6* 11.5   HCT 38.6 30.6* 34.7*   * 361 439*     Recent Labs     25  0218 25  0328 25  0647   * 137 139   K 3.9 3.6 3.5    105 110*   CO2 21 20* 18*   BUN 21* 29* 19   MG 1.7 1.6 1.7   PHOS 3.0 2.6 1.6*   ALT 19 20 25     Invalid input(s): \"AML\", \"LPSE\"        ______________________  Medications:    Current Facility-Administered Medications   Medication Dose Route Frequency    pantoprazole (PROTONIX) 40 mg in sodium chloride (PF) 0.9 % 10 mL injection  40 mg IntraVENous Daily    PN-Adult (-) Peripheral Line (Custom)   IntraVENous Continuous TPN    piperacillin-tazobactam (ZOSYN) 3,375 mg in sodium chloride 0.9 % 50 mL IVPB (mini-bag)  3,375 mg IntraVENous Q12H    Benzocaine-Menthol (CEPACOL) 1 lozenge  1 lozenge Oral Q2H PRN    phenol 1.4 % mouth spray 1 spray  1 spray Mouth/Throat Q2H PRN    hydrALAZINE (APRESOLINE) 
[facility]   Daily Progress Note    Admit Date: 2025  Post-Operative Day: 5 Days Post-Op from Procedure(s):  DIAGNOSTIC LAPAROSCOPIC LYSIS OF ADHESIONS     Subjective:     Last 24 hrs: pt is doing well; tolerating diet; having loose stools; OOB       Objective:     Blood pressure 115/65, pulse 78, temperature 98.2 °F (36.8 °C), temperature source Oral, resp. rate 18, height 1.575 m (5' 2.01\"), weight 64.5 kg (142 lb 3.2 oz), SpO2 93%, peak flow (!) 2 L/min.  Temp (24hrs), Av.2 °F (36.8 °C), Min:98.2 °F (36.8 °C), Max:98.2 °F (36.8 °C)      _____________________  Physical Exam:     Alert and Oriented, x3, in no acute distress.  Cardiovascular: RRR, no peripheral edema  Abdomen: soft, lap sites c/d/I w/ steri strips      Assessment:   Principal Problem:    Ischemic colitis (HCC)  Resolved Problems:    * No resolved hospital problems. *          Plan:     May go home from surgery standpoint  Diet ed per RD  OOB as she is doing  Follow up w/ Dr De La Fuente in one week    Data Review:    Recent Labs     25  0256 25  0210   WBC 10.3 8.2   HGB 10.1* 9.7*   HCT 30.8* 29.3*    291     Recent Labs     25  0256 25  0210    138   K 4.2 4.4   * 112*   CO2 17* 18*   BUN 21* 25*   MG 1.5* 1.3*   PHOS 3.1 3.7   ALT  --  25     Invalid input(s): \"AML\", \"LPSE\"        ______________________  Medications:    Current Facility-Administered Medications   Medication Dose Route Frequency    oxyCODONE-acetaminophen (PERCOCET) 5-325 MG per tablet 1 tablet  1 tablet Oral Q4H PRN    cefTRIAXone (ROCEPHIN) 1,000 mg in sterile water 10 mL IV syringe  1,000 mg IntraVENous Q24H    metroNIDAZOLE (FLAGYL) 500 mg in 0.9% NaCl 100 mL IVPB premix  500 mg IntraVENous Q8H    enoxaparin (LOVENOX) injection 40 mg  40 mg SubCUTAneous Daily    diphenhydrAMINE (BENADRYL) injection 12.5 mg  12.5 mg IntraVENous Q6H PRN    melatonin tablet 3 mg  3 mg Oral Nightly PRN    traZODone (DESYREL) tablet 50 mg  50 
IntraVENous Daily    piperacillin-tazobactam (ZOSYN) 3,375 mg in sodium chloride 0.9 % 50 mL IVPB (mini-bag)  3,375 mg IntraVENous Q8H    lidocaine 4 % external patch 1 patch  1 patch TransDERmal Daily    ondansetron (ZOFRAN) injection 4 mg  4 mg IntraVENous Q6H PRN    Benzocaine-Menthol (CEPACOL) 1 lozenge  1 lozenge Oral Q2H PRN    phenol 1.4 % mouth spray 1 spray  1 spray Mouth/Throat Q2H PRN    hydrALAZINE (APRESOLINE) injection 5 mg  5 mg IntraVENous Q6H PRN    acetaminophen (TYLENOL) tablet 1,000 mg  1,000 mg Oral Q6H PRN    ondansetron (ZOFRAN-ODT) disintegrating tablet 4 mg  4 mg Oral Q6H PRN    fat emulsion (INTRALIPID/NUTRILIPID) 20 % infusion 250 mL  250 mL IntraVENous Daily    prochlorperazine (COMPAZINE) injection 5 mg  5 mg IntraVENous Q6H PRN    lisinopril (PRINIVIL;ZESTRIL) tablet 20 mg  20 mg Oral Daily    HYDROmorphone HCl PF (DILAUDID) injection 1 mg  1 mg IntraVENous Q4H PRN    sodium chloride flush 0.9 % injection 5-40 mL  5-40 mL IntraVENous 2 times per day    sodium chloride flush 0.9 % injection 5-40 mL  5-40 mL IntraVENous PRN    0.9 % sodium chloride infusion   IntraVENous PRN    magnesium sulfate 2000 mg in 50 mL IVPB premix  2,000 mg IntraVENous PRN    polyethylene glycol (GLYCOLAX) packet 17 g  17 g Oral Daily PRN    atenolol (TENORMIN) tablet 50 mg  50 mg Oral Nightly    atorvastatin (LIPITOR) tablet 40 mg  40 mg Oral Daily    cilostazol (PLETAL) tablet 100 mg  100 mg Oral BID    DULoxetine (CYMBALTA) extended release capsule 60 mg  60 mg Oral Daily    [Held by provider] hydroCHLOROthiazide (HYDRODIURIL) tablet 12.5 mg  12.5 mg Oral Daily    levothyroxine (SYNTHROID) tablet 100 mcg  100 mcg Oral Daily               Assessment:     79 year old female with partial SBO s/p dx lap w/ TREVOR doing well POD#1      Plan:     Encourage OOB & IS  PPI  Keep NPO may have some ice chips- awaiting ROBF  Continue TPN   NGT to LIWS  Analgesics and antiemetics PRN   Dr. De La Fuente to follow           [place] 
PRN    0.9 % sodium chloride infusion   IntraVENous PRN    magnesium sulfate 2000 mg in 50 mL IVPB premix  2,000 mg IntraVENous PRN    ondansetron (ZOFRAN-ODT) disintegrating tablet 4 mg  4 mg Oral Q8H PRN    Or    ondansetron (ZOFRAN) injection 4 mg  4 mg IntraVENous Q6H PRN    polyethylene glycol (GLYCOLAX) packet 17 g  17 g Oral Daily PRN    acetaminophen (TYLENOL) tablet 650 mg  650 mg Oral Q6H PRN    Or    acetaminophen (TYLENOL) suppository 650 mg  650 mg Rectal Q6H PRN    0.9 % sodium chloride infusion   IntraVENous Continuous    atenolol (TENORMIN) tablet 50 mg  50 mg Oral Nightly    atorvastatin (LIPITOR) tablet 40 mg  40 mg Oral Daily    cilostazol (PLETAL) tablet 100 mg  100 mg Oral BID    DULoxetine (CYMBALTA) extended release capsule 60 mg  60 mg Oral Daily    [Held by provider] hydroCHLOROthiazide (HYDRODIURIL) tablet 12.5 mg  12.5 mg Oral Daily    levothyroxine (SYNTHROID) tablet 100 mcg  100 mcg Oral Daily    lisinopril (PRINIVIL;ZESTRIL) tablet 40 mg  40 mg Oral Daily    pantoprazole (PROTONIX) tablet 40 mg  40 mg Oral QAM AC               Assessment:     80 yo woman consulted for abdominal pain, possible ischemic enteritis       Plan:     No acute indication for surgery.   Continue with bowel rest, NPO.  May have small sips and ice chips for comfort.  May need repeat CT imaging if symptoms worsen   IV fluid hydration  PPI  Continue with abx therapy   Analgesics and antiemetics PRN   Dr. Shetty to follow    KAMI Gramajo - NP    1/2/2025    ADDENDUM:  Hamilton Shetty MD  Pt was seen and examined.  Pt reported pain earlier today not controlled by morning but which to dilaudid with better pain control now.  Pain has been crampy.  No nausea or vomiting.  She also has not had flatus or BM's yet.  Will need to repeat CT scan tomorrow to evaluate for possible bowel ischemia or obstruction.  NPO with sips for now.    FACE TO FACE time including review of any indicated imaging, discussion with patient, and 
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 Marked segmental inflammatory change of the distal ileum suspicious for infectious or inflammatory enteritis though ischemic enteritis is not excluded.    CT of abd/pel (1/3) Mild diffuse small bowel distention suggests small bowel obstruction, likely related to internal hernia. Small ascites.      IV rocephin/flagyl changed to IV zosyn due to worsening of leukocytosis      Continue with PPN     Appreciate clinical pharmacist input. PPN protocol was initiated it.      Surgery following; possible exploration                                  NPO with sips, continue with PPN.                                   NGT placement    DELANEY  - creat 2.2 while receiving IV hydration    Nephrology consult in place    Hold ACEI    Avoid nephrotoxic agents    Hypocalcemia  - 1 gm calcium gluconate given. Continue to monitor    GERD  - continue PPI    CAD  PAD  - continue home metoprolol, pletal and lipitor    Lisinopril on hold due to worsening of creatinine    Hypothyroidism  -continue home levothyroxine    1/4/2025  Pt c/o decrease of sensation of both lower extremities, R>L  S/p angioplasty and stenting of her femoral vessels by Dr. Torres on December 26th.   Pt has a follow up visit with Dr. Torres on Monday but has been cancelled due to current hospitalization. D/w Dr. Yo. Pt to be evaluated by Dr. Yo  1/5/2025  Pt informed me that she was seen by Dr. Yo yesterday. No concerns at this time. Follow up with Dr. Maria as outpatient after the discharge    Code status: full code  Prophylaxis: lovenox  Care Plan discussed with:patient, pt's nurse, and Dr. Graham  Anticipated Disposition: 24-48 hours  Inpatient  Cardiac monitoring: None      Principal Problem:    Ischemic colitis (HCC)  Resolved Problems:    * No resolved hospital problems. *         Social Determinants of Health     Tobacco Use: Medium 
magnesium sulfate 2000 mg in 50 mL IVPB premix  2,000 mg IntraVENous PRN    polyethylene glycol (GLYCOLAX) packet 17 g  17 g Oral Daily PRN    0.9 % sodium chloride infusion   IntraVENous Continuous    atenolol (TENORMIN) tablet 50 mg  50 mg Oral Nightly    atorvastatin (LIPITOR) tablet 40 mg  40 mg Oral Daily    cilostazol (PLETAL) tablet 100 mg  100 mg Oral BID    DULoxetine (CYMBALTA) extended release capsule 60 mg  60 mg Oral Daily    [Held by provider] hydroCHLOROthiazide (HYDRODIURIL) tablet 12.5 mg  12.5 mg Oral Daily    levothyroxine (SYNTHROID) tablet 100 mcg  100 mcg Oral Daily        Objective:   Vitals:  /80   Pulse 92   Temp 98 °F (36.7 °C) (Oral)   Resp 16   Ht 1.575 m (5' 2.01\")   Wt 62 kg (136 lb 9.6 oz)   SpO2 98%   BMI 24.98 kg/m²   Temp (24hrs), Av.3 °F (36.8 °C), Min:98 °F (36.7 °C), Max:99 °F (37.2 °C)           Last 24hr Input/Output:  No intake or output data in the 24 hours ending 25 0949     Physical Exam:  General:Alert, awake, No distress,   HEENT: Eyes Conjunctiva without pallor ,erythema.  The sclerae without icterus. .   Neck:Supple,no JVD  Lungs : Clears to auscultation Bilaterally, Normal respiratory effort  CVS: RRR, S1 S2 normal, No rub  Abdomen: Soft, Non tender, Not distended, bowel sounds present  : No russell  Extremities:  No Edema  Skin: No rash , not jaundiced  Neurologic: non focal, AAO x 3, nl speech  Psych: normal affect       [] Telemetry Reviewed     [] NSR [] PAC/PVCs   [] Afib  [] Paced   [] NSVT   [] Russell [] NGT  [] Intubated on vent          LABS:  Recent Labs     25  0647 25  0328 25  0218    137 133*   K 3.5 3.6 3.9   * 105 102   CO2 18* 20* 21   BUN 19 29* 21*   CREATININE 0.96 2.25* 1.62*   CALCIUM 9.0 7.4* 8.3*   PHOS 1.6* 2.6 3.0   MG 1.7 1.6 1.7     Recent Labs     25  0647 25  0328 25  0218   WBC 16.1* 16.0* 14.9*   HGB 11.5 9.6* 12.5   HCT 34.7* 30.6* 38.6   * 361 429*     No 
        Intake/Output Summary (Last 24 hours) at 1/9/2025 1224  Last data filed at 1/9/2025 0852  Gross per 24 hour   Intake 5379.79 ml   Output 515 ml   Net 4864.79 ml            Physical Examination:     I had a face to face encounter with this patient and independently examined them on 1/9/2025 as outlined below:          General : alert x 3, awake, appears uncomfortable  HEENT: NGT in place.  Neck: supple, no JVD, no meningeal signs  Chest: Clear in apex with decreased breath sounds at bases, on room air  CVS: S1 S2 heard  Abd: Soft, normoactive, tenderness around the laparoscopy entry sites.  Laparoscopy entry sites bandaged, clean and dry.  Ext: no edema, palpable bilateral pedal pulses  Neuro/Psych: pleasant mood and affect, moves all extremities, follows commands  Skin: warm, dry     Data Review:    Review and/or order of clinical lab test      I have personally and independently reviewed all pertinent labs, diagnostic studies, imaging, and have provided independent interpretation of the same.     Labs:     Recent Labs     01/08/25 0533 01/09/25 0228   WBC 9.5 12.5*   HGB 10.7* 10.1*   HCT 32.4* 30.7*    340     Recent Labs     01/07/25  0611 01/08/25 0533 01/09/25 0228    139 138  139   K 3.2* 3.8 4.4  4.4   * 114* 113*  114*   CO2 18* 21 19*  19*   BUN 15 12 14  13   MG  --  1.9 1.6   PHOS 2.2* 3.2 3.6  3.6     Recent Labs     01/08/25 0533   ALT 26   GLOB 3.0       No results for input(s): \"INR\", \"APTT\" in the last 72 hours.    Invalid input(s): \"PTP\"   No results for input(s): \"TIBC\" in the last 72 hours.    Invalid input(s): \"FE\", \"PSAT\", \"FERR\"   No results found for: \"RBCF\"   No results for input(s): \"PH\", \"PCO2\", \"PO2\" in the last 72 hours.  No results for input(s): \"CPK\" in the last 72 hours.    Invalid input(s): \"CPKMB\", \"CKNDX\", \"TROIQ\"  Lab Results   Component Value Date/Time    CHOL 147 10/07/2024 02:09 PM    HDL 94 10/07/2024 02:09 PM    LDL 41.4 10/07/2024 02:09 
reviewed from all clinical/nonclinical/nursing services involved in patient's clinical care. Care coordination discussions were held with appropriate clinical/nonclinical/ nursing providers based on care coordination needs.     Patients current active Medications were reviewed, considered, added and adjusted based on the clinical condition today.      Home Medications were reconciled to the best of my ability given all available resources at the time of admission. Route is PO if not otherwise noted.    Medications Reviewed:     Current Facility-Administered Medications   Medication Dose Route Frequency    PN-Adult (1/10 - 1/11) Peripheral Line (Custom)   IntraVENous Continuous TPN    cefTRIAXone (ROCEPHIN) 1,000 mg in sterile water 10 mL IV syringe  1,000 mg IntraVENous Q24H    metroNIDAZOLE (FLAGYL) 500 mg in 0.9% NaCl 100 mL IVPB premix  500 mg IntraVENous Q8H    enoxaparin (LOVENOX) injection 40 mg  40 mg SubCUTAneous Daily    diphenhydrAMINE (BENADRYL) injection 12.5 mg  12.5 mg IntraVENous Q6H PRN    melatonin tablet 3 mg  3 mg Oral Nightly PRN    traZODone (DESYREL) tablet 50 mg  50 mg Oral Nightly    pantoprazole (PROTONIX) 40 mg in sodium chloride (PF) 0.9 % 10 mL injection  40 mg IntraVENous Daily    lidocaine 4 % external patch 1 patch  1 patch TransDERmal Daily    ondansetron (ZOFRAN) injection 4 mg  4 mg IntraVENous Q6H PRN    Benzocaine-Menthol (CEPACOL) 1 lozenge  1 lozenge Oral Q2H PRN    phenol 1.4 % mouth spray 1 spray  1 spray Mouth/Throat Q2H PRN    hydrALAZINE (APRESOLINE) injection 5 mg  5 mg IntraVENous Q6H PRN    acetaminophen (TYLENOL) tablet 1,000 mg  1,000 mg Oral Q6H PRN    ondansetron (ZOFRAN-ODT) disintegrating tablet 4 mg  4 mg Oral Q6H PRN    fat emulsion (INTRALIPID/NUTRILIPID) 20 % infusion 250 mL  250 mL IntraVENous Daily    prochlorperazine (COMPAZINE) injection 5 mg  5 mg IntraVENous Q6H PRN    lisinopril (PRINIVIL;ZESTRIL) tablet 20 mg  20 mg Oral Daily    HYDROmorphone HCl PF

## 2025-01-13 NOTE — PLAN OF CARE
Problem: Chronic Conditions and Co-morbidities  Goal: Patient's chronic conditions and co-morbidity symptoms are monitored and maintained or improved  1/1/2025 0921 by Dasha Urena RN  Outcome: Progressing  1/1/2025 0640 by Carol Whitaker RN  Outcome: Progressing     Problem: Safety - Adult  Goal: Free from fall injury  1/1/2025 0921 by Dasha Urena RN  Outcome: Progressing  1/1/2025 0640 by Carol Whitaker RN  Outcome: Progressing     Problem: ABCDS Injury Assessment  Goal: Absence of physical injury  Outcome: Progressing     
  Problem: Chronic Conditions and Co-morbidities  Goal: Patient's chronic conditions and co-morbidity symptoms are monitored and maintained or improved  1/13/2025 0258 by Robyn Delacruz RN  Outcome: Progressing     Problem: Safety - Adult  Goal: Free from fall injury  1/13/2025 0258 by Robyn Delacruz RN  Outcome: Progressing     Problem: Pain  Goal: Verbalizes/displays adequate comfort level or baseline comfort level  1/13/2025 0258 by Robyn Delacruz RN  Outcome: Progressing     Problem: Discharge Planning  Goal: Discharge to home or other facility with appropriate resources  1/13/2025 0258 by Robyn Delacruz RN  Outcome: Progressing     
  Problem: Chronic Conditions and Co-morbidities  Goal: Patient's chronic conditions and co-morbidity symptoms are monitored and maintained or improved  1/13/2025 1148 by Alanis Jeff RN  Outcome: Adequate for Discharge  1/13/2025 0258 by Robyn Delacruz RN  Outcome: Progressing     Problem: Safety - Adult  Goal: Free from fall injury  1/13/2025 1148 by Alanis Jeff RN  Outcome: Adequate for Discharge  1/13/2025 0258 by Robyn Delacruz RN  Outcome: Progressing     Problem: ABCDS Injury Assessment  Goal: Absence of physical injury  Outcome: Adequate for Discharge     Problem: Pain  Goal: Verbalizes/displays adequate comfort level or baseline comfort level  1/13/2025 1148 by Alanis Jeff RN  Outcome: Adequate for Discharge  1/13/2025 0258 by Robyn Delacruz RN  Outcome: Progressing     Problem: Skin/Tissue Integrity  Goal: Absence of new skin breakdown  Description: 1.  Monitor for areas of redness and/or skin breakdown  2.  Assess vascular access sites hourly  3.  Every 4-6 hours minimum:  Change oxygen saturation probe site  4.  Every 4-6 hours:  If on nasal continuous positive airway pressure, respiratory therapy assess nares and determine need for appliance change or resting period.  Outcome: Adequate for Discharge     Problem: Nutrition Deficit:  Goal: Optimize nutritional status  Outcome: Adequate for Discharge     Problem: Discharge Planning  Goal: Discharge to home or other facility with appropriate resources  1/13/2025 1148 by Alanis Jeff RN  Outcome: Adequate for Discharge  1/13/2025 0258 by Robyn Delacruz RN  Outcome: Progressing     
  Problem: Chronic Conditions and Co-morbidities  Goal: Patient's chronic conditions and co-morbidity symptoms are monitored and maintained or improved  1/2/2025 2223 by Dulce Conway RN  Outcome: Progressing  Flowsheets (Taken 1/2/2025 2130)  Care Plan - Patient's Chronic Conditions and Co-Morbidity Symptoms are Monitored and Maintained or Improved:   Monitor and assess patient's chronic conditions and comorbid symptoms for stability, deterioration, or improvement   Collaborate with multidisciplinary team to address chronic and comorbid conditions and prevent exacerbation or deterioration   Update acute care plan with appropriate goals if chronic or comorbid symptoms are exacerbated and prevent overall improvement and discharge     Problem: Safety - Adult  Goal: Free from fall injury  1/2/2025 2223 by Dulce Conway RN  Outcome: Progressing  Flowsheets (Taken 1/2/2025 2130)  Free From Fall Injury:   Instruct family/caregiver on patient safety   Based on caregiver fall risk screen, instruct family/caregiver to ask for assistance with transferring infant if caregiver noted to have fall risk factors     Problem: ABCDS Injury Assessment  Goal: Absence of physical injury  1/2/2025 2223 by Dulce Conway RN  Outcome: Progressing  Flowsheets (Taken 1/2/2025 2130)  Absence of Physical Injury: Implement safety measures based on patient assessment     Problem: Pain  Goal: Verbalizes/displays adequate comfort level or baseline comfort level  1/2/2025 2223 by Dulce Conway RN  Outcome: Progressing  Flowsheets (Taken 1/2/2025 2130)  Verbalizes/displays adequate comfort level or baseline comfort level:   Encourage patient to monitor pain and request assistance   Assess pain using appropriate pain scale   Administer analgesics based on type and severity of pain and evaluate response   Implement non-pharmacological measures as appropriate and evaluate response   Consider cultural and social influences on 
  Problem: Chronic Conditions and Co-morbidities  Goal: Patient's chronic conditions and co-morbidity symptoms are monitored and maintained or improved  Outcome: Progressing     Problem: Safety - Adult  Goal: Free from fall injury  Outcome: Progressing     Problem: ABCDS Injury Assessment  Goal: Absence of physical injury  Outcome: Progressing     Problem: Pain  Goal: Verbalizes/displays adequate comfort level or baseline comfort level  Outcome: Progressing     
  Problem: Chronic Conditions and Co-morbidities  Goal: Patient's chronic conditions and co-morbidity symptoms are monitored and maintained or improved  Outcome: Progressing     Problem: Safety - Adult  Goal: Free from fall injury  Outcome: Progressing     Problem: ABCDS Injury Assessment  Goal: Absence of physical injury  Outcome: Progressing     Problem: Pain  Goal: Verbalizes/displays adequate comfort level or baseline comfort level  Outcome: Progressing     
  Problem: Chronic Conditions and Co-morbidities  Goal: Patient's chronic conditions and co-morbidity symptoms are monitored and maintained or improved  Outcome: Progressing     Problem: Safety - Adult  Goal: Free from fall injury  Outcome: Progressing     Problem: ABCDS Injury Assessment  Goal: Absence of physical injury  Outcome: Progressing     Problem: Pain  Goal: Verbalizes/displays adequate comfort level or baseline comfort level  Outcome: Progressing     Problem: Skin/Tissue Integrity  Goal: Absence of new skin breakdown  Description: 1.  Monitor for areas of redness and/or skin breakdown  2.  Assess vascular access sites hourly  3.  Every 4-6 hours minimum:  Change oxygen saturation probe site  4.  Every 4-6 hours:  If on nasal continuous positive airway pressure, respiratory therapy assess nares and determine need for appliance change or resting period.  Outcome: Progressing     Problem: Nutrition Deficit:  Goal: Optimize nutritional status  Outcome: Progressing     Problem: Physical Therapy - Adult  Goal: By Discharge: Performs mobility at highest level of function for planned discharge setting.  See evaluation for individualized goals.  Description: FUNCTIONAL STATUS PRIOR TO ADMISSION: Patient was independent and active without use of DME. PMH includes recent revascularization procedures B Les 12/26/2024    HOME SUPPORT PRIOR TO ADMISSION: Pt lived alone; daughter plans to stay with pt at initial d/c    Physical Therapy Goals  Initiated 1/9/2025  1.  Patient will move from supine to sit and sit to supine and roll side to side in bed with modified independence within 7 day(s).    2.  Patient will perform sit to stand with supervision/set-up within 7 day(s).  3.  Patient will transfer from bed to chair and chair to bed with supervision/set-up using the least restrictive device within 7 day(s).  4.  Patient will ambulate with supervision/set-up for 150 feet with the least restrictive device within 7 day(s). 
  Problem: Chronic Conditions and Co-morbidities  Goal: Patient's chronic conditions and co-morbidity symptoms are monitored and maintained or improved  Outcome: Progressing  Flowsheets (Taken 1/9/2025 2199)  Care Plan - Patient's Chronic Conditions and Co-Morbidity Symptoms are Monitored and Maintained or Improved: Monitor and assess patient's chronic conditions and comorbid symptoms for stability, deterioration, or improvement     Problem: Safety - Adult  Goal: Free from fall injury  Outcome: Progressing     Problem: ABCDS Injury Assessment  Goal: Absence of physical injury  Outcome: Progressing     Problem: Pain  Goal: Verbalizes/displays adequate comfort level or baseline comfort level  Outcome: Progressing     Problem: Skin/Tissue Integrity  Goal: Absence of new skin breakdown  Description: 1.  Monitor for areas of redness and/or skin breakdown  2.  Assess vascular access sites hourly  3.  Every 4-6 hours minimum:  Change oxygen saturation probe site  4.  Every 4-6 hours:  If on nasal continuous positive airway pressure, respiratory therapy assess nares and determine need for appliance change or resting period.  Outcome: Progressing     Problem: Nutrition Deficit:  Goal: Optimize nutritional status  Outcome: Progressing     Problem: Physical Therapy - Adult  Goal: By Discharge: Performs mobility at highest level of function for planned discharge setting.  See evaluation for individualized goals.  Description: FUNCTIONAL STATUS PRIOR TO ADMISSION: Patient was independent and active without use of DME. PMH includes recent revascularization procedures B Les 12/26/2024    HOME SUPPORT PRIOR TO ADMISSION: Pt lived alone; daughter plans to stay with pt at initial d/c    Physical Therapy Goals  Initiated 1/9/2025  1.  Patient will move from supine to sit and sit to supine and roll side to side in bed with modified independence within 7 day(s).    2.  Patient will perform sit to stand with supervision/set-up within 7 
  Problem: Nutrition Deficit:  Goal: Optimize nutritional status  Outcome: Progressing     Problem: Pain  Goal: Verbalizes/displays adequate comfort level or baseline comfort level  Outcome: Progressing     Problem: Safety - Adult  Goal: Free from fall injury  1/12/2025 0032 by Robyn Delacruz RN  Outcome: Progressing     Problem: Chronic Conditions and Co-morbidities  Goal: Patient's chronic conditions and co-morbidity symptoms are monitored and maintained or improved  1/12/2025 0032 by Robyn Delacruz RN  Outcome: Progressing     
  Problem: Physical Therapy - Adult  Goal: By Discharge: Performs mobility at highest level of function for planned discharge setting.  See evaluation for individualized goals.  Description: FUNCTIONAL STATUS PRIOR TO ADMISSION: Patient was independent and active without use of DME. PMH includes recent revascularization procedures B Les 12/26/2024    HOME SUPPORT PRIOR TO ADMISSION: Pt lived alone; daughter plans to stay with pt at initial d/c    Physical Therapy Goals  Initiated 1/9/2025  1.  Patient will move from supine to sit and sit to supine and roll side to side in bed with modified independence within 7 day(s).    2.  Patient will perform sit to stand with supervision/set-up within 7 day(s).  3.  Patient will transfer from bed to chair and chair to bed with supervision/set-up using the least restrictive device within 7 day(s).  4.  Patient will ambulate with supervision/set-up for 150 feet with the least restrictive device within 7 day(s).   5.  Patient will ascend/descend 4 stairs with bilat handrail(s) with contact guard assist within 7 day(s).   Outcome: Progressing   PHYSICAL THERAPY TREATMENT    Patient: Robyn Valenzuela (79 y.o. 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 Her chief complaint continues to be R heel pain overall.  With use of walker and shoes on, she is able to ambulate safely, albeit slowly.  The stiffness and pain does slightly decrease.  Worked on stretching prior to mobility and applied disposable heat pack to R heel after activity.  Patient does have multiple orthotic 
  Problem: Safety - Adult  Goal: Free from fall injury  1/11/2025 0428 by Robyn Delacruz RN  Outcome: Progressing     Problem: Nutrition Deficit:  Goal: Optimize nutritional status  1/11/2025 0428 by Robyn Delacruz RN  Outcome: Progressing     Problem: Pain  Goal: Verbalizes/displays adequate comfort level or baseline comfort level  1/11/2025 0428 by Robyn Delacruz RN  Outcome: Progressing     Problem: Chronic Conditions and Co-morbidities  Goal: Patient's chronic conditions and co-morbidity symptoms are monitored and maintained or improved  1/11/2025 0428 by Robyn Delacruz RN  Outcome: Progressing     
  Problem: Safety - Adult  Goal: Free from fall injury  Outcome: Progressing     Problem: ABCDS Injury Assessment  Goal: Absence of physical injury  Outcome: Progressing     Problem: Pain  Goal: Verbalizes/displays adequate comfort level or baseline comfort level  Outcome: Progressing     Problem: Skin/Tissue Integrity  Goal: Absence of new skin breakdown  Description: 1.  Monitor for areas of redness and/or skin breakdown  2.  Assess vascular access sites hourly  3.  Every 4-6 hours minimum:  Change oxygen saturation probe site  4.  Every 4-6 hours:  If on nasal continuous positive airway pressure, respiratory therapy assess nares and determine need for appliance change or resting period.  Outcome: Progressing     
Patient's pain in right foot from plantar fasciitis, unrelated to her surgery. Tylenol does not relieve pain, but the one time dose of ibuprofen helped. Suggested she try heat or ice with an ACE wrap to hold pack around the heel of her foot. Currently pain is tolerable. Denies pain in surgical site. TPN and lipids d/c tonight per Dr. Shetty, and patient began regular low fiber diet.    Problem: Chronic Conditions and Co-morbidities  Goal: Patient's chronic conditions and co-morbidity symptoms are monitored and maintained or improved  Outcome: Progressing     Problem: Safety - Adult  Goal: Free from fall injury  Outcome: Progressing     Problem: ABCDS Injury Assessment  Goal: Absence of physical injury  Outcome: Progressing     Problem: Pain  Goal: Verbalizes/displays adequate comfort level or baseline comfort level  Outcome: Progressing     Problem: Skin/Tissue Integrity  Goal: Absence of new skin breakdown  Description: 1.  Monitor for areas of redness and/or skin breakdown  2.  Assess vascular access sites hourly  Outcome: Progressing     Problem: Nutrition Deficit:  Goal: Optimize nutritional status  Outcome: Progressing     Problem: Discharge Planning  Goal: Discharge to home or other facility with appropriate resources  Outcome: Progressing     
Problem: Chronic Conditions and Co-morbidities  Goal: Patient's chronic conditions and co-morbidity symptoms are monitored and maintained or improved  Outcome: Progressing     Problem: Safety - Adult  Goal: Free from fall injury  Outcome: Progressing     Problem: ABCDS Injury Assessment  Goal: Absence of physical injury  Outcome: Progressing     Problem: Pain  Goal: Verbalizes/displays adequate comfort level or baseline comfort level  Outcome: Progressing     Problem: Skin/Tissue Integrity  Goal: Absence of new skin breakdown  Description: 1.  Monitor for areas of redness and/or skin breakdown  2.  Assess vascular access sites hourly  3.  Every 4-6 hours minimum:  Change oxygen saturation probe site  4.  Every 4-6 hours:  If on nasal continuous positive airway pressure, respiratory therapy assess nares and determine need for appliance change or resting period.  Outcome: Progressing     
Problem: Chronic Conditions and Co-morbidities  Goal: Patient's chronic conditions and co-morbidity symptoms are monitored and maintained or improved  Outcome: Progressing     Problem: Safety - Adult  Goal: Free from fall injury  Outcome: Progressing     Problem: ABCDS Injury Assessment  Goal: Absence of physical injury  Outcome: Progressing     Problem: Pain  Goal: Verbalizes/displays adequate comfort level or baseline comfort level  Outcome: Progressing     Problem: Skin/Tissue Integrity  Goal: Absence of new skin breakdown  Description: 1.  Monitor for areas of redness and/or skin breakdown  2.  Assess vascular access sites hourly  3.  Every 4-6 hours minimum:  Change oxygen saturation probe site  4.  Every 4-6 hours:  If on nasal continuous positive airway pressure, respiratory therapy assess nares and determine need for appliance change or resting period.  Outcome: Progressing     Problem: Nutrition Deficit:  Goal: Optimize nutritional status  Outcome: Progressing     
support;Fair   Ambulation/Gait Training:     Gait  Gait Training: Yes  Overall Level of Assistance: Contact-guard assistance;Additional time;Adaptive equipment  Distance (ft): 100 Feet  Assistive Device: Walker, rolling  Base of Support: Widened  Speed/Akua: Slow  Step Length: Right shortened;Left shortened  Stance: Right decreased (due to R heel pain)  Gait Abnormalities: Antalgic;Step to gait        Neuro Re-Education:                    Pain Rating:  Sore throat and R heel pain  Pain Intervention(s):   rest    Activity Tolerance:   Good and requires rest breaks    After treatment:   Patient left in no apparent distress sitting up in chair, Call bell within reach, and Bed/ chair alarm activated      COMMUNICATION/EDUCATION:   The patient's plan of care was discussed with: occupational therapist and registered nurse           Natalie Maza, PT  Minutes: 57   
Daily Activity Inpatient Short Form  AM-PAC Daily Activity - Inpatient   How much help is needed for putting on and taking off regular lower body clothing?: A Lot  How much help is needed for bathing (which includes washing, rinsing, drying)?: A Lot  How much help is needed for toileting (which includes using toilet, bedpan, or urinal)?: A Little  How much help is needed for putting on and taking off regular upper body clothing?: A Little  How much help is needed for taking care of personal grooming?: A Little  How much help for eating meals?: None  [facility] Inpatient Daily Activity Raw Score: 17  AM-PAC Inpatient ADL T-Scale Score : 37.26  ADL Inpatient CMS 0-100% Score: 50.11  ADL Inpatient CMS G-Code Modifier : CK     Interpretation of Tool:  Represents clinically-significant functional categories (i.e. Activities of daily living).  Cutoff score 39.4 (19) correlates to a good likelihood of discharging home versus a facility  Cecille Bliss, Arlene Hawk, Gildardo Guzman, Robyn Adam, Todd Villa, Alejandro Bliss, AM-PAC “6-Clicks” Functional Assessment Scores Predict Acute Care Hospital Discharge Destination, Physical Therapy, Volume 94, Issue 9, 2014, Pages 0215-8716, [email or web address]       Pain Ratin/10   Pain Intervention(s):       Activity Tolerance:   Fair , requires rest breaks, and SpO2 stable on room air    After treatment:   Patient left in no apparent distress sitting up EOB, Call bell within reach, and PCT present.    COMMUNICATION/EDUCATION:   The patient's plan of care was discussed with: physical therapist and registered nurse    Patient Education  Education Given To: Patient  Education Provided: Role of Therapy;Transfer Training;Plan of Care;Precautions;ADL Adaptive Strategies;Fall Prevention Strategies;Mobility Training  Education Method: Demonstration;Verbal  Barriers to Learning: None  Education Outcome: Verbalized understanding;Demonstrated 
reach, Bed/ chair alarm activated, and Updated patient's board on functional status and mobility recommendations    COMMUNICATION/EDUCATION:   The patient's plan of care was discussed with: registered nurse    Patient Education  Education Given To: Patient  Education Provided: Role of Therapy;Plan of Care;Fall Prevention Strategies;Transfer Training  Education Provided Comments: use of log roll sequence to decrease abdominal strain/ pain with bed mbo  Education Method: Verbal  Barriers to Learning: None  Education Outcome: Verbalized understanding;Demonstrated understanding;Continued education needed    Thank you for this referral.  Elis Wen, PT  Minutes: 40      Physical Therapy Evaluation Charge Determination   History Examination Presentation Decision-Making   HIGH Complexity :3+ comorbidities / personal factors will impact the outcome/ POC  MEDIUM Complexity : 3 Standardized tests and measures addressin body structure, function, activity limitation and / or participation in recreation  LOW Complexity : Stable, uncomplicated  AM-PAC  LOW    Based on the above components, the patient evaluation is determined to be of the following complexity level: Low

## 2025-01-13 NOTE — CONSULTS
Comprehensive Nutrition Assessment    Type and Reason for Visit: Initial, Consult    Nutrition Recommendations/Plan:   NPO, advance diet as medically appropriate/as pt tolerated   Replete phos, continue to monitor/replete electrolyte prn     Continue PPN of 4.25% AA, 10% dex @42 ml/hr +250 ml lipids daily   Once electrolytes WNL, increase PPN to the followin.25% AA, 10% dex @63ml/hr, continue lipids   If electrolytes remain stable, increase to goal of 4.25% AA, 10% dex @83ml/hr, continue lipids  Provides: 1516 kcal (98%), 85 gm pro (115%), 200 gm CHO    Check triglycerides weekly, consider adjusting lipid frequency/holding lipids if values >400 mg/dl  Consider addition of MVI, thiamin, and B complex vitamin          Malnutrition Assessment:  Malnutrition Status:  At risk for malnutrition (25 0933)    Context:  Acute Illness     Findings of the 6 clinical characteristics of malnutrition:  Energy Intake:  50% or less of estimated energy requirements for 5 or more days  Weight Loss:  No weight loss     Body Fat Loss:  Unable to assess     Muscle Mass Loss:  Unable to assess    Fluid Accumulation:  No fluid accumulation     Strength:  Not Performed       Nutrition Assessment:    PMH of PMR, CAD, TIA, nicotine dependence, HTN, dyslipidemia, anemia, arthritis, ASHD, CVD, depression, GERD, gout, hypothyroidism, neuropathy d/t peripheral vascular disease, osteopenia, osteoporosis, TIA, NKFA.     Presents with abd cramping, nausea; admitted for ischemic colitis. Noted incidental finding of cholecystolithiasis and colonic diverticulosis via abd/pelvis CT. General surgery consulted, no role for surgery at this time per chart review. Repeat abd/pelvis CT 1/3  revealing \"mild diffuse small bowel distention suggests small bowel obstruction, likely related to internal hernia...\". NGT in place, PPN started . Nephrology following, +volume depletion likely 2/2 decreased urine OP and diarrhea.     RD consulted for 
Comprehensive Nutrition Assessment    Type and Reason for Visit: Reassess    Nutrition Recommendations/Plan:   NPO, advance diet as medically appropriate/as pt tolerated     Increase PPN to goal of 4.25% AA, 10% dex @83 ml/hr +250 ml lipids daily  Adjust Sodium Chloride and calcium in PPN prn   Provides: 1516 kcal (86%), 85 gm pro (110%), 200 gm CHO    Check triglycerides weekly, consider adjusting lipid frequency/holding lipids if values >400 mg/dl        Malnutrition Assessment:  Malnutrition Status:  At risk for malnutrition (01/09/25 1038)    Context:  Acute Illness     Findings of the 6 clinical characteristics of malnutrition:  Energy Intake:  50% or less of estimated energy requirements for 5 or more days  Weight Loss:  No weight loss     Body Fat Loss:  No body fat loss     Muscle Mass Loss:  No muscle mass loss (mild temporal wasting likely age related)    Fluid Accumulation:  No fluid accumulation     Strength:  Not Performed       Nutrition Assessment:    PMH of PMR, CAD, TIA, nicotine dependence, HTN, dyslipidemia, anemia, arthritis, ASHD, CVD, depression, GERD, gout, hypothyroidism, neuropathy d/t peripheral vascular disease, osteopenia, osteoporosis, TIA, NKFA.     Presents with abd cramping, nausea; admitted for ischemic colitis. Noted incidental finding of cholecystolithiasis and colonic diverticulosis via abd/pelvis CT. General surgery consulted, no role for surgery at this time per chart review. Repeat abd/pelvis CT 1/3  revealing \"mild diffuse small bowel distention suggests small bowel obstruction, likely related to internal hernia...\". NGT in place, PPN started 1/4. Nephrology following, +volume depletion likely 2/2 decreased urine OP and diarrhea.     (1/9) Follow-up. S/p diagnostic laparoscopic lysis of adhesions 1/8. +NGT for decompression. RD visited pt at bedside, pt c/o a sore throat 2/2 NGT tube and difficulty swallowing pills. Spoke with RN, reports crushing meds via NGT, flushing, 
Nutrition Education    Educated on Low Fiber diet   Learners: Patient  Readiness: Acceptance  Method: Explanation and Handout  Response: Verbalizes Understanding  Contact name and number provided.    Olga Casillas RD  Contact Number: available via [employer]     
left groin area    Labs:  CBC:  Recent Labs     01/01/25 0155   WBC 9.5   RBC 3.50*   HGB 11.1*   HCT 33.7*   MCV 96.3   RDW 13.2        CHEMISTRIES:  Recent Labs     01/01/25 0155   *   K 4.8      CO2 18*   BUN 26*   CREATININE 1.06*   GLUCOSE 151*     PT/INR:No results for input(s): \"PROTIME\", \"INR\" in the last 72 hours.  APTT:No results for input(s): \"APTT\" in the last 72 hours.  LIVER PROFILE:  Recent Labs     01/01/25 0155   AST 25   ALT 14   BILITOT 0.6   ALKPHOS 74       Imaging Last 24 Hours:  CTA ABDOMEN PELVIS W CONTRAST    Result Date: 1/1/2025  EXAM:  CTA ABDOMEN PELVIS W CONTRAST INDICATION: recent revascularization of legs; hx PAD; severe abd pain started suddenly at 6p COMPARISON: None. CONTRAST: 100 mL of Isovue-370. TECHNIQUE: Following the uneventful intravenous administration of contrast, thin axial images were obtained through the abdomen and pelvis. Coronal and sagittal reconstructions were generated. MIP reconstructions were also generated. Oral contrast was not administered. CT dose reduction was achieved through use of a standardized protocol tailored for this examination and automatic exposure control for dose modulation. FINDINGS: Vascular: Visualized thoracic aorta: Atherosclerotic calcification. Normally patent. Visualized heart/Coronary arteries: The heart is normal in size without pericardial effusion. Coronary artery calcifications are noted. Abdominal aorta: Atherosclerotic calcification without aneurysm or dissection and normal caliber.   Celiac trunk: Approximately 70% stenosis of the origin with normal patency distally.   SMA: Origin stenosis of about 50% with normal patency distally.   Renal arteries: Normally patent.   BON: Normally patent.   Iliac Arteries and Lower Extremity Arteries: Atherosclerotic calcification without aneurysm, dissection, or occlusion with normal patency THYROID: No nodule. MEDIASTINUM: No mass or lymphadenopathy. ADDIE: No mass or 
11/18/2024    K 3.6 01/05/2025    K 3.9 01/04/2025    K 4.8 01/01/2025    K 4.8 01/01/2025    K 4.9 11/18/2024       Lab Results   Component Value Date    WBC 16.0 (H) 01/05/2025    RBC 3.01 (L) 01/05/2025    HGB 9.6 (L) 01/05/2025    HCT 30.6 (L) 01/05/2025    .7 (H) 01/05/2025    MCH 31.9 01/05/2025    RDW 14.2 01/05/2025     01/05/2025       Lab Results   Component Value Date    CALCIUM 7.4 (L) 01/05/2025    PHOS 2.6 01/05/2025       Urine dipstick:       I have reviewed the following:     Labs         Care Plan discussed with:  Shanna Mccollum (daughter) at the bedside    Chart reviewed.            Thank you for allowing us to participate in the care of this patient.         We will follow patient. Please don’t hesitate to call with any questions    Kai Molina MD  1/5/2025    [facility]  [place]  [address] [place] [address]  Phone - [phone]   Fax - [phone]  [email or web address]

## 2025-01-13 NOTE — DISCHARGE SUMMARY
times daily for 3 days     oxyCODONE-acetaminophen 5-325 MG per tablet  Commonly known as: PERCOCET  Take 1 tablet by mouth every 6 hours as needed for Pain for up to 7 days. Max Daily Amount: 4 tablets     pantoprazole 40 MG tablet  Commonly known as: PROTONIX  Take 1 tablet by mouth every morning (before breakfast)            CHANGE how you take these medications      lisinopril 40 MG tablet  Commonly known as: PRINIVIL;ZESTRIL  Take 0.5 tablets by mouth daily  What changed: how much to take            CONTINUE taking these medications      alendronate 35 MG tablet  Commonly known as: FOSAMAX  TAKE 1 TABLET EVERY 7 DAYS     ascorbic acid 500 MG tablet  Commonly known as: VITAMIN C     aspirin 325 MG tablet     atenolol 50 MG tablet  Commonly known as: TENORMIN  TAKE 1 TABLET NIGHTLY     atorvastatin 40 MG tablet  Commonly known as: LIPITOR  TAKE 1 TABLET DAILY     CENTRUM SILVER 50+WOMEN PO     cilostazol 100 MG tablet  Commonly known as: PLETAL  TAKE 1 TABLET TWICE A DAY     Clobetasol Propionate 0.05 % Sham     cyclobenzaprine 5 MG tablet  Commonly known as: FLEXERIL  Take 1 tablet by mouth every 8 hours as needed for Muscle spasms     diclofenac sodium 1 % Gel  Commonly known as: VOLTAREN     DULoxetine 60 MG extended release capsule  Commonly known as: CYMBALTA  TAKE 1 CAPSULE DAILY     hydroCHLOROthiazide 12.5 MG tablet  Take 1 tablet by mouth daily     ketoconazole 2 % shampoo  Commonly known as: NIZORAL     levothyroxine 100 MCG tablet  Commonly known as: SYNTHROID  Take 1 tablet by mouth daily     QC TUMERIC COMPLEX PO     SODIUM FLUORIDE (DENTAL GEL) 1.1 % Crea     vitamin D 25 MCG (1000 UT) Caps            STOP taking these medications      ciclopirox 8 % solution  Commonly known as: PENLAC     magnesium oxide 400 MG tablet  Commonly known as: MAG-OX     omeprazole 20 MG delayed release capsule  Commonly known as: PRILOSEC     polyethyl glycol-propyl glycol 0.4-0.3 % 0.4-0.3 % ophthalmic

## 2025-01-14 ENCOUNTER — TELEPHONE (OUTPATIENT)
Age: 80
End: 2025-01-14

## 2025-01-14 ENCOUNTER — TELEPHONE (OUTPATIENT)
Facility: CLINIC | Age: 80
End: 2025-01-14

## 2025-01-14 NOTE — TELEPHONE ENCOUNTER
Dr. De La Fuente responded via perfect serve and advices home health discusses matter with pt's PCP. Returned call to Lachelle and informed of provider's response, Lachelle thanked for the update.

## 2025-01-14 NOTE — TELEPHONE ENCOUNTER
Home health called and said Pt was Clopidgrel. home health isn't sure if the pt should still be taking it. Lachelle [phone]

## 2025-01-14 NOTE — TELEPHONE ENCOUNTER
[facility] 3 drug interaction with alendronate,aspirin, Centrum vitamins, cilostazol, levothyroxine, cyclobenzaprine, and oxyCODONE-acetaminophen with DULoxetine. She wanted to make you aware.

## 2025-01-14 NOTE — TELEPHONE ENCOUNTER
Received notification from \"Chaya\" at [facility] regarding possible \"Level 3 drug interaction\" with alendronate, aspirin, Centrum vitamin, cilostazol, levothyroxine, cyclobenzaprine, oxycodone, and duloxetine.  There is no specificity here.  I have reviewed the potential for interactions.  Patient has been on these medications for a prolonged period of time and has not demonstrated any adverse side effects.  Patient tolerating well otherwise.    Noted.

## 2025-01-14 NOTE — TELEPHONE ENCOUNTER
2 pt identifiers used. Spoke with Lachelle at 1:45pm today from pt's home health. Lachelle stated pt had a stint placed at around daniele time, went back to hospital a week later and did surgery, clopidogrel was not shown in pt's discharge list and wanted to know if pt should still take clopidogrel. I looked through provider's notes, medication list pre-op and post-op/discharge summary in pt's chart and could not find clopidogrel in the list. Asked which provider prescribed the clopidogrel as it is not shown in pt's chart, Lachelle stated Dr. Mich Torres vascular surgeon.    Informed Lachelle that I will message provider and follow up with provider's response. In the meantime advised her to contact Dr. Torres's office to verify as well. Lachelle verbalized understanding and thanked for the call.    Sent provider message via epic and perfect serve. Will follow up.

## 2025-01-17 ENCOUNTER — OFFICE VISIT (OUTPATIENT)
Age: 80
End: 2025-01-17

## 2025-01-17 VITALS
BODY MASS INDEX: 23.15 KG/M2 | OXYGEN SATURATION: 96 % | WEIGHT: 125.8 LBS | TEMPERATURE: 97.8 F | HEART RATE: 66 BPM | DIASTOLIC BLOOD PRESSURE: 81 MMHG | HEIGHT: 62 IN | RESPIRATION RATE: 15 BRPM | SYSTOLIC BLOOD PRESSURE: 127 MMHG

## 2025-01-17 DIAGNOSIS — Z09 POSTOP CHECK: Primary | ICD-10-CM

## 2025-01-17 PROCEDURE — 99024 POSTOP FOLLOW-UP VISIT: CPT | Performed by: NURSE PRACTITIONER

## 2025-01-17 ASSESSMENT — PATIENT HEALTH QUESTIONNAIRE - PHQ9
2. FEELING DOWN, DEPRESSED OR HOPELESS: NOT AT ALL
SUM OF ALL RESPONSES TO PHQ QUESTIONS 1-9: 0
SUM OF ALL RESPONSES TO PHQ9 QUESTIONS 1 & 2: 0
1. LITTLE INTEREST OR PLEASURE IN DOING THINGS: NOT AT ALL

## 2025-01-17 NOTE — PROGRESS NOTES
Identified patient with two patient identifiers (name and ). Reviewed chart in preparation for visit and have obtained necessary documentation.    Robyn Valenzuela is a 79 y.o. female  Chief Complaint   Patient presents with    Post-Op Check     Lysis of Adhesions     /81 (Site: Left Upper Arm, Position: Sitting, Cuff Size: Large Adult)   Pulse 66   Temp 97.8 °F (36.6 °C) (Oral)   Resp 15   Ht 1.575 m (5' 2.01\")   Wt 57.1 kg (125 lb 12.8 oz)   SpO2 96%   BMI 23.00 kg/m²     1. Have you been to the ER, urgent care clinic since your last visit?  Hospitalized since your last visit?no    2. Have you seen or consulted any other health care providers outside of the [facility] since your last visit?  Include any pap smears or colon screening. No       
Subjective:      Robyn Valenzuela is a 79 y.o. female presents for postop care 11 days status post diagnostic laparoscopy and lysis of adhesions  Appetite is fair. Eating a regular diet without difficulty.   Bowel movements are loose  The patient is voiding without difficulty.  The patient is not having any pain..      Ms. Valenzuela has a reminder for a \"due or due soon\" health maintenance. I have asked that she contact her primary care provider for follow-up on this health maintenance.      Objective:     /81 (Site: Left Upper Arm, Position: Sitting, Cuff Size: Large Adult)   Pulse 66   Temp 97.8 °F (36.6 °C) (Oral)   Resp 15   Ht 1.575 m (5' 2.01\")   Wt 57.1 kg (125 lb 12.8 oz)   SpO2 96%   BMI 23.00 kg/m²     General:  alert, cooperative   Abdomen: soft, non-tender   Incision:   healing well, no drainage, no erythema, no dehiscence, incision well approximated     Assessment:     Doing well postoperatively.    Plan:     Follow up as needed  Diet as tolerated.   No heavy lifting  May get incisions wet.   Shayy Yo, KAMI - NP      
Patient's belongings returned

## 2025-01-29 ENCOUNTER — HOSPITAL ENCOUNTER (OUTPATIENT)
Age: 80
Discharge: HOME | End: 2025-02-01
Payer: MEDICARE

## 2025-01-29 ENCOUNTER — TRANSCRIBE ORDERS (OUTPATIENT)
Facility: HOSPITAL | Age: 80
End: 2025-01-29

## 2025-01-29 ENCOUNTER — TELEPHONE (OUTPATIENT)
Age: 80
End: 2025-01-29

## 2025-01-29 DIAGNOSIS — I70.229 CRITICAL LOWER LIMB ISCHEMIA (HCC): Primary | ICD-10-CM

## 2025-01-29 DIAGNOSIS — I70.229 CRITICAL LOWER LIMB ISCHEMIA (HCC): ICD-10-CM

## 2025-01-29 PROCEDURE — 75635 CT ANGIO ABDOMINAL ARTERIES: CPT

## 2025-01-29 PROCEDURE — 6360000004 HC RX CONTRAST MEDICATION: Performed by: NURSE PRACTITIONER

## 2025-01-29 RX ORDER — IOPAMIDOL 755 MG/ML
100 INJECTION, SOLUTION INTRAVASCULAR
Status: COMPLETED | OUTPATIENT
Start: 2025-01-29 | End: 2025-01-29

## 2025-01-29 RX ADMIN — IOPAMIDOL 100 ML: 755 INJECTION, SOLUTION INTRAVENOUS at 14:09

## 2025-01-29 NOTE — TELEPHONE ENCOUNTER
Received faxed form from [employer] regarding pt missing radiology appt today, requires provider's review and signature. Scanned copy into pt's media, placed actual copy in Dr. De La Fuente's office. Will follow up.

## 2025-02-05 DIAGNOSIS — E03.9 ACQUIRED HYPOTHYROIDISM: ICD-10-CM

## 2025-02-06 RX ORDER — LEVOTHYROXINE SODIUM 100 UG/1
100 TABLET ORAL DAILY
Qty: 90 TABLET | Refills: 1 | Status: SHIPPED | OUTPATIENT
Start: 2025-02-06

## 2025-02-06 NOTE — TELEPHONE ENCOUNTER
PCP: Endy Torres APRN - NP    Last appt: 11/18/2024    Future Appointments   Date Time Provider Department Center   4/21/2025 11:00 AM Endy Torres APRN - NP [facility] DEP       Requested Prescriptions     Pending Prescriptions Disp Refills    levothyroxine (SYNTHROID) 100 MCG tablet 90 tablet 1     Sig: Take 1 tablet by mouth daily

## 2025-02-11 ENCOUNTER — HOSPITAL ENCOUNTER (OUTPATIENT)
Facility: HOSPITAL | Age: 80
Discharge: HOME | End: 2025-02-14
Payer: MEDICARE

## 2025-02-11 VITALS
OXYGEN SATURATION: 100 % | RESPIRATION RATE: 18 BRPM | HEIGHT: 62 IN | SYSTOLIC BLOOD PRESSURE: 149 MMHG | HEART RATE: 68 BPM | WEIGHT: 125.44 LBS | DIASTOLIC BLOOD PRESSURE: 65 MMHG | BODY MASS INDEX: 23.08 KG/M2 | TEMPERATURE: 97.3 F

## 2025-02-11 LAB
ABO + RH BLD: NORMAL
ALBUMIN SERPL-MCNC: 3.5 G/DL (ref 3.5–5)
ALBUMIN/GLOB SERPL: 0.9 (ref 1.1–2.2)
ALP SERPL-CCNC: 69 U/L (ref 45–117)
ALT SERPL-CCNC: 19 U/L (ref 12–78)
ANION GAP SERPL CALC-SCNC: 5 MMOL/L (ref 2–12)
APPEARANCE UR: CLEAR
APTT PPP: 22.8 SEC (ref 22.1–31)
AST SERPL-CCNC: 16 U/L (ref 15–37)
BACTERIA URNS QL MICRO: NEGATIVE /HPF
BILIRUB SERPL-MCNC: 0.3 MG/DL (ref 0.2–1)
BILIRUB UR QL: NEGATIVE
BLOOD GROUP ANTIBODIES SERPL: NORMAL
BUN SERPL-MCNC: 17 MG/DL (ref 6–20)
BUN/CREAT SERPL: 20 (ref 12–20)
CALCIUM SERPL-MCNC: 9.4 MG/DL (ref 8.5–10.1)
CHLORIDE SERPL-SCNC: 105 MMOL/L (ref 97–108)
CO2 SERPL-SCNC: 27 MMOL/L (ref 21–32)
COLOR UR: NORMAL
CREAT SERPL-MCNC: 0.85 MG/DL (ref 0.55–1.02)
EKG ATRIAL RATE: 67 BPM
EKG DIAGNOSIS: NORMAL
EKG P AXIS: 26 DEGREES
EKG P-R INTERVAL: 134 MS
EKG Q-T INTERVAL: 410 MS
EKG QRS DURATION: 74 MS
EKG QTC CALCULATION (BAZETT): 433 MS
EKG R AXIS: 13 DEGREES
EKG T AXIS: -71 DEGREES
EKG VENTRICULAR RATE: 67 BPM
EPITH CASTS URNS QL MICRO: NORMAL /LPF
ERYTHROCYTE [DISTWIDTH] IN BLOOD BY AUTOMATED COUNT: 13.7 % (ref 11.5–14.5)
GLOBULIN SER CALC-MCNC: 4 G/DL (ref 2–4)
GLUCOSE SERPL-MCNC: 122 MG/DL (ref 65–100)
GLUCOSE UR STRIP.AUTO-MCNC: NEGATIVE MG/DL
HCT VFR BLD AUTO: 36.8 % (ref 35–47)
HGB BLD-MCNC: 11.4 G/DL (ref 11.5–16)
HGB UR QL STRIP: NEGATIVE
HYALINE CASTS URNS QL MICRO: NORMAL /LPF (ref 0–2)
INR PPP: 1 (ref 0.9–1.1)
KETONES UR QL STRIP.AUTO: NEGATIVE MG/DL
LEUKOCYTE ESTERASE UR QL STRIP.AUTO: NEGATIVE
MCH RBC QN AUTO: 30.6 PG (ref 26–34)
MCHC RBC AUTO-ENTMCNC: 31 G/DL (ref 30–36.5)
MCV RBC AUTO: 98.7 FL (ref 80–99)
NITRITE UR QL STRIP.AUTO: NEGATIVE
NRBC # BLD: 0 K/UL (ref 0–0.01)
NRBC BLD-RTO: 0 PER 100 WBC
PH UR STRIP: 5.5 (ref 5–8)
PLATELET # BLD AUTO: 245 K/UL (ref 150–400)
PMV BLD AUTO: 10.2 FL (ref 8.9–12.9)
POTASSIUM SERPL-SCNC: 4.1 MMOL/L (ref 3.5–5.1)
PROT SERPL-MCNC: 7.5 G/DL (ref 6.4–8.2)
PROT UR STRIP-MCNC: NEGATIVE MG/DL
PROTHROMBIN TIME: 10.9 SEC (ref 9.2–11.2)
RBC # BLD AUTO: 3.73 M/UL (ref 3.8–5.2)
RBC #/AREA URNS HPF: NORMAL /HPF (ref 0–5)
SODIUM SERPL-SCNC: 137 MMOL/L (ref 136–145)
SP GR UR REFRACTOMETRY: 1.01
SPECIMEN EXP DATE BLD: NORMAL
THERAPEUTIC RANGE: NORMAL SECS (ref 58–77)
URINE CULTURE IF INDICATED: NORMAL
UROBILINOGEN UR QL STRIP.AUTO: 0.2 EU/DL (ref 0.2–1)
WBC # BLD AUTO: 8.4 K/UL (ref 3.6–11)
WBC URNS QL MICRO: NORMAL /HPF (ref 0–4)

## 2025-02-11 PROCEDURE — 71046 X-RAY EXAM CHEST 2 VIEWS: CPT

## 2025-02-11 PROCEDURE — 86900 BLOOD TYPING SEROLOGIC ABO: CPT

## 2025-02-11 PROCEDURE — 36415 COLL VENOUS BLD VENIPUNCTURE: CPT

## 2025-02-11 PROCEDURE — 85027 COMPLETE CBC AUTOMATED: CPT

## 2025-02-11 PROCEDURE — 80053 COMPREHEN METABOLIC PANEL: CPT

## 2025-02-11 PROCEDURE — 86850 RBC ANTIBODY SCREEN: CPT

## 2025-02-11 PROCEDURE — 85610 PROTHROMBIN TIME: CPT

## 2025-02-11 PROCEDURE — 93005 ELECTROCARDIOGRAM TRACING: CPT | Performed by: SURGERY

## 2025-02-11 PROCEDURE — 81001 URINALYSIS AUTO W/SCOPE: CPT

## 2025-02-11 PROCEDURE — 85730 THROMBOPLASTIN TIME PARTIAL: CPT

## 2025-02-11 PROCEDURE — 86901 BLOOD TYPING SEROLOGIC RH(D): CPT

## 2025-02-11 RX ORDER — ASPIRIN 81 MG/1
81 TABLET ORAL DAILY
COMMUNITY

## 2025-02-11 RX ORDER — SODIUM CHLORIDE, SODIUM LACTATE, POTASSIUM CHLORIDE, CALCIUM CHLORIDE 600; 310; 30; 20 MG/100ML; MG/100ML; MG/100ML; MG/100ML
INJECTION, SOLUTION INTRAVENOUS CONTINUOUS
Status: CANCELLED | OUTPATIENT
Start: 2025-02-13

## 2025-02-11 RX ORDER — CLOPIDOGREL BISULFATE 75 MG/1
75 TABLET ORAL DAILY
COMMUNITY

## 2025-02-11 RX ORDER — CEFAZOLIN SODIUM/WATER 2 G/20 ML
2000 SYRINGE (ML) INTRAVENOUS ONCE
Status: CANCELLED | OUTPATIENT
Start: 2025-02-13

## 2025-02-11 ASSESSMENT — PAIN SCALES - GENERAL: PAINLEVEL_OUTOF10: 0

## 2025-02-11 NOTE — PROGRESS NOTES
Incentive Spirometer        Using the incentive spirometer helps expand the small air sacs of your lungs, helps you breathe deeply, and helps improve your lung function.  Use your incentive spirometer twice a day (10 breaths each time) prior to surgery.      How to Use Your Incentive Spirometer:  Hold the incentive spirometer in an upright position.   Breathe out as usual.   Place the mouthpiece in your mouth and seal your lips tightly around it.   Take a deep breath.  Breathe in slowly and as deeply as possible. Keep the blue flow rate guide between the arrows.   Hold your breath as long as possible. Then exhale slowly and allow the piston to fall to the bottom of the column.   Rest for a few seconds and repeat steps one through five at least 10 times.     PAT Tidal Volume__1500________________  x___2_____________  Date_2/11/2025______________________    BRING THE INCENTIVE SPIROMETER WITH YOU TO THE HOSPITAL ON THE DAY OF YOUR SURGERY.  Opportunity given to ask and answer questions as well as to observe return demonstration.    Patient signature_____________________________    Witness____________________________  
[facility]  Preoperative Instructions        Surgery Date 2/13/2025          Time of Arrival to be called @ [phone] (Home Phone)  [phone] (Mobile     On the day of your surgery, please report to Surgical Services Registration Desk and sign in at your designated time.  The Surgery Center is located to the right of the Emergency Room.     2. You must have someone with you to drive you home. You should not drive a car for 24 hours following surgery. Please make arrangements for a friend or family member to stay with you for the first 24 hours after your surgery.    3. Do not have anything to eat or drink (including water, gum, mints, coffee, juice) after midnight ??      .?This may not apply to medications prescribed by your physician. ?(Please note below the special instructions with medications to take the morning of your procedure.)    4. We recommend you do not drink any alcoholic beverages for 24 hours before and after your surgery.    5. Contact your surgeon's office for instructions on the following medications: non-steroidal anti-inflammatory drugs (i.e. Advil, Aleve), vitamins, and supplements. (Some surgeon's will want you to stop these medications prior to surgery and others may allow you to take them)  **If you are currently taking Plavix, Coumadin, Aspirin and/or other blood-thinning agents, contact your surgeon for instructions.** Your surgeon will partner with the physician prescribing these medications to determine if it is safe to stop or if you need to continue taking.  Please do not stop taking these medications without instructions from your surgeon    6. Wear comfortable clothes.  Wear glasses instead of contacts.  Do not bring any money or jewelry. Please bring picture ID, insurance card, and any prearranged co-payment or hospital payment.  Do not wear make-up, particularly mascara the morning of your surgery.  Do not wear nail polish, particularly if you are having 
surgery:  Shower again thoroughly with an antibacterial soap, such as Dial or the soap provided at your preassessment appointment. If needed, ask someone for help to reach all areas of your body. Don’t forget to clean your belly button! Rinse.  With bottle of Hibiclens/Chlorhexidine in hand, turn water off.  Apply Hibiclens antiseptic skin cleanser with a clean, freshly washed washcloth.  Gently apply to your body from chin to toes (except the genital area) and especially the area(s) where your incision(s) will be.  Leave Hibiclens/Chlorhexidine on your skin for at least 20 seconds.     CAUTION: If needed, Hibiclens/chlorhexidine may be used to clean the folds of skin of the legs (such as in the area of the groin) and on your buttocks and hips. However, do not use Hibiclens/Chlorhexidine above the neck or in the genital area (your bottom) or put inside any area of your body.  Turn the water back on and rinse.  Dry gently with a clean, freshly washed towel.  After your shower, do not use any powder, deodorant, perfumes or lotion prior to surgery.  Put on clean, freshly washed clothing.    Tips to help prevent infections after your surgery:  Protect your surgical wound from germs:  Hand washing is the most important thing you and your caregivers can do to prevent infections.  Keep your bandage clean and dry!  Do not touch your surgical wound.  Use clean, freshly washed towels and washcloths every time you shower; do not share bath linens with others.  Until your surgical wound is healed, wear clothing and sleep on bed linens each day that are clean and freshly washed.  Do not allow pets to sleep in your bed with you or touch your surgical wound.  Do not smoke - smoking delays wound healing. This may be a good time to stop smoking.  If you have diabetes, it is important for you to manage your blood sugar levels properly before your surgery as well as after your surgery. Poorly managed blood sugar levels slow down wound

## 2025-02-11 NOTE — PERIOP NOTE
Dr. Milton reviewed EKG 2/1/25, 3/19/19, 3/15/18, Pmh, stress test results and METS>4.  Will proceed with planned procedure.

## 2025-02-12 ENCOUNTER — ANESTHESIA EVENT (OUTPATIENT)
Facility: HOSPITAL | Age: 80
End: 2025-02-12
Payer: MEDICARE

## 2025-02-12 NOTE — ANESTHESIA PRE PROCEDURE
Department of Anesthesiology  Preprocedure Note       Name:  Robyn Valenzuela   Age:  79 y.o.  :  1945                                          MRN:  [id number]         Date:  2025      Surgeon: Surgeon(s):  Mich Torres MD    Procedure: Procedure(s):  RIGHT ILIAC ANGIOPLASTY AND STENTING, POSSIBLE LEFT ILIAC ANGIOPLASTY AND STENTING    Medications prior to admission:   Prior to Admission medications    Medication Sig Start Date End Date Taking? Authorizing Provider   Multiple Vitamins-Minerals (HAIR SKIN & NAILS PO) Take 1 tablet by mouth daily    Jese Tipton MD   aspirin 81 MG EC tablet Take 1 tablet by mouth daily    Jese Tipton MD   clopidogrel (PLAVIX) 75 MG tablet Take 1 tablet by mouth daily    Jese Tipton MD   levothyroxine (SYNTHROID) 100 MCG tablet Take 1 tablet by mouth daily 25   Endy Torres APRN - NP   lisinopril (PRINIVIL;ZESTRIL) 40 MG tablet Take 0.5 tablets by mouth daily 25   Sav Portillo MD   pantoprazole (PROTONIX) 40 MG tablet Take 1 tablet by mouth every morning (before breakfast) 25  Sav Portillo MD   atenolol (TENORMIN) 50 MG tablet TAKE 1 TABLET NIGHTLY  Patient taking differently: Take 1 tablet by mouth nightly 1/3/25   Endy Torres APRN - NP   hydroCHLOROthiazide 12.5 MG tablet Take 1 tablet by mouth daily 25   Endy Torres APRN - NP   DULoxetine (CYMBALTA) 60 MG extended release capsule TAKE 1 CAPSULE DAILY 24   Endy Torres APRN - NP   cilostazol (PLETAL) 100 MG tablet TAKE 1 TABLET TWICE A DAY  Patient not taking: Reported on 24   Edny Torres APRN - NP   alendronate (FOSAMAX) 35 MG tablet TAKE 1 TABLET EVERY 7 DAYS  Patient taking differently: Take 1 tablet by mouth every 7 days sundays 9/13/24   LETI Caicedo MD   atorvastatin (LIPITOR) 40 MG tablet TAKE 1 TABLET DAILY 24   Endy Torres APRN - NP   cyclobenzaprine (FLEXERIL) 5 MG tablet Take 1 tablet by

## 2025-02-13 ENCOUNTER — HOSPITAL ENCOUNTER (OUTPATIENT)
Facility: HOSPITAL | Age: 80
Setting detail: OBSERVATION
Discharge: HOME | End: 2025-02-14
Attending: SURGERY | Admitting: SURGERY
Payer: MEDICARE

## 2025-02-13 ENCOUNTER — APPOINTMENT (OUTPATIENT)
Facility: HOSPITAL | Age: 80
End: 2025-02-13
Attending: SURGERY
Payer: MEDICARE

## 2025-02-13 ENCOUNTER — ANESTHESIA (OUTPATIENT)
Facility: HOSPITAL | Age: 80
End: 2025-02-13
Payer: MEDICARE

## 2025-02-13 DIAGNOSIS — M85.80 OSTEOPENIA, UNSPECIFIED LOCATION: ICD-10-CM

## 2025-02-13 PROBLEM — I70.229 CRITICAL LOWER LIMB ISCHEMIA (HCC): Status: ACTIVE | Noted: 2025-02-13

## 2025-02-13 PROCEDURE — C1760 CLOSURE DEV, VASC: HCPCS | Performed by: SURGERY

## 2025-02-13 PROCEDURE — 6360000002 HC RX W HCPCS: Performed by: REGISTERED NURSE

## 2025-02-13 PROCEDURE — 6370000000 HC RX 637 (ALT 250 FOR IP): Performed by: SURGERY

## 2025-02-13 PROCEDURE — 2500000003 HC RX 250 WO HCPCS: Performed by: SURGERY

## 2025-02-13 PROCEDURE — 3600000017 HC SURGERY HYBRID ADDL 15MIN: Performed by: SURGERY

## 2025-02-13 PROCEDURE — C1874 STENT, COATED/COV W/DEL SYS: HCPCS | Performed by: SURGERY

## 2025-02-13 PROCEDURE — 6360000002 HC RX W HCPCS: Performed by: SURGERY

## 2025-02-13 PROCEDURE — C1887 CATHETER, GUIDING: HCPCS | Performed by: SURGERY

## 2025-02-13 PROCEDURE — 6360000004 HC RX CONTRAST MEDICATION: Performed by: SURGERY

## 2025-02-13 PROCEDURE — 2580000003 HC RX 258: Performed by: ANESTHESIOLOGY

## 2025-02-13 PROCEDURE — 2709999900 HC NON-CHARGEABLE SUPPLY: Performed by: SURGERY

## 2025-02-13 PROCEDURE — G0378 HOSPITAL OBSERVATION PER HR: HCPCS

## 2025-02-13 PROCEDURE — 74018 RADEX ABDOMEN 1 VIEW: CPT

## 2025-02-13 PROCEDURE — 3700000000 HC ANESTHESIA ATTENDED CARE: Performed by: SURGERY

## 2025-02-13 PROCEDURE — 3600000007 HC SURGERY HYBRID BASE: Performed by: SURGERY

## 2025-02-13 PROCEDURE — 76000 FLUOROSCOPY <1 HR PHYS/QHP: CPT

## 2025-02-13 PROCEDURE — 3700000001 HC ADD 15 MINUTES (ANESTHESIA): Performed by: SURGERY

## 2025-02-13 PROCEDURE — 6370000000 HC RX 637 (ALT 250 FOR IP): Performed by: ANESTHESIOLOGY

## 2025-02-13 PROCEDURE — C1725 CATH, TRANSLUMIN NON-LASER: HCPCS | Performed by: SURGERY

## 2025-02-13 PROCEDURE — 7100000000 HC PACU RECOVERY - FIRST 15 MIN: Performed by: SURGERY

## 2025-02-13 PROCEDURE — C1894 INTRO/SHEATH, NON-LASER: HCPCS | Performed by: SURGERY

## 2025-02-13 PROCEDURE — 2580000003 HC RX 258: Performed by: REGISTERED NURSE

## 2025-02-13 PROCEDURE — 2500000003 HC RX 250 WO HCPCS: Performed by: REGISTERED NURSE

## 2025-02-13 PROCEDURE — 7100000001 HC PACU RECOVERY - ADDTL 15 MIN: Performed by: SURGERY

## 2025-02-13 PROCEDURE — C1753 CATH, INTRAVAS ULTRASOUND: HCPCS | Performed by: SURGERY

## 2025-02-13 PROCEDURE — C1769 GUIDE WIRE: HCPCS | Performed by: SURGERY

## 2025-02-13 PROCEDURE — C1876 STENT, NON-COA/NON-COV W/DEL: HCPCS | Performed by: SURGERY

## 2025-02-13 DEVICE — BX2 HEP REDUCED PROFILE BX2 6MMX79MM 6FR 135CM CATH
Type: IMPLANTABLE DEVICE | Site: GROIN | Status: FUNCTIONAL
Brand: GORE VIABAHN VBX BALLOON EXPANDABLE ENDO

## 2025-02-13 DEVICE — STENT GRFT ENDOPROSTHESIS L 29 MM DIA 6 MM CATH L 135 CM DIA: Type: IMPLANTABLE DEVICE | Site: GROIN | Status: FUNCTIONAL

## 2025-02-13 DEVICE — STENT PRB35-06-040-120 PROTEGE EF V07
Type: IMPLANTABLE DEVICE | Site: GROIN | Status: FUNCTIONAL
Brand: EVERFLEX™ PROTÉGÉ™ EVERFLEX™

## 2025-02-13 RX ORDER — SODIUM CHLORIDE 0.9 % (FLUSH) 0.9 %
5-40 SYRINGE (ML) INJECTION EVERY 12 HOURS SCHEDULED
Status: DISCONTINUED | OUTPATIENT
Start: 2025-02-13 | End: 2025-02-13 | Stop reason: HOSPADM

## 2025-02-13 RX ORDER — OXYCODONE AND ACETAMINOPHEN 5; 325 MG/1; MG/1
1 TABLET ORAL EVERY 4 HOURS PRN
Status: DISCONTINUED | OUTPATIENT
Start: 2025-02-13 | End: 2025-02-14 | Stop reason: HOSPADM

## 2025-02-13 RX ORDER — IOPAMIDOL 755 MG/ML
INJECTION, SOLUTION INTRAVASCULAR PRN
Status: DISCONTINUED | OUTPATIENT
Start: 2025-02-13 | End: 2025-02-13 | Stop reason: ALTCHOICE

## 2025-02-13 RX ORDER — CILOSTAZOL 100 MG/1
100 TABLET ORAL 2 TIMES DAILY
Status: DISCONTINUED | OUTPATIENT
Start: 2025-02-14 | End: 2025-02-14 | Stop reason: HOSPADM

## 2025-02-13 RX ORDER — PROTAMINE SULFATE 10 MG/ML
INJECTION, SOLUTION INTRAVENOUS
Status: DISCONTINUED | OUTPATIENT
Start: 2025-02-13 | End: 2025-02-13 | Stop reason: SDUPTHER

## 2025-02-13 RX ORDER — PANTOPRAZOLE SODIUM 40 MG/1
40 TABLET, DELAYED RELEASE ORAL
Status: DISCONTINUED | OUTPATIENT
Start: 2025-02-14 | End: 2025-02-14 | Stop reason: HOSPADM

## 2025-02-13 RX ORDER — LEVOTHYROXINE SODIUM 50 UG/1
100 TABLET ORAL DAILY
Status: DISCONTINUED | OUTPATIENT
Start: 2025-02-14 | End: 2025-02-14 | Stop reason: HOSPADM

## 2025-02-13 RX ORDER — ONDANSETRON 2 MG/ML
INJECTION INTRAMUSCULAR; INTRAVENOUS
Status: DISCONTINUED | OUTPATIENT
Start: 2025-02-13 | End: 2025-02-13 | Stop reason: SDUPTHER

## 2025-02-13 RX ORDER — FENTANYL CITRATE 50 UG/ML
INJECTION, SOLUTION INTRAMUSCULAR; INTRAVENOUS
Status: DISCONTINUED | OUTPATIENT
Start: 2025-02-13 | End: 2025-02-13 | Stop reason: SDUPTHER

## 2025-02-13 RX ORDER — ACETAMINOPHEN 500 MG
1000 TABLET ORAL ONCE
Status: COMPLETED | OUTPATIENT
Start: 2025-02-13 | End: 2025-02-13

## 2025-02-13 RX ORDER — ROCURONIUM BROMIDE 10 MG/ML
INJECTION, SOLUTION INTRAVENOUS
Status: DISCONTINUED | OUTPATIENT
Start: 2025-02-13 | End: 2025-02-13 | Stop reason: SDUPTHER

## 2025-02-13 RX ORDER — BISACODYL 10 MG
10 SUPPOSITORY, RECTAL RECTAL DAILY PRN
Status: DISCONTINUED | OUTPATIENT
Start: 2025-02-13 | End: 2025-02-14 | Stop reason: HOSPADM

## 2025-02-13 RX ORDER — LIDOCAINE HYDROCHLORIDE 10 MG/ML
1 INJECTION, SOLUTION EPIDURAL; INFILTRATION; INTRACAUDAL; PERINEURAL
Status: DISCONTINUED | OUTPATIENT
Start: 2025-02-13 | End: 2025-02-13 | Stop reason: HOSPADM

## 2025-02-13 RX ORDER — OXYCODONE HYDROCHLORIDE 5 MG/1
5 TABLET ORAL
Status: DISCONTINUED | OUTPATIENT
Start: 2025-02-13 | End: 2025-02-13 | Stop reason: HOSPADM

## 2025-02-13 RX ORDER — ASPIRIN 81 MG/1
81 TABLET ORAL DAILY
Status: DISCONTINUED | OUTPATIENT
Start: 2025-02-14 | End: 2025-02-14 | Stop reason: HOSPADM

## 2025-02-13 RX ORDER — SODIUM CHLORIDE 9 MG/ML
INJECTION, SOLUTION INTRAVENOUS PRN
Status: DISCONTINUED | OUTPATIENT
Start: 2025-02-13 | End: 2025-02-13 | Stop reason: HOSPADM

## 2025-02-13 RX ORDER — SODIUM CHLORIDE 0.9 % (FLUSH) 0.9 %
5-40 SYRINGE (ML) INJECTION PRN
Status: DISCONTINUED | OUTPATIENT
Start: 2025-02-13 | End: 2025-02-13 | Stop reason: HOSPADM

## 2025-02-13 RX ORDER — ONDANSETRON 2 MG/ML
4 INJECTION INTRAMUSCULAR; INTRAVENOUS EVERY 6 HOURS PRN
Status: DISCONTINUED | OUTPATIENT
Start: 2025-02-13 | End: 2025-02-13 | Stop reason: HOSPADM

## 2025-02-13 RX ORDER — NALOXONE HYDROCHLORIDE 0.4 MG/ML
INJECTION, SOLUTION INTRAMUSCULAR; INTRAVENOUS; SUBCUTANEOUS PRN
Status: DISCONTINUED | OUTPATIENT
Start: 2025-02-13 | End: 2025-02-13 | Stop reason: HOSPADM

## 2025-02-13 RX ORDER — PHENYLEPHRINE HCL IN 0.9% NACL 0.4MG/10ML
SYRINGE (ML) INTRAVENOUS
Status: DISCONTINUED | OUTPATIENT
Start: 2025-02-13 | End: 2025-02-13 | Stop reason: SDUPTHER

## 2025-02-13 RX ORDER — DULOXETIN HYDROCHLORIDE 30 MG/1
60 CAPSULE, DELAYED RELEASE ORAL DAILY
Status: DISCONTINUED | OUTPATIENT
Start: 2025-02-13 | End: 2025-02-14 | Stop reason: HOSPADM

## 2025-02-13 RX ORDER — PROPOFOL 10 MG/ML
INJECTION, EMULSION INTRAVENOUS
Status: DISCONTINUED | OUTPATIENT
Start: 2025-02-13 | End: 2025-02-13 | Stop reason: SDUPTHER

## 2025-02-13 RX ORDER — HYDROCHLOROTHIAZIDE 25 MG/1
12.5 TABLET ORAL DAILY
Status: DISCONTINUED | OUTPATIENT
Start: 2025-02-14 | End: 2025-02-14 | Stop reason: HOSPADM

## 2025-02-13 RX ORDER — ACETAMINOPHEN 325 MG/1
650 TABLET ORAL EVERY 4 HOURS PRN
Status: DISCONTINUED | OUTPATIENT
Start: 2025-02-13 | End: 2025-02-14 | Stop reason: HOSPADM

## 2025-02-13 RX ORDER — ONDANSETRON 2 MG/ML
4 INJECTION INTRAMUSCULAR; INTRAVENOUS
Status: DISCONTINUED | OUTPATIENT
Start: 2025-02-13 | End: 2025-02-13 | Stop reason: HOSPADM

## 2025-02-13 RX ORDER — ATORVASTATIN CALCIUM 40 MG/1
40 TABLET, FILM COATED ORAL DAILY
Status: DISCONTINUED | OUTPATIENT
Start: 2025-02-14 | End: 2025-02-14 | Stop reason: HOSPADM

## 2025-02-13 RX ORDER — FENTANYL CITRATE 50 UG/ML
25 INJECTION, SOLUTION INTRAMUSCULAR; INTRAVENOUS EVERY 5 MIN PRN
Status: DISCONTINUED | OUTPATIENT
Start: 2025-02-13 | End: 2025-02-13 | Stop reason: HOSPADM

## 2025-02-13 RX ORDER — HYDROMORPHONE HYDROCHLORIDE 1 MG/ML
0.5 INJECTION, SOLUTION INTRAMUSCULAR; INTRAVENOUS; SUBCUTANEOUS EVERY 5 MIN PRN
Status: DISCONTINUED | OUTPATIENT
Start: 2025-02-13 | End: 2025-02-13 | Stop reason: HOSPADM

## 2025-02-13 RX ORDER — SODIUM CHLORIDE, SODIUM LACTATE, POTASSIUM CHLORIDE, CALCIUM CHLORIDE 600; 310; 30; 20 MG/100ML; MG/100ML; MG/100ML; MG/100ML
INJECTION, SOLUTION INTRAVENOUS CONTINUOUS
Status: DISCONTINUED | OUTPATIENT
Start: 2025-02-13 | End: 2025-02-13 | Stop reason: HOSPADM

## 2025-02-13 RX ORDER — PROCHLORPERAZINE EDISYLATE 5 MG/ML
5 INJECTION INTRAMUSCULAR; INTRAVENOUS
Status: DISCONTINUED | OUTPATIENT
Start: 2025-02-13 | End: 2025-02-13 | Stop reason: HOSPADM

## 2025-02-13 RX ORDER — LIDOCAINE HYDROCHLORIDE 20 MG/ML
INJECTION, SOLUTION EPIDURAL; INFILTRATION; INTRACAUDAL; PERINEURAL
Status: DISCONTINUED | OUTPATIENT
Start: 2025-02-13 | End: 2025-02-13 | Stop reason: SDUPTHER

## 2025-02-13 RX ORDER — HEPARIN SODIUM 1000 [USP'U]/ML
INJECTION, SOLUTION INTRAVENOUS; SUBCUTANEOUS
Status: DISCONTINUED | OUTPATIENT
Start: 2025-02-13 | End: 2025-02-13 | Stop reason: SDUPTHER

## 2025-02-13 RX ORDER — ATENOLOL 50 MG/1
50 TABLET ORAL
Status: DISCONTINUED | OUTPATIENT
Start: 2025-02-13 | End: 2025-02-14 | Stop reason: HOSPADM

## 2025-02-13 RX ORDER — MORPHINE SULFATE 4 MG/ML
2 INJECTION, SOLUTION INTRAMUSCULAR; INTRAVENOUS
Status: DISCONTINUED | OUTPATIENT
Start: 2025-02-13 | End: 2025-02-14 | Stop reason: HOSPADM

## 2025-02-13 RX ORDER — LISINOPRIL 20 MG/1
20 TABLET ORAL DAILY
Status: DISCONTINUED | OUTPATIENT
Start: 2025-02-14 | End: 2025-02-14 | Stop reason: HOSPADM

## 2025-02-13 RX ORDER — DEXAMETHASONE SODIUM PHOSPHATE 4 MG/ML
INJECTION, SOLUTION INTRA-ARTICULAR; INTRALESIONAL; INTRAMUSCULAR; INTRAVENOUS; SOFT TISSUE
Status: DISCONTINUED | OUTPATIENT
Start: 2025-02-13 | End: 2025-02-13 | Stop reason: SDUPTHER

## 2025-02-13 RX ORDER — CLOPIDOGREL BISULFATE 75 MG/1
75 TABLET ORAL DAILY
Status: DISCONTINUED | OUTPATIENT
Start: 2025-02-14 | End: 2025-02-14 | Stop reason: HOSPADM

## 2025-02-13 RX ADMIN — Medication 40 MCG: at 07:48

## 2025-02-13 RX ADMIN — FENTANYL CITRATE 50 MCG: 50 INJECTION, SOLUTION INTRAMUSCULAR; INTRAVENOUS at 07:45

## 2025-02-13 RX ADMIN — Medication 3 AMPULE: at 06:42

## 2025-02-13 RX ADMIN — FENTANYL CITRATE 25 MCG: 50 INJECTION, SOLUTION INTRAMUSCULAR; INTRAVENOUS at 10:48

## 2025-02-13 RX ADMIN — ONDANSETRON 4 MG: 2 INJECTION, SOLUTION INTRAMUSCULAR; INTRAVENOUS at 10:05

## 2025-02-13 RX ADMIN — ROCURONIUM BROMIDE 10 MG: 10 INJECTION INTRAVENOUS at 09:15

## 2025-02-13 RX ADMIN — FENTANYL CITRATE 25 MCG: 50 INJECTION, SOLUTION INTRAMUSCULAR; INTRAVENOUS at 09:16

## 2025-02-13 RX ADMIN — WATER 2000 MG: 1 INJECTION INTRAMUSCULAR; INTRAVENOUS; SUBCUTANEOUS at 07:54

## 2025-02-13 RX ADMIN — PROTAMINE SULFATE 25 MG: 10 INJECTION, SOLUTION INTRAVENOUS at 10:13

## 2025-02-13 RX ADMIN — DEXAMETHASONE SODIUM PHOSPHATE 4 MG: 4 INJECTION INTRA-ARTICULAR; INTRALESIONAL; INTRAMUSCULAR; INTRAVENOUS; SOFT TISSUE at 08:10

## 2025-02-13 RX ADMIN — ATENOLOL 50 MG: 50 TABLET ORAL at 22:44

## 2025-02-13 RX ADMIN — HEPARIN SODIUM 2000 UNITS: 1000 INJECTION INTRAVENOUS; SUBCUTANEOUS at 08:19

## 2025-02-13 RX ADMIN — PROPOFOL 20 MG: 10 INJECTION, EMULSION INTRAVENOUS at 10:19

## 2025-02-13 RX ADMIN — SODIUM CHLORIDE, POTASSIUM CHLORIDE, SODIUM LACTATE AND CALCIUM CHLORIDE: 600; 310; 30; 20 INJECTION, SOLUTION INTRAVENOUS at 07:05

## 2025-02-13 RX ADMIN — SUGAMMADEX 150 MG: 100 INJECTION, SOLUTION INTRAVENOUS at 10:44

## 2025-02-13 RX ADMIN — ROCURONIUM BROMIDE 10 MG: 10 INJECTION INTRAVENOUS at 09:32

## 2025-02-13 RX ADMIN — ROCURONIUM BROMIDE 40 MG: 10 INJECTION INTRAVENOUS at 07:46

## 2025-02-13 RX ADMIN — FENTANYL CITRATE 25 MCG: 50 INJECTION, SOLUTION INTRAMUSCULAR; INTRAVENOUS at 08:14

## 2025-02-13 RX ADMIN — PROPOFOL 30 MG: 10 INJECTION, EMULSION INTRAVENOUS at 10:34

## 2025-02-13 RX ADMIN — ROCURONIUM BROMIDE 10 MG: 10 INJECTION INTRAVENOUS at 08:01

## 2025-02-13 RX ADMIN — HEPARIN SODIUM 6000 UNITS: 1000 INJECTION INTRAVENOUS; SUBCUTANEOUS at 08:06

## 2025-02-13 RX ADMIN — PROPOFOL 20 MG: 10 INJECTION, EMULSION INTRAVENOUS at 10:37

## 2025-02-13 RX ADMIN — ACETAMINOPHEN 650 MG: 325 TABLET ORAL at 22:43

## 2025-02-13 RX ADMIN — LIDOCAINE HYDROCHLORIDE 60 MG: 20 INJECTION, SOLUTION EPIDURAL; INFILTRATION; INTRACAUDAL; PERINEURAL at 07:45

## 2025-02-13 RX ADMIN — PHENYLEPHRINE HYDROCHLORIDE 20 MCG/MIN: 10 INJECTION INTRAVENOUS at 07:48

## 2025-02-13 RX ADMIN — ACETAMINOPHEN 1000 MG: 500 TABLET ORAL at 06:41

## 2025-02-13 RX ADMIN — DULOXETINE HYDROCHLORIDE 60 MG: 30 CAPSULE, DELAYED RELEASE ORAL at 19:13

## 2025-02-13 RX ADMIN — ROCURONIUM BROMIDE 10 MG: 10 INJECTION INTRAVENOUS at 08:41

## 2025-02-13 RX ADMIN — HEPARIN SODIUM 2000 UNITS: 1000 INJECTION INTRAVENOUS; SUBCUTANEOUS at 09:17

## 2025-02-13 RX ADMIN — PROPOFOL 100 MG: 10 INJECTION, EMULSION INTRAVENOUS at 07:45

## 2025-02-13 ASSESSMENT — PAIN SCALES - GENERAL
PAINLEVEL_OUTOF10: 0
PAINLEVEL_OUTOF10: 4
PAINLEVEL_OUTOF10: 0

## 2025-02-13 ASSESSMENT — PAIN DESCRIPTION - DESCRIPTORS: DESCRIPTORS: ACHING

## 2025-02-13 ASSESSMENT — PAIN - FUNCTIONAL ASSESSMENT
PAIN_FUNCTIONAL_ASSESSMENT: ACTIVITIES ARE NOT PREVENTED
PAIN_FUNCTIONAL_ASSESSMENT: 0-10

## 2025-02-13 ASSESSMENT — PAIN DESCRIPTION - FREQUENCY: FREQUENCY: INTERMITTENT

## 2025-02-13 ASSESSMENT — PAIN DESCRIPTION - ORIENTATION: ORIENTATION: POSTERIOR

## 2025-02-13 ASSESSMENT — PAIN DESCRIPTION - LOCATION: LOCATION: BACK

## 2025-02-13 ASSESSMENT — PAIN DESCRIPTION - ONSET: ONSET: GRADUAL

## 2025-02-13 ASSESSMENT — PAIN DESCRIPTION - PAIN TYPE: TYPE: CHRONIC PAIN

## 2025-02-13 NOTE — PROGRESS NOTES
Spiritual Health History and Assessment/Progress Note  [facility]    Pre-Op (7/13/25),  ,  ,      Name: Robyn Valenzuela MRN: [id number]    Age: 79 y.o.     Sex: female   Language: English   Yarsanism: Mandaen   <principal problem not specified>     Date: 2/13/2025            Total Time Calculated: 7 min              Spiritual Assessment began in [facility]        Referral/Consult From: Rounding   Encounter Overview/Reason: Pre-Op (7/13/25)  Service Provided For: Patient    Sydni, Belief, Meaning:   Patient identifies as spiritual, is connected with a sydni tradition or spiritual practice, and has beliefs or practices that help with coping during difficult times  Family/Friends No family/friends present      Importance and Influence:  Patient has spiritual/personal beliefs that influence decisions regarding their health  Family/Friends have spiritual/personal beliefs that influence decisions regarding the patient's health    Community:  Patient is connected with a spiritual community and feels well-supported. Support system includes: Extended family  Family/Friends No family/friends present    Assessment and Plan of Care:     Patient Interventions include: Facilitated expression of thoughts and feelings, Explored spiritual coping/struggle/distress, and Affirmed coping skills/support systems  Family/Friends Interventions include: No family/friends present    Patient Plan of Care: Spiritual Care available upon further referral  Family/Friends Plan of Care: Spiritual Care available upon further referral    Electronically signed by KERVIN Tian on 2/13/2025 at 8:23 AM     Rev. JOYCE Tian, KERVIN  [facility]   Paging Service [phone])

## 2025-02-13 NOTE — FLOWSHEET NOTE
02/13/25 1149   Handoff   Communication Given Periop Handoff/Relief   Handoff phase Phase I receiving   Handoff Given To Felice RUIZ   Handoff Received From Larisa RUIZ & Daniel CRNA   Handoff Communication Face to Face   Time Handoff Given 1057       1055 pt arrived pacu with fem stop 30mm right groin puncture site. OR   staff at bedside.  1103 Dr. Torres at bedside examining patient, reported Right LLE PT& PD signals absent.  1115  right groin fem stop removed by OR staff per Dr. Torres. Right groin no bleeding or hematoma. Dr. Torres at bedside, right fem doppler signal present. 4x4 & tape applied to right groin as requested by Dr. Torres.    1221 patient's daughter Shanna received post op update via cell phone.    1248      TRANSFER - OUT REPORT:    Verbal report given to Chencho RN name) on Robyn Valenzuela  being transferred to IVCU (unit) for routine post-op       Report consisted of patient’s Situation, Background, Assessment and   Recommendations(SBAR).     Information from the following report(s) Surgery Report, Intake/Output, MAR, and Cardiac Rhythm SR   was reviewed with the receiving nurse.    Opportunity for questions and clarification was provided.      Patient transported with:   Monitor  O2 @ 3 liters  Registered Nurse    Lines:      This SmartLink retrieves the last documented value for LDA assessment data, retrieved by either LDA type or by LDA group ID.     This SmartLink should be used in a SmartText or SmartPhrase. If one is not available, please contact your .

## 2025-02-13 NOTE — PERIOP NOTE
06:27= ambulated independently to Pre OP; A&Ox4, consents verified (3); states she adhered to PAT guidelines for cleansing; changed into gown with NO CHG; voided in BR; mepilex dsg applied to sacrum; no erythema or skin breakdown noted; skin CDI; warming blanket applied. Set daughter Byron) up to receive text messaging alerts.     07:08= Dr. Mtz in to discuss Anesthesia. Ready for OR; call bell in reach; music therapy ([place]) playing to calm prior to surgery.     07:31= Dr. Torres in to discuss surgery.

## 2025-02-13 NOTE — ANESTHESIA POSTPROCEDURE EVALUATION
Department of Anesthesiology  Postprocedure Note    Patient: Robyn Valenzuela  MRN: [id number]  YOB: 1945  Date of evaluation: 2/13/2025    Procedure Summary       Date: 02/13/25 Room / Location: [facility] MAIN OR M10 / [facility] MAIN OR    Anesthesia Start: 0740 Anesthesia Stop: 1101    Procedure: RIGHT ILIAC ANGIOPLASTY AND STENTING, LEFT ILIAC ANGIOPLASTY AND STENTING (Groin) Diagnosis:       Critical limb ischemia of right lower extremity with nonautologous biological bypass graft (HCC)      (Critical limb ischemia of right lower extremity with nonautologous biological bypass graft (HCC) [I70.521])    Providers: Mich Torres MD Responsible Provider: Smith Baldwin MD    Anesthesia Type: General ASA Status: 3            Anesthesia Type: General    Phyllis Phase I: Phyllis Score: 9    Phyllis Phase II:      Anesthesia Post Evaluation    Patient location during evaluation: PACU  Patient participation: complete - patient participated  Level of consciousness: awake  Pain score: 0  Airway patency: patent  Nausea & Vomiting: no nausea and no vomiting  Cardiovascular status: hemodynamically stable  Respiratory status: acceptable  Hydration status: stable  Multimodal analgesia pain management approach  Pain management: adequate    No notable events documented.

## 2025-02-13 NOTE — BRIEF OP NOTE
Brief Postoperative Note      Patient: Robyn Valenzuela  YOB: 1945  MRN: [id number]    Date of Procedure: 2/13/2025    Pre-Op Diagnosis Codes:      * Critical limb ischemia of right lower extremity with nonautologous biological bypass graft (HCC) [I70.521]    Post-Op Diagnosis: Same       Procedure(s):  RIGHT ILIAC ANGIOPLASTY AND STENTING, LEFT ILIAC ANGIOPLASTY AND STENTING    Surgeon(s):  Mich oTrres MD    Assistant:  Surgical Assistant: Conrado Beckett    Anesthesia: General    Estimated Blood Loss (mL): Minimal    Complications: None    Specimens:   * No specimens in log *    Implants:  Implant Name Type Inv. Item Serial No.  Lot No. LRB No. Used Action   STENT VASC SELF EXP 0.035IN 6X40MM [id number] LP EVERFLEX - [id number] Peripheral stents STENT VASC SELF EXP 0.035IN 6X40MM [id number] LP EVERFLEX  [employer] [id number] Right 1 Implanted   STENT ENDOPROSTHESIS L 79 MM TOMMIE 6 MM CATH L 135 CM - [id number]  STENT ENDOPROSTHESIS L 79 MM TOMMIE 6 MM CATH L 135 CM [id number]  [employer]  Right 1 Implanted   STENT GRFT ENDOPROSTHESIS L 29 MM TOMMIE 6 MM CATH L 135 CM TOMMIE - [id number]  STENT GRFT ENDOPROSTHESIS L 29 MM TOMMIE 6 MM CATH L 135 CM TOMMIE [id number]  [employer]  Right 1 Implanted   STENT VASC SELF EXP 0.035IN 6X40MM [id number] LP EVERFLEX - [id number] Peripheral stents STENT VASC SELF EXP 0.035IN 6X40MM [id number] LP EVERFLEX  [employer] [id number] Left 1 Implanted         Drains: * No LDAs found *    Findings:  Infection Present At Time Of Surgery (PATOS) (choose all levels that have infection present):  No infection present  Other Findings: 60 % stenosis proximal Rt RENEA PTA/stent w/ 6x29 VBX.  70% proximal EIA stenosis PTA/stent w/ 6x79 VBX covering IIA which had 99% stenosis. 80% stenosis in proximal Rt CFA/distal EIA PTA w/ 5x40 and stent w/ 6x40 Everflex.  90% Rt PFA stenosis PTA w/ 4x40 w/ 50% residual. Distal Lt EIA and proximal  CFA stenosis PTA/stent w/ 6x40 balloon and Everflex w/ good result.  Lt IIA patent.  Lt CFA 6 Fr closed w/ Mynx.  Rt CFA 5 Fr hand hold then Femstop.  Cont DAPT.    Electronically signed by Mich Torres MD on 2/13/2025 at 10:52 AM

## 2025-02-13 NOTE — PROGRESS NOTES
TRANSFER - IN REPORT:  Verbal report received from Pacu,Rn on Robyn Valenzuela  being received from Pacu for routine progression of care. Report consisted of patient’s Situation, Background, Assessment and Recommendations(SBAR).  Opportunity for questions and clarification was provided. Assessment completed upon patient’s arrival to IVCU and care assumed.   PROCEDURE SITE CHECK:  Procedure site: Bilateral groin. Patient currently has no c/o pain/discomfort reported at procedure site.    End of Shift Note    Bedside shift change report given to SARA Yee (oncoming nurse) by Kayla Hoover RN (offgoing nurse).  Report included the following information SBAR, Procedure Summary, Intake/Output, MAR, and Cardiac Rhythm Sr    Shift worked:  5290-1633     Shift summary and any significant changes:     Patient to unit from vascular surgery. Bilateral groin sites clean dry and intact. Pedal pulses audible with doppler. Patient has no c/o pain. Patient up to chair with dinner.      Concerns for physician to address:       Zone phone for oncoming shift:   [phone]       Activity:     Number times ambulated in hallways past shift: 0  Number of times OOB to chair past shift: 1    Cardiac:   Cardiac Monitoring: Yes           Access:  Current line(s): PIV     Genitourinary:   Urinary status: voiding    Respiratory:      Chronic home O2 use?: NO  Incentive spirometer at bedside: NO       GI:     Current diet:  ADULT DIET; Regular  DIET ONE TIME MESSAGE;  Passing flatus: YES  Tolerating current diet: YES       Pain Management:   Patient states pain is manageable on current regimen: N/A    Skin:     Interventions: increase time out of bed    Patient Safety:  Fall Score:    Interventions: bed/chair alarm, gripper socks, pt to call before getting OOB, and stay with me (per policy)       Length of Stay:  Expected LOS: 2  Actual LOS: 0    Predictive Model Details          28 (Normal)  Factor Value    Calculated 2/13/2025 18:48 45%  Age 79 years old    Deterioration Index Model 33% Neurological exam X     8% Respiratory rate 18     6% Pulse oximetry 92 %     3% Sodium 137 mmol/L     2% Systolic 107     2% WBC count 8.4 K/uL     1% Potassium 4.1 mmol/L     0% Temperature 97.8 °F (36.6 °C)     0% Pulse 70     0% Hematocrit 36.8 %          Kayla Hoover RN

## 2025-02-14 VITALS
BODY MASS INDEX: 22.82 KG/M2 | HEART RATE: 72 BPM | RESPIRATION RATE: 12 BRPM | OXYGEN SATURATION: 94 % | WEIGHT: 124 LBS | HEIGHT: 62 IN | TEMPERATURE: 98.1 F | DIASTOLIC BLOOD PRESSURE: 51 MMHG | SYSTOLIC BLOOD PRESSURE: 101 MMHG

## 2025-02-14 LAB
ANION GAP SERPL CALC-SCNC: 4 MMOL/L (ref 2–12)
BUN SERPL-MCNC: 20 MG/DL (ref 6–20)
BUN/CREAT SERPL: 24 (ref 12–20)
CALCIUM SERPL-MCNC: 8.7 MG/DL (ref 8.5–10.1)
CHLORIDE SERPL-SCNC: 107 MMOL/L (ref 97–108)
CO2 SERPL-SCNC: 28 MMOL/L (ref 21–32)
CREAT SERPL-MCNC: 0.84 MG/DL (ref 0.55–1.02)
ERYTHROCYTE [DISTWIDTH] IN BLOOD BY AUTOMATED COUNT: 13.7 % (ref 11.5–14.5)
GLUCOSE SERPL-MCNC: 119 MG/DL (ref 65–100)
HCT VFR BLD AUTO: 29.6 % (ref 35–47)
HGB BLD-MCNC: 9.2 G/DL (ref 11.5–16)
MCH RBC QN AUTO: 30.9 PG (ref 26–34)
MCHC RBC AUTO-ENTMCNC: 31.1 G/DL (ref 30–36.5)
MCV RBC AUTO: 99.3 FL (ref 80–99)
NRBC # BLD: 0 K/UL (ref 0–0.01)
NRBC BLD-RTO: 0 PER 100 WBC
PLATELET # BLD AUTO: 193 K/UL (ref 150–400)
PMV BLD AUTO: 10.6 FL (ref 8.9–12.9)
POTASSIUM SERPL-SCNC: 4.1 MMOL/L (ref 3.5–5.1)
RBC # BLD AUTO: 2.98 M/UL (ref 3.8–5.2)
SODIUM SERPL-SCNC: 139 MMOL/L (ref 136–145)
WBC # BLD AUTO: 9.5 K/UL (ref 3.6–11)

## 2025-02-14 PROCEDURE — 6370000000 HC RX 637 (ALT 250 FOR IP): Performed by: SURGERY

## 2025-02-14 PROCEDURE — 85027 COMPLETE CBC AUTOMATED: CPT

## 2025-02-14 PROCEDURE — G0378 HOSPITAL OBSERVATION PER HR: HCPCS

## 2025-02-14 PROCEDURE — 36415 COLL VENOUS BLD VENIPUNCTURE: CPT

## 2025-02-14 PROCEDURE — 80048 BASIC METABOLIC PNL TOTAL CA: CPT

## 2025-02-14 RX ORDER — ALENDRONATE SODIUM 35 MG/1
35 TABLET ORAL
Qty: 4 TABLET | Refills: 0 | Status: SHIPPED
Start: 2025-02-14

## 2025-02-14 RX ADMIN — LISINOPRIL 20 MG: 20 TABLET ORAL at 09:23

## 2025-02-14 RX ADMIN — LEVOTHYROXINE SODIUM 100 MCG: 0.05 TABLET ORAL at 09:23

## 2025-02-14 RX ADMIN — PANTOPRAZOLE SODIUM 40 MG: 40 TABLET, DELAYED RELEASE ORAL at 06:42

## 2025-02-14 RX ADMIN — CLOPIDOGREL BISULFATE 75 MG: 75 TABLET ORAL at 09:23

## 2025-02-14 RX ADMIN — CILOSTAZOL 100 MG: 100 TABLET ORAL at 09:23

## 2025-02-14 RX ADMIN — DULOXETINE HYDROCHLORIDE 60 MG: 30 CAPSULE, DELAYED RELEASE ORAL at 09:22

## 2025-02-14 RX ADMIN — ATORVASTATIN CALCIUM 40 MG: 40 TABLET, FILM COATED ORAL at 09:23

## 2025-02-14 RX ADMIN — ASPIRIN 81 MG: 81 TABLET, COATED ORAL at 09:23

## 2025-02-14 RX ADMIN — HYDROCHLOROTHIAZIDE 12.5 MG: 25 TABLET ORAL at 09:23

## 2025-02-14 ASSESSMENT — PAIN SCALES - GENERAL: PAINLEVEL_OUTOF10: 0

## 2025-02-14 NOTE — PROGRESS NOTES
I have reviewed discharge instructions with the patient such as new medications, follow up appointments, and post procedure site care.  Per NP Ramiro patient can continue to take Flexeril at home.     Predictive Model Details          24 (Normal)  Factor Value    Calculated 2/14/2025 12:44 56% Age 79 years old    Deterioration Index Model 29% Respiratory rate 13     5% WBC count 9.5 K/uL     4% Hematocrit abnormal (29.6 %)     2% Sodium 139 mmol/L     2% Systolic 117     2% Potassium 4.1 mmol/L     1% Pulse 66     0% Temperature 97.7 °F (36.5 °C)     0% Pulse oximetry 96 %

## 2025-02-14 NOTE — PLAN OF CARE
Problem: Chronic Conditions and Co-morbidities  Goal: Patient's chronic conditions and co-morbidity symptoms are monitored and maintained or improved  2/14/2025 1245 by Ju Douglas RN  Outcome: Adequate for Discharge  2/14/2025 0156 by Joselito Macias RN  Outcome: Progressing     Problem: Safety - Adult  Goal: Free from fall injury  2/14/2025 1245 by Ju Douglas RN  Outcome: Adequate for Discharge  2/14/2025 0156 by Joselito Macias RN  Outcome: Progressing  Flowsheets (Taken 2/13/2025 1930)  Free From Fall Injury:   Instruct family/caregiver on patient safety   Based on caregiver fall risk screen, instruct family/caregiver to ask for assistance with transferring infant if caregiver noted to have fall risk factors     Problem: ABCDS Injury Assessment  Goal: Absence of physical injury  2/14/2025 1245 by Ju Douglas RN  Outcome: Adequate for Discharge  2/14/2025 0156 by Joselito Macias RN  Outcome: Progressing  Flowsheets (Taken 2/13/2025 1930)  Absence of Physical Injury: Implement safety measures based on patient assessment     Problem: Pain  Goal: Verbalizes/displays adequate comfort level or baseline comfort level  2/14/2025 1245 by Ju Douglas RN  Outcome: Adequate for Discharge  2/14/2025 0156 by Joselito Macias RN  Outcome: Progressing     Problem: Discharge Planning  Goal: Discharge to home or other facility with appropriate resources  2/14/2025 1245 by Ju Douglas RN  Outcome: Adequate for Discharge  2/14/2025 0156 by Joselito Macias RN  Outcome: Progressing  Flowsheets (Taken 2/13/2025 1930)  Discharge to home or other facility with appropriate resources: Identify barriers to discharge with patient and caregiver     Problem: Skin/Tissue Integrity  Goal: Skin integrity remains intact  Description: 1.  Monitor for areas of redness and/or skin breakdown  2.  Assess vascular access sites hourly  3.  Every 4-6 hours minimum:  Change

## 2025-02-14 NOTE — DISCHARGE INSTRUCTIONS
Discharge Instructions After Vascular Surgery   Home care  Check your incisions every day for signs of infection such as swelling, redness, warmth, or drainage.  Don’t bathe or soak in a tub or go swimming until your incisions are well healed (usually at least 2 weeks). You can shower to keep your incisions clean. Just make sure you dry them well afterward.    Activity  Don’t drive while taking pain medication.    Expect to start walking soon after surgery. Walking helps reduce swelling and helps your incision heal.   Don’t stand or sit with your feet down for long. When you sit, raise your feet as high as you comfortably can.  Take your medicines exactly as directed. Don’t skip doses.  Avoid skin burns by testing the temperature of shower water before you get in.  Wear slippers or shoes when walking. Don’t go barefoot or wear open-toed shoes.    Follow-up  See your doctor to have your staples removed 2-3 weeks after your surgery.    When to call your healthcare provider   Call your provider right away if you have any of the following:  Fever of 100.4°F (38°C)  Signs of infection (redness, swelling, or warmth at the incision site)  Drainage from your incision  Changes in color, temperature, feeling, or movement in either leg or foot  Increasing pain or numbness in your foot or leg  Leg swelling that doesn't get better overnight  Chest pain or trouble breathing    Long-term changes at home  Eat a healthy, low-fat, low cholesterol, and low calorie diet.   Maintain your ideal body weight.  After you have recovered from surgery, try to exercise more, especially walking.   If you smoke, ask your primary doctor for help quitting.      Please call the office at [phone] for any issues

## 2025-02-14 NOTE — PLAN OF CARE
Problem: Chronic Conditions and Co-morbidities  Goal: Patient's chronic conditions and co-morbidity symptoms are monitored and maintained or improved  Outcome: Progressing     Problem: Safety - Adult  Goal: Free from fall injury  Outcome: Progressing  Flowsheets (Taken 2/13/2025 1930)  Free From Fall Injury:   Instruct family/caregiver on patient safety   Based on caregiver fall risk screen, instruct family/caregiver to ask for assistance with transferring infant if caregiver noted to have fall risk factors     Problem: ABCDS Injury Assessment  Goal: Absence of physical injury  Outcome: Progressing  Flowsheets (Taken 2/13/2025 1930)  Absence of Physical Injury: Implement safety measures based on patient assessment     Problem: Pain  Goal: Verbalizes/displays adequate comfort level or baseline comfort level  Outcome: Progressing     Problem: Discharge Planning  Goal: Discharge to home or other facility with appropriate resources  Outcome: Progressing  Flowsheets (Taken 2/13/2025 1930)  Discharge to home or other facility with appropriate resources: Identify barriers to discharge with patient and caregiver     Problem: Skin/Tissue Integrity  Goal: Skin integrity remains intact  Description: 1.  Monitor for areas of redness and/or skin breakdown  2.  Assess vascular access sites hourly  3.  Every 4-6 hours minimum:  Change oxygen saturation probe site  4.  Every 4-6 hours:  If on nasal continuous positive airway pressure, respiratory therapy assess nares and determine need for appliance change or resting period  Outcome: Progressing  Flowsheets (Taken 2/13/2025 1930)  Skin Integrity Remains Intact: Monitor for areas of redness and/or skin breakdown

## 2025-02-14 NOTE — PROGRESS NOTES
End of Shift Note    Bedside shift change report given to SARA Dodd (oncoming nurse) by Joselito Macias RN (offgoing nurse).  Report included the following information SBAR, Kardex, ED Summary, Procedure Summary, Intake/Output, MAR, Accordion, Recent Results, Med Rec Status, Cardiac Rhythm NSR, and Alarm Parameters     Shift worked:  4916-1058     Shift summary and any significant changes:     No significant change.  Pt. Is resting in bed/ chair without distress.  Vital signs are stable.     Concerns for physician to address:       Zone phone for oncoming shift:          Activity:  Level of Assistance: Standby assist, set-up cues, supervision of patient - no hands on  Number times ambulated in hallways past shift: 0  Number of times OOB to chair past shift: 1    Cardiac:   Cardiac Monitoring: Yes      Cardiac Rhythm: Sinus rhythm    Access:  Current line(s): PIV     Genitourinary:   Urinary Status: Voiding, Bathroom privileges    Respiratory:   O2 Device: None (Room air)  Chronic home O2 use?: NO  Incentive spirometer at bedside:     GI:  Last BM (including prior to admit): 02/13/25  Current diet:  ADULT DIET; Regular  DIET ONE TIME MESSAGE;  Passing flatus: YES    Pain Management:   Patient states pain is manageable on current regimen: YES    Skin:  Kishor Scale Score: 20  Interventions: Wound Offloading (Prevention Methods): Repositioning, Turning, Elevate heels    Patient Safety:  Fall Risk: Nursing Judgement-Fall Risk High(Add Comments): No  Fall Risk Interventions  Nursing Judgement-Fall Risk High(Add Comments): No  Toilet Every 2 Hours-In Advance of Need: Yes  Hourly Visual Checks: Awake, In bed, In chair  Fall Visual Posted: Socks, Fall sign posted, Armband  Room Door Open: Deferred to promote rest  Alarm On: Bed  Patient Moved Closer to Nursing Station: No    Active Consults:   None    Length of Stay:  Expected LOS: 2  Actual LOS: 0    Joselito Macias RN

## 2025-02-14 NOTE — CARE COORDINATION
Pt clear from CM standpoint.    Care Management Initial Assessment       RUR: N/A - Observation   Readmission? No  1st IM letter given? N/A  1st  letter given: N/A    Initial note - 1113 AM: Chart reviewed. CM met with pt at the bedside to introduce self and role. Verified contact information and demographics. Pt resides alone in a one level home with 4 SALLY. Pt PCP is IVETT Torres with the last visit being within the last six months. Preferred pharmacy is [facility] or [facility]. Pt is current with [facility] services. Pt has a hx of an IPR stay at [facility]. Pt is independent with ADL's and has no DME needs. Pt has a cane and walker at home. Pt is an active . Pt has no ACP on file; pt is a FULL code. Pt family to transport pt home upon time of d/c. Full assessment below:     02/14/25 1113   Service Assessment   Patient Orientation Alert and Oriented;Person;Place;Situation;Self   Cognition Alert   History Provided By Patient   Primary Caregiver Self   Support Systems Children   Patient's Healthcare Decision Maker is: Legal Next of Kin   PCP Verified by CM Yes  (IVETT Torres)   Last Visit to PCP Within last 6 months   Prior Functional Level Independent in ADLs/IADLs   Current Functional Level Independent in ADLs/IADLs   Can patient return to prior living arrangement Yes   Ability to make needs known: Good   Family able to assist with home care needs: Yes   Would you like for me to discuss the discharge plan with any other family members/significant others, and if so, who? Yes  (Shanna Valenzuela, child)   Financial Resources Medicare  (Medicare Part [place] and [place], [place])   Community Resources None   Social/Functional History   Lives With Alone   Type of Home House   Home Layout One level   Home Access Stairs to enter with rails   Entrance Stairs - Number of Steps 4 SALLY   Bathroom Equipment Commode   Home Equipment Walker - Rolling;Cane   Receives Help From Family   Prior Level of Assist for  ADLs Independent   Prior Level of Assist for Homemaking Independent   Ambulation Assistance Independent   Prior Level of Assist for Transfers Independent   Active  Yes   Mode of Transportation Car   Occupation Retired   Discharge Planning   Type of Residence House   Living Arrangements Alone   Current Services Prior To Admission None   Potential Assistance Needed Home Care   DME Ordered? No   Potential Assistance Purchasing Medications No   Type of Home Care Services None   Patient expects to be discharged to: House   Services At/After Discharge   Transition of Care Consult (CM Consult) Discharge Planning   Services At/After Discharge None    Resource Information Provided? No   Mode of Transport at Discharge Other (see comment)  (Daughter)   Hospital Transport Time of Discharge 1300   Confirm Follow Up Transport Family   Condition of Participation: Discharge Planning   The Plan for Transition of Care is related to the following treatment goals: Return home independently   The Patient and/or Patient Representative was provided with a Choice of Provider? Patient   The Patient and/Or Patient Representative agree with the Discharge Plan? Yes     Advance Care Planning     General Advance Care Planning (ACP) Conversation    Date of Conversation: 2/14/2025  Conducted with: Patient with Decision Making Capacity  Other persons present: None    Healthcare Decision Maker:   Primary Decision Maker: Shanna Valenzuela - [facility] - [phone]     Today we documented Decision Maker(s) consistent with Legal Next of Kin hierarchy.  Content/Action Overview:  Has NO ACP documents-Information provided  Reviewed DNR/DNI and patient elects Full Code (Attempt Resuscitation)      Length of Voluntary ACP Conversation in minutes:  <16 minutes (Non-Billable)    JOHN BLANDON   [phone]

## 2025-02-14 NOTE — DISCHARGE INSTR - MEDS
Discharge Instructions After Vascular Surgery   Home care  Check your incision every day for signs of infection such as swelling, redness, warmth, or drainage.  Don’t bathe or soak in a tub or go swimming until your incisions are well healed (usually at least 2 weeks). You can shower to keep your incisions clean. Just make sure you dry them well afterward.     Activity  Don’t drive while taking pain medication.    Expect to start walking soon after surgery. Walking helps reduce swelling and helps your incision heal.   Don’t stand or sit with your feet down for long. When you sit, raise your feet as high as you comfortably can.  Take your medicines exactly as directed. Don’t skip doses.  Avoid skin burns by testing the temperature of shower water before you get in.  Wear slippers or shoes when walking. Don’t go barefoot or wear open-toed shoes.    Follow-up  See your doctor to have your staples removed 2-3 weeks after your surgery.    When to call your healthcare provider   Call your provider right away if you have any of the following:  Fever of 100.4°F (38°C)  Signs of infection (redness, swelling, or warmth at the incision site)  Drainage from your incision  Changes in color, temperature, feeling, or movement in either leg or foot  Increasing pain or numbness in your foot or leg  Leg swelling that doesn't get better overnight  Chest pain or trouble breathing    Long-term changes at home  Eat a healthy, low-fat, low cholesterol, and low calorie diet.   Maintain your ideal body weight.  After you have recovered from surgery, try to exercise more, especially walking.   If you smoke, ask your primary doctor for help quitting.      Please call the office at [phone] for any issues

## 2025-02-14 NOTE — DISCHARGE SUMMARY
Vascular Surgery Discharge Summary     Patient ID:  Robyn Valenzuela  [id number]  79 y.o.  1945    Admitting Provider: Mich Torres MD  Discharging Provider: Sari [facility]    Admit Date: 2/13/2025    Discharge Date: 2/14/2025    Discharge Diagnoses:    Peripheral arterial disease POA  Carotid artery disease POA    Coronary artery disease POA  Hypertension POA  Hyperlipidemia POA   Hypothyroidism POA   Gastroesophageal reflux disease POA   Recent hx of ischemic colitis POA     Procedures during admission:    Hospital Course:   Ms. Robyn Valenzuela is an 79 y.o. female with a pmhx significant for CAD, HTN, HLD, thyroid disease, GERD, and anxiety. She is a former smoker.  She continues to take aspirin, cilostazol, and a statin.  She has known mild bilateral carotid artery disease.  She is status post a right carotid endarterectomy in March 2010.  She is due for routine surveillance in June of 2025.    She has known PAD.  She is s/p right CFA-Ifrah with PTFE (01/2011). It re-occluded and she is s/p redo right CFA-Ifrah with PTFE (3/2012). She had a third right CFA-Ifrah with PTFE (8/2013). She is s/p left SFA BAP (9/2012), left RENEA (6/2014), right RENEA BAP/stenting (6/2014), left RENEA BAP stenting (12/2018), and right iliac angioplasty and left iliac angioplasty and stenting (12/26/24).  Post procedure her Plavix was held for an exploratory laparotomy with lysis of adhesions after she presents to the hospital with ischemic colitis. 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  She developed a recurrent occlusion of the right lower extremity.   Her ABIs were right 0.0 and left 0.59.      She is now admitted to the hospital following bilateral iliac stenting on 02/13/25.  Her post procedure course was uncomplicated.  On day of discharge she denied rest pain.  She was resumed on aspirin, clopidogrel, and cilostazol post procedure.      Pertinent Results:   Recent Results (from the  CAPSULE DAILY     hydroCHLOROthiazide 12.5 MG tablet  Take 1 tablet by mouth daily     ketoconazole 2 % shampoo  Commonly known as: NIZORAL     levothyroxine 100 MCG tablet  Commonly known as: SYNTHROID  Take 1 tablet by mouth daily     lisinopril 40 MG tablet  Commonly known as: PRINIVIL;ZESTRIL  Take 0.5 tablets by mouth daily     pantoprazole 40 MG tablet  Commonly known as: PROTONIX  Take 1 tablet by mouth every morning (before breakfast)     QC TUMERIC COMPLEX PO     SODIUM FLUORIDE (DENTAL GEL) 1.1 % Crea     vitamin D 25 MCG (1000 UT) Caps           * This list has 2 medication(s) that are the same as other medications prescribed for you. Read the directions carefully, and ask your doctor or other care provider to review them with you.                STOP taking these medications      cyclobenzaprine 5 MG tablet  Commonly known as: FLEXERIL     diclofenac sodium 1 % Gel  Commonly known as: VOLTAREN          Medication Instructions:    Discharge Instructions After Vascular Surgery   Home care  Check your incision every day for signs of infection such as swelling, redness, warmth, or drainage.  Don’t bathe or soak in a tub or go swimming until your incisions are well healed (usually at least 2 weeks). You can shower to keep your incisions clean. Just make sure you dry them well afterward.     Activity  Don’t drive while taking pain medication.    Expect to start walking soon after surgery. Walking helps reduce swelling and helps your incision heal.   Don’t stand or sit with your feet down for long. When you sit, raise your feet as high as you comfortably can.  Take your medicines exactly as directed. Don’t skip doses.  Avoid skin burns by testing the temperature of shower water before you get in.  Wear slippers or shoes when walking. Don’t go barefoot or wear open-toed shoes.    Follow-up  See your doctor to have your staples removed 2-3 weeks after your surgery.    When to call your healthcare provider   Call  your provider right away if you have any of the following:  Fever of 100.4°F (38°C)  Signs of infection (redness, swelling, or warmth at the incision site)  Drainage from your incision  Changes in color, temperature, feeling, or movement in either leg or foot  Increasing pain or numbness in your foot or leg  Leg swelling that doesn't get better overnight  Chest pain or trouble breathing    Long-term changes at home  Eat a healthy, low-fat, low cholesterol, and low calorie diet.   Maintain your ideal body weight.  After you have recovered from surgery, try to exercise more, especially walking.   If you smoke, ask your primary doctor for help quitting.      Please call the office at [phone] for any issues              Where to Get Your Medications        Information about where to get these medications is not yet available    Ask your nurse or doctor about these medications  alendronate 35 MG tablet          Diet: cardiac diet    Discharge Instructions After Vascular Surgery   Home care  Check your incisions every day for signs of infection such as swelling, redness, warmth, or drainage.  Don’t bathe or soak in a tub or go swimming until your incisions are well healed (usually at least 2 weeks). You can shower to keep your incisions clean. Just make sure you dry them well afterward.    Activity  Don’t drive while taking pain medication.    Expect to start walking soon after surgery. Walking helps reduce swelling and helps your incision heal.   Don’t stand or sit with your feet down for long. When you sit, raise your feet as high as you comfortably can.  Take your medicines exactly as directed. Don’t skip doses.  Avoid skin burns by testing the temperature of shower water before you get in.  Wear slippers or shoes when walking. Don’t go barefoot or wear open-toed shoes.    Follow-up  See your doctor to have your staples removed 2-3 weeks after your surgery.    When to call your healthcare provider   Call your

## 2025-02-14 NOTE — PROGRESS NOTES
Bedside shift change report given to Joselito Arias RN (oncoming nurse) by Kayla Hoover RN  (offgoing nurse). Report included the following information Nurse Handoff Report, Index, Adult Overview, Surgery Report, Intake/Output, MAR, Recent Results, Med Rec Status, Cardiac Rhythm NSR, and Alarm Parameters.

## 2025-02-19 ENCOUNTER — TELEPHONE (OUTPATIENT)
Age: 80
End: 2025-02-19

## 2025-02-27 NOTE — OP NOTE
[facility]              [address] [place] [address]                            OPERATIVE REPORT      PATIENT NAME: LAW WISE          : 1945  MED REC NO: [id number]                       ROOM: [id number]  ACCOUNT NO: [id number]                       ADMIT DATE: 2025  PROVIDER: Mich Torres MD    DATE OF SERVICE:  2025    PREOPERATIVE DIAGNOSES:  Critical limb ischemia of the right lower extremity.    POSTOPERATIVE DIAGNOSES:  Critical limb ischemia of the right lower extremity.    PROCEDURES PERFORMED:       1. Bilateral iliac arteriograms.     2. Angioplasty and stenting of right common and external iliac arteries.     3. Angioplasty and stenting of right common femoral artery.     4. Angioplasty of right profunda femoris artery.     5. Angioplasty and stenting of left external iliac and common femoral artery.     6. Intravascular ultrasound of right common iliac artery.    SURGEON:  Mich Torres MD    ASSISTANT:  None.    ANESTHESIA:  General.    ESTIMATED BLOOD LOSS:  Minimal.    SPECIMENS REMOVED:  None.    INTRAOPERATIVE FINDINGS:  Severe diffuse atherosclerotic stenosis in the bilateral iliac and common femoral arteries.  Occlusion of the right lower extremity bypass grafts and native SFA with severe stenosis in right profunda.  Good result with iliac and common femoral artery angioplasty and stenting.  Approximately 50% residual stenosis in right profunda due to dissected calcified plaque.     COMPLICATIONS:  None.    IMPLANTS:  6 x 40 EverFlex stent in distal right external iliac through mid right common femoral artery, 6 x 79 VBX stent in right common and external iliac arteries, 6 x 20 VBX stent in right common iliac artery, 6 x 40 EverFlex stent in distal left external iliac through mid common femoral artery.    INDICATIONS:  The patient is an 79-year-old female with a very long history of peripheral artery  the right leg was significantly improved and hopefully adequate to relieve her rest pain and restore some pressure at the right ankle and digit level.  Next, attention was directed to the distal left external iliac and proximal common femoral artery stenosis.  The left common femoral artery sheath was retracted down to the distal common femoral artery and a retrograde arteriogram was done.  The proximal and distal extents of the stenosis were marked and this was angioplastied with a 5 x 40 balloon.  Repeat angiogram showed little improvement with a 60% residual stenosis.  This area was stented with a 6 x 40 EverFlex self-expanding stent, which was extended from the distal external iliac artery down to the mid left common femoral artery.  The stent was then post dilated with the 5 x 40 balloon.  A final completion angiogram was done, which showed an excellent result with no residual stenosis in the left external iliac or common femoral artery.  The 7-Czech left common femoral artery access was closed with a Mynx device with good hemostasis.  The right common femoral artery sheath was removed and manual pressure was held and then a FemoStop was applied.  The patient was transferred to the PACU in stable condition with the FemoStop in place and good hemostasis.    DRAINS:  None.        MD MARICHUY VILLAVICENCIO/AQS  D:  02/27/2025 08:10:34  T:  02/27/2025 09:00:16  JOB #:  [id number]

## 2025-04-14 DIAGNOSIS — I10 ESSENTIAL HYPERTENSION: ICD-10-CM

## 2025-04-14 RX ORDER — ATORVASTATIN CALCIUM 40 MG/1
40 TABLET, FILM COATED ORAL DAILY
Qty: 90 TABLET | Refills: 1 | Status: SHIPPED | OUTPATIENT
Start: 2025-04-14

## 2025-04-14 RX ORDER — HYDROCHLOROTHIAZIDE 12.5 MG/1
12.5 TABLET ORAL DAILY
Qty: 90 TABLET | Refills: 1 | Status: SHIPPED | OUTPATIENT
Start: 2025-04-14

## 2025-04-14 NOTE — TELEPHONE ENCOUNTER
Disp Refills Start End    hydroCHLOROthiazide 12.5 MG tablet 90 tablet 3 1/2/2025 --    Sig - Route: Take 1 tablet by mouth daily - Oral      Last visit 11/18/24   Future appt 4/21/25    Pharmacy [facility]

## 2025-04-14 NOTE — TELEPHONE ENCOUNTER
PCP: Endy Torres APRN - NP    Last appt: 11/18/2024    Future Appointments   Date Time Provider Department Center   4/21/2025 11:00 AM Endy Torres APRN - NP [facility] DEP       Requested Prescriptions     Pending Prescriptions Disp Refills    atorvastatin (LIPITOR) 40 MG tablet [Pharmacy Med Name: ATORVASTATIN TABS 40MG] 90 tablet 1     Sig: TAKE 1 TABLET DAILY

## 2025-04-21 ENCOUNTER — OFFICE VISIT (OUTPATIENT)
Facility: CLINIC | Age: 80
End: 2025-04-21
Payer: MEDICARE

## 2025-04-21 VITALS
WEIGHT: 129.4 LBS | HEART RATE: 72 BPM | HEIGHT: 62 IN | BODY MASS INDEX: 23.81 KG/M2 | DIASTOLIC BLOOD PRESSURE: 76 MMHG | TEMPERATURE: 97.7 F | RESPIRATION RATE: 16 BRPM | OXYGEN SATURATION: 100 % | SYSTOLIC BLOOD PRESSURE: 124 MMHG

## 2025-04-21 DIAGNOSIS — K52.9 CHRONIC DIARRHEA: ICD-10-CM

## 2025-04-21 DIAGNOSIS — E78.2 MIXED HYPERLIPIDEMIA: ICD-10-CM

## 2025-04-21 DIAGNOSIS — Z83.79 FAMILY HISTORY OF ULCERATIVE COLITIS: ICD-10-CM

## 2025-04-21 DIAGNOSIS — I10 ESSENTIAL HYPERTENSION: Primary | ICD-10-CM

## 2025-04-21 DIAGNOSIS — E03.9 ACQUIRED HYPOTHYROIDISM: ICD-10-CM

## 2025-04-21 DIAGNOSIS — K21.9 GASTROESOPHAGEAL REFLUX DISEASE WITHOUT ESOPHAGITIS: ICD-10-CM

## 2025-04-21 DIAGNOSIS — I25.10 CORONARY ARTERY DISEASE INVOLVING NATIVE CORONARY ARTERY OF NATIVE HEART WITHOUT ANGINA PECTORIS: ICD-10-CM

## 2025-04-21 PROCEDURE — 1036F TOBACCO NON-USER: CPT | Performed by: NURSE PRACTITIONER

## 2025-04-21 PROCEDURE — 3074F SYST BP LT 130 MM HG: CPT | Performed by: NURSE PRACTITIONER

## 2025-04-21 PROCEDURE — 3078F DIAST BP <80 MM HG: CPT | Performed by: NURSE PRACTITIONER

## 2025-04-21 PROCEDURE — 1160F RVW MEDS BY RX/DR IN RCRD: CPT | Performed by: NURSE PRACTITIONER

## 2025-04-21 PROCEDURE — G8427 DOCREV CUR MEDS BY ELIG CLIN: HCPCS | Performed by: NURSE PRACTITIONER

## 2025-04-21 PROCEDURE — 1090F PRES/ABSN URINE INCON ASSESS: CPT | Performed by: NURSE PRACTITIONER

## 2025-04-21 PROCEDURE — G8399 PT W/DXA RESULTS DOCUMENT: HCPCS | Performed by: NURSE PRACTITIONER

## 2025-04-21 PROCEDURE — G8420 CALC BMI NORM PARAMETERS: HCPCS | Performed by: NURSE PRACTITIONER

## 2025-04-21 PROCEDURE — 1123F ACP DISCUSS/DSCN MKR DOCD: CPT | Performed by: NURSE PRACTITIONER

## 2025-04-21 PROCEDURE — 99214 OFFICE O/P EST MOD 30 MIN: CPT | Performed by: NURSE PRACTITIONER

## 2025-04-21 PROCEDURE — 1159F MED LIST DOCD IN RCRD: CPT | Performed by: NURSE PRACTITIONER

## 2025-04-21 PROCEDURE — 1126F AMNT PAIN NOTED NONE PRSNT: CPT | Performed by: NURSE PRACTITIONER

## 2025-04-21 RX ORDER — LISINOPRIL 20 MG/1
20 TABLET ORAL DAILY
Qty: 90 TABLET | Refills: 1 | Status: SHIPPED | OUTPATIENT
Start: 2025-04-21

## 2025-04-21 RX ORDER — LACTOBACILLUS RHAMNOSUS GG 10B CELL
1 CAPSULE ORAL DAILY
Qty: 90 CAPSULE | Refills: 1 | Status: SHIPPED | OUTPATIENT
Start: 2025-04-21

## 2025-04-21 NOTE — PROGRESS NOTES
Chief Complaint   Patient presents with    Hypertension     5M       SUBJECTIVE:    Robyn Valenzuela is a 80 y.o. female who is here today for a follow up appointment regarding current medical conditions including: Essential hypertension, CAD, PVD, mixed hyperlipidemia, acquired hypothyroidism, GERD, and complaints of ongoing chronic intermittent diarrhea and concerns for possible ulcerative colitis due to family history.    To recap, the patient was previously hospitalized at [facility] from 02/13/2025 to 02/14/2025 for lower limb ischemia of her right extremity.  She has a history of \"right CFA-Ifrah with PTFE (01/2011). It re-occluded and she is s/p redo right CFA-Ifrah with PTFE (3/2012). She had a third right CFA-Ifrah with PTFE (8/2013). She is s/p left SFA BAP (9/2012), left RENEA (6/2014), right RENEA BAP/stenting (6/2014), left RENEA BAP stenting (12/2018), and right iliac angioplasty and left iliac angioplasty and stenting (12/26/24).\"  She was admitted to the hospital after bilateral iliac stenting on 02/13/2025 and was resumed on aspirin, clopidogrel, and cilostazol after her procedure.  She is concerned about the severity of her symptoms and prognosis.  She was told by her vascular surgeon that there was \"nothing more that I can do.\"  She has been advised to try to exercise/walk as much as she can tolerate.    Additionally, the patient was hospitalized at the beginning of January for ischemic colitis.  She was placed inpatient from 01/01/2025 to 01/13/2025 at [facility].  She states she had a \"twisted small bowel.\"  She was switched from her Prilosec to pantoprazole postoperatively.  She states that while in the hospital her lisinopril was reduced from 40 mg daily to 20 mg daily.  She had also completed a regimen of oral antibiotics.  She feels that her diarrhea symptoms seem to improve significantly following the procedure and hospitalization, but has returned with

## 2025-04-21 NOTE — PROGRESS NOTES
Robyn Valenzuela is a 80 y.o. female     Chief Complaint   Patient presents with    Hypertension     5M       /76   Pulse 72   Temp 97.7 °F (36.5 °C) (Oral)   Resp 16   Ht 1.575 m (5' 2\")   Wt 58.7 kg (129 lb 6.4 oz)   SpO2 100%   BMI 23.67 kg/m²     Health Maintenance Due   Topic Date Due    Shingles vaccine (2 of 3) 03/28/2008    Respiratory Syncytial Virus (RSV) Pregnant or age 60 yrs+ (1 - 1-dose 75+ series) Never done    DTaP/Tdap/Td vaccine (3 - Td or Tdap) 02/18/2023    COVID-19 Vaccine (4 - 2024-25 season) 09/01/2024         \"Have you been to the ER, urgent care clinic since your last visit?  Hospitalized since your last visit?\"    YES - When: approximately 2 months ago.  Where and Why: [facility] for lower limb ischemia.    “Have you seen or consulted any other health care providers outside of [facility] since your last visit?”    NO

## 2025-04-22 LAB
ALBUMIN SERPL-MCNC: 3.4 G/DL (ref 3.5–5)
ALBUMIN/GLOB SERPL: 1 (ref 1.1–2.2)
ALP SERPL-CCNC: 65 U/L (ref 45–117)
ALT SERPL-CCNC: 19 U/L (ref 12–78)
ANION GAP SERPL CALC-SCNC: 4 MMOL/L (ref 2–12)
AST SERPL-CCNC: 19 U/L (ref 15–37)
BASOPHILS # BLD: 0.06 K/UL (ref 0–0.1)
BASOPHILS NFR BLD: 0.7 % (ref 0–1)
BILIRUB SERPL-MCNC: 0.4 MG/DL (ref 0.2–1)
BUN SERPL-MCNC: 17 MG/DL (ref 6–20)
BUN/CREAT SERPL: 19 (ref 12–20)
CALCIUM SERPL-MCNC: 8.8 MG/DL (ref 8.5–10.1)
CHLORIDE SERPL-SCNC: 108 MMOL/L (ref 97–108)
CHOLEST SERPL-MCNC: 148 MG/DL
CO2 SERPL-SCNC: 29 MMOL/L (ref 21–32)
CREAT SERPL-MCNC: 0.88 MG/DL (ref 0.55–1.02)
DIFFERENTIAL METHOD BLD: ABNORMAL
EOSINOPHIL # BLD: 0.25 K/UL (ref 0–0.4)
EOSINOPHIL NFR BLD: 3 % (ref 0–7)
ERYTHROCYTE [DISTWIDTH] IN BLOOD BY AUTOMATED COUNT: 16.1 % (ref 11.5–14.5)
GLOBULIN SER CALC-MCNC: 3.4 G/DL (ref 2–4)
GLUCOSE SERPL-MCNC: 97 MG/DL (ref 65–100)
HCT VFR BLD AUTO: 34 % (ref 35–47)
HDLC SERPL-MCNC: 67 MG/DL
HDLC SERPL: 2.2 (ref 0–5)
HGB BLD-MCNC: 10.3 G/DL (ref 11.5–16)
IMM GRANULOCYTES # BLD AUTO: 0.03 K/UL (ref 0–0.04)
IMM GRANULOCYTES NFR BLD AUTO: 0.4 % (ref 0–0.5)
LDLC SERPL CALC-MCNC: 63.6 MG/DL (ref 0–100)
LYMPHOCYTES # BLD: 1.6 K/UL (ref 0.8–3.5)
LYMPHOCYTES NFR BLD: 19.2 % (ref 12–49)
MCH RBC QN AUTO: 28.6 PG (ref 26–34)
MCHC RBC AUTO-ENTMCNC: 30.3 G/DL (ref 30–36.5)
MCV RBC AUTO: 94.4 FL (ref 80–99)
MONOCYTES # BLD: 1.09 K/UL (ref 0–1)
MONOCYTES NFR BLD: 13.1 % (ref 5–13)
NEUTS SEG # BLD: 5.3 K/UL (ref 1.8–8)
NEUTS SEG NFR BLD: 63.6 % (ref 32–75)
NRBC # BLD: 0 K/UL (ref 0–0.01)
NRBC BLD-RTO: 0 PER 100 WBC
PLATELET # BLD AUTO: 314 K/UL (ref 150–400)
PMV BLD AUTO: 10 FL (ref 8.9–12.9)
POTASSIUM SERPL-SCNC: 4.6 MMOL/L (ref 3.5–5.1)
PROT SERPL-MCNC: 6.8 G/DL (ref 6.4–8.2)
RBC # BLD AUTO: 3.6 M/UL (ref 3.8–5.2)
SODIUM SERPL-SCNC: 141 MMOL/L (ref 136–145)
T4 FREE SERPL-MCNC: 1.7 NG/DL (ref 0.8–1.5)
TRIGL SERPL-MCNC: 87 MG/DL
TSH SERPL DL<=0.05 MIU/L-ACNC: 0.19 UIU/ML (ref 0.36–3.74)
VLDLC SERPL CALC-MCNC: 17.4 MG/DL
WBC # BLD AUTO: 8.3 K/UL (ref 3.6–11)

## 2025-04-23 ENCOUNTER — RESULTS FOLLOW-UP (OUTPATIENT)
Facility: CLINIC | Age: 80
End: 2025-04-23

## 2025-05-02 ENCOUNTER — TELEPHONE (OUTPATIENT)
Facility: CLINIC | Age: 80
End: 2025-05-02

## 2025-06-09 DIAGNOSIS — M35.3 POLYMYALGIA RHEUMATICA: ICD-10-CM

## 2025-06-09 RX ORDER — DULOXETIN HYDROCHLORIDE 60 MG/1
60 CAPSULE, DELAYED RELEASE ORAL DAILY
Qty: 90 CAPSULE | Refills: 1 | Status: SHIPPED | OUTPATIENT
Start: 2025-06-09

## 2025-06-09 NOTE — TELEPHONE ENCOUNTER
PCP: Endy Torres APRN - NP    Last appt: 4/21/2025    Future Appointments   Date Time Provider Department Center   6/12/2025  1:00 PM Endy Torres APRN - NP [facility] DEP       Requested Prescriptions     Pending Prescriptions Disp Refills    DULoxetine (CYMBALTA) 60 MG extended release capsule [Pharmacy Med Name: DULOXETINE HCL DR CAPS 60MG] 90 capsule 1     Sig: TAKE 1 CAPSULE DAILY

## 2025-06-12 ENCOUNTER — OFFICE VISIT (OUTPATIENT)
Facility: CLINIC | Age: 80
End: 2025-06-12

## 2025-06-12 VITALS
WEIGHT: 129 LBS | HEIGHT: 62 IN | RESPIRATION RATE: 16 BRPM | DIASTOLIC BLOOD PRESSURE: 76 MMHG | SYSTOLIC BLOOD PRESSURE: 124 MMHG | TEMPERATURE: 98.7 F | BODY MASS INDEX: 23.74 KG/M2 | OXYGEN SATURATION: 98 % | HEART RATE: 80 BPM

## 2025-06-12 DIAGNOSIS — K52.9 CHRONIC DIARRHEA: Primary | ICD-10-CM

## 2025-06-17 DIAGNOSIS — M85.80 OSTEOPENIA, UNSPECIFIED LOCATION: ICD-10-CM

## 2025-06-17 NOTE — TELEPHONE ENCOUNTER
PCP: Endy Torres APRN - NP    Last appt: 6/12/2025    Future Appointments   Date Time Provider Department Center   10/15/2025 11:00 AM Endy Torres APRN - NP [facility] DEP       Requested Prescriptions     Pending Prescriptions Disp Refills    alendronate (FOSAMAX) 35 MG tablet [Pharmacy Med Name: ALENDRONATE SOD TABS 4'S 35MG] 12 tablet 1     Sig: Take 1 tablet by mouth every 7 days

## 2025-06-18 RX ORDER — ALENDRONATE SODIUM 35 MG/1
35 TABLET ORAL
Qty: 12 TABLET | Refills: 1 | Status: SHIPPED | OUTPATIENT
Start: 2025-06-18

## 2025-07-01 RX ORDER — ATENOLOL 50 MG/1
50 TABLET ORAL NIGHTLY
Qty: 90 TABLET | Refills: 1 | Status: SHIPPED | OUTPATIENT
Start: 2025-07-01

## 2025-07-01 NOTE — TELEPHONE ENCOUNTER
PCP: Endy Torres APRN - NP    Last appt: 6/12/2025    Future Appointments   Date Time Provider Department Center   10/15/2025 11:00 AM Endy Torres APRN - NP [facility] DEP       Requested Prescriptions     Pending Prescriptions Disp Refills    atenolol (TENORMIN) 50 MG tablet [Pharmacy Med Name: ATENOLOL TABS 50MG] 90 tablet 1     Sig: TAKE 1 TABLET NIGHTLY

## 2025-07-17 ENCOUNTER — TRANSCRIBE ORDERS (OUTPATIENT)
Facility: HOSPITAL | Age: 80
End: 2025-07-17

## 2025-07-17 DIAGNOSIS — I73.9 PERIPHERAL VASCULAR DISEASE, UNSPECIFIED: Primary | ICD-10-CM

## 2025-07-17 DIAGNOSIS — I70.213 ATHEROSCLEROSIS OF NATIVE ARTERY OF BOTH LOWER EXTREMITIES WITH INTERMITTENT CLAUDICATION: ICD-10-CM

## 2025-07-25 DIAGNOSIS — E03.9 ACQUIRED HYPOTHYROIDISM: ICD-10-CM

## 2025-07-25 RX ORDER — LEVOTHYROXINE SODIUM 100 UG/1
100 TABLET ORAL DAILY
Qty: 90 TABLET | Refills: 1 | Status: SHIPPED | OUTPATIENT
Start: 2025-07-25

## 2025-07-25 NOTE — TELEPHONE ENCOUNTER
PCP: Endy Torres, APRN - NP    Last appt: 6/12/2025    No future appointments.    Requested Prescriptions     Pending Prescriptions Disp Refills    levothyroxine (SYNTHROID) 100 MCG tablet [Pharmacy Med Name: L-THYROXINE (SYNTHROID) TABS 100MCG] 90 tablet 3     Sig: TAKE 1 TABLET DAILY

## 2025-09-03 RX ORDER — CILOSTAZOL 100 MG/1
100 TABLET ORAL 2 TIMES DAILY
Qty: 20 TABLET | Refills: 0 | Status: SHIPPED | OUTPATIENT
Start: 2025-09-03

## 2025-09-03 RX ORDER — CILOSTAZOL 100 MG/1
100 TABLET ORAL 2 TIMES DAILY
Qty: 180 TABLET | Refills: 1 | Status: SHIPPED | OUTPATIENT
Start: 2025-09-03

## (undated) DEVICE — GOWN,SIRUS,NONRNF,SETINSLV,2XL,18/CS: Brand: MEDLINE

## (undated) DEVICE — GLOVE ORANGE PI 7 1/2   MSG9075

## (undated) DEVICE — DEVICE CLSR COMPR SYS INTEGR MNOMTR INFL TRNSPAR DOME ADJ

## (undated) DEVICE — PTA BALLOON DILATATION CATHETER: Brand: MUSTANG™

## (undated) DEVICE — Device

## (undated) DEVICE — Device: Brand: VISIONS PV .014P RX DIGITAL IVUS CATHETER

## (undated) DEVICE — CATHETER ANGIO 5FR L70CM GWIRE 0.035IN 1CM SPC OMNI FLSH 21

## (undated) DEVICE — SUTURE MONOCRYL + SZ 4-0 L27IN ABSRB UD L19MM PS-2 3/8 CIR MCP426H

## (undated) DEVICE — X-RAY DETECTABLE SPONGES,16 PLY: Brand: VISTEC

## (undated) DEVICE — INTRODUCER SHTH 6FR CANN L11CM DIL TIP 35MM GRN TUNGSTEN

## (undated) DEVICE — 40580 - THE PINK PAD - ADVANCED TRENDELENBURG POSITIONING KIT: Brand: 40580 - THE PINK PAD - ADVANCED TRENDELENBURG POSITIONING KIT

## (undated) DEVICE — INTRODUCER SHTH 4FR CANN L11CM DIL TIP 25MM RED TUNGSTEN

## (undated) DEVICE — DEVICE VASC CLSR MYNX CONTROL 6FR 7FR 10ML LOK SYR W BLLN CATHETER INTEGR (ORDER MUTLIPLES OF 10 EACH)

## (undated) DEVICE — SYSTEM EVAC SMOKE LAPARSCOPIC

## (undated) DEVICE — SOLUTION IV 500 ML 0.9 NACL INJ EXCEL CONTAINER USP LF

## (undated) DEVICE — OR HYBRID-MRMC: Brand: MEDLINE INDUSTRIES, INC.

## (undated) DEVICE — SYRINGE ANGIO CNTRST DEL 20 CC POLYCARB LIGHT GRN MEDALLION

## (undated) DEVICE — SYRINGE MED 10ML LUERLOCK TIP W/O SFTY DISP

## (undated) DEVICE — TROCAR: Brand: KII FIOS FIRST ENTRY

## (undated) DEVICE — BLADE CLIPPER GEN PURP NS

## (undated) DEVICE — GARMENT,MEDLINE,DVT,INT,CALF,MED, GEN2: Brand: MEDLINE

## (undated) DEVICE — SYRINGE MED 30ML STD CLR PLAS LUERLOCK TIP N CTRL DISP

## (undated) DEVICE — SEALER ONE-STEP 37CM LIGASURE MARYLAND XP

## (undated) DEVICE — RADIFOCUS GLIDEWIRE: Brand: GLIDEWIRE

## (undated) DEVICE — INTRODUCER SHTH 5FR CANN L11CM DIL TIP 25MM GRY TUNGSTEN

## (undated) DEVICE — 4-PORT MANIFOLD: Brand: NEPTUNE 2

## (undated) DEVICE — SOLUTION IV 1000 ML 0.9 NACL INJ USP EXCEL PLAS CONTAINER

## (undated) DEVICE — GLOVE SURG SZ 8 L12IN FNGR THK94MIL STD WHT LTX FREE

## (undated) DEVICE — TROCAR: Brand: KII SLEEVE

## (undated) DEVICE — DESTINATION RENAL GUIDING SHEATH: Brand: DESTINATION

## (undated) DEVICE — GLOVE SURG SZ 65 L12IN FNGR THK94MIL STD WHT LTX FREE

## (undated) DEVICE — 1LYRTR 16FR10ML 100%SILI SNAP: Brand: MEDLINE INDUSTRIES, INC.

## (undated) DEVICE — PROVE COVER: Brand: UNBRANDED

## (undated) DEVICE — DRAPE,C-SECTION,FEN,POUCH,WIRE: Brand: MEDLINE

## (undated) DEVICE — MICROPUNCTURE INTRODUCER SET SILHOUETTE TRANSITIONLESS WITH STAINLESS STEEL WIRE GUIDE: Brand: MICROPUNCTURE

## (undated) DEVICE — MASTISOL ADHESIVE LIQ 2/3ML

## (undated) DEVICE — APPLICATOR MEDICATED 26 CC SOLUTION HI LT ORNG CHLORAPREP

## (undated) DEVICE — HYPODERMIC SAFETY NEEDLE: Brand: MAGELLAN

## (undated) DEVICE — CATHETER ANGIO BER 038 3 CM 5 FRX65 CM SFT N BRAIDED SOFT-VU

## (undated) DEVICE — GENERAL LAPAROSCOPY - SMH: Brand: MEDLINE INDUSTRIES, INC.

## (undated) DEVICE — Device: Brand: GRAND SLAM